# Patient Record
Sex: FEMALE | Race: WHITE | Employment: OTHER | URBAN - METROPOLITAN AREA
[De-identification: names, ages, dates, MRNs, and addresses within clinical notes are randomized per-mention and may not be internally consistent; named-entity substitution may affect disease eponyms.]

---

## 2017-06-10 PROBLEM — E11.3293 MILD NONPROLIFERATIVE DIABETIC RETINOPATHY OF BOTH EYES WITHOUT MACULAR EDEMA ASSOCIATED WITH TYPE 2 DIABETES MELLITUS (HCC): Status: ACTIVE | Noted: 2017-06-10

## 2017-07-20 ENCOUNTER — OFFICE VISIT (OUTPATIENT)
Dept: SURGERY | Facility: CLINIC | Age: 62
End: 2017-07-20

## 2017-07-20 VITALS
BODY MASS INDEX: 19.44 KG/M2 | WEIGHT: 121 LBS | HEART RATE: 78 BPM | HEIGHT: 66 IN | SYSTOLIC BLOOD PRESSURE: 134 MMHG | DIASTOLIC BLOOD PRESSURE: 58 MMHG

## 2017-07-20 DIAGNOSIS — D33.3 ACOUSTIC NEUROMA (HCC): Primary | ICD-10-CM

## 2017-07-20 DIAGNOSIS — R29.810 FACIAL DROOP: ICD-10-CM

## 2017-07-20 PROCEDURE — 82043 UR ALBUMIN QUANTITATIVE: CPT | Performed by: INTERNAL MEDICINE

## 2017-07-20 PROCEDURE — 82570 ASSAY OF URINE CREATININE: CPT | Performed by: INTERNAL MEDICINE

## 2017-07-20 PROCEDURE — 99213 OFFICE O/P EST LOW 20 MIN: CPT | Performed by: NEUROLOGICAL SURGERY

## 2017-07-20 PROCEDURE — 86803 HEPATITIS C AB TEST: CPT | Performed by: INTERNAL MEDICINE

## 2017-07-20 NOTE — PROGRESS NOTES
Neurosurgery Clinic Visit  2017    Harley Hess PCP:  Pippa Turner MD    [birth year] MRN [id number]     HISTORY OF PRESENT ILLNESS:  tom Deshawn is a(n) 57year old female who is here s/p left image guided retrosigmoid craniectomy for tumor

## 2017-07-20 NOTE — PATIENT INSTRUCTIONS
Refill policies:    • Allow 2-3 business days for refills; controlled substances may take longer.   • Contact your pharmacy at least 5 days prior to running out of medication and have them send an electronic request or submit request through the [facility] have a procedure or additional testing performed. [facility]) will contact your insurance carrier to obtain pre-certification or prior authorization.     Unfortunately, JOCELYN has seen an increase in denial of payment even though the p

## 2017-07-26 ENCOUNTER — OFFICE VISIT (OUTPATIENT)
Dept: SURGERY | Facility: CLINIC | Age: 62
End: 2017-07-26

## 2017-07-26 VITALS
HEIGHT: 66 IN | TEMPERATURE: 80 F | SYSTOLIC BLOOD PRESSURE: 175 MMHG | DIASTOLIC BLOOD PRESSURE: 76 MMHG | WEIGHT: 124.38 LBS | HEART RATE: 80 BPM | BODY MASS INDEX: 19.99 KG/M2

## 2017-07-26 DIAGNOSIS — S04.52XA INJURY OF LEFT FACIAL NERVE, INITIAL ENCOUNTER: Primary | ICD-10-CM

## 2017-07-26 DIAGNOSIS — G51.0 FACIAL PARALYSIS: ICD-10-CM

## 2017-07-26 PROCEDURE — 99243 OFF/OP CNSLTJ NEW/EST LOW 30: CPT | Performed by: SURGERY

## 2017-07-26 NOTE — CONSULTS
New Patient Consultation    Chief Complaint: L facial nerve paralysis  History of Present Illness:   Laz Carroll is a 57year old female referred by Dr. Naa Palacios for L faical nerve paralysis.   The patient underwent schwannoma resection in November 2015 impairment          Past Surgical History:  Past Surgical History:  No date: EXTERN DRAIN [address] [facility] PSEUDOCYST,OPEN  6-29-10: HYSTERECTOMY      Comment: exp lap KODAK BSO,  11/23/15: OTHER      Comment: LEFT IMAGE GUIDED CRANIOTOMY FOR RESECTION OF of poor/slow wound healing, weight loss/gain, fertility or hormone problems, cold intolerance, heat intolerance, excessive thirst, or thyroid disease. Allergic/Immunologic:  There is no history of hives, hay fever, angioedema or anaphylaxis.   HEENT:    T fainting/black outs, dizziness, memory problems, loss of sensation/numbness, problems walking, weakness, tingling or burning in hands/feet.    Psychiatric:  There is no history of abusive relationship, bipolar disorder, sleep disturbance,+ anxiety, depressi John Colón to specifically determine the viability of her remaining left facial muscles. If as expected there is limited viability of the motor endplates on the left facial musculature then any nerve transfer  procedure would not be successful.   Ther hematoma, seroma, foreign body reaction, allergic reaction, flap failure, wound dehiscences, delayed wound healing, need for further surgery. Patient understands and  questions were answered. Betina Henderson MD  7/26/2017  12:32 PM

## 2019-02-09 PROCEDURE — 82570 ASSAY OF URINE CREATININE: CPT | Performed by: INTERNAL MEDICINE

## 2019-02-09 PROCEDURE — 82043 UR ALBUMIN QUANTITATIVE: CPT | Performed by: INTERNAL MEDICINE

## 2019-07-24 ENCOUNTER — HOSPITAL ENCOUNTER (EMERGENCY)
Facility: HOSPITAL | Age: 64
Discharge: HOME | End: 2019-07-24
Attending: EMERGENCY MEDICINE
Payer: COMMERCIAL

## 2019-07-24 VITALS
DIASTOLIC BLOOD PRESSURE: 85 MMHG | TEMPERATURE: 98 F | HEIGHT: 66 IN | RESPIRATION RATE: 18 BRPM | HEART RATE: 69 BPM | WEIGHT: 120 LBS | SYSTOLIC BLOOD PRESSURE: 158 MMHG | BODY MASS INDEX: 19.29 KG/M2 | OXYGEN SATURATION: 99 %

## 2019-07-24 DIAGNOSIS — E16.2 HYPOGLYCEMIA: Primary | ICD-10-CM

## 2019-07-24 LAB
GLUCOSE BLD-MCNC: 118 MG/DL (ref 70–99)
GLUCOSE BLD-MCNC: 215 MG/DL (ref 70–99)

## 2019-07-24 PROCEDURE — 99283 EMERGENCY DEPT VISIT LOW MDM: CPT

## 2019-07-24 PROCEDURE — 82962 GLUCOSE BLOOD TEST: CPT

## 2019-07-24 NOTE — ED NOTES
Patient is resting comfortably on cart, awaiting food tray. Pt requesting side rail down, pt given orange juice per RN request for pt to drink.

## 2019-07-24 NOTE — ED NOTES
Pt states she did not eat today, which is normal for her. Pt states she takes a long lasting insulin at night and then prn when eating. Pt states she has had issues over the past couple of weeks since changing insulin.

## 2019-07-24 NOTE — ED PROVIDER NOTES
Patient Seen in: [facility] Emergency Department    History   Patient presents with:  Hypoglycemia (metabolic)    Stated Complaint: low glucose    HPI    Patient is a 69-year-old female, medical history as noted below, here via EMS after she was not Uterine fibroid     large uterine fibroid   • Visual impairment        Past Surgical History:   Procedure Laterality Date   • CRANIOTOMY FOR TUMOR Left 11/25/2015    Performed by Joseph Joseph MD at [facility] MAIN OR   • EXTERN DRAIN [address] [facility] PSEUDOCYST,OPEN cm (5' 6\")   Wt 54.4 kg   SpO2 99%   BMI 19.37 kg/m²         Physical Exam      Constitutional: No distress. Appears well-developed and well-nourished. Head: Normocephalic and atraumatic.    Nose: Nose normal.   Eyes: EOM are normal. Pupils are equal, ro

## 2019-07-24 NOTE — ED NOTES
Pt does not want any further intervention, but is in agreement to eat a food tray. Per livier Wills for general food tray.

## 2019-08-20 NOTE — PROGRESS NOTES
Order given today for EMG to assess for viability of facial muscles for pre-op planning for future facial reconstruction (has known left facial nerve paralysis) - asked specifically for Dr. Ernestine Saldivar for comparison to 2016 study as this is who did her previous
no

## 2021-04-15 ENCOUNTER — APPOINTMENT (OUTPATIENT)
Dept: CT IMAGING | Facility: HOSPITAL | Age: 66
DRG: 638 | End: 2021-04-15
Attending: EMERGENCY MEDICINE
Payer: MEDICARE

## 2021-04-15 ENCOUNTER — HOSPITAL ENCOUNTER (INPATIENT)
Facility: HOSPITAL | Age: 66
LOS: 2 days | Discharge: HOME | DRG: 638 | End: 2021-04-17
Attending: EMERGENCY MEDICINE | Admitting: INTERNAL MEDICINE
Payer: MEDICARE

## 2021-04-15 ENCOUNTER — APPOINTMENT (OUTPATIENT)
Dept: GENERAL RADIOLOGY | Facility: HOSPITAL | Age: 66
DRG: 638 | End: 2021-04-15
Attending: EMERGENCY MEDICINE
Payer: MEDICARE

## 2021-04-15 DIAGNOSIS — T07.XXXA MULTIPLE CONTUSIONS: ICD-10-CM

## 2021-04-15 DIAGNOSIS — E11.10 DIABETIC KETOACIDOSIS WITHOUT COMA ASSOCIATED WITH TYPE 2 DIABETES MELLITUS (HCC): Primary | ICD-10-CM

## 2021-04-15 DIAGNOSIS — S01.01XA SCALP LACERATION, INITIAL ENCOUNTER: ICD-10-CM

## 2021-04-15 PROCEDURE — 99291 CRITICAL CARE FIRST HOUR: CPT | Performed by: NURSE PRACTITIONER

## 2021-04-15 PROCEDURE — 72170 X-RAY EXAM OF PELVIS: CPT | Performed by: EMERGENCY MEDICINE

## 2021-04-15 PROCEDURE — 0HQ0XZZ REPAIR SCALP SKIN, EXTERNAL APPROACH: ICD-10-PCS | Performed by: EMERGENCY MEDICINE

## 2021-04-15 PROCEDURE — 70450 CT HEAD/BRAIN W/O DYE: CPT | Performed by: EMERGENCY MEDICINE

## 2021-04-15 PROCEDURE — 72125 CT NECK SPINE W/O DYE: CPT | Performed by: EMERGENCY MEDICINE

## 2021-04-15 RX ORDER — INSULIN GLARGINE 100 [IU]/ML
26 INJECTION, SOLUTION SUBCUTANEOUS NIGHTLY
Status: ON HOLD | COMMUNITY
End: 2021-04-17

## 2021-04-15 RX ORDER — DEXTROSE AND SODIUM CHLORIDE 5; .45 G/100ML; G/100ML
100 INJECTION, SOLUTION INTRAVENOUS CONTINUOUS PRN
Status: DISCONTINUED | OUTPATIENT
Start: 2021-04-15 | End: 2021-04-16

## 2021-04-15 RX ORDER — HEPARIN SODIUM 5000 [USP'U]/ML
5000 INJECTION, SOLUTION INTRAVENOUS; SUBCUTANEOUS EVERY 8 HOURS SCHEDULED
Status: DISCONTINUED | OUTPATIENT
Start: 2021-04-15 | End: 2021-04-17

## 2021-04-15 RX ORDER — METOCLOPRAMIDE HYDROCHLORIDE 5 MG/ML
5 INJECTION INTRAMUSCULAR; INTRAVENOUS EVERY 8 HOURS PRN
Status: DISCONTINUED | OUTPATIENT
Start: 2021-04-15 | End: 2021-04-17

## 2021-04-15 RX ORDER — SODIUM CHLORIDE 9 MG/ML
INJECTION, SOLUTION INTRAVENOUS CONTINUOUS
Status: DISCONTINUED | OUTPATIENT
Start: 2021-04-15 | End: 2021-04-16

## 2021-04-15 RX ORDER — ASPIRIN 81 MG/1
81 TABLET ORAL DAILY
COMMUNITY

## 2021-04-15 RX ORDER — ONDANSETRON 2 MG/ML
4 INJECTION INTRAMUSCULAR; INTRAVENOUS EVERY 6 HOURS PRN
Status: DISCONTINUED | OUTPATIENT
Start: 2021-04-15 | End: 2021-04-17

## 2021-04-15 RX ORDER — SODIUM CHLORIDE 9 MG/ML
INJECTION, SOLUTION INTRAVENOUS CONTINUOUS
Status: DISCONTINUED | OUTPATIENT
Start: 2021-04-15 | End: 2021-04-15

## 2021-04-15 RX ORDER — DEXTROSE MONOHYDRATE 25 G/50ML
50 INJECTION, SOLUTION INTRAVENOUS
Status: DISCONTINUED | OUTPATIENT
Start: 2021-04-15 | End: 2021-04-17

## 2021-04-15 RX ORDER — ONDANSETRON 2 MG/ML
4 INJECTION INTRAMUSCULAR; INTRAVENOUS EVERY 4 HOURS PRN
Status: DISCONTINUED | OUTPATIENT
Start: 2021-04-15 | End: 2021-04-15

## 2021-04-15 NOTE — ED INITIAL ASSESSMENT (HPI)
Pt fell at home down 12 stairs. Pt has lac to back of head and EMS placed pt in c collar. Pt c/o rt buttock pain. Pt slurring her words.

## 2021-04-16 ENCOUNTER — APPOINTMENT (OUTPATIENT)
Dept: GENERAL RADIOLOGY | Facility: HOSPITAL | Age: 66
DRG: 638 | End: 2021-04-16
Attending: INTERNAL MEDICINE
Payer: MEDICARE

## 2021-04-16 PROCEDURE — 72190 X-RAY EXAM OF PELVIS: CPT | Performed by: INTERNAL MEDICINE

## 2021-04-16 RX ORDER — ACETAMINOPHEN 325 MG/1
650 TABLET ORAL EVERY 6 HOURS PRN
Status: DISCONTINUED | OUTPATIENT
Start: 2021-04-16 | End: 2021-04-17

## 2021-04-16 RX ORDER — ASPIRIN 81 MG/1
81 TABLET ORAL DAILY
Status: DISCONTINUED | OUTPATIENT
Start: 2021-04-16 | End: 2021-04-17

## 2021-04-16 RX ORDER — ATORVASTATIN CALCIUM 10 MG/1
10 TABLET, FILM COATED ORAL NIGHTLY
Refills: 1 | Status: DISCONTINUED | OUTPATIENT
Start: 2021-04-16 | End: 2021-04-17

## 2021-04-16 RX ORDER — SODIUM CHLORIDE 9 MG/ML
INJECTION, SOLUTION INTRAVENOUS CONTINUOUS
Status: DISCONTINUED | OUTPATIENT
Start: 2021-04-16 | End: 2021-04-17

## 2021-04-16 RX ORDER — TETRAHYDROZOLINE HCL 0.05 %
1 DROPS OPHTHALMIC (EYE) 4 TIMES DAILY PRN
Status: DISCONTINUED | OUTPATIENT
Start: 2021-04-16 | End: 2021-04-17

## 2021-04-16 RX ORDER — ESCITALOPRAM OXALATE 20 MG/1
20 TABLET ORAL DAILY
Refills: 1 | Status: DISCONTINUED | OUTPATIENT
Start: 2021-04-16 | End: 2021-04-17

## 2021-04-16 RX ORDER — METOPROLOL SUCCINATE 50 MG/1
50 TABLET, EXTENDED RELEASE ORAL
Status: DISCONTINUED | OUTPATIENT
Start: 2021-04-16 | End: 2021-04-17

## 2021-04-16 RX ORDER — TRAMADOL HYDROCHLORIDE 50 MG/1
50 TABLET ORAL EVERY 12 HOURS PRN
Status: DISCONTINUED | OUTPATIENT
Start: 2021-04-16 | End: 2021-04-17

## 2021-04-16 NOTE — PROGRESS NOTES
04/16/21 1136   Clinical Encounter Type   Visited With Patient   Routine Visit   (Responded to AD consult)   Patient Spiritual Encounters   Spiritual Assessment Completed Yes   Per consult,  invited patient into an Advanced Directive conversatio

## 2021-04-16 NOTE — CONSULTS
Critical Care Consult     Assessment / Plan:  1. DKA  - insulin gtt d/c'd  - levemir and SSI  - IVFs  2. Fall - CT head and spine with no acute process. Possible bilateral inferior pubic ramus fractures  - repeat XR pending  3.  Presyncope - likely due to D mellitus ([facility] [address]    • Uterine fibroid     large uterine fibroid   • Visual impairment        Past Surgical History:   Procedure Laterality Date   • BRAIN SURGERY     • CATARACT     • EXTERN DRAIN PANC PSEUDOCYST,OPEN     • FRACTURE SURGERY      L shoulder soni directed, Disp: 45 mL, Rfl: 3, 4/14/2021 at 2100  Cholecalciferol (VITAMIN D) 1000 UNITS Oral Tab, Take 3,000 units by mouth once daily, Disp: 30 tablet, Rfl: 0, 4/14/2021 at 0900  Multiple Vitamin (ONE-DAILY MULTI VITAMINS) Oral Tab, Take 1 tablet by mout Used    Vaping Use      Vaping Use: Never used    Alcohol use: No      Alcohol/week: 0.0 standard drinks      Comment: quit 1991-- 25 years ago as of 2016    Drug use: No      Family History   Problem Relation Age of Onset   • Cancer Father    • Cancer Mot

## 2021-04-16 NOTE — PROGRESS NOTES
ICU  Critical Care APRN Progress Note    NAME: Sallie Alvarado - ROOM: [address] [place] [address] - MRN: [id number] - Age: 76year old - IBF:4/46/8410    History Of Present Illness:  Sallie Alvarado is a 76year old female with PMHx significant for acoustic neuroma s/p re Uterine fibroid     large uterine fibroid       Social Hx:  Social History    Tobacco Use      Smoking status: Current Every Day Smoker        Packs/day: 0.50        Years: 35.00        Pack years: 17.5        Types: Cigarettes      Smokeless tobacco: Marcial Hopkins MD on 4/15/2021 at 8:20 PM     Finalized by (CST): Raffy Weiss MD on 4/15/2021 at 8:22 PM       CT SPINE CERVICAL (CPT=72125)    Result Date: 4/15/2021  CONCLUSION:   No evidence of acute bony injury to the cervical spine.    Dictated by (CST): Neli Polanco To

## 2021-04-16 NOTE — H&P
[facility] Hospitalist History and Physical      CC: GUMARO    PCP: Dorothy Bonilla MD    History of Present Illness: Patient is a 76year old female with PMH sig for DM, acoustic neuroma sp resection with residual left sided facial droop here with fall yesterday down nightly., Disp: , Rfl:   Losartan Potassium-HCTZ 50-12.5 MG Oral Tab, Take 1 tablet by mouth daily. , Disp: 90 tablet, Rfl: 1  simvastatin 20 MG Oral Tab, Take 1 tablet (20 mg total) by mouth daily. , Disp: 90 tablet, Rfl: 1  Citalopram Hydrobromide 40 MG Or Coughing     Past Social Hx:  Denies heavy ETOH use  Denies illicts    Fam Hx  Family History   Problem Relation Age of Onset   • Cancer Father    • Cancer Mother        Review of Systems: *Negative except as otherwise noted in HPI    Objective:  B Assessment/Plan:   Principal Problem:    Diabetic ketoacidosis without coma associated with type 2 diabetes mellitus ([facility] [address]  Active Problems:    Scalp laceration, initial encounter    Multiple contusions    DAVID [facility]acute kidney [facility]) [facility]    Fall    Pos

## 2021-04-16 NOTE — DIETARY NOTE
[facility]    NUTRITION ASSESSMENT    Pt does not meet malnutrition criteria at this time. NUTRITION INTERVENTION:     1. Meal and Snacks - monitor patient po intake. Encourage adequate po of appropriate diet.    2. Medical Food Supplements -  Ratio kg (116 lb 9.6 oz)  09/09/19 : 54.2 kg (119 lb 6.4 oz)  07/24/19 : 54.4 kg (120 lb)  02/09/19 : 56.2 kg (123 lb 12.8 oz)  09/27/18 : 56.4 kg (124 lb 6.4 oz)       NUTRITION:  Diet: Orders Placed This Encounter      Carbohydrate controlled diet 1800 kcal/60

## 2021-04-16 NOTE — ED QUICK NOTES
Placed patient on bed pan x 2 to attempt to collect urine sample but patient was unsuccessful. Patient back from CT. 2 bags of normal saline finished infusing. Glucose 596. Insulin started at 9units/hr. 3rd bag of normal saline hung at this time.  Patient a

## 2021-04-16 NOTE — PROGRESS NOTES
Received patient this am alert and oriented  NSR on monitor  Lungs clear bilaterally  Lidocaine patch applied to right lower back per [facility]  Ice packs to neck for neck pain  Insulin gtt off at 1030 am per orders  Up to commode with 1 assist  Orders to transf

## 2021-04-16 NOTE — ED PROVIDER NOTES
Patient Seen in: [facility] Emergency Department      History   Patient presents with:  Trauma    Stated Complaint:     HPI/Subjective:   HPI    74-year-old female brought in by EMS after falling down 12 stairs.   Patient reports she felt a little di outbreak on R-anterior thigh   • Uterine fibroid     large uterine fibroid              Past Surgical History:   Procedure Laterality Date   • CRANIOTOMY FOR TUMOR Left 11/25/2015    Performed by Adryan Baxter MD at [facility] MAIN OR   • EXTERN [facility] Resp [facility]   Wt 49 kg   SpO2 100%   BMI 17.36 kg/m²         Physical Exam    General:  Vitals as listed. No acute distress   HEENT: Bleeding from the occipital scalp. There is a 3 cm vertical laceration to the posterior scalp. The galea is intact.   Sclerae order CBC WITH DIFFERENTIAL WITH PLATELET.   Procedure                               Abnormality         Status                     ---------                               -----------         ------                     CBC W/ DIFFERENTIAL[[id number]] is transmitted to the [facility] [address]) which includes the Dose Index Registry.   PATIENT STATED HISTORY: (As transcribed by Technologist)     FINDINGS:  VENTRICLES/SULCI:  Ventricles and sulci are n CONCLUSION:   No evidence of acute bony injury to the cervical spine.    Dictated by (CST): Kwesi Parry MD on 4/15/2021 at 8:32 PM     Finalized by (CST): Kwesi Parry MD on 4/15/2021 at 8:35 PM           Laceration repair:  Laceration was anesthet Laceration repaired as described above. 3 staples used for repair. Staples should be removed in 7 days.   Admission disposition: 4/15/2021  9:33 PM                                  Disposition and Plan     Clinical Impression:  Diabetic ketoacidosis witho

## 2021-04-16 NOTE — PLAN OF CARE
Received pt from ED alert and oriented x4. On insulin gtt. Accu-403. Assisted on bedpan to void. Urine sent. Admission data base completed. Significant other at bedside. On room air. VSS, Afeb. Wants to eat. Ice chips given.   C/o pain to R flank,

## 2021-04-16 NOTE — CM/SW NOTE
CM met with patient to discuss medications. Pt stating that her Lantus cost cate from $25 to $500. Pt stopped using Lantus and adjusted other insulin medication to compensate  without knowledge of her doctor.  Pt has appointment with Dr. Silvestre Mera on [address]

## 2021-04-17 VITALS
BODY MASS INDEX: 18.77 KG/M2 | HEART RATE: 71 BPM | DIASTOLIC BLOOD PRESSURE: 55 MMHG | HEIGHT: 65 IN | TEMPERATURE: 99 F | WEIGHT: 112.63 LBS | OXYGEN SATURATION: 93 % | RESPIRATION RATE: 21 BRPM | SYSTOLIC BLOOD PRESSURE: 145 MMHG

## 2021-04-17 RX ORDER — INSULIN GLARGINE 100 [IU]/ML
26 INJECTION, SOLUTION SUBCUTANEOUS EVERY MORNING
Qty: 1 PEN | Refills: 0 | Status: SHIPPED | OUTPATIENT
Start: 2021-04-17 | End: 2021-07-02

## 2021-04-17 RX ORDER — AMLODIPINE BESYLATE 5 MG/1
5 TABLET ORAL
Status: DISCONTINUED | OUTPATIENT
Start: 2021-04-17 | End: 2021-04-17

## 2021-04-17 RX ORDER — MAGNESIUM OXIDE 400 MG (241.3 MG MAGNESIUM) TABLET
800 TABLET ONCE
Status: COMPLETED | OUTPATIENT
Start: 2021-04-17 | End: 2021-04-17

## 2021-04-17 RX ORDER — POTASSIUM CHLORIDE 20 MEQ/1
40 TABLET, EXTENDED RELEASE ORAL ONCE
Status: COMPLETED | OUTPATIENT
Start: 2021-04-17 | End: 2021-04-17

## 2021-04-17 NOTE — DISCHARGE SUMMARY
General Medicine Discharge Summary     Patient ID:  Emeli Delgado  76year old  9/14/1955    Admit date: 4/15/2021    Discharge date and time:  4/17/21    Attending Physician: Doron Javier, No s/s of acute infection  - CT head OK, Cspine OK, possible mild pelvic fracture though repeat does not show this, she doesn't express any pain  - Not requiring any pain meds, ambulating independently     # DKA  - Corrected now, off insulin drip, restart daily. Losartan Potassium-HCTZ 50-12.5 MG Oral Tab  Take 1 tablet by mouth daily. simvastatin 20 MG Oral Tab  Take 1 tablet (20 mg total) by mouth daily. Citalopram Hydrobromide 40 MG Oral Tab  Take 1 tablet (40 mg total) by mouth daily.     Arthurine Daniele

## 2021-04-17 NOTE — PROGRESS NOTES
Critical Care Progress Note     Assessment / Plan:  1. DKA  - insulin gtt d/c'd  - levemir and SSI  - IVFs  2. Presyncope - likely due to dehydration  - monitor  3. DAVID - improved  - IVFs  4. FEN  - diabetic diet  5. PPx  - subcu heparin  6.  Dispo  - dc ho

## 2021-04-17 NOTE — PLAN OF CARE
Received pt alert and oriented but forgetful at times. On room air. Up to commode with stand by assist several times overnight. Is somewhat impulsive and unsteady at times. Voiding well. Accu 135 last night.   C/o pain to R flank and back of head but r

## 2021-04-19 ENCOUNTER — PATIENT OUTREACH (OUTPATIENT)
Dept: CASE MANAGEMENT | Age: 66
End: 2021-04-19

## 2021-04-19 NOTE — PROGRESS NOTES
1st attempt to contact patient to assist in sched hosp fup-called pt no VM or answer-will attempt later      Follow up for Diabetes (Post-Discharge Follow-Up Orders) Once, Status: Canceled     Priority: Routine       Question: Where to follow-up for Diabet

## 2021-04-20 NOTE — PROGRESS NOTES
2nd attempt to contact patient to assist in sched DM fup-called pt no answer and number goes to fast busy- Pt already has existing appt w/PCP who she sees for DM right now.     Follow up for Diabetes (Post-Discharge Follow-Up Orders) Once, Status: Canceled

## 2021-04-21 NOTE — PROGRESS NOTES
3rd attempt to contact patient to sched DM fup and no answer and phone goes to fast busy signal-closing encounter    Follow up for Diabetes (Post-Discharge Follow-Up Orders) Once, Status: Canceled     Priority: Routine       Question: Where to follow-up fo

## 2021-04-26 ENCOUNTER — PATIENT OUTREACH (OUTPATIENT)
Dept: CASE MANAGEMENT | Age: 66
End: 2021-04-26

## 2021-04-26 NOTE — PROGRESS NOTES
1st attempt to contact Pt    Unable to [address] to review DM Questions, not set up

## 2021-05-03 NOTE — PROGRESS NOTES
2nd attempt to contact Pt.     Unable to [facility] [phone] to review DM Questions, went to fast busy

## 2021-11-29 PROBLEM — E46 PROTEIN-CALORIE MALNUTRITION, UNSPECIFIED SEVERITY (HCC): Status: ACTIVE | Noted: 2021-11-29

## 2022-01-19 PROBLEM — C50.411 MALIGNANT NEOPLASM OF UPPER-OUTER QUADRANT OF RIGHT BREAST IN FEMALE, ESTROGEN RECEPTOR POSITIVE (HCC): Status: ACTIVE | Noted: 2022-01-19

## 2022-01-19 PROBLEM — Z17.0 MALIGNANT NEOPLASM OF UPPER-OUTER QUADRANT OF RIGHT BREAST IN FEMALE, ESTROGEN RECEPTOR POSITIVE (HCC): Status: ACTIVE | Noted: 2022-01-19

## 2022-01-19 PROBLEM — C50.411 MALIGNANT NEOPLASM OF UPPER-OUTER QUADRANT OF RIGHT BREAST IN FEMALE, ESTROGEN RECEPTOR POSITIVE: Status: ACTIVE | Noted: 2022-01-19

## 2022-01-19 PROBLEM — Z17.0 MALIGNANT NEOPLASM OF UPPER-OUTER QUADRANT OF RIGHT BREAST IN FEMALE, ESTROGEN RECEPTOR POSITIVE: Status: ACTIVE | Noted: 2022-01-19

## 2022-08-28 ENCOUNTER — HOSPITAL ENCOUNTER (INPATIENT)
Age: 67
LOS: 10 days | Discharge: HOME HEALTH | DRG: 871 | End: 2022-09-07
Attending: EMERGENCY MEDICINE | Admitting: EMERGENCY MEDICINE

## 2022-08-28 ENCOUNTER — APPOINTMENT (OUTPATIENT)
Dept: ULTRASOUND IMAGING | Age: 67
DRG: 871 | End: 2022-08-28
Attending: EMERGENCY MEDICINE

## 2022-08-28 ENCOUNTER — APPOINTMENT (OUTPATIENT)
Dept: GENERAL RADIOLOGY | Age: 67
DRG: 871 | End: 2022-08-28
Attending: EMERGENCY MEDICINE

## 2022-08-28 ENCOUNTER — APPOINTMENT (OUTPATIENT)
Dept: CT IMAGING | Age: 67
DRG: 871 | End: 2022-08-28
Attending: EMERGENCY MEDICINE

## 2022-08-28 DIAGNOSIS — C50.919 MALIGNANT NEOPLASM OF FEMALE BREAST, UNSPECIFIED ESTROGEN RECEPTOR STATUS, UNSPECIFIED LATERALITY, UNSPECIFIED SITE OF BREAST (CMD): ICD-10-CM

## 2022-08-28 DIAGNOSIS — R50.81 NEUTROPENIC FEVER (CMD): ICD-10-CM

## 2022-08-28 DIAGNOSIS — J18.9 HEALTHCARE-ASSOCIATED PNEUMONIA: ICD-10-CM

## 2022-08-28 DIAGNOSIS — J81.0 ACUTE PULMONARY EDEMA (CMD): ICD-10-CM

## 2022-08-28 DIAGNOSIS — A41.3: Primary | ICD-10-CM

## 2022-08-28 DIAGNOSIS — D70.9 NEUTROPENIC FEVER (CMD): ICD-10-CM

## 2022-08-28 DIAGNOSIS — D69.6 THROMBOCYTOPENIA (CMD): ICD-10-CM

## 2022-08-28 LAB
ABO + RH BLD: NORMAL
ALBUMIN SERPL-MCNC: 1.7 G/DL (ref 3.6–5.1)
ALBUMIN/GLOB SERPL: 0.5 {RATIO} (ref 1–2.4)
ALP SERPL-CCNC: 67 UNITS/L (ref 45–117)
ALT SERPL-CCNC: 17 UNITS/L
ANION GAP SERPL CALC-SCNC: 14 MMOL/L (ref 7–19)
APPEARANCE UR: ABNORMAL
AST SERPL-CCNC: 12 UNITS/L
ATRIAL RATE (BPM): 91
BACTERIA #/AREA URNS HPF: ABNORMAL /HPF
BILIRUB SERPL-MCNC: 0.5 MG/DL (ref 0.2–1)
BILIRUB UR QL STRIP: NEGATIVE
BLD GP AB SCN SERPL QL GEL: NEGATIVE
BLOOD EXPIRATION DATE: NORMAL
BLOOD EXPIRATION DATE: NORMAL
BUN SERPL-MCNC: 57 MG/DL (ref 6–20)
BUN/CREAT SERPL: 46 (ref 7–25)
CALCIUM SERPL-MCNC: 8 MG/DL (ref 8.4–10.2)
CHLORIDE SERPL-SCNC: 90 MMOL/L (ref 97–110)
CO2 SERPL-SCNC: 27 MMOL/L (ref 21–32)
COLOR UR: YELLOW
CREAT SERPL-MCNC: 1.23 MG/DL (ref 0.51–0.95)
CRP SERPL-MCNC: 33.7 MG/DL
D DIMER PPP FEU-MCNC: 2.46 MG/L (FEU)
DEPRECATED RDW RBC: 41.9 FL (ref 39–50)
DISPENSE STATUS: NORMAL
DISPENSE STATUS: NORMAL
ERYTHROCYTE [DISTWIDTH] IN BLOOD: 13.4 % (ref 11–15)
ERYTHROCYTE [SEDIMENTATION RATE] IN BLOOD BY WESTERGREN METHOD: 28 MM/HR (ref 0–20)
FASTING DURATION TIME PATIENT: ABNORMAL H
FLUAV RNA RESP QL NAA+PROBE: NOT DETECTED
FLUBV RNA RESP QL NAA+PROBE: NOT DETECTED
GFR SERPLBLD BASED ON 1.73 SQ M-ARVRAT: 48 ML/MIN
GLOBULIN SER-MCNC: 3.1 G/DL (ref 2–4)
GLUCOSE BLDC GLUCOMTR-MCNC: 101 MG/DL (ref 70–99)
GLUCOSE BLDC GLUCOMTR-MCNC: 140 MG/DL (ref 70–99)
GLUCOSE BLDC GLUCOMTR-MCNC: 150 MG/DL (ref 70–99)
GLUCOSE BLDC GLUCOMTR-MCNC: 154 MG/DL (ref 70–99)
GLUCOSE BLDC GLUCOMTR-MCNC: 192 MG/DL (ref 70–99)
GLUCOSE BLDC GLUCOMTR-MCNC: 34 MG/DL (ref 70–99)
GLUCOSE SERPL-MCNC: 49 MG/DL (ref 70–99)
GLUCOSE UR STRIP-MCNC: NEGATIVE MG/DL
HCT VFR BLD CALC: 22.2 % (ref 36–46.5)
HGB BLD-MCNC: 8 G/DL (ref 12–15.5)
HGB UR QL STRIP: ABNORMAL
HYALINE CASTS #/AREA URNS LPF: ABNORMAL /LPF
ISBT BLOOD TYPE: 6200
ISBT BLOOD TYPE: 6200
ISSUE DATE/TIME: NORMAL
ISSUE DATE/TIME: NORMAL
KETONES UR STRIP-MCNC: NEGATIVE MG/DL
LACTATE BLDV-SCNC: 7 MMOL/L (ref 0–2)
LACTATE BLDV-SCNC: 7.8 MMOL/L (ref 0–2)
LEUKOCYTE ESTERASE UR QL STRIP: NEGATIVE
MCH RBC QN AUTO: 32 PG (ref 26–34)
MCHC RBC AUTO-ENTMCNC: 36 G/DL (ref 32–36.5)
MCV RBC AUTO: 88.8 FL (ref 78–100)
NITRITE UR QL STRIP: NEGATIVE
NRBC BLD MANUAL-RTO: 0 /100 WBC
NT-PROBNP SERPL-MCNC: 6970 PG/ML
P AXIS (DEGREES): 74
PH UR STRIP: 5 [PH] (ref 5–7)
PLATELET # BLD AUTO: 4 K/MCL (ref 140–450)
POTASSIUM SERPL-SCNC: 2.9 MMOL/L (ref 3.4–5.1)
PR-INTERVAL (MSEC): 130
PROCALCITONIN SERPL IA-MCNC: 50.35 NG/ML
PRODUCT CODE: NORMAL
PRODUCT CODE: NORMAL
PRODUCT DESCRIPTION: NORMAL
PRODUCT DESCRIPTION: NORMAL
PRODUCT ID: NORMAL
PRODUCT ID: NORMAL
PROT SERPL-MCNC: 4.8 G/DL (ref 6.4–8.2)
PROT UR STRIP-MCNC: NEGATIVE MG/DL
QRS-INTERVAL (MSEC): 104
QT-INTERVAL (MSEC): 418
QTC: 514
R AXIS (DEGREES): 74
RBC # BLD: 2.5 MIL/MCL (ref 4–5.2)
RBC #/AREA URNS HPF: ABNORMAL /HPF
REPORT TEXT: NORMAL
RSV AG NPH QL IA.RAPID: NOT DETECTED
SARS-COV-2 RNA RESP QL NAA+PROBE: NOT DETECTED
SERVICE CMNT-IMP: NORMAL
SERVICE CMNT-IMP: NORMAL
SODIUM SERPL-SCNC: 128 MMOL/L (ref 135–145)
SP GR UR STRIP: 1.02 (ref 1–1.03)
SQUAMOUS #/AREA URNS HPF: ABNORMAL /HPF
T AXIS (DEGREES): 72
TYPE AND SCREEN EXPIRATION DATE: NORMAL
UNIT BLOOD TYPE: NORMAL
UNIT BLOOD TYPE: NORMAL
UNIT NUMBER: NORMAL
UNIT NUMBER: NORMAL
UROBILINOGEN UR STRIP-MCNC: 0.2 MG/DL
VENTRICULAR RATE EKG/MIN (BPM): 91
WBC # BLD: 0.4 K/MCL (ref 4.2–11)
WBC #/AREA URNS HPF: ABNORMAL /HPF

## 2022-08-28 PROCEDURE — 96375 TX/PRO/DX INJ NEW DRUG ADDON: CPT

## 2022-08-28 PROCEDURE — 86900 BLOOD TYPING SEROLOGIC ABO: CPT | Performed by: EMERGENCY MEDICINE

## 2022-08-28 PROCEDURE — 85027 COMPLETE CBC AUTOMATED: CPT | Performed by: EMERGENCY MEDICINE

## 2022-08-28 PROCEDURE — 84145 PROCALCITONIN (PCT): CPT | Performed by: EMERGENCY MEDICINE

## 2022-08-28 PROCEDURE — C9803 HOPD COVID-19 SPEC COLLECT: HCPCS

## 2022-08-28 PROCEDURE — 96365 THER/PROPH/DIAG IV INF INIT: CPT

## 2022-08-28 PROCEDURE — 93010 ELECTROCARDIOGRAM REPORT: CPT | Performed by: INTERNAL MEDICINE

## 2022-08-28 PROCEDURE — 0241U COVID/FLU/RSV PANEL: CPT | Performed by: EMERGENCY MEDICINE

## 2022-08-28 PROCEDURE — 10004651 HB RX, NO CHARGE ITEM: Performed by: EMERGENCY MEDICINE

## 2022-08-28 PROCEDURE — 10004180 HB COUNTER-TRANSPORT

## 2022-08-28 PROCEDURE — G1004 CDSM NDSC: HCPCS

## 2022-08-28 PROCEDURE — 10002800 HB RX 250 W HCPCS: Performed by: EMERGENCY MEDICINE

## 2022-08-28 PROCEDURE — 93970 EXTREMITY STUDY: CPT

## 2022-08-28 PROCEDURE — 36415 COLL VENOUS BLD VENIPUNCTURE: CPT

## 2022-08-28 PROCEDURE — 10002801 HB RX 250 W/O HCPCS: Performed by: EMERGENCY MEDICINE

## 2022-08-28 PROCEDURE — 83880 ASSAY OF NATRIURETIC PEPTIDE: CPT | Performed by: EMERGENCY MEDICINE

## 2022-08-28 PROCEDURE — 10002807 HB RX 258: Performed by: EMERGENCY MEDICINE

## 2022-08-28 PROCEDURE — 87077 CULTURE AEROBIC IDENTIFY: CPT | Performed by: EMERGENCY MEDICINE

## 2022-08-28 PROCEDURE — 86140 C-REACTIVE PROTEIN: CPT | Performed by: EMERGENCY MEDICINE

## 2022-08-28 PROCEDURE — 93005 ELECTROCARDIOGRAM TRACING: CPT | Performed by: EMERGENCY MEDICINE

## 2022-08-28 PROCEDURE — 87641 MR-STAPH DNA AMP PROBE: CPT | Performed by: EMERGENCY MEDICINE

## 2022-08-28 PROCEDURE — P9073 PLATELETS PHERESIS PATH REDU: HCPCS

## 2022-08-28 PROCEDURE — 81001 URINALYSIS AUTO W/SCOPE: CPT | Performed by: EMERGENCY MEDICINE

## 2022-08-28 PROCEDURE — 10002803 HB RX 637: Performed by: EMERGENCY MEDICINE

## 2022-08-28 PROCEDURE — 83605 ASSAY OF LACTIC ACID: CPT | Performed by: EMERGENCY MEDICINE

## 2022-08-28 PROCEDURE — 85652 RBC SED RATE AUTOMATED: CPT | Performed by: EMERGENCY MEDICINE

## 2022-08-28 PROCEDURE — 94660 CPAP INITIATION&MGMT: CPT

## 2022-08-28 PROCEDURE — 10004281 HB COUNTER-STAFF TIME PER 15 MIN

## 2022-08-28 PROCEDURE — 99285 EMERGENCY DEPT VISIT HI MDM: CPT

## 2022-08-28 PROCEDURE — 85379 FIBRIN DEGRADATION QUANT: CPT | Performed by: EMERGENCY MEDICINE

## 2022-08-28 PROCEDURE — 10002801 HB RX 250 W/O HCPCS

## 2022-08-28 PROCEDURE — 10002807 HB RX 258: Performed by: INTERNAL MEDICINE

## 2022-08-28 PROCEDURE — 71045 X-RAY EXAM CHEST 1 VIEW: CPT

## 2022-08-28 PROCEDURE — 10002805 HB CONTRAST AGENT: Performed by: EMERGENCY MEDICINE

## 2022-08-28 PROCEDURE — 96367 TX/PROPH/DG ADDL SEQ IV INF: CPT

## 2022-08-28 PROCEDURE — 71275 CT ANGIOGRAPHY CHEST: CPT

## 2022-08-28 PROCEDURE — 82962 GLUCOSE BLOOD TEST: CPT

## 2022-08-28 PROCEDURE — 80053 COMPREHEN METABOLIC PANEL: CPT | Performed by: EMERGENCY MEDICINE

## 2022-08-28 PROCEDURE — 10000008 HB ROOM CHARGE ICU OR CCU

## 2022-08-28 PROCEDURE — 87154 CUL TYP ID BLD PTHGN 6+ TRGT: CPT | Performed by: EMERGENCY MEDICINE

## 2022-08-28 PROCEDURE — 10002801 HB RX 250 W/O HCPCS: Performed by: INTERNAL MEDICINE

## 2022-08-28 RX ORDER — GUAIFENESIN 600 MG/1
600 TABLET, EXTENDED RELEASE ORAL 2 TIMES DAILY
COMMUNITY
End: 2023-05-05

## 2022-08-28 RX ORDER — ACETAMINOPHEN 325 MG/1
650 TABLET ORAL EVERY 4 HOURS PRN
Status: DISCONTINUED | OUTPATIENT
Start: 2022-08-28 | End: 2022-09-07 | Stop reason: HOSPADM

## 2022-08-28 RX ORDER — SODIUM CHLORIDE 9 MG/ML
INJECTION, SOLUTION INTRAVENOUS CONTINUOUS PRN
Status: DISCONTINUED | OUTPATIENT
Start: 2022-08-28 | End: 2022-08-29 | Stop reason: ALTCHOICE

## 2022-08-28 RX ORDER — LOSARTAN POTASSIUM AND HYDROCHLOROTHIAZIDE 12.5; 1 MG/1; MG/1
1 TABLET ORAL DAILY
Status: ON HOLD | COMMUNITY
End: 2022-09-07 | Stop reason: HOSPADM

## 2022-08-28 RX ORDER — CITALOPRAM 40 MG/1
40 TABLET ORAL DAILY
COMMUNITY

## 2022-08-28 RX ORDER — PROCHLORPERAZINE MALEATE 10 MG
10 TABLET ORAL EVERY 6 HOURS PRN
COMMUNITY
End: 2023-05-05

## 2022-08-28 RX ORDER — NICOTINE POLACRILEX 4 MG
30 LOZENGE BUCCAL PRN
Status: DISCONTINUED | OUTPATIENT
Start: 2022-08-28 | End: 2022-09-07 | Stop reason: HOSPADM

## 2022-08-28 RX ORDER — POLYETHYLENE GLYCOL 3350 17 G/17G
17 POWDER, FOR SOLUTION ORAL DAILY PRN
Status: DISCONTINUED | OUTPATIENT
Start: 2022-08-28 | End: 2022-09-07 | Stop reason: HOSPADM

## 2022-08-28 RX ORDER — POTASSIUM CHLORIDE 14.9 MG/ML
20 INJECTION INTRAVENOUS ONCE
Status: COMPLETED | OUTPATIENT
Start: 2022-08-28 | End: 2022-08-28

## 2022-08-28 RX ORDER — FAMOTIDINE 10 MG/ML
20 INJECTION, SOLUTION INTRAVENOUS DAILY
Status: DISCONTINUED | OUTPATIENT
Start: 2022-08-28 | End: 2022-08-29

## 2022-08-28 RX ORDER — ONDANSETRON HYDROCHLORIDE 8 MG/1
8 TABLET, FILM COATED ORAL EVERY 8 HOURS PRN
COMMUNITY
End: 2023-05-05

## 2022-08-28 RX ORDER — IPRATROPIUM BROMIDE AND ALBUTEROL SULFATE 2.5; .5 MG/3ML; MG/3ML
3 SOLUTION RESPIRATORY (INHALATION)
Status: DISCONTINUED | OUTPATIENT
Start: 2022-08-28 | End: 2022-09-07 | Stop reason: HOSPADM

## 2022-08-28 RX ORDER — ONDANSETRON HYDROCHLORIDE 8 MG/1
8 TABLET, FILM COATED ORAL EVERY 8 HOURS PRN
Status: DISCONTINUED | OUTPATIENT
Start: 2022-08-28 | End: 2022-09-07 | Stop reason: HOSPADM

## 2022-08-28 RX ORDER — METOPROLOL SUCCINATE 50 MG/1
50 TABLET, EXTENDED RELEASE ORAL DAILY
Status: ON HOLD | COMMUNITY
End: 2022-09-07 | Stop reason: HOSPADM

## 2022-08-28 RX ORDER — DEXTROSE MONOHYDRATE 25 G/50ML
INJECTION, SOLUTION INTRAVENOUS
Status: COMPLETED
Start: 2022-08-28 | End: 2022-08-28

## 2022-08-28 RX ORDER — NICOTINE POLACRILEX 4 MG
15 LOZENGE BUCCAL PRN
Status: DISCONTINUED | OUTPATIENT
Start: 2022-08-28 | End: 2022-09-07 | Stop reason: HOSPADM

## 2022-08-28 RX ORDER — AMOXICILLIN 250 MG
2 CAPSULE ORAL DAILY PRN
Status: DISCONTINUED | OUTPATIENT
Start: 2022-08-28 | End: 2022-09-07 | Stop reason: HOSPADM

## 2022-08-28 RX ORDER — CITALOPRAM 20 MG/1
20 TABLET ORAL DAILY
Status: DISCONTINUED | OUTPATIENT
Start: 2022-08-28 | End: 2022-09-07 | Stop reason: HOSPADM

## 2022-08-28 RX ORDER — AMLODIPINE BESYLATE 5 MG/1
5 TABLET ORAL 2 TIMES DAILY
Status: DISCONTINUED | OUTPATIENT
Start: 2022-08-28 | End: 2022-09-07 | Stop reason: HOSPADM

## 2022-08-28 RX ORDER — DEXAMETHASONE 4 MG/1
8 TABLET ORAL SEE ADMIN INSTRUCTIONS
COMMUNITY
End: 2023-05-05

## 2022-08-28 RX ORDER — BISACODYL 10 MG
10 SUPPOSITORY, RECTAL RECTAL DAILY PRN
Status: DISCONTINUED | OUTPATIENT
Start: 2022-08-28 | End: 2022-09-07 | Stop reason: HOSPADM

## 2022-08-28 RX ORDER — DIPHENHYDRAMINE HCL 25 MG
25 CAPSULE ORAL EVERY 6 HOURS PRN
COMMUNITY

## 2022-08-28 RX ORDER — AMLODIPINE BESYLATE 5 MG/1
5 TABLET ORAL 2 TIMES DAILY
COMMUNITY
End: 2023-05-05

## 2022-08-28 RX ORDER — SODIUM CHLORIDE 9 MG/ML
INJECTION, SOLUTION INTRAVENOUS CONTINUOUS PRN
Status: DISCONTINUED | OUTPATIENT
Start: 2022-08-28 | End: 2022-08-30 | Stop reason: SDUPTHER

## 2022-08-28 RX ORDER — DEXTROSE MONOHYDRATE 25 G/50ML
25 INJECTION, SOLUTION INTRAVENOUS PRN
Status: DISCONTINUED | OUTPATIENT
Start: 2022-08-28 | End: 2022-09-07 | Stop reason: HOSPADM

## 2022-08-28 RX ORDER — 0.9 % SODIUM CHLORIDE 0.9 %
2 VIAL (ML) INJECTION EVERY 12 HOURS SCHEDULED
Status: DISCONTINUED | OUTPATIENT
Start: 2022-08-28 | End: 2022-09-07 | Stop reason: HOSPADM

## 2022-08-28 RX ORDER — INSULIN GLARGINE 100 [IU]/ML
20 INJECTION, SOLUTION SUBCUTANEOUS NIGHTLY
Status: ON HOLD | COMMUNITY
End: 2023-05-09 | Stop reason: SDUPTHER

## 2022-08-28 RX ORDER — SIMVASTATIN 20 MG
20 TABLET ORAL NIGHTLY
Status: ON HOLD | COMMUNITY
End: 2023-05-09 | Stop reason: SDUPTHER

## 2022-08-28 RX ORDER — METOPROLOL SUCCINATE 100 MG/1
50 TABLET, EXTENDED RELEASE ORAL DAILY
Status: DISCONTINUED | OUTPATIENT
Start: 2022-08-28 | End: 2022-08-29

## 2022-08-28 RX ORDER — ATORVASTATIN CALCIUM 10 MG/1
10 TABLET, FILM COATED ORAL NIGHTLY
Status: DISCONTINUED | OUTPATIENT
Start: 2022-08-28 | End: 2022-09-07 | Stop reason: HOSPADM

## 2022-08-28 RX ORDER — DEXTROSE MONOHYDRATE 25 G/50ML
12.5 INJECTION, SOLUTION INTRAVENOUS PRN
Status: DISCONTINUED | OUTPATIENT
Start: 2022-08-28 | End: 2022-09-07 | Stop reason: HOSPADM

## 2022-08-28 RX ORDER — ACETAMINOPHEN 325 MG/1
650 TABLET ORAL EVERY 6 HOURS PRN
COMMUNITY

## 2022-08-28 RX ORDER — POTASSIUM CHLORIDE 14.9 MG/ML
20 INJECTION INTRAVENOUS ONCE
Status: COMPLETED | OUTPATIENT
Start: 2022-08-28 | End: 2022-08-29

## 2022-08-28 RX ORDER — DEXTROSE MONOHYDRATE 25 G/50ML
25 INJECTION, SOLUTION INTRAVENOUS ONCE
Status: COMPLETED | OUTPATIENT
Start: 2022-08-28 | End: 2022-08-28

## 2022-08-28 RX ADMIN — CEFEPIME 1000 MG: 1 INJECTION, POWDER, FOR SOLUTION INTRAMUSCULAR; INTRAVENOUS at 14:58

## 2022-08-28 RX ADMIN — IOHEXOL 75 ML: 350 INJECTION, SOLUTION INTRAVENOUS at 16:26

## 2022-08-28 RX ADMIN — AZITHROMYCIN 500 MG: 500 INJECTION, POWDER, LYOPHILIZED, FOR SOLUTION INTRAVENOUS at 15:38

## 2022-08-28 RX ADMIN — FAMOTIDINE 20 MG: 10 INJECTION INTRAVENOUS at 21:18

## 2022-08-28 RX ADMIN — SODIUM CHLORIDE, POTASSIUM CHLORIDE, SODIUM LACTATE AND CALCIUM CHLORIDE 1440 ML: 600; 310; 30; 20 INJECTION, SOLUTION INTRAVENOUS at 14:30

## 2022-08-28 RX ADMIN — POTASSIUM CHLORIDE 20 MEQ: 14.9 INJECTION, SOLUTION INTRAVENOUS at 16:15

## 2022-08-28 RX ADMIN — ATORVASTATIN CALCIUM 10 MG: 20 TABLET, FILM COATED ORAL at 21:17

## 2022-08-28 RX ADMIN — DOXYCYCLINE 100 MG: 100 INJECTION, POWDER, LYOPHILIZED, FOR SOLUTION INTRAVENOUS at 22:02

## 2022-08-28 RX ADMIN — SODIUM CHLORIDE, PRESERVATIVE FREE 2 ML: 5 INJECTION INTRAVENOUS at 21:30

## 2022-08-28 RX ADMIN — CEFEPIME HYDROCHLORIDE 2000 MG: 2 INJECTION, POWDER, FOR SOLUTION INTRAVENOUS at 21:33

## 2022-08-28 RX ADMIN — DEXTROSE MONOHYDRATE 25 G: 25 INJECTION, SOLUTION INTRAVENOUS at 14:15

## 2022-08-28 RX ADMIN — VANCOMYCIN HYDROCHLORIDE 1000 MG: 1 INJECTION, POWDER, LYOPHILIZED, FOR SOLUTION INTRAVENOUS at 16:37

## 2022-08-28 RX ADMIN — POTASSIUM CHLORIDE 20 MEQ: 14.9 INJECTION, SOLUTION INTRAVENOUS at 20:33

## 2022-08-28 RX ADMIN — CITALOPRAM HYDROBROMIDE 20 MG: 20 TABLET ORAL at 21:17

## 2022-08-28 ASSESSMENT — LIFESTYLE VARIABLES
LAST DRINK YOU HAD: DENIES INTAKE
TRANSFERRING: INDEPENDENT
ARE YOU BLIND OR DO YOU HAVE SERIOUS DIFFICULTY SEEING, EVEN WHEN WEARING GLASSES: YES
HOW MUCH WEIGHT HAVE YOU LOST: 2
HOW MANY STANDARD DRINKS CONTAINING ALCOHOL DO YOU HAVE ON A TYPICAL DAY: 0,1 OR 2
SHORT OF BREATH OR FATIGUE WITH ADLS: YES
SMOKING_YEARS: NO
ALCOHOL_USE_STATUS: NO OR LOW RISK WITH VALIDATED TOOL
ARE YOU DEAF OR DO YOU HAVE SERIOUS DIFFICULTY  HEARING: YES
ADL SCORE: 12
RECENTLY LOST WEIGHT WITHOUT TRYING: 0
TOILETING: INDEPENDENT
DRESSING: INDEPENDENT
ADL BEFORE ADMISSION: NEEDS/REQUIRES ASSISTANCE
ADL NEEDS ASSIST: NO
HISTORY OF PROBLEMS WHEN YOU STOP DRINKING ALCOHOL: NO
ENJOYING LIFE WITHOUT USING ALCOHOL OR DRUGS: INDEPENDENT
HAVE YOU BEEN EATING POORLY BECAUSE OF A DECREASED APPETITE: 1
RECENT DECLINE IN ADLS: YES, ACUTE ILLNESS WITHOUT THERAPY NEEDS
HOW OFTEN DO YOU HAVE A DRINK CONTAINING ALCOHOL: NEVER
AUDIT-C TOTAL SCORE: 0
IS PATIENT ABLE TO COMPLETE ASSESSMENT AT THIS TIME: YES
HOW OFTEN DO YOU HAVE 6 OR MORE DRINKS ON ONE OCCASION: NEVER
FEEDING: INDEPENDENT
CHRONIC/CANCER PAIN PRESENT: NO
CONTINENCE: INDEPENDENT
MALNUTRITION SCORING TOOL (MST) SCORE: 3

## 2022-08-28 ASSESSMENT — PAIN SCALES - GENERAL
PAINLEVEL_OUTOF10: 0
PAINLEVEL_OUTOF10: 5

## 2022-08-28 ASSESSMENT — ACTIVITIES OF DAILY LIVING (ADL): SENSORY_SUPPORT_DEVICES: DENTURES

## 2022-08-28 ASSESSMENT — COLUMBIA-SUICIDE SEVERITY RATING SCALE - C-SSRS
6. HAVE YOU EVER DONE ANYTHING, STARTED TO DO ANYTHING, OR PREPARED TO DO ANYTHING TO END YOUR LIFE?: NO
2. HAVE YOU ACTUALLY HAD ANY THOUGHTS OF KILLING YOURSELF?: NO
1. WITHIN THE PAST MONTH, HAVE YOU WISHED YOU WERE DEAD OR WISHED YOU COULD GO TO SLEEP AND NOT WAKE UP?: NO
IS THE PATIENT ABLE TO COMPLETE C-SSRS: YES

## 2022-08-28 ASSESSMENT — PATIENT HEALTH QUESTIONNAIRE - PHQ9
CLINICAL INTERPRETATION OF PHQ2 SCORE: NO FURTHER SCREENING NEEDED
IS PATIENT ABLE TO COMPLETE PHQ2 OR PHQ9: YES
1. LITTLE INTEREST OR PLEASURE IN DOING THINGS: NOT AT ALL
SUM OF ALL RESPONSES TO PHQ9 QUESTIONS 1 AND 2: 0
SUM OF ALL RESPONSES TO PHQ9 QUESTIONS 1 AND 2: 0
2. FEELING DOWN, DEPRESSED OR HOPELESS: NOT AT ALL

## 2022-08-28 ASSESSMENT — COGNITIVE AND FUNCTIONAL STATUS - GENERAL
BECAUSE OF A PHYSICAL, MENTAL, OR EMOTIONAL CONDITION, DO YOU HAVE DIFFICULTY DOING ERRANDS ALONE: YES
DO YOU HAVE DIFFICULTY DRESSING OR BATHING: NO
DO YOU HAVE SERIOUS DIFFICULTY WALKING OR CLIMBING STAIRS: NO
BECAUSE OF A PHYSICAL, MENTAL, OR EMOTIONAL CONDITION, DO YOU HAVE SERIOUS DIFFICULTY CONCENTRATING, REMEMBERING OR MAKING DECISIONS: NO

## 2022-08-29 ENCOUNTER — APPOINTMENT (OUTPATIENT)
Dept: CARDIOLOGY | Age: 67
DRG: 871 | End: 2022-08-29
Attending: EMERGENCY MEDICINE

## 2022-08-29 LAB
ACANTHOCYTES BLD QL SMEAR: ABNORMAL
ALBUMIN SERPL-MCNC: 1.5 G/DL (ref 3.6–5.1)
ALBUMIN/GLOB SERPL: 0.6 {RATIO} (ref 1–2.4)
ALP SERPL-CCNC: 57 UNITS/L (ref 45–117)
ALT SERPL-CCNC: 14 UNITS/L
ANION GAP SERPL CALC-SCNC: 11 MMOL/L (ref 7–19)
AORTIC VALVE AREA: NORMAL
ASCENDING AORTA (AAD): 3.1
AST SERPL-CCNC: 14 UNITS/L
ATRIAL RATE (BPM): 88
AV MEAN GRADIENT (AVMG): NORMAL
AV MEAN VELOCITY (AVMV): NORMAL
AV PEAK GRADIENT (AVPG): NORMAL
AV PEAK VELOCITY (AVPV): NORMAL
AV STENOSIS SEVERITY TEXT: NORMAL
BASOPHILS # BLD: 0 K/MCL (ref 0–0.3)
BASOPHILS NFR BLD: 0 %
BILIRUB SERPL-MCNC: 1 MG/DL (ref 0.2–1)
BLOOD EXPIRATION DATE: NORMAL
BLOOD EXPIRATION DATE: NORMAL
BUN SERPL-MCNC: 52 MG/DL (ref 6–20)
BUN/CREAT SERPL: 51 (ref 7–25)
CALCIUM SERPL-MCNC: 7.6 MG/DL (ref 8.4–10.2)
CHLORIDE SERPL-SCNC: 95 MMOL/L (ref 97–110)
CO2 SERPL-SCNC: 29 MMOL/L (ref 21–32)
CORTIS SERPL-MCNC: 55.7 MCG/DL (ref 3.4–22.5)
CREAT SERPL-MCNC: 1.01 MG/DL (ref 0.51–0.95)
CROSSMATCH RESULT: NORMAL
DEPRECATED RDW RBC: 41.2 FL (ref 39–50)
DEPRECATED RDW RBC: 42.2 FL (ref 39–50)
DISPENSE STATUS: NORMAL
DISPENSE STATUS: NORMAL
DOP CALC LVOT PEAK VEL (LVOTPV): 0.7
E WAVE DECELARATION TIME (MDT): 215
EOSINOPHIL # BLD: 0 K/MCL (ref 0–0.5)
EOSINOPHIL NFR BLD: 0 %
ERYTHROCYTE [DISTWIDTH] IN BLOOD: 13.4 % (ref 11–15)
ERYTHROCYTE [DISTWIDTH] IN BLOOD: 13.7 % (ref 11–15)
EST RIGHT VENT SYSTOLIC PRESSURE BY TRICUSPID REGURGITATION JET (RVSP): 27.4
FASTING DURATION TIME PATIENT: ABNORMAL H
GFR SERPLBLD BASED ON 1.73 SQ M-ARVRAT: 61 ML/MIN
GLOBULIN SER-MCNC: 2.7 G/DL (ref 2–4)
GLUCOSE BLDC GLUCOMTR-MCNC: 178 MG/DL (ref 70–99)
GLUCOSE BLDC GLUCOMTR-MCNC: 213 MG/DL (ref 70–99)
GLUCOSE BLDC GLUCOMTR-MCNC: 96 MG/DL (ref 70–99)
GLUCOSE SERPL-MCNC: 73 MG/DL (ref 70–99)
HBA1C MFR BLD: 9.5 % (ref 4.5–5.6)
HCT VFR BLD CALC: 17.5 % (ref 36–46.5)
HCT VFR BLD CALC: 22.8 % (ref 36–46.5)
HGB BLD-MCNC: 6.3 G/DL (ref 12–15.5)
HGB BLD-MCNC: 8.3 G/DL (ref 12–15.5)
INTERVENTRICULAR SEPTUM IN END DIASTOLE (IVSD): 0.92
ISBT BLOOD TYPE: 5100
ISBT BLOOD TYPE: 6200
ISSUE DATE/TIME: NORMAL
ISSUE DATE/TIME: NORMAL
LACTATE BLDV-SCNC: 2.4 MMOL/L (ref 0–2)
LACTATE BLDV-SCNC: 3.4 MMOL/L (ref 0–2)
LEFT INTERNAL DIMENSION IN SYSTOLE (LVSD): 2.8
LEFT VENTRICULAR INTERNAL DIMENSION IN DIASTOLE (LVDD): 4.45
LEFT VENTRICULAR POSTERIOR WALL IN END DIASTOLE (LVPW): 1
LV EF: NORMAL %
LVOT VTI (LVOTVTI): 17.4
LYMPHOCYTES # BLD: 0.1 K/MCL (ref 1–4)
LYMPHOCYTES NFR BLD: 4 %
MAGNESIUM SERPL-MCNC: 1.4 MG/DL (ref 1.7–2.4)
MCH RBC QN AUTO: 32 PG (ref 26–34)
MCH RBC QN AUTO: 32.3 PG (ref 26–34)
MCHC RBC AUTO-ENTMCNC: 36 G/DL (ref 32–36.5)
MCHC RBC AUTO-ENTMCNC: 36.4 G/DL (ref 32–36.5)
MCV RBC AUTO: 88 FL (ref 78–100)
MCV RBC AUTO: 89.7 FL (ref 78–100)
METAMYELOCYTES NFR BLD: 1 % (ref 0–2)
MONOCYTES # BLD: 0 K/MCL (ref 0.3–0.9)
MONOCYTES NFR BLD: 3 %
MRSA DNA SPEC QL NAA+PROBE: NOT DETECTED
MV E TISSUE VEL LAT (MELV): 12.1
MV E TISSUE VEL MED (MESV): 8.16
MV E WAVE VEL/E TISSUE VEL MED(MSR): 13.36
MV PEAK A VELOCITY (MVPAV): 0.91
MV PEAK E VELOCITY (MVPEV): 1.09
NEUTROPHILS # BLD: 1.5 K/MCL (ref 1.8–7.7)
NEUTS BAND NFR BLD: 7 % (ref 0–10)
NEUTS SEG NFR BLD: 85 %
NRBC BLD MANUAL-RTO: 1 /100 WBC
NRBC BLD MANUAL-RTO: 1 /100 WBC
PHOSPHATE SERPL-MCNC: 2 MG/DL (ref 2.4–4.7)
PLAT MORPH BLD: NORMAL
PLATELET # BLD AUTO: 17 K/MCL (ref 140–450)
PLATELET # BLD AUTO: 42 K/MCL (ref 140–450)
POTASSIUM SERPL-SCNC: 3.1 MMOL/L (ref 3.4–5.1)
POTASSIUM SERPL-SCNC: 3.7 MMOL/L (ref 3.4–5.1)
PROCALCITONIN SERPL IA-MCNC: 39.8 NG/ML
PRODUCT CODE: NORMAL
PRODUCT CODE: NORMAL
PRODUCT DESCRIPTION: NORMAL
PRODUCT DESCRIPTION: NORMAL
PRODUCT ID: NORMAL
PRODUCT ID: NORMAL
PROT SERPL-MCNC: 4.2 G/DL (ref 6.4–8.2)
QRS-INTERVAL (MSEC): 80
QT-INTERVAL (MSEC): 358
QTC: 486
R AXIS (DEGREES): 62
RBC # BLD: 1.95 MIL/MCL (ref 4–5.2)
RBC # BLD: 2.59 MIL/MCL (ref 4–5.2)
REPORT TEXT: NORMAL
SODIUM SERPL-SCNC: 132 MMOL/L (ref 135–145)
T AXIS (DEGREES): 71
TRICUSPID VALVE PEAK REGURGITATION VELOCITY (TRPV): 2.47
TSH SERPL-ACNC: 0.47 MCUNITS/ML (ref 0.35–5)
TV ESTIMATED RIGHT ARTERIAL PRESSURE (RAP): 3
UNIT BLOOD TYPE: NORMAL
UNIT BLOOD TYPE: NORMAL
UNIT NUMBER: NORMAL
UNIT NUMBER: NORMAL
VENTRICULAR RATE EKG/MIN (BPM): 111
WBC # BLD: 1.6 K/MCL (ref 4.2–11)
WBC # BLD: 3.2 K/MCL (ref 4.2–11)
WBC TOXIC VACUOLES BLD QL SMEAR: PRESENT

## 2022-08-29 PROCEDURE — 10004651 HB RX, NO CHARGE ITEM: Performed by: EMERGENCY MEDICINE

## 2022-08-29 PROCEDURE — 84443 ASSAY THYROID STIM HORMONE: CPT | Performed by: EMERGENCY MEDICINE

## 2022-08-29 PROCEDURE — 13003289 HB OXYGEN THERAPY DAILY

## 2022-08-29 PROCEDURE — 10002800 HB RX 250 W HCPCS: Performed by: INTERNAL MEDICINE

## 2022-08-29 PROCEDURE — 85027 COMPLETE CBC AUTOMATED: CPT | Performed by: EMERGENCY MEDICINE

## 2022-08-29 PROCEDURE — 93005 ELECTROCARDIOGRAM TRACING: CPT | Performed by: EMERGENCY MEDICINE

## 2022-08-29 PROCEDURE — 10002807 HB RX 258: Performed by: INTERNAL MEDICINE

## 2022-08-29 PROCEDURE — 93306 TTE W/DOPPLER COMPLETE: CPT

## 2022-08-29 PROCEDURE — 10002803 HB RX 637: Performed by: INTERNAL MEDICINE

## 2022-08-29 PROCEDURE — 10002803 HB RX 637: Performed by: HOSPITALIST

## 2022-08-29 PROCEDURE — 97535 SELF CARE MNGMENT TRAINING: CPT

## 2022-08-29 PROCEDURE — 97530 THERAPEUTIC ACTIVITIES: CPT

## 2022-08-29 PROCEDURE — 87633 RESP VIRUS 12-25 TARGETS: CPT | Performed by: INTERNAL MEDICINE

## 2022-08-29 PROCEDURE — P9073 PLATELETS PHERESIS PATH REDU: HCPCS

## 2022-08-29 PROCEDURE — 97161 PT EVAL LOW COMPLEX 20 MIN: CPT

## 2022-08-29 PROCEDURE — 10002803 HB RX 637: Performed by: EMERGENCY MEDICINE

## 2022-08-29 PROCEDURE — P9016 RBC LEUKOCYTES REDUCED: HCPCS

## 2022-08-29 PROCEDURE — 84100 ASSAY OF PHOSPHORUS: CPT | Performed by: EMERGENCY MEDICINE

## 2022-08-29 PROCEDURE — 10002801 HB RX 250 W/O HCPCS: Performed by: INTERNAL MEDICINE

## 2022-08-29 PROCEDURE — 97116 GAIT TRAINING THERAPY: CPT

## 2022-08-29 PROCEDURE — 10006031 HB ROOM CHARGE TELEMETRY

## 2022-08-29 PROCEDURE — 10002016 HB COUNTER INCENTIVE SPIROMETRY

## 2022-08-29 PROCEDURE — 84132 ASSAY OF SERUM POTASSIUM: CPT | Performed by: INTERNAL MEDICINE

## 2022-08-29 PROCEDURE — 83735 ASSAY OF MAGNESIUM: CPT | Performed by: EMERGENCY MEDICINE

## 2022-08-29 PROCEDURE — 83605 ASSAY OF LACTIC ACID: CPT | Performed by: NURSE PRACTITIONER

## 2022-08-29 PROCEDURE — 83036 HEMOGLOBIN GLYCOSYLATED A1C: CPT | Performed by: EMERGENCY MEDICINE

## 2022-08-29 PROCEDURE — 97166 OT EVAL MOD COMPLEX 45 MIN: CPT

## 2022-08-29 PROCEDURE — 10002800 HB RX 250 W HCPCS: Performed by: EMERGENCY MEDICINE

## 2022-08-29 PROCEDURE — 83605 ASSAY OF LACTIC ACID: CPT | Performed by: EMERGENCY MEDICINE

## 2022-08-29 PROCEDURE — 10002807 HB RX 258: Performed by: EMERGENCY MEDICINE

## 2022-08-29 PROCEDURE — 80053 COMPREHEN METABOLIC PANEL: CPT | Performed by: EMERGENCY MEDICINE

## 2022-08-29 PROCEDURE — 82533 TOTAL CORTISOL: CPT | Performed by: EMERGENCY MEDICINE

## 2022-08-29 PROCEDURE — 85027 COMPLETE CBC AUTOMATED: CPT | Performed by: NURSE PRACTITIONER

## 2022-08-29 PROCEDURE — 84145 PROCALCITONIN (PCT): CPT | Performed by: EMERGENCY MEDICINE

## 2022-08-29 RX ORDER — POTASSIUM CHLORIDE 14.9 MG/ML
20 INJECTION INTRAVENOUS ONCE
Status: DISCONTINUED | OUTPATIENT
Start: 2022-08-29 | End: 2022-08-29

## 2022-08-29 RX ORDER — POTASSIUM CHLORIDE 14.9 MG/ML
20 INJECTION INTRAVENOUS ONCE
Status: COMPLETED | OUTPATIENT
Start: 2022-08-29 | End: 2022-08-29

## 2022-08-29 RX ORDER — LANOLIN ALCOHOL/MO/W.PET/CERES
3 CREAM (GRAM) TOPICAL NIGHTLY
Status: COMPLETED | OUTPATIENT
Start: 2022-08-29 | End: 2022-08-29

## 2022-08-29 RX ORDER — SODIUM CHLORIDE 9 MG/ML
INJECTION, SOLUTION INTRAVENOUS CONTINUOUS PRN
Status: DISCONTINUED | OUTPATIENT
Start: 2022-08-29 | End: 2022-08-30 | Stop reason: SDUPTHER

## 2022-08-29 RX ORDER — POTASSIUM CHLORIDE 14.9 MG/ML
20 INJECTION INTRAVENOUS ONCE
Status: COMPLETED | OUTPATIENT
Start: 2022-08-29 | End: 2022-08-30

## 2022-08-29 RX ORDER — LANOLIN ALCOHOL/MO/W.PET/CERES
3 CREAM (GRAM) TOPICAL ONCE
Status: COMPLETED | OUTPATIENT
Start: 2022-08-29 | End: 2022-08-29

## 2022-08-29 RX ADMIN — MAGNESIUM SULFATE HEPTAHYDRATE 2 G: 40 INJECTION, SOLUTION INTRAVENOUS at 09:39

## 2022-08-29 RX ADMIN — CEFEPIME HYDROCHLORIDE 2000 MG: 2 INJECTION, POWDER, FOR SOLUTION INTRAVENOUS at 09:41

## 2022-08-29 RX ADMIN — DOXYCYCLINE 100 MG: 100 INJECTION, POWDER, LYOPHILIZED, FOR SOLUTION INTRAVENOUS at 11:54

## 2022-08-29 RX ADMIN — DOXYCYCLINE 100 MG: 100 INJECTION, POWDER, LYOPHILIZED, FOR SOLUTION INTRAVENOUS at 21:28

## 2022-08-29 RX ADMIN — Medication 3 MG: at 22:39

## 2022-08-29 RX ADMIN — AZITHROMYCIN DIHYDRATE 500 MG: 500 INJECTION, POWDER, LYOPHILIZED, FOR SOLUTION INTRAVENOUS at 15:28

## 2022-08-29 RX ADMIN — Medication 3 MG: at 02:09

## 2022-08-29 RX ADMIN — CITALOPRAM HYDROBROMIDE 20 MG: 20 TABLET ORAL at 09:40

## 2022-08-29 RX ADMIN — ATORVASTATIN CALCIUM 10 MG: 20 TABLET, FILM COATED ORAL at 21:12

## 2022-08-29 RX ADMIN — POTASSIUM CHLORIDE 20 MEQ: 14.9 INJECTION, SOLUTION INTRAVENOUS at 06:19

## 2022-08-29 RX ADMIN — POTASSIUM CHLORIDE 20 MEQ: 14.9 INJECTION, SOLUTION INTRAVENOUS at 21:23

## 2022-08-29 RX ADMIN — ACETAMINOPHEN 650 MG: 325 TABLET ORAL at 02:08

## 2022-08-29 RX ADMIN — METOPROLOL TARTRATE 25 MG: 25 TABLET, FILM COATED ORAL at 21:13

## 2022-08-29 RX ADMIN — SODIUM CHLORIDE, PRESERVATIVE FREE 2 ML: 5 INJECTION INTRAVENOUS at 21:32

## 2022-08-29 RX ADMIN — INSULIN LISPRO 1 UNITS: 100 INJECTION, SOLUTION INTRAVENOUS; SUBCUTANEOUS at 18:11

## 2022-08-29 RX ADMIN — MAGNESIUM SULFATE HEPTAHYDRATE 2 G: 40 INJECTION, SOLUTION INTRAVENOUS at 06:21

## 2022-08-29 RX ADMIN — ACETAMINOPHEN 650 MG: 325 TABLET ORAL at 21:13

## 2022-08-29 RX ADMIN — CEFEPIME HYDROCHLORIDE 2000 MG: 2 INJECTION, POWDER, FOR SOLUTION INTRAVENOUS at 21:20

## 2022-08-29 RX ADMIN — METOPROLOL TARTRATE 25 MG: 25 TABLET, FILM COATED ORAL at 13:35

## 2022-08-29 RX ADMIN — INSULIN LISPRO 2 UNITS: 100 INJECTION, SOLUTION INTRAVENOUS; SUBCUTANEOUS at 23:25

## 2022-08-29 RX ADMIN — POTASSIUM PHOSPHATE, MONOBASIC AND POTASSIUM PHOSPHATE, DIBASIC 45 MMOL: 224; 236 INJECTION, SOLUTION, CONCENTRATE INTRAVENOUS at 08:11

## 2022-08-29 RX ADMIN — SODIUM CHLORIDE, PRESERVATIVE FREE 2 ML: 5 INJECTION INTRAVENOUS at 09:40

## 2022-08-29 ASSESSMENT — COGNITIVE AND FUNCTIONAL STATUS - GENERAL
HELP NEEDED FOR TOILETING: A LITTLE
BASIC_MOBILITY_CONVERTED_SCORE: 39.67
BASIC_MOBILITY_RAW_SCORE: 17
HELP NEEDED DRESSING REGULAR UPPER BODY CLOTHING: A LITTLE
DAILY_ACTIVITY_CONVERTED_SCORE: 38.66
HELP NEEDED DRESSING REGULAR LOWER BODY CLOTHING: A LITTLE
HELP NEEDED FOR BATHING: A LOT
DAILY_ACTIVITY_RAW_SCORE: 18
HELP NEEDED FOR PERSONAL GROOMING: A LITTLE

## 2022-08-29 ASSESSMENT — PAIN SCALES - GENERAL
PAINLEVEL_OUTOF10: 2
PAINLEVEL_OUTOF10: 2
PAINLEVEL_OUTOF10: 0

## 2022-08-29 ASSESSMENT — ACTIVITIES OF DAILY LIVING (ADL)
PRIOR_ADL: INDEPENDENT
PRIOR_ADL_BATHING: INDEPENDENT
HOME_MANAGEMENT_TIME_ENTRY: 13
PRIOR_ADL_TOILETING: INDEPENDENT

## 2022-08-29 ASSESSMENT — ENCOUNTER SYMPTOMS: PAIN SEVERITY NOW: 0

## 2022-08-30 ENCOUNTER — CASE MANAGEMENT (OUTPATIENT)
Dept: CARE COORDINATION | Age: 67
End: 2022-08-30

## 2022-08-30 LAB
A BAUMANNII DNA BLD POS QL NAA+NON-PROBE: NOT DETECTED
ANION GAP SERPL CALC-SCNC: 12 MMOL/L (ref 7–19)
B FRAGILIS DNA BLD POS QL NAA+NON-PROBE: NOT DETECTED
BLOOD EXPIRATION DATE: NORMAL
BUN SERPL-MCNC: 50 MG/DL (ref 6–20)
BUN/CREAT SERPL: 61 (ref 7–25)
C ALBICANS DNA BLD POS QL NAA+NON-PROBE: NOT DETECTED
C AURIS DNA BLD POS QL NAA+NON-PROBE: NOT DETECTED
C GATTII+NEOFOR DNA BLD POS QL NAA+N-PRB: NOT DETECTED
C GLABRATA DNA BLD POS QL NAA+NON-PROBE: NOT DETECTED
C KRUSEI DNA BLD POS QL NAA+NON-PROBE: NOT DETECTED
C PARAP DNA BLD POS QL NAA+NON-PROBE: NOT DETECTED
C PNEUM DNA SPEC QL NAA+PROBE: NOT DETECTED
C TROPICLS DNA BLD POS QL NAA+NON-PROBE: NOT DETECTED
CALCIUM SERPL-MCNC: 8.3 MG/DL (ref 8.4–10.2)
CHLORIDE SERPL-SCNC: 97 MMOL/L (ref 97–110)
CO2 SERPL-SCNC: 26 MMOL/L (ref 21–32)
CREAT SERPL-MCNC: 0.82 MG/DL (ref 0.51–0.95)
DEPRECATED RDW RBC: 44.1 FL (ref 39–50)
DISPENSE STATUS: NORMAL
E CLOAC COMP DNA BLD POS NAA+NON-PROBE: NOT DETECTED
E COLI DNA BLD POS QL NAA+NON-PROBE: NOT DETECTED
E FAECALIS DNA BLD POS QL NAA+NON-PROBE: NOT DETECTED
E FAECIUM DNA BLD POS QL NAA+NON-PROBE: NOT DETECTED
ENTEROBACTERALES DNA BLD POS NAA+N-PRB: NOT DETECTED
ERYTHROCYTE [DISTWIDTH] IN BLOOD: 13.9 % (ref 11–15)
FASTING DURATION TIME PATIENT: ABNORMAL H
FLUAV H1 2009 PAND RNA SPEC QL NAA+PROBE: NOT DETECTED
FLUAV H1 RNA SPEC QL NAA+PROBE: NOT DETECTED
FLUAV H3 RNA SPEC QL NAA+PROBE: NOT DETECTED
FLUAV RNA SPEC QL NAA+PROBE: NORMAL
FLUBV RNA SPEC QL NAA+PROBE: NOT DETECTED
GFR SERPLBLD BASED ON 1.73 SQ M-ARVRAT: 78 ML/MIN
GLUCOSE BLDC GLUCOMTR-MCNC: 253 MG/DL (ref 70–99)
GLUCOSE BLDC GLUCOMTR-MCNC: 256 MG/DL (ref 70–99)
GLUCOSE BLDC GLUCOMTR-MCNC: 268 MG/DL (ref 70–99)
GLUCOSE BLDC GLUCOMTR-MCNC: 87 MG/DL (ref 70–99)
GLUCOSE SERPL-MCNC: 253 MG/DL (ref 70–99)
GP B STREP DNA CSF QL NAA+NON-PROBE: NOT DETECTED
HADV DNA SPEC QL NAA+PROBE: NOT DETECTED
HAEM INFLU DNA BLD POS QL NAA+NON-PROBE: DETECTED
HBOV DNA SPEC QL NAA+PROBE: NOT DETECTED
HCOV 229E RNA SPEC QL NAA+PROBE: NOT DETECTED
HCOV HKU1 RNA SPEC QL NAA+PROBE: NOT DETECTED
HCOV NL63 RNA SPEC QL NAA+PROBE: NOT DETECTED
HCOV OC43 RNA SPEC QL NAA+PROBE: NOT DETECTED
HCT VFR BLD CALC: 25.1 % (ref 36–46.5)
HGB BLD-MCNC: 8.8 G/DL (ref 12–15.5)
HMPV RNA SPEC QL NAA+PROBE: NOT DETECTED
HPIV1 RNA SPEC QL NAA+PROBE: NOT DETECTED
HPIV2 RNA SPEC QL NAA+PROBE: NOT DETECTED
HPIV3 RNA SPEC QL NAA+PROBE: NOT DETECTED
HPIV4 RNA SPEC QL NAA+PROBE: NOT DETECTED
ISBT BLOOD TYPE: 6200
ISSUE DATE/TIME: NORMAL
K OXYTOCA DNA BLD POS QL NAA+NON-PROBE: NOT DETECTED
KLEBSIELLA SP DNA BLD POS QL NAA+NON-PRB: NOT DETECTED
KLEBSIELLA SP DNA BLD POS QL NAA+NON-PRB: NOT DETECTED
L MONOCYTOG DNA BLD POS QL NAA+NON-PROBE: NOT DETECTED
L PNEUMO DNA SPEC QL NAA+PROBE: NOT DETECTED
M PNEUMO DNA SPEC QL NAA+PROBE: NOT DETECTED
MAGNESIUM SERPL-MCNC: 2.3 MG/DL (ref 1.7–2.4)
MCH RBC QN AUTO: 31.4 PG (ref 26–34)
MCHC RBC AUTO-ENTMCNC: 35.1 G/DL (ref 32–36.5)
MCV RBC AUTO: 89.6 FL (ref 78–100)
N MEN DNA BLD POS QL NAA+NON-PROBE: NOT DETECTED
NRBC BLD MANUAL-RTO: 0 /100 WBC
P AERUGINOSA DNA BLD POS NAA+NON-PROBE: NOT DETECTED
PHOSPHATE SERPL-MCNC: 3.2 MG/DL (ref 2.4–4.7)
PLATELET # BLD AUTO: 10 K/MCL (ref 140–450)
POTASSIUM SERPL-SCNC: 3.5 MMOL/L (ref 3.4–5.1)
PRODUCT CODE: NORMAL
PRODUCT DESCRIPTION: NORMAL
PRODUCT ID: NORMAL
PROTEUS SP DNA BLD POS QL NAA+NON-PROBE: NOT DETECTED
RBC # BLD: 2.8 MIL/MCL (ref 4–5.2)
RSV A RNA SPEC QL NAA+PROBE: NOT DETECTED
RSV B RNA SPEC QL NAA+PROBE: NOT DETECTED
RV+EV RNA SPEC QL NAA+PROBE: NOT DETECTED
S EPIDERMIDIS DNA BLD POS QL NAA+NON-PRB: NOT DETECTED
S LUGDUNENSIS DNA BLD POS QL NAA+NON-PRB: NOT DETECTED
S MALTOPHILIA DNA BLD POS QL NAA+NON-PRB: NOT DETECTED
S MARCESCENS DNA BLD POS NAA+NON-PROBE: NOT DETECTED
S PNEUM DNA BLD POS QL NAA+NON-PROBE: NOT DETECTED
S PYO DNA BLD POS QL NAA+NON-PROBE: NOT DETECTED
SALMONELLA DNA BLD POS QL NAA+NON-PROBE: NOT DETECTED
SERVICE CMNT-IMP: NORMAL
SODIUM SERPL-SCNC: 131 MMOL/L (ref 135–145)
STAPHYLOCOCCUS AUREUS: NOT DETECTED
STAPHYLOCOCCUS SPECIES: NOT DETECTED
STREPTOCOCCUS DNA BLD POS NAA+NON-PROBE: NOT DETECTED
UNIT BLOOD TYPE: NORMAL
UNIT NUMBER: NORMAL
WBC # BLD: 10.1 K/MCL (ref 4.2–11)

## 2022-08-30 PROCEDURE — 36415 COLL VENOUS BLD VENIPUNCTURE: CPT | Performed by: INTERNAL MEDICINE

## 2022-08-30 PROCEDURE — 10002801 HB RX 250 W/O HCPCS: Performed by: INTERNAL MEDICINE

## 2022-08-30 PROCEDURE — 10004180 HB COUNTER-TRANSPORT

## 2022-08-30 PROCEDURE — 83735 ASSAY OF MAGNESIUM: CPT | Performed by: INTERNAL MEDICINE

## 2022-08-30 PROCEDURE — 10002800 HB RX 250 W HCPCS: Performed by: INTERNAL MEDICINE

## 2022-08-30 PROCEDURE — 10002800 HB RX 250 W HCPCS: Performed by: EMERGENCY MEDICINE

## 2022-08-30 PROCEDURE — 10004651 HB RX, NO CHARGE ITEM: Performed by: EMERGENCY MEDICINE

## 2022-08-30 PROCEDURE — 10002803 HB RX 637: Performed by: EMERGENCY MEDICINE

## 2022-08-30 PROCEDURE — 80048 BASIC METABOLIC PNL TOTAL CA: CPT | Performed by: INTERNAL MEDICINE

## 2022-08-30 PROCEDURE — 93005 ELECTROCARDIOGRAM TRACING: CPT | Performed by: INTERNAL MEDICINE

## 2022-08-30 PROCEDURE — 10006031 HB ROOM CHARGE TELEMETRY

## 2022-08-30 PROCEDURE — 85027 COMPLETE CBC AUTOMATED: CPT | Performed by: INTERNAL MEDICINE

## 2022-08-30 PROCEDURE — 10002807 HB RX 258: Performed by: INTERNAL MEDICINE

## 2022-08-30 PROCEDURE — 87040 BLOOD CULTURE FOR BACTERIA: CPT | Performed by: INTERNAL MEDICINE

## 2022-08-30 PROCEDURE — 84100 ASSAY OF PHOSPHORUS: CPT | Performed by: INTERNAL MEDICINE

## 2022-08-30 PROCEDURE — 10002807 HB RX 258: Performed by: EMERGENCY MEDICINE

## 2022-08-30 PROCEDURE — P9073 PLATELETS PHERESIS PATH REDU: HCPCS

## 2022-08-30 PROCEDURE — 13003289 HB OXYGEN THERAPY DAILY

## 2022-08-30 RX ORDER — POTASSIUM CHLORIDE 14.9 MG/ML
20 INJECTION INTRAVENOUS ONCE
Status: COMPLETED | OUTPATIENT
Start: 2022-08-30 | End: 2022-08-30

## 2022-08-30 RX ORDER — INSULIN GLARGINE 100 [IU]/ML
15 INJECTION, SOLUTION SUBCUTANEOUS NIGHTLY
Status: DISCONTINUED | OUTPATIENT
Start: 2022-08-30 | End: 2022-08-31

## 2022-08-30 RX ORDER — SODIUM CHLORIDE 9 MG/ML
INJECTION, SOLUTION INTRAVENOUS CONTINUOUS PRN
Status: DISCONTINUED | OUTPATIENT
Start: 2022-08-30 | End: 2022-09-07 | Stop reason: HOSPADM

## 2022-08-30 RX ORDER — FUROSEMIDE 10 MG/ML
20 INJECTION INTRAMUSCULAR; INTRAVENOUS ONCE
Status: COMPLETED | OUTPATIENT
Start: 2022-08-30 | End: 2022-08-30

## 2022-08-30 RX ADMIN — METOPROLOL TARTRATE 25 MG: 25 TABLET, FILM COATED ORAL at 09:46

## 2022-08-30 RX ADMIN — ATORVASTATIN CALCIUM 10 MG: 20 TABLET, FILM COATED ORAL at 20:21

## 2022-08-30 RX ADMIN — POTASSIUM CHLORIDE 20 MEQ: 14.9 INJECTION, SOLUTION INTRAVENOUS at 09:50

## 2022-08-30 RX ADMIN — CITALOPRAM HYDROBROMIDE 20 MG: 20 TABLET ORAL at 09:46

## 2022-08-30 RX ADMIN — SODIUM CHLORIDE, PRESERVATIVE FREE 2 ML: 5 INJECTION INTRAVENOUS at 20:20

## 2022-08-30 RX ADMIN — DOXYCYCLINE 100 MG: 100 INJECTION, POWDER, LYOPHILIZED, FOR SOLUTION INTRAVENOUS at 13:12

## 2022-08-30 RX ADMIN — CEFEPIME HYDROCHLORIDE 2000 MG: 2 INJECTION, POWDER, FOR SOLUTION INTRAVENOUS at 20:20

## 2022-08-30 RX ADMIN — FUROSEMIDE 20 MG: 10 INJECTION, SOLUTION INTRAMUSCULAR; INTRAVENOUS at 13:15

## 2022-08-30 RX ADMIN — METOPROLOL TARTRATE 25 MG: 25 TABLET, FILM COATED ORAL at 20:21

## 2022-08-30 RX ADMIN — POTASSIUM CHLORIDE 20 MEQ: 14.9 INJECTION, SOLUTION INTRAVENOUS at 15:21

## 2022-08-30 RX ADMIN — POTASSIUM CHLORIDE 20 MEQ: 14.9 INJECTION, SOLUTION INTRAVENOUS at 04:48

## 2022-08-30 RX ADMIN — INSULIN LISPRO 3 UNITS: 100 INJECTION, SOLUTION INTRAVENOUS; SUBCUTANEOUS at 10:33

## 2022-08-30 RX ADMIN — INSULIN LISPRO 6 UNITS: 100 INJECTION, SOLUTION INTRAVENOUS; SUBCUTANEOUS at 18:00

## 2022-08-30 RX ADMIN — INSULIN LISPRO 6 UNITS: 100 INJECTION, SOLUTION INTRAVENOUS; SUBCUTANEOUS at 15:48

## 2022-08-30 RX ADMIN — CEFEPIME HYDROCHLORIDE 2000 MG: 2 INJECTION, POWDER, FOR SOLUTION INTRAVENOUS at 09:53

## 2022-08-30 RX ADMIN — INSULIN GLARGINE 15 UNITS: 100 INJECTION, SOLUTION SUBCUTANEOUS at 18:00

## 2022-08-30 ASSESSMENT — PAIN SCALES - GENERAL
PAINLEVEL_OUTOF10: 0
PAINLEVEL_OUTOF10: 2
PAINLEVEL_OUTOF10: 0

## 2022-08-31 LAB
ANION GAP SERPL CALC-SCNC: 10 MMOL/L (ref 7–19)
ATRIAL RATE (BPM): 74
BLOOD EXPIRATION DATE: NORMAL
BUN SERPL-MCNC: 46 MG/DL (ref 6–20)
BUN/CREAT SERPL: 58 (ref 7–25)
CALCIUM SERPL-MCNC: 7.9 MG/DL (ref 8.4–10.2)
CHLORIDE SERPL-SCNC: 100 MMOL/L (ref 97–110)
CO2 SERPL-SCNC: 26 MMOL/L (ref 21–32)
CREAT SERPL-MCNC: 0.8 MG/DL (ref 0.51–0.95)
DEPRECATED RDW RBC: 44.2 FL (ref 39–50)
DISPENSE STATUS: NORMAL
ERYTHROCYTE [DISTWIDTH] IN BLOOD: 14.1 % (ref 11–15)
FASTING DURATION TIME PATIENT: ABNORMAL H
GFR SERPLBLD BASED ON 1.73 SQ M-ARVRAT: 81 ML/MIN
GLUCOSE BLDC GLUCOMTR-MCNC: 101 MG/DL (ref 70–99)
GLUCOSE BLDC GLUCOMTR-MCNC: 104 MG/DL (ref 70–99)
GLUCOSE BLDC GLUCOMTR-MCNC: 135 MG/DL (ref 70–99)
GLUCOSE BLDC GLUCOMTR-MCNC: 137 MG/DL (ref 70–99)
GLUCOSE BLDC GLUCOMTR-MCNC: 175 MG/DL (ref 70–99)
GLUCOSE BLDC GLUCOMTR-MCNC: 48 MG/DL (ref 70–99)
GLUCOSE BLDC GLUCOMTR-MCNC: 80 MG/DL (ref 70–99)
GLUCOSE SERPL-MCNC: 204 MG/DL (ref 70–99)
HCT VFR BLD CALC: 23 % (ref 36–46.5)
HGB BLD-MCNC: 8.1 G/DL (ref 12–15.5)
ISBT BLOOD TYPE: 7300
ISSUE DATE/TIME: NORMAL
L PNEUMO1 AG UR QL IA.RAPID: NORMAL
MAGNESIUM SERPL-MCNC: 1.7 MG/DL (ref 1.7–2.4)
MCH RBC QN AUTO: 31.5 PG (ref 26–34)
MCHC RBC AUTO-ENTMCNC: 35.2 G/DL (ref 32–36.5)
MCV RBC AUTO: 89.5 FL (ref 78–100)
NRBC BLD MANUAL-RTO: 0 /100 WBC
P AXIS (DEGREES): 68
PLATELET # BLD AUTO: 5 K/MCL (ref 140–450)
POTASSIUM SERPL-SCNC: 3.2 MMOL/L (ref 3.4–5.1)
POTASSIUM SERPL-SCNC: 3.7 MMOL/L (ref 3.4–5.1)
PR-INTERVAL (MSEC): 148
PRODUCT CODE: NORMAL
PRODUCT DESCRIPTION: NORMAL
PRODUCT ID: NORMAL
QRS-INTERVAL (MSEC): 84
QT-INTERVAL (MSEC): 422
QTC: 468
R AXIS (DEGREES): 64
RBC # BLD: 2.57 MIL/MCL (ref 4–5.2)
REPORT TEXT: NORMAL
S PNEUM AG UR QL IA.RAPID: NORMAL
SODIUM SERPL-SCNC: 133 MMOL/L (ref 135–145)
T AXIS (DEGREES): 73
UNIT BLOOD TYPE: NORMAL
UNIT NUMBER: NORMAL
VENTRICULAR RATE EKG/MIN (BPM): 74
WBC # BLD: 15.2 K/MCL (ref 4.2–11)

## 2022-08-31 PROCEDURE — P9073 PLATELETS PHERESIS PATH REDU: HCPCS

## 2022-08-31 PROCEDURE — 10002800 HB RX 250 W HCPCS: Performed by: EMERGENCY MEDICINE

## 2022-08-31 PROCEDURE — 10002803 HB RX 637: Performed by: INTERNAL MEDICINE

## 2022-08-31 PROCEDURE — 84132 ASSAY OF SERUM POTASSIUM: CPT | Performed by: EMERGENCY MEDICINE

## 2022-08-31 PROCEDURE — 10002800 HB RX 250 W HCPCS: Performed by: HOSPITALIST

## 2022-08-31 PROCEDURE — 10002800 HB RX 250 W HCPCS: Performed by: INTERNAL MEDICINE

## 2022-08-31 PROCEDURE — 10002803 HB RX 637: Performed by: HOSPITALIST

## 2022-08-31 PROCEDURE — 10004651 HB RX, NO CHARGE ITEM: Performed by: EMERGENCY MEDICINE

## 2022-08-31 PROCEDURE — 83735 ASSAY OF MAGNESIUM: CPT | Performed by: INTERNAL MEDICINE

## 2022-08-31 PROCEDURE — 10002801 HB RX 250 W/O HCPCS: Performed by: EMERGENCY MEDICINE

## 2022-08-31 PROCEDURE — 10002803 HB RX 637: Performed by: EMERGENCY MEDICINE

## 2022-08-31 PROCEDURE — 87899 AGENT NOS ASSAY W/OPTIC: CPT | Performed by: EMERGENCY MEDICINE

## 2022-08-31 PROCEDURE — 10002807 HB RX 258: Performed by: EMERGENCY MEDICINE

## 2022-08-31 PROCEDURE — 10006031 HB ROOM CHARGE TELEMETRY

## 2022-08-31 PROCEDURE — 85027 COMPLETE CBC AUTOMATED: CPT | Performed by: INTERNAL MEDICINE

## 2022-08-31 PROCEDURE — 13003289 HB OXYGEN THERAPY DAILY

## 2022-08-31 PROCEDURE — 80048 BASIC METABOLIC PNL TOTAL CA: CPT | Performed by: INTERNAL MEDICINE

## 2022-08-31 RX ORDER — POTASSIUM CHLORIDE 20 MEQ/1
40 TABLET, EXTENDED RELEASE ORAL ONCE
Status: COMPLETED | OUTPATIENT
Start: 2022-08-31 | End: 2022-08-31

## 2022-08-31 RX ORDER — IMMUNE GLOBULIN INFUSION (HUMAN) 100 MG/ML
1000 INJECTION, SOLUTION INTRAVENOUS; SUBCUTANEOUS ONCE
Status: COMPLETED | OUTPATIENT
Start: 2022-08-31 | End: 2022-08-31

## 2022-08-31 RX ORDER — SODIUM CHLORIDE 9 MG/ML
INJECTION, SOLUTION INTRAVENOUS CONTINUOUS PRN
Status: DISCONTINUED | OUTPATIENT
Start: 2022-08-31 | End: 2022-09-07 | Stop reason: HOSPADM

## 2022-08-31 RX ORDER — LANOLIN ALCOHOL/MO/W.PET/CERES
400 CREAM (GRAM) TOPICAL ONCE
Status: COMPLETED | OUTPATIENT
Start: 2022-08-31 | End: 2022-08-31

## 2022-08-31 RX ORDER — PREDNISONE 50 MG/1
50 TABLET ORAL
Status: DISCONTINUED | OUTPATIENT
Start: 2022-08-31 | End: 2022-09-05

## 2022-08-31 RX ORDER — INSULIN GLARGINE 100 [IU]/ML
10 INJECTION, SOLUTION SUBCUTANEOUS NIGHTLY
Status: DISCONTINUED | OUTPATIENT
Start: 2022-08-31 | End: 2022-09-07 | Stop reason: HOSPADM

## 2022-08-31 RX ADMIN — INSULIN GLARGINE 10 UNITS: 100 INJECTION, SOLUTION SUBCUTANEOUS at 20:10

## 2022-08-31 RX ADMIN — DEXTROSE MONOHYDRATE 12.5 G: 25 INJECTION, SOLUTION INTRAVENOUS at 06:00

## 2022-08-31 RX ADMIN — METOPROLOL TARTRATE 25 MG: 25 TABLET, FILM COATED ORAL at 20:10

## 2022-08-31 RX ADMIN — ACETAMINOPHEN 650 MG: 325 TABLET ORAL at 03:22

## 2022-08-31 RX ADMIN — SODIUM CHLORIDE, PRESERVATIVE FREE 2 ML: 5 INJECTION INTRAVENOUS at 08:45

## 2022-08-31 RX ADMIN — ATORVASTATIN CALCIUM 10 MG: 20 TABLET, FILM COATED ORAL at 20:10

## 2022-08-31 RX ADMIN — CEFEPIME HYDROCHLORIDE 2000 MG: 2 INJECTION, POWDER, FOR SOLUTION INTRAVENOUS at 20:16

## 2022-08-31 RX ADMIN — Medication 400 MG: at 08:36

## 2022-08-31 RX ADMIN — AMLODIPINE BESYLATE 5 MG: 5 TABLET ORAL at 20:10

## 2022-08-31 RX ADMIN — POTASSIUM CHLORIDE 40 MEQ: 1500 TABLET, EXTENDED RELEASE ORAL at 08:34

## 2022-08-31 RX ADMIN — METOPROLOL TARTRATE 25 MG: 25 TABLET, FILM COATED ORAL at 08:36

## 2022-08-31 RX ADMIN — GUAIFENESIN 200 MG: 200 SOLUTION ORAL at 03:22

## 2022-08-31 RX ADMIN — CITALOPRAM HYDROBROMIDE 20 MG: 20 TABLET ORAL at 08:36

## 2022-08-31 RX ADMIN — PREDNISONE 50 MG: 5 TABLET ORAL at 12:34

## 2022-08-31 RX ADMIN — CEFEPIME HYDROCHLORIDE 2000 MG: 2 INJECTION, POWDER, FOR SOLUTION INTRAVENOUS at 08:44

## 2022-08-31 RX ADMIN — IMMUNE GLOBULIN INFUSION (HUMAN) 60 G: 100 INJECTION, SOLUTION INTRAVENOUS; SUBCUTANEOUS at 12:47

## 2022-08-31 RX ADMIN — Medication 400 MG: at 16:50

## 2022-08-31 ASSESSMENT — PAIN SCALES - GENERAL
PAINLEVEL_OUTOF10: 0
PAINLEVEL_OUTOF10: 1
PAINLEVEL_OUTOF10: 0
PAINLEVEL_OUTOF10: 0
PAINLEVEL_OUTOF10: 1

## 2022-09-01 LAB
ANION GAP SERPL CALC-SCNC: 9 MMOL/L (ref 7–19)
BUN SERPL-MCNC: 45 MG/DL (ref 6–20)
BUN/CREAT SERPL: 57 (ref 7–25)
CALCIUM SERPL-MCNC: 8.4 MG/DL (ref 8.4–10.2)
CHLORIDE SERPL-SCNC: 100 MMOL/L (ref 97–110)
CO2 SERPL-SCNC: 27 MMOL/L (ref 21–32)
CREAT SERPL-MCNC: 0.79 MG/DL (ref 0.51–0.95)
DEPRECATED RDW RBC: 43.3 FL (ref 39–50)
ERYTHROCYTE [DISTWIDTH] IN BLOOD: 14.4 % (ref 11–15)
FASTING DURATION TIME PATIENT: ABNORMAL H
GFR SERPLBLD BASED ON 1.73 SQ M-ARVRAT: 82 ML/MIN
GLUCOSE BLDC GLUCOMTR-MCNC: 131 MG/DL (ref 70–99)
GLUCOSE BLDC GLUCOMTR-MCNC: 211 MG/DL (ref 70–99)
GLUCOSE BLDC GLUCOMTR-MCNC: 94 MG/DL (ref 70–99)
GLUCOSE SERPL-MCNC: 130 MG/DL (ref 70–99)
HCT VFR BLD CALC: 25.7 % (ref 36–46.5)
HGB BLD-MCNC: 9.5 G/DL (ref 12–15.5)
MAGNESIUM SERPL-MCNC: 1.8 MG/DL (ref 1.7–2.4)
MCH RBC QN AUTO: 32.2 PG (ref 26–34)
MCHC RBC AUTO-ENTMCNC: 37 G/DL (ref 32–36.5)
MCV RBC AUTO: 87.1 FL (ref 78–100)
NRBC BLD MANUAL-RTO: 0 /100 WBC
PLATELET # BLD AUTO: 11 K/MCL (ref 140–450)
POTASSIUM SERPL-SCNC: 4.1 MMOL/L (ref 3.4–5.1)
RBC # BLD: 2.95 MIL/MCL (ref 4–5.2)
SODIUM SERPL-SCNC: 132 MMOL/L (ref 135–145)
WBC # BLD: 18.7 K/MCL (ref 4.2–11)

## 2022-09-01 PROCEDURE — 80048 BASIC METABOLIC PNL TOTAL CA: CPT | Performed by: INTERNAL MEDICINE

## 2022-09-01 PROCEDURE — 10002019 HB COUNTER RESP ASSESSMENT

## 2022-09-01 PROCEDURE — 10002803 HB RX 637: Performed by: INTERNAL MEDICINE

## 2022-09-01 PROCEDURE — 83735 ASSAY OF MAGNESIUM: CPT | Performed by: EMERGENCY MEDICINE

## 2022-09-01 PROCEDURE — 10004651 HB RX, NO CHARGE ITEM: Performed by: EMERGENCY MEDICINE

## 2022-09-01 PROCEDURE — 10002800 HB RX 250 W HCPCS: Performed by: INTERNAL MEDICINE

## 2022-09-01 PROCEDURE — 10006031 HB ROOM CHARGE TELEMETRY

## 2022-09-01 PROCEDURE — 10002803 HB RX 637: Performed by: EMERGENCY MEDICINE

## 2022-09-01 PROCEDURE — 10004180 HB COUNTER-TRANSPORT

## 2022-09-01 PROCEDURE — 10002800 HB RX 250 W HCPCS: Performed by: HOSPITALIST

## 2022-09-01 PROCEDURE — 10002800 HB RX 250 W HCPCS: Performed by: EMERGENCY MEDICINE

## 2022-09-01 PROCEDURE — 10002807 HB RX 258: Performed by: EMERGENCY MEDICINE

## 2022-09-01 PROCEDURE — 13003289 HB OXYGEN THERAPY DAILY

## 2022-09-01 PROCEDURE — 85027 COMPLETE CBC AUTOMATED: CPT | Performed by: INTERNAL MEDICINE

## 2022-09-01 RX ORDER — TRAMADOL HYDROCHLORIDE 50 MG/1
50 TABLET ORAL EVERY 6 HOURS PRN
Status: DISCONTINUED | OUTPATIENT
Start: 2022-09-01 | End: 2022-09-07 | Stop reason: HOSPADM

## 2022-09-01 RX ORDER — LOSARTAN POTASSIUM 50 MG/1
100 TABLET ORAL DAILY
Status: DISCONTINUED | OUTPATIENT
Start: 2022-09-01 | End: 2022-09-07 | Stop reason: HOSPADM

## 2022-09-01 RX ADMIN — PREDNISONE 50 MG: 5 TABLET ORAL at 07:51

## 2022-09-01 RX ADMIN — ACETAMINOPHEN 650 MG: 325 TABLET ORAL at 11:00

## 2022-09-01 RX ADMIN — CEFEPIME HYDROCHLORIDE 2000 MG: 2 INJECTION, POWDER, FOR SOLUTION INTRAVENOUS at 07:55

## 2022-09-01 RX ADMIN — SODIUM CHLORIDE, PRESERVATIVE FREE 2 ML: 5 INJECTION INTRAVENOUS at 07:52

## 2022-09-01 RX ADMIN — CEFEPIME HYDROCHLORIDE 2000 MG: 2 INJECTION, POWDER, FOR SOLUTION INTRAVENOUS at 20:04

## 2022-09-01 RX ADMIN — ATORVASTATIN CALCIUM 10 MG: 20 TABLET, FILM COATED ORAL at 20:00

## 2022-09-01 RX ADMIN — MORPHINE SULFATE 2 MG: 2 INJECTION, SOLUTION INTRAMUSCULAR; INTRAVENOUS at 22:41

## 2022-09-01 RX ADMIN — INSULIN GLARGINE 10 UNITS: 100 INJECTION, SOLUTION SUBCUTANEOUS at 20:00

## 2022-09-01 RX ADMIN — METOPROLOL TARTRATE 25 MG: 25 TABLET, FILM COATED ORAL at 07:51

## 2022-09-01 RX ADMIN — AMLODIPINE BESYLATE 5 MG: 5 TABLET ORAL at 20:00

## 2022-09-01 RX ADMIN — METOPROLOL TARTRATE 25 MG: 25 TABLET, FILM COATED ORAL at 20:00

## 2022-09-01 RX ADMIN — MORPHINE SULFATE 2 MG: 2 INJECTION, SOLUTION INTRAMUSCULAR; INTRAVENOUS at 12:29

## 2022-09-01 RX ADMIN — TRAMADOL HYDROCHLORIDE 50 MG: 50 TABLET, COATED ORAL at 11:45

## 2022-09-01 RX ADMIN — CITALOPRAM HYDROBROMIDE 20 MG: 20 TABLET ORAL at 07:51

## 2022-09-01 RX ADMIN — AMLODIPINE BESYLATE 5 MG: 5 TABLET ORAL at 07:51

## 2022-09-01 RX ADMIN — LOSARTAN POTASSIUM 100 MG: 50 TABLET, FILM COATED ORAL at 11:45

## 2022-09-01 ASSESSMENT — PULMONARY FUNCTION TESTS: FEV1/FVC: UNABLE TO OBTAIN, OR GREATER THAN 70%

## 2022-09-01 ASSESSMENT — PAIN SCALES - GENERAL
PAINLEVEL_OUTOF10: 0
PAINLEVEL_OUTOF10: 0

## 2022-09-01 ASSESSMENT — PAIN SCALES - BEHAVIORAL PAIN SCALE (BPS): BPS_SCORE: 3

## 2022-09-02 LAB
ANION GAP SERPL CALC-SCNC: 8 MMOL/L (ref 7–19)
BUN SERPL-MCNC: 47 MG/DL (ref 6–20)
BUN/CREAT SERPL: 57 (ref 7–25)
CALCIUM SERPL-MCNC: 8.3 MG/DL (ref 8.4–10.2)
CHLORIDE SERPL-SCNC: 102 MMOL/L (ref 97–110)
CO2 SERPL-SCNC: 28 MMOL/L (ref 21–32)
CREAT SERPL-MCNC: 0.82 MG/DL (ref 0.51–0.95)
DEPRECATED RDW RBC: 46.4 FL (ref 39–50)
ERYTHROCYTE [DISTWIDTH] IN BLOOD: 14.9 % (ref 11–15)
FASTING DURATION TIME PATIENT: ABNORMAL H
GFR SERPLBLD BASED ON 1.73 SQ M-ARVRAT: 78 ML/MIN
GLUCOSE BLDC GLUCOMTR-MCNC: 106 MG/DL (ref 70–99)
GLUCOSE BLDC GLUCOMTR-MCNC: 114 MG/DL (ref 70–99)
GLUCOSE BLDC GLUCOMTR-MCNC: 124 MG/DL (ref 70–99)
GLUCOSE BLDC GLUCOMTR-MCNC: 136 MG/DL (ref 70–99)
GLUCOSE SERPL-MCNC: 147 MG/DL (ref 70–99)
HCT VFR BLD CALC: 23.9 % (ref 36–46.5)
HGB BLD-MCNC: 8.8 G/DL (ref 12–15.5)
MAGNESIUM SERPL-MCNC: 2 MG/DL (ref 1.7–2.4)
MCH RBC QN AUTO: 32.5 PG (ref 26–34)
MCHC RBC AUTO-ENTMCNC: 36.8 G/DL (ref 32–36.5)
MCV RBC AUTO: 88.2 FL (ref 78–100)
NRBC BLD MANUAL-RTO: 0 /100 WBC
PLATELET # BLD AUTO: 13 K/MCL (ref 140–450)
POTASSIUM SERPL-SCNC: 4 MMOL/L (ref 3.4–5.1)
RBC # BLD: 2.71 MIL/MCL (ref 4–5.2)
SODIUM SERPL-SCNC: 134 MMOL/L (ref 135–145)
WBC # BLD: 18 K/MCL (ref 4.2–11)

## 2022-09-02 PROCEDURE — 13003289 HB OXYGEN THERAPY DAILY

## 2022-09-02 PROCEDURE — 10004651 HB RX, NO CHARGE ITEM: Performed by: EMERGENCY MEDICINE

## 2022-09-02 PROCEDURE — 10002803 HB RX 637: Performed by: INTERNAL MEDICINE

## 2022-09-02 PROCEDURE — 85027 COMPLETE CBC AUTOMATED: CPT | Performed by: INTERNAL MEDICINE

## 2022-09-02 PROCEDURE — 83735 ASSAY OF MAGNESIUM: CPT | Performed by: INTERNAL MEDICINE

## 2022-09-02 PROCEDURE — 10002803 HB RX 637: Performed by: EMERGENCY MEDICINE

## 2022-09-02 PROCEDURE — 10006031 HB ROOM CHARGE TELEMETRY

## 2022-09-02 PROCEDURE — 10002801 HB RX 250 W/O HCPCS: Performed by: INTERNAL MEDICINE

## 2022-09-02 PROCEDURE — 80048 BASIC METABOLIC PNL TOTAL CA: CPT | Performed by: INTERNAL MEDICINE

## 2022-09-02 PROCEDURE — 10002800 HB RX 250 W HCPCS: Performed by: INTERNAL MEDICINE

## 2022-09-02 RX ORDER — POTASSIUM CHLORIDE 14.9 MG/ML
20 INJECTION INTRAVENOUS ONCE
Status: DISCONTINUED | OUTPATIENT
Start: 2022-09-02 | End: 2022-09-02

## 2022-09-02 RX ORDER — CEFAZOLIN SODIUM/WATER 2 G/20 ML
2000 SYRINGE (ML) INTRAVENOUS DAILY
Status: COMPLETED | OUTPATIENT
Start: 2022-09-02 | End: 2022-09-03

## 2022-09-02 RX ORDER — POTASSIUM CHLORIDE 29.8 MG/ML
40 INJECTION INTRAVENOUS ONCE
Status: COMPLETED | OUTPATIENT
Start: 2022-09-02 | End: 2022-09-02

## 2022-09-02 RX ADMIN — SODIUM CHLORIDE, PRESERVATIVE FREE 2 ML: 5 INJECTION INTRAVENOUS at 09:51

## 2022-09-02 RX ADMIN — CITALOPRAM HYDROBROMIDE 20 MG: 20 TABLET ORAL at 09:21

## 2022-09-02 RX ADMIN — POTASSIUM CHLORIDE 40 MEQ: 400 INJECTION, SOLUTION INTRAVENOUS at 09:23

## 2022-09-02 RX ADMIN — METOPROLOL TARTRATE 25 MG: 25 TABLET, FILM COATED ORAL at 09:21

## 2022-09-02 RX ADMIN — AMLODIPINE BESYLATE 5 MG: 5 TABLET ORAL at 20:13

## 2022-09-02 RX ADMIN — AMLODIPINE BESYLATE 5 MG: 5 TABLET ORAL at 09:21

## 2022-09-02 RX ADMIN — CEFTRIAXONE SODIUM 2000 MG: 10 INJECTION, POWDER, FOR SOLUTION INTRAVENOUS at 09:22

## 2022-09-02 RX ADMIN — PREDNISONE 50 MG: 5 TABLET ORAL at 09:21

## 2022-09-02 RX ADMIN — MORPHINE SULFATE 2 MG: 2 INJECTION, SOLUTION INTRAMUSCULAR; INTRAVENOUS at 02:43

## 2022-09-02 RX ADMIN — TRAMADOL HYDROCHLORIDE 50 MG: 50 TABLET, COATED ORAL at 20:13

## 2022-09-02 RX ADMIN — ATORVASTATIN CALCIUM 10 MG: 20 TABLET, FILM COATED ORAL at 20:13

## 2022-09-02 RX ADMIN — LOSARTAN POTASSIUM 100 MG: 50 TABLET, FILM COATED ORAL at 09:21

## 2022-09-02 RX ADMIN — METOPROLOL TARTRATE 25 MG: 25 TABLET, FILM COATED ORAL at 20:13

## 2022-09-02 RX ADMIN — SODIUM CHLORIDE, PRESERVATIVE FREE 2 ML: 5 INJECTION INTRAVENOUS at 20:14

## 2022-09-02 RX ADMIN — MORPHINE SULFATE 2 MG: 2 INJECTION, SOLUTION INTRAMUSCULAR; INTRAVENOUS at 23:47

## 2022-09-02 ASSESSMENT — PAIN SCALES - GENERAL
PAINLEVEL_OUTOF10: 4
PAINLEVEL_OUTOF10: 0
PAINLEVEL_OUTOF10: 7
PAINLEVEL_OUTOF10: 4
PAINLEVEL_OUTOF10: 0
PAINLEVEL_OUTOF10: 5

## 2022-09-03 LAB
ACANTHOCYTES BLD QL SMEAR: ABNORMAL
ALBUMIN SERPL-MCNC: 1.8 G/DL (ref 3.6–5.1)
ALBUMIN/GLOB SERPL: 0.5 {RATIO} (ref 1–2.4)
ALP SERPL-CCNC: 170 UNITS/L (ref 45–117)
ALT SERPL-CCNC: 25 UNITS/L
ANION GAP SERPL CALC-SCNC: 10 MMOL/L (ref 7–19)
AST SERPL-CCNC: 23 UNITS/L
BASOPHILS # BLD: 0 K/MCL (ref 0–0.3)
BASOPHILS NFR BLD: 0 %
BILIRUB SERPL-MCNC: 0.6 MG/DL (ref 0.2–1)
BUN SERPL-MCNC: 43 MG/DL (ref 6–20)
BUN/CREAT SERPL: 54 (ref 7–25)
CALCIUM SERPL-MCNC: 8.5 MG/DL (ref 8.4–10.2)
CHLORIDE SERPL-SCNC: 102 MMOL/L (ref 97–110)
CO2 SERPL-SCNC: 27 MMOL/L (ref 21–32)
CREAT SERPL-MCNC: 0.79 MG/DL (ref 0.51–0.95)
DEPRECATED RDW RBC: 48.2 FL (ref 39–50)
EOSINOPHIL # BLD: 0 K/MCL (ref 0–0.5)
EOSINOPHIL NFR BLD: 0 %
ERYTHROCYTE [DISTWIDTH] IN BLOOD: 15.4 % (ref 11–15)
FASTING DURATION TIME PATIENT: ABNORMAL H
GFR SERPLBLD BASED ON 1.73 SQ M-ARVRAT: 82 ML/MIN
GLOBULIN SER-MCNC: 3.5 G/DL (ref 2–4)
GLUCOSE BLDC GLUCOMTR-MCNC: 153 MG/DL (ref 70–99)
GLUCOSE BLDC GLUCOMTR-MCNC: 211 MG/DL (ref 70–99)
GLUCOSE BLDC GLUCOMTR-MCNC: 223 MG/DL (ref 70–99)
GLUCOSE BLDC GLUCOMTR-MCNC: 307 MG/DL (ref 70–99)
GLUCOSE SERPL-MCNC: 197 MG/DL (ref 70–99)
HCT VFR BLD CALC: 25.4 % (ref 36–46.5)
HGB BLD-MCNC: 9 G/DL (ref 12–15.5)
LYMPHOCYTES # BLD: 0.4 K/MCL (ref 1–4)
LYMPHOCYTES NFR BLD: 2 %
MCH RBC QN AUTO: 31.6 PG (ref 26–34)
MCHC RBC AUTO-ENTMCNC: 35.4 G/DL (ref 32–36.5)
MCV RBC AUTO: 89.1 FL (ref 78–100)
MONOCYTES # BLD: 0.6 K/MCL (ref 0.3–0.9)
MONOCYTES NFR BLD: 3 %
NEUTROPHILS # BLD: 18.4 K/MCL (ref 1.8–7.7)
NEUTS SEG NFR BLD: 95 %
NRBC BLD MANUAL-RTO: 0 /100 WBC
PLAT MORPH BLD: NORMAL
PLATELET # BLD AUTO: 26 K/MCL (ref 140–450)
POTASSIUM SERPL-SCNC: 5 MMOL/L (ref 3.4–5.1)
PROT SERPL-MCNC: 5.3 G/DL (ref 6.4–8.2)
RBC # BLD: 2.85 MIL/MCL (ref 4–5.2)
SODIUM SERPL-SCNC: 134 MMOL/L (ref 135–145)
WBC # BLD: 19.4 K/MCL (ref 4.2–11)
WBC TOXIC VACUOLES BLD QL SMEAR: PRESENT

## 2022-09-03 PROCEDURE — 10006031 HB ROOM CHARGE TELEMETRY

## 2022-09-03 PROCEDURE — 10002803 HB RX 637: Performed by: INTERNAL MEDICINE

## 2022-09-03 PROCEDURE — 85027 COMPLETE CBC AUTOMATED: CPT | Performed by: HOSPITALIST

## 2022-09-03 PROCEDURE — 10002803 HB RX 637: Performed by: EMERGENCY MEDICINE

## 2022-09-03 PROCEDURE — 10002800 HB RX 250 W HCPCS: Performed by: HOSPITALIST

## 2022-09-03 PROCEDURE — 10002800 HB RX 250 W HCPCS: Performed by: INTERNAL MEDICINE

## 2022-09-03 PROCEDURE — 80053 COMPREHEN METABOLIC PANEL: CPT | Performed by: HOSPITALIST

## 2022-09-03 PROCEDURE — 10004651 HB RX, NO CHARGE ITEM: Performed by: EMERGENCY MEDICINE

## 2022-09-03 PROCEDURE — 13003289 HB OXYGEN THERAPY DAILY

## 2022-09-03 RX ADMIN — INSULIN GLARGINE 10 UNITS: 100 INJECTION, SOLUTION SUBCUTANEOUS at 21:38

## 2022-09-03 RX ADMIN — INSULIN LISPRO 4 UNITS: 100 INJECTION, SOLUTION INTRAVENOUS; SUBCUTANEOUS at 14:50

## 2022-09-03 RX ADMIN — METOPROLOL TARTRATE 25 MG: 25 TABLET, FILM COATED ORAL at 08:55

## 2022-09-03 RX ADMIN — TRAMADOL HYDROCHLORIDE 50 MG: 50 TABLET, COATED ORAL at 09:09

## 2022-09-03 RX ADMIN — SODIUM CHLORIDE, PRESERVATIVE FREE 2 ML: 5 INJECTION INTRAVENOUS at 09:11

## 2022-09-03 RX ADMIN — INSULIN LISPRO 12 UNITS: 100 INJECTION, SOLUTION INTRAVENOUS; SUBCUTANEOUS at 17:19

## 2022-09-03 RX ADMIN — ATORVASTATIN CALCIUM 10 MG: 20 TABLET, FILM COATED ORAL at 20:12

## 2022-09-03 RX ADMIN — PREDNISONE 50 MG: 5 TABLET ORAL at 08:55

## 2022-09-03 RX ADMIN — AMLODIPINE BESYLATE 5 MG: 5 TABLET ORAL at 20:12

## 2022-09-03 RX ADMIN — INSULIN LISPRO 4 UNITS: 100 INJECTION, SOLUTION INTRAVENOUS; SUBCUTANEOUS at 09:10

## 2022-09-03 RX ADMIN — INSULIN LISPRO 3 UNITS: 100 INJECTION, SOLUTION INTRAVENOUS; SUBCUTANEOUS at 20:11

## 2022-09-03 RX ADMIN — CITALOPRAM HYDROBROMIDE 20 MG: 20 TABLET ORAL at 08:55

## 2022-09-03 RX ADMIN — SODIUM CHLORIDE, PRESERVATIVE FREE 2 ML: 5 INJECTION INTRAVENOUS at 20:13

## 2022-09-03 RX ADMIN — CEFTRIAXONE SODIUM 2000 MG: 10 INJECTION, POWDER, FOR SOLUTION INTRAVENOUS at 08:56

## 2022-09-03 RX ADMIN — METOPROLOL TARTRATE 25 MG: 25 TABLET, FILM COATED ORAL at 20:12

## 2022-09-03 RX ADMIN — AMLODIPINE BESYLATE 5 MG: 5 TABLET ORAL at 08:55

## 2022-09-03 RX ADMIN — LOSARTAN POTASSIUM 100 MG: 50 TABLET, FILM COATED ORAL at 08:55

## 2022-09-03 ASSESSMENT — PAIN SCALES - GENERAL
PAINLEVEL_OUTOF10: 0

## 2022-09-03 ASSESSMENT — PAIN SCALES - PAIN ASSESSMENT IN ADVANCED DEMENTIA (PAINAD)
CONSOLABILITY: NO NEED TO CONSOLE
FACIALEXPRESSION: SMILING OR INEXPRESSIVE
BODYLANGUAGE: RELAXED
BREATHING: NORMAL
TOTALSCORE: 0

## 2022-09-04 LAB
ANION GAP SERPL CALC-SCNC: 7 MMOL/L (ref 7–19)
BACTERIA BLD CULT: NORMAL
BACTERIA BLD CULT: NORMAL
BUN SERPL-MCNC: 40 MG/DL (ref 6–20)
BUN/CREAT SERPL: 56 (ref 7–25)
CALCIUM SERPL-MCNC: 8.3 MG/DL (ref 8.4–10.2)
CHLORIDE SERPL-SCNC: 100 MMOL/L (ref 97–110)
CO2 SERPL-SCNC: 31 MMOL/L (ref 21–32)
CREAT SERPL-MCNC: 0.71 MG/DL (ref 0.51–0.95)
DEPRECATED RDW RBC: 48.8 FL (ref 39–50)
ERYTHROCYTE [DISTWIDTH] IN BLOOD: 15.3 % (ref 11–15)
FASTING DURATION TIME PATIENT: ABNORMAL H
GFR SERPLBLD BASED ON 1.73 SQ M-ARVRAT: >90 ML/MIN
GLUCOSE BLDC GLUCOMTR-MCNC: 150 MG/DL (ref 70–99)
GLUCOSE BLDC GLUCOMTR-MCNC: 156 MG/DL (ref 70–99)
GLUCOSE BLDC GLUCOMTR-MCNC: 188 MG/DL (ref 70–99)
GLUCOSE BLDC GLUCOMTR-MCNC: 194 MG/DL (ref 70–99)
GLUCOSE SERPL-MCNC: 175 MG/DL (ref 70–99)
HCT VFR BLD CALC: 25.2 % (ref 36–46.5)
HGB BLD-MCNC: 8.6 G/DL (ref 12–15.5)
MCH RBC QN AUTO: 31.4 PG (ref 26–34)
MCHC RBC AUTO-ENTMCNC: 34.1 G/DL (ref 32–36.5)
MCV RBC AUTO: 92 FL (ref 78–100)
NRBC BLD MANUAL-RTO: 1 /100 WBC
PLATELET # BLD AUTO: 64 K/MCL (ref 140–450)
POTASSIUM SERPL-SCNC: 4.5 MMOL/L (ref 3.4–5.1)
RBC # BLD: 2.74 MIL/MCL (ref 4–5.2)
SODIUM SERPL-SCNC: 133 MMOL/L (ref 135–145)
WBC # BLD: 25.8 K/MCL (ref 4.2–11)

## 2022-09-04 PROCEDURE — 10002803 HB RX 637: Performed by: INTERNAL MEDICINE

## 2022-09-04 PROCEDURE — 10002800 HB RX 250 W HCPCS: Performed by: HOSPITALIST

## 2022-09-04 PROCEDURE — 10002803 HB RX 637: Performed by: EMERGENCY MEDICINE

## 2022-09-04 PROCEDURE — 10002800 HB RX 250 W HCPCS: Performed by: INTERNAL MEDICINE

## 2022-09-04 PROCEDURE — 10002803 HB RX 637: Performed by: HOSPITALIST

## 2022-09-04 PROCEDURE — 10004180 HB COUNTER-TRANSPORT

## 2022-09-04 PROCEDURE — 10004651 HB RX, NO CHARGE ITEM: Performed by: EMERGENCY MEDICINE

## 2022-09-04 PROCEDURE — 80048 BASIC METABOLIC PNL TOTAL CA: CPT | Performed by: HOSPITALIST

## 2022-09-04 PROCEDURE — 13003289 HB OXYGEN THERAPY DAILY

## 2022-09-04 PROCEDURE — 10006031 HB ROOM CHARGE TELEMETRY

## 2022-09-04 PROCEDURE — 85027 COMPLETE CBC AUTOMATED: CPT | Performed by: HOSPITALIST

## 2022-09-04 RX ORDER — CEFAZOLIN SODIUM/WATER 2 G/20 ML
2000 SYRINGE (ML) INTRAVENOUS DAILY
Status: DISCONTINUED | OUTPATIENT
Start: 2022-09-05 | End: 2022-09-04

## 2022-09-04 RX ORDER — CEFAZOLIN SODIUM/WATER 2 G/20 ML
2000 SYRINGE (ML) INTRAVENOUS AT BEDTIME
Status: DISCONTINUED | OUTPATIENT
Start: 2022-09-04 | End: 2022-09-06

## 2022-09-04 RX ADMIN — PREDNISONE 50 MG: 5 TABLET ORAL at 08:23

## 2022-09-04 RX ADMIN — MORPHINE SULFATE 2 MG: 2 INJECTION, SOLUTION INTRAMUSCULAR; INTRAVENOUS at 18:18

## 2022-09-04 RX ADMIN — INSULIN LISPRO 3 UNITS: 100 INJECTION, SOLUTION INTRAVENOUS; SUBCUTANEOUS at 08:28

## 2022-09-04 RX ADMIN — INSULIN LISPRO 3 UNITS: 100 INJECTION, SOLUTION INTRAVENOUS; SUBCUTANEOUS at 20:55

## 2022-09-04 RX ADMIN — CEFTRIAXONE SODIUM 2000 MG: 10 INJECTION, POWDER, FOR SOLUTION INTRAVENOUS at 22:28

## 2022-09-04 RX ADMIN — AMLODIPINE BESYLATE 5 MG: 5 TABLET ORAL at 08:23

## 2022-09-04 RX ADMIN — INSULIN LISPRO 3 UNITS: 100 INJECTION, SOLUTION INTRAVENOUS; SUBCUTANEOUS at 17:57

## 2022-09-04 RX ADMIN — GUAIFENESIN 200 MG: 200 SOLUTION ORAL at 18:02

## 2022-09-04 RX ADMIN — LOSARTAN POTASSIUM 100 MG: 50 TABLET, FILM COATED ORAL at 08:23

## 2022-09-04 RX ADMIN — CITALOPRAM HYDROBROMIDE 20 MG: 20 TABLET ORAL at 08:23

## 2022-09-04 RX ADMIN — INSULIN GLARGINE 10 UNITS: 100 INJECTION, SOLUTION SUBCUTANEOUS at 20:56

## 2022-09-04 RX ADMIN — MORPHINE SULFATE 2 MG: 2 INJECTION, SOLUTION INTRAMUSCULAR; INTRAVENOUS at 14:54

## 2022-09-04 RX ADMIN — METOPROLOL TARTRATE 25 MG: 25 TABLET, FILM COATED ORAL at 08:23

## 2022-09-04 RX ADMIN — ATORVASTATIN CALCIUM 10 MG: 20 TABLET, FILM COATED ORAL at 20:55

## 2022-09-04 RX ADMIN — MORPHINE SULFATE 2 MG: 2 INJECTION, SOLUTION INTRAMUSCULAR; INTRAVENOUS at 22:38

## 2022-09-04 RX ADMIN — METOPROLOL TARTRATE 25 MG: 25 TABLET, FILM COATED ORAL at 20:55

## 2022-09-04 RX ADMIN — AMLODIPINE BESYLATE 5 MG: 5 TABLET ORAL at 20:55

## 2022-09-04 RX ADMIN — SODIUM CHLORIDE, PRESERVATIVE FREE 2 ML: 5 INJECTION INTRAVENOUS at 08:24

## 2022-09-04 RX ADMIN — SODIUM CHLORIDE, PRESERVATIVE FREE 2 ML: 5 INJECTION INTRAVENOUS at 21:11

## 2022-09-04 RX ADMIN — INSULIN LISPRO 3 UNITS: 100 INJECTION, SOLUTION INTRAVENOUS; SUBCUTANEOUS at 12:27

## 2022-09-04 ASSESSMENT — PAIN SCALES - PAIN ASSESSMENT IN ADVANCED DEMENTIA (PAINAD)
BODYLANGUAGE: RELAXED
BREATHING: NORMAL
FACIALEXPRESSION: SMILING OR INEXPRESSIVE
CONSOLABILITY: NO NEED TO CONSOLE
TOTALSCORE: 0

## 2022-09-04 ASSESSMENT — PAIN SCALES - GENERAL
PAINLEVEL_OUTOF10: 5
PAINLEVEL_OUTOF10: 7
PAINLEVEL_OUTOF10: 0
PAINLEVEL_OUTOF10: 0
PAINLEVEL_OUTOF10: 7
PAINLEVEL_OUTOF10: 0

## 2022-09-04 ASSESSMENT — PAIN SCALES - WONG BAKER
WONGBAKER_NUMERICALRESPONSE: 5
WONGBAKER_NUMERICALRESPONSE: 7

## 2022-09-05 LAB
ANION GAP SERPL CALC-SCNC: 6 MMOL/L (ref 7–19)
BUN SERPL-MCNC: 37 MG/DL (ref 6–20)
BUN/CREAT SERPL: 59 (ref 7–25)
CALCIUM SERPL-MCNC: 8.5 MG/DL (ref 8.4–10.2)
CHLORIDE SERPL-SCNC: 98 MMOL/L (ref 97–110)
CO2 SERPL-SCNC: 34 MMOL/L (ref 21–32)
CREAT SERPL-MCNC: 0.63 MG/DL (ref 0.51–0.95)
DEPRECATED RDW RBC: 50.1 FL (ref 39–50)
ERYTHROCYTE [DISTWIDTH] IN BLOOD: 15.9 % (ref 11–15)
FASTING DURATION TIME PATIENT: ABNORMAL H
GFR SERPLBLD BASED ON 1.73 SQ M-ARVRAT: >90 ML/MIN
GLUCOSE BLDC GLUCOMTR-MCNC: 216 MG/DL (ref 70–99)
GLUCOSE BLDC GLUCOMTR-MCNC: 223 MG/DL (ref 70–99)
GLUCOSE BLDC GLUCOMTR-MCNC: 248 MG/DL (ref 70–99)
GLUCOSE BLDC GLUCOMTR-MCNC: 251 MG/DL (ref 70–99)
GLUCOSE BLDC GLUCOMTR-MCNC: 314 MG/DL (ref 70–99)
GLUCOSE SERPL-MCNC: 242 MG/DL (ref 70–99)
HCT VFR BLD CALC: 26.3 % (ref 36–46.5)
HGB BLD-MCNC: 9.2 G/DL (ref 12–15.5)
MCH RBC QN AUTO: 32.2 PG (ref 26–34)
MCHC RBC AUTO-ENTMCNC: 35 G/DL (ref 32–36.5)
MCV RBC AUTO: 92 FL (ref 78–100)
NRBC BLD MANUAL-RTO: 1 /100 WBC
PLATELET # BLD AUTO: 111 K/MCL (ref 140–450)
POTASSIUM SERPL-SCNC: 4.8 MMOL/L (ref 3.4–5.1)
RBC # BLD: 2.86 MIL/MCL (ref 4–5.2)
SODIUM SERPL-SCNC: 133 MMOL/L (ref 135–145)
WBC # BLD: 20.2 K/MCL (ref 4.2–11)

## 2022-09-05 PROCEDURE — 10006031 HB ROOM CHARGE TELEMETRY

## 2022-09-05 PROCEDURE — 10002800 HB RX 250 W HCPCS: Performed by: INTERNAL MEDICINE

## 2022-09-05 PROCEDURE — 10002803 HB RX 637: Performed by: EMERGENCY MEDICINE

## 2022-09-05 PROCEDURE — 97530 THERAPEUTIC ACTIVITIES: CPT

## 2022-09-05 PROCEDURE — 80048 BASIC METABOLIC PNL TOTAL CA: CPT | Performed by: HOSPITALIST

## 2022-09-05 PROCEDURE — 10004651 HB RX, NO CHARGE ITEM: Performed by: EMERGENCY MEDICINE

## 2022-09-05 PROCEDURE — 10002803 HB RX 637: Performed by: INTERNAL MEDICINE

## 2022-09-05 PROCEDURE — 10002800 HB RX 250 W HCPCS: Performed by: HOSPITALIST

## 2022-09-05 PROCEDURE — 13003289 HB OXYGEN THERAPY DAILY

## 2022-09-05 PROCEDURE — 85027 COMPLETE CBC AUTOMATED: CPT | Performed by: HOSPITALIST

## 2022-09-05 RX ORDER — PREDNISONE 10 MG/1
TABLET ORAL
Qty: 45 TABLET | Refills: 0 | Status: SHIPPED | OUTPATIENT
Start: 2022-09-05 | End: 2023-05-05

## 2022-09-05 RX ORDER — CEFTRIAXONE 1 G/1
2000 INJECTION, POWDER, FOR SOLUTION INTRAMUSCULAR; INTRAVENOUS DAILY
Qty: 10 EACH | Refills: 0 | Status: SHIPPED
Start: 2022-09-05 | End: 2022-09-06 | Stop reason: HOSPADM

## 2022-09-05 RX ORDER — TRAMADOL HYDROCHLORIDE 50 MG/1
50 TABLET ORAL EVERY 6 HOURS PRN
Qty: 20 TABLET | Refills: 0 | Status: SHIPPED | OUTPATIENT
Start: 2022-09-05 | End: 2022-09-07 | Stop reason: SDUPTHER

## 2022-09-05 RX ADMIN — CEFTRIAXONE SODIUM 2000 MG: 10 INJECTION, POWDER, FOR SOLUTION INTRAVENOUS at 22:05

## 2022-09-05 RX ADMIN — AMLODIPINE BESYLATE 5 MG: 5 TABLET ORAL at 22:03

## 2022-09-05 RX ADMIN — CITALOPRAM HYDROBROMIDE 20 MG: 20 TABLET ORAL at 08:45

## 2022-09-05 RX ADMIN — METOPROLOL TARTRATE 25 MG: 25 TABLET, FILM COATED ORAL at 08:45

## 2022-09-05 RX ADMIN — MORPHINE SULFATE 2 MG: 2 INJECTION, SOLUTION INTRAMUSCULAR; INTRAVENOUS at 19:30

## 2022-09-05 RX ADMIN — INSULIN LISPRO 6 UNITS: 100 INJECTION, SOLUTION INTRAVENOUS; SUBCUTANEOUS at 12:51

## 2022-09-05 RX ADMIN — ATORVASTATIN CALCIUM 10 MG: 20 TABLET, FILM COATED ORAL at 22:02

## 2022-09-05 RX ADMIN — AMLODIPINE BESYLATE 5 MG: 5 TABLET ORAL at 08:45

## 2022-09-05 RX ADMIN — MORPHINE SULFATE 2 MG: 2 INJECTION, SOLUTION INTRAMUSCULAR; INTRAVENOUS at 15:34

## 2022-09-05 RX ADMIN — INSULIN GLARGINE 10 UNITS: 100 INJECTION, SOLUTION SUBCUTANEOUS at 22:05

## 2022-09-05 RX ADMIN — METOPROLOL TARTRATE 25 MG: 25 TABLET, FILM COATED ORAL at 22:02

## 2022-09-05 RX ADMIN — INSULIN LISPRO 6 UNITS: 100 INJECTION, SOLUTION INTRAVENOUS; SUBCUTANEOUS at 22:05

## 2022-09-05 RX ADMIN — SODIUM CHLORIDE, PRESERVATIVE FREE 2 ML: 5 INJECTION INTRAVENOUS at 22:03

## 2022-09-05 RX ADMIN — TRAMADOL HYDROCHLORIDE 50 MG: 50 TABLET, COATED ORAL at 22:02

## 2022-09-05 RX ADMIN — INSULIN LISPRO 12 UNITS: 100 INJECTION, SOLUTION INTRAVENOUS; SUBCUTANEOUS at 18:20

## 2022-09-05 RX ADMIN — SODIUM CHLORIDE, PRESERVATIVE FREE 2 ML: 5 INJECTION INTRAVENOUS at 23:37

## 2022-09-05 RX ADMIN — LOSARTAN POTASSIUM 100 MG: 50 TABLET, FILM COATED ORAL at 08:45

## 2022-09-05 RX ADMIN — PREDNISONE 50 MG: 5 TABLET ORAL at 08:45

## 2022-09-05 RX ADMIN — INSULIN LISPRO 6 UNITS: 100 INJECTION, SOLUTION INTRAVENOUS; SUBCUTANEOUS at 09:43

## 2022-09-05 RX ADMIN — MORPHINE SULFATE 2 MG: 2 INJECTION, SOLUTION INTRAMUSCULAR; INTRAVENOUS at 23:32

## 2022-09-05 RX ADMIN — SODIUM CHLORIDE, PRESERVATIVE FREE 2 ML: 5 INJECTION INTRAVENOUS at 08:56

## 2022-09-05 ASSESSMENT — PAIN SCALES - GENERAL
PAINLEVEL_OUTOF10: 7
PAINLEVEL_OUTOF10: 0
PAINLEVEL_OUTOF10: 7
PAINLEVEL_OUTOF10: 7
PAINLEVEL_OUTOF10: 4
PAINLEVEL_OUTOF10: 6
PAINLEVEL_OUTOF10: 8

## 2022-09-05 ASSESSMENT — ENCOUNTER SYMPTOMS: PAIN SEVERITY NOW: 0

## 2022-09-05 ASSESSMENT — COGNITIVE AND FUNCTIONAL STATUS - GENERAL
BASIC_MOBILITY_RAW_SCORE: 18
BASIC_MOBILITY_CONVERTED_SCORE: 41.05

## 2022-09-06 LAB
ANION GAP SERPL CALC-SCNC: 4 MMOL/L (ref 7–19)
BUN SERPL-MCNC: 36 MG/DL (ref 6–20)
BUN/CREAT SERPL: 60 (ref 7–25)
CALCIUM SERPL-MCNC: 8 MG/DL (ref 8.4–10.2)
CHLORIDE SERPL-SCNC: 98 MMOL/L (ref 97–110)
CO2 SERPL-SCNC: 34 MMOL/L (ref 21–32)
CREAT SERPL-MCNC: 0.6 MG/DL (ref 0.51–0.95)
DEPRECATED RDW RBC: 50.5 FL (ref 39–50)
ERYTHROCYTE [DISTWIDTH] IN BLOOD: 16.1 % (ref 11–15)
FASTING DURATION TIME PATIENT: ABNORMAL H
GFR SERPLBLD BASED ON 1.73 SQ M-ARVRAT: >90 ML/MIN
GLUCOSE BLDC GLUCOMTR-MCNC: 144 MG/DL (ref 70–99)
GLUCOSE BLDC GLUCOMTR-MCNC: 151 MG/DL (ref 70–99)
GLUCOSE BLDC GLUCOMTR-MCNC: 199 MG/DL (ref 70–99)
GLUCOSE BLDC GLUCOMTR-MCNC: 243 MG/DL (ref 70–99)
GLUCOSE SERPL-MCNC: 169 MG/DL (ref 70–99)
HCT VFR BLD CALC: 25.7 % (ref 36–46.5)
HGB BLD-MCNC: 8.8 G/DL (ref 12–15.5)
MCH RBC QN AUTO: 31.8 PG (ref 26–34)
MCHC RBC AUTO-ENTMCNC: 34.2 G/DL (ref 32–36.5)
MCV RBC AUTO: 92.8 FL (ref 78–100)
NRBC BLD MANUAL-RTO: 0 /100 WBC
PLATELET # BLD AUTO: 158 K/MCL (ref 140–450)
POTASSIUM SERPL-SCNC: 4.4 MMOL/L (ref 3.4–5.1)
RBC # BLD: 2.77 MIL/MCL (ref 4–5.2)
SODIUM SERPL-SCNC: 132 MMOL/L (ref 135–145)
WBC # BLD: 22.3 K/MCL (ref 4.2–11)

## 2022-09-06 PROCEDURE — 10006031 HB ROOM CHARGE TELEMETRY

## 2022-09-06 PROCEDURE — 10002807 HB RX 258: Performed by: INTERNAL MEDICINE

## 2022-09-06 PROCEDURE — 10002803 HB RX 637: Performed by: EMERGENCY MEDICINE

## 2022-09-06 PROCEDURE — 10002800 HB RX 250 W HCPCS: Performed by: INTERNAL MEDICINE

## 2022-09-06 PROCEDURE — 10002803 HB RX 637: Performed by: INTERNAL MEDICINE

## 2022-09-06 PROCEDURE — 10004651 HB RX, NO CHARGE ITEM: Performed by: EMERGENCY MEDICINE

## 2022-09-06 PROCEDURE — 13003289 HB OXYGEN THERAPY DAILY

## 2022-09-06 PROCEDURE — 10002800 HB RX 250 W HCPCS: Performed by: HOSPITALIST

## 2022-09-06 PROCEDURE — 80048 BASIC METABOLIC PNL TOTAL CA: CPT | Performed by: HOSPITALIST

## 2022-09-06 PROCEDURE — 85027 COMPLETE CBC AUTOMATED: CPT | Performed by: HOSPITALIST

## 2022-09-06 RX ORDER — PREDNISONE 20 MG/1
20 TABLET ORAL
Status: DISCONTINUED | OUTPATIENT
Start: 2022-09-08 | End: 2022-09-07 | Stop reason: HOSPADM

## 2022-09-06 RX ORDER — ENOXAPARIN SODIUM 100 MG/ML
30 INJECTION SUBCUTANEOUS DAILY
Status: DISCONTINUED | OUTPATIENT
Start: 2022-09-06 | End: 2022-09-07 | Stop reason: HOSPADM

## 2022-09-06 RX ORDER — PREDNISONE 10 MG/1
10 TABLET ORAL
Status: DISCONTINUED | OUTPATIENT
Start: 2022-09-14 | End: 2022-09-07 | Stop reason: HOSPADM

## 2022-09-06 RX ORDER — PREDNISONE 5 MG/1
5 TABLET ORAL
Status: DISCONTINUED | OUTPATIENT
Start: 2022-09-17 | End: 2022-09-07 | Stop reason: HOSPADM

## 2022-09-06 RX ORDER — ERTAPENEM 1 G/1
1 INJECTION, POWDER, LYOPHILIZED, FOR SOLUTION INTRAMUSCULAR; INTRAVENOUS DAILY
Qty: 7 G | Refills: 0 | Status: SHIPPED
Start: 2022-09-06 | End: 2022-09-13

## 2022-09-06 RX ORDER — HEPARIN 100 UNIT/ML
5 SYRINGE INTRAVENOUS ONCE
Status: DISCONTINUED | OUTPATIENT
Start: 2022-09-06 | End: 2022-09-07 | Stop reason: HOSPADM

## 2022-09-06 RX ADMIN — MEROPENEM 500 MG: 500 INJECTION INTRAVENOUS at 16:46

## 2022-09-06 RX ADMIN — SODIUM CHLORIDE, PRESERVATIVE FREE 2 ML: 5 INJECTION INTRAVENOUS at 09:00

## 2022-09-06 RX ADMIN — SODIUM CHLORIDE, PRESERVATIVE FREE 2 ML: 5 INJECTION INTRAVENOUS at 20:53

## 2022-09-06 RX ADMIN — CITALOPRAM HYDROBROMIDE 20 MG: 20 TABLET ORAL at 08:51

## 2022-09-06 RX ADMIN — MEROPENEM 500 MG: 500 INJECTION INTRAVENOUS at 22:17

## 2022-09-06 RX ADMIN — MORPHINE SULFATE 2 MG: 2 INJECTION, SOLUTION INTRAMUSCULAR; INTRAVENOUS at 20:53

## 2022-09-06 RX ADMIN — METOPROLOL TARTRATE 25 MG: 25 TABLET, FILM COATED ORAL at 20:54

## 2022-09-06 RX ADMIN — AMLODIPINE BESYLATE 5 MG: 5 TABLET ORAL at 08:51

## 2022-09-06 RX ADMIN — LOSARTAN POTASSIUM 100 MG: 50 TABLET, FILM COATED ORAL at 08:51

## 2022-09-06 RX ADMIN — METOPROLOL TARTRATE 25 MG: 25 TABLET, FILM COATED ORAL at 08:51

## 2022-09-06 RX ADMIN — INSULIN LISPRO 3 UNITS: 100 INJECTION, SOLUTION INTRAVENOUS; SUBCUTANEOUS at 08:56

## 2022-09-06 RX ADMIN — INSULIN LISPRO 3 UNITS: 100 INJECTION, SOLUTION INTRAVENOUS; SUBCUTANEOUS at 20:54

## 2022-09-06 RX ADMIN — INSULIN LISPRO 6 UNITS: 100 INJECTION, SOLUTION INTRAVENOUS; SUBCUTANEOUS at 18:03

## 2022-09-06 RX ADMIN — ENOXAPARIN SODIUM 30 MG: 30 INJECTION SUBCUTANEOUS at 11:36

## 2022-09-06 RX ADMIN — AMLODIPINE BESYLATE 5 MG: 5 TABLET ORAL at 20:54

## 2022-09-06 RX ADMIN — ATORVASTATIN CALCIUM 10 MG: 20 TABLET, FILM COATED ORAL at 20:54

## 2022-09-06 RX ADMIN — MORPHINE SULFATE 2 MG: 2 INJECTION, SOLUTION INTRAMUSCULAR; INTRAVENOUS at 05:13

## 2022-09-06 RX ADMIN — INSULIN GLARGINE 10 UNITS: 100 INJECTION, SOLUTION SUBCUTANEOUS at 20:54

## 2022-09-06 RX ADMIN — PREDNISONE 30 MG: 20 TABLET ORAL at 08:51

## 2022-09-06 ASSESSMENT — PAIN SCALES - GENERAL
PAINLEVEL_OUTOF10: 6
PAINLEVEL_OUTOF10: 6
PAINLEVEL_OUTOF10: 3
PAINLEVEL_OUTOF10: 2

## 2022-09-06 ASSESSMENT — PAIN SCALES - PAIN ASSESSMENT IN ADVANCED DEMENTIA (PAINAD)
FACIALEXPRESSION: SMILING OR INEXPRESSIVE
TOTALSCORE: 0
BREATHING: NORMAL
CONSOLABILITY: NO NEED TO CONSOLE
BODYLANGUAGE: RELAXED

## 2022-09-06 ASSESSMENT — PAIN SCALES - BEHAVIORAL PAIN SCALE (BPS): BPS_SCORE: 3

## 2022-09-07 VITALS
WEIGHT: 106.92 LBS | RESPIRATION RATE: 16 BRPM | HEIGHT: 66 IN | DIASTOLIC BLOOD PRESSURE: 62 MMHG | BODY MASS INDEX: 17.18 KG/M2 | SYSTOLIC BLOOD PRESSURE: 116 MMHG | HEART RATE: 83 BPM | TEMPERATURE: 96.8 F | OXYGEN SATURATION: 94 %

## 2022-09-07 LAB
GLUCOSE BLDC GLUCOMTR-MCNC: 143 MG/DL (ref 70–99)
GLUCOSE BLDC GLUCOMTR-MCNC: 246 MG/DL (ref 70–99)
GLUCOSE BLDC GLUCOMTR-MCNC: 362 MG/DL (ref 70–99)

## 2022-09-07 PROCEDURE — 10002800 HB RX 250 W HCPCS: Performed by: INTERNAL MEDICINE

## 2022-09-07 PROCEDURE — 10002803 HB RX 637: Performed by: EMERGENCY MEDICINE

## 2022-09-07 PROCEDURE — 10002800 HB RX 250 W HCPCS: Performed by: HOSPITALIST

## 2022-09-07 PROCEDURE — 10002803 HB RX 637: Performed by: INTERNAL MEDICINE

## 2022-09-07 PROCEDURE — 97535 SELF CARE MNGMENT TRAINING: CPT

## 2022-09-07 PROCEDURE — 97530 THERAPEUTIC ACTIVITIES: CPT

## 2022-09-07 PROCEDURE — 13003289 HB OXYGEN THERAPY DAILY

## 2022-09-07 PROCEDURE — 10002807 HB RX 258: Performed by: INTERNAL MEDICINE

## 2022-09-07 PROCEDURE — 10004651 HB RX, NO CHARGE ITEM: Performed by: EMERGENCY MEDICINE

## 2022-09-07 PROCEDURE — 10004180 HB COUNTER-TRANSPORT

## 2022-09-07 RX ORDER — TRAMADOL HYDROCHLORIDE 50 MG/1
50 TABLET ORAL EVERY 6 HOURS PRN
Qty: 15 TABLET | Refills: 0 | Status: SHIPPED | OUTPATIENT
Start: 2022-09-07

## 2022-09-07 RX ORDER — LOSARTAN POTASSIUM 100 MG/1
100 TABLET ORAL DAILY
Qty: 30 TABLET | Refills: 0 | Status: SHIPPED | OUTPATIENT
Start: 2022-09-08 | End: 2023-05-05

## 2022-09-07 RX ORDER — HYDRALAZINE HYDROCHLORIDE 20 MG/ML
10 INJECTION INTRAMUSCULAR; INTRAVENOUS EVERY 6 HOURS PRN
Status: DISCONTINUED | OUTPATIENT
Start: 2022-09-07 | End: 2022-09-07 | Stop reason: HOSPADM

## 2022-09-07 RX ADMIN — INSULIN LISPRO 15 UNITS: 100 INJECTION, SOLUTION INTRAVENOUS; SUBCUTANEOUS at 17:47

## 2022-09-07 RX ADMIN — CITALOPRAM HYDROBROMIDE 20 MG: 20 TABLET ORAL at 08:00

## 2022-09-07 RX ADMIN — ERTAPENEM SODIUM 1 G: 1 INJECTION, POWDER, LYOPHILIZED, FOR SOLUTION INTRAMUSCULAR; INTRAVENOUS at 17:09

## 2022-09-07 RX ADMIN — TRAMADOL HYDROCHLORIDE 50 MG: 50 TABLET, COATED ORAL at 07:52

## 2022-09-07 RX ADMIN — SODIUM CHLORIDE, PRESERVATIVE FREE 2 ML: 5 INJECTION INTRAVENOUS at 08:00

## 2022-09-07 RX ADMIN — AMLODIPINE BESYLATE 5 MG: 5 TABLET ORAL at 08:00

## 2022-09-07 RX ADMIN — ENOXAPARIN SODIUM 30 MG: 30 INJECTION SUBCUTANEOUS at 08:00

## 2022-09-07 RX ADMIN — LOSARTAN POTASSIUM 100 MG: 50 TABLET, FILM COATED ORAL at 08:00

## 2022-09-07 RX ADMIN — METOPROLOL TARTRATE 25 MG: 25 TABLET, FILM COATED ORAL at 08:00

## 2022-09-07 RX ADMIN — MORPHINE SULFATE 2 MG: 2 INJECTION, SOLUTION INTRAMUSCULAR; INTRAVENOUS at 10:05

## 2022-09-07 RX ADMIN — MEROPENEM 500 MG: 500 INJECTION INTRAVENOUS at 06:25

## 2022-09-07 RX ADMIN — MEROPENEM 500 MG: 500 INJECTION INTRAVENOUS at 14:07

## 2022-09-07 RX ADMIN — INSULIN LISPRO 6 UNITS: 100 INJECTION, SOLUTION INTRAVENOUS; SUBCUTANEOUS at 12:42

## 2022-09-07 RX ADMIN — HYDRALAZINE HYDROCHLORIDE 10 MG: 20 INJECTION INTRAMUSCULAR; INTRAVENOUS at 06:19

## 2022-09-07 RX ADMIN — PREDNISONE 30 MG: 20 TABLET ORAL at 07:45

## 2022-09-07 ASSESSMENT — PAIN SCALES - GENERAL
PAINLEVEL_OUTOF10: 7
PAINLEVEL_OUTOF10: 5
PAINLEVEL_OUTOF10: 2
PAINLEVEL_OUTOF10: 3

## 2022-09-07 ASSESSMENT — COGNITIVE AND FUNCTIONAL STATUS - GENERAL
HELP NEEDED FOR BATHING: A LOT
HELP NEEDED DRESSING REGULAR LOWER BODY CLOTHING: A LITTLE
HELP NEEDED FOR TOILETING: A LITTLE
DAILY_ACTIVITY_RAW_SCORE: 18
HELP NEEDED DRESSING REGULAR UPPER BODY CLOTHING: A LITTLE
DAILY_ACTIVITY_CONVERTED_SCORE: 38.66
HELP NEEDED FOR PERSONAL GROOMING: A LITTLE

## 2022-09-07 ASSESSMENT — PAIN SCALES - PAIN ASSESSMENT IN ADVANCED DEMENTIA (PAINAD)
BREATHING: NORMAL
FACIALEXPRESSION: SMILING OR INEXPRESSIVE
BODYLANGUAGE: RELAXED
TOTALSCORE: 0
CONSOLABILITY: NO NEED TO CONSOLE

## 2022-09-07 ASSESSMENT — ENCOUNTER SYMPTOMS: PAIN SEVERITY NOW: 6

## 2022-09-07 ASSESSMENT — ACTIVITIES OF DAILY LIVING (ADL): HOME_MANAGEMENT_TIME_ENTRY: 10

## 2022-09-08 ENCOUNTER — CASE MANAGEMENT (OUTPATIENT)
Dept: CARE COORDINATION | Age: 67
End: 2022-09-08

## 2022-09-08 LAB
BACTERIA BLD CULT: ABNORMAL
GRAM STN SPEC: ABNORMAL

## 2022-09-11 NOTE — ED INITIAL ASSESSMENT (HPI)
Pt presents to the ED via EMS with complaints of low glucose. Medics were called by EMS after co-worker witnessed pt with confusion. Medics state glucose was 33 upon their arrival, but pt was alert enough to have juice and oral glucose, glucose up to 58.  P
Labs and imaging reviewed

## 2022-09-15 ENCOUNTER — CASE MANAGEMENT (OUTPATIENT)
Dept: CARE COORDINATION | Age: 67
End: 2022-09-15

## 2022-09-21 ENCOUNTER — CASE MANAGEMENT (OUTPATIENT)
Dept: CARE COORDINATION | Age: 67
End: 2022-09-21

## 2022-09-29 ENCOUNTER — CASE MANAGEMENT (OUTPATIENT)
Dept: CARE COORDINATION | Age: 67
End: 2022-09-29

## 2022-10-05 ENCOUNTER — HOSPITAL ENCOUNTER (INPATIENT)
Facility: HOSPITAL | Age: 67
LOS: 2 days | Discharge: HOME HEALTH | End: 2022-10-08
Attending: EMERGENCY MEDICINE | Admitting: INTERNAL MEDICINE
Payer: MEDICARE

## 2022-10-05 DIAGNOSIS — E86.0 DEHYDRATION: Primary | ICD-10-CM

## 2022-10-05 DIAGNOSIS — R73.9 HYPERGLYCEMIA: ICD-10-CM

## 2022-10-05 DIAGNOSIS — D72.829 LEUKOCYTOSIS, UNSPECIFIED TYPE: ICD-10-CM

## 2022-10-05 DIAGNOSIS — E11.65 UNCONTROLLED TYPE 2 DIABETES MELLITUS WITH HYPERGLYCEMIA (HCC): ICD-10-CM

## 2022-10-05 DIAGNOSIS — D64.9 ANEMIA, UNSPECIFIED TYPE: ICD-10-CM

## 2022-10-05 DIAGNOSIS — E87.6 HYPOKALEMIA: ICD-10-CM

## 2022-10-05 DIAGNOSIS — N17.9 AKI (ACUTE KIDNEY INJURY) (HCC): ICD-10-CM

## 2022-10-05 DIAGNOSIS — N39.0 ACUTE UTI: ICD-10-CM

## 2022-10-05 DIAGNOSIS — E87.1 HYPONATREMIA: ICD-10-CM

## 2022-10-05 LAB
ALBUMIN SERPL-MCNC: 2.8 G/DL (ref 3.4–5)
ALBUMIN/GLOB SERPL: 0.8 {RATIO} (ref 1–2)
ALP LIVER SERPL-CCNC: 142 U/L
ALT SERPL-CCNC: 30 U/L
ANION GAP SERPL CALC-SCNC: 11 MMOL/L (ref 0–18)
AST SERPL-CCNC: 29 U/L (ref 15–37)
BASOPHILS # BLD AUTO: 0.07 X10(3) UL (ref 0–0.2)
BASOPHILS NFR BLD AUTO: 0.5 %
BILIRUB SERPL-MCNC: 0.3 MG/DL (ref 0.1–2)
BUN BLD-MCNC: 23 MG/DL (ref 7–18)
CALCIUM BLD-MCNC: 8.9 MG/DL (ref 8.5–10.1)
CHLORIDE SERPL-SCNC: 92 MMOL/L (ref 98–112)
CO2 SERPL-SCNC: 27 MMOL/L (ref 21–32)
CREAT BLD-MCNC: 1.1 MG/DL
EOSINOPHIL # BLD AUTO: 0.09 X10(3) UL (ref 0–0.7)
EOSINOPHIL NFR BLD AUTO: 0.7 %
ERYTHROCYTE [DISTWIDTH] IN BLOOD BY AUTOMATED COUNT: 17.7 %
GFR SERPLBLD BASED ON 1.73 SQ M-ARVRAT: 55 ML/MIN/1.73M2 (ref 60–?)
GLOBULIN PLAS-MCNC: 3.7 G/DL (ref 2.8–4.4)
GLUCOSE BLD-MCNC: 212 MG/DL (ref 70–99)
GLUCOSE BLD-MCNC: 237 MG/DL (ref 70–99)
HCT VFR BLD AUTO: 31.7 %
HGB BLD-MCNC: 10.6 G/DL
IMM GRANULOCYTES # BLD AUTO: 0.23 X10(3) UL (ref 0–1)
IMM GRANULOCYTES NFR BLD: 1.7 %
LYMPHOCYTES # BLD AUTO: 3 X10(3) UL (ref 1–4)
LYMPHOCYTES NFR BLD AUTO: 21.9 %
MCH RBC QN AUTO: 35 PG (ref 26–34)
MCHC RBC AUTO-ENTMCNC: 33.4 G/DL (ref 31–37)
MCV RBC AUTO: 104.6 FL
MONOCYTES # BLD AUTO: 1.15 X10(3) UL (ref 0.1–1)
MONOCYTES NFR BLD AUTO: 8.4 %
NEUTROPHILS # BLD AUTO: 9.14 X10 (3) UL (ref 1.5–7.7)
NEUTROPHILS # BLD AUTO: 9.14 X10(3) UL (ref 1.5–7.7)
NEUTROPHILS NFR BLD AUTO: 66.8 %
OSMOLALITY SERPL CALC.SUM OF ELEC: 280 MOSM/KG (ref 275–295)
PLATELET # BLD AUTO: 181 10(3)UL (ref 150–450)
POTASSIUM SERPL-SCNC: 2.8 MMOL/L (ref 3.5–5.1)
PROT SERPL-MCNC: 6.5 G/DL (ref 6.4–8.2)
RBC # BLD AUTO: 3.03 X10(6)UL
SODIUM SERPL-SCNC: 130 MMOL/L (ref 136–145)
WBC # BLD AUTO: 13.7 X10(3) UL (ref 4–11)

## 2022-10-05 PROCEDURE — 99285 EMERGENCY DEPT VISIT HI MDM: CPT

## 2022-10-05 PROCEDURE — 87040 BLOOD CULTURE FOR BACTERIA: CPT | Performed by: EMERGENCY MEDICINE

## 2022-10-05 PROCEDURE — 80053 COMPREHEN METABOLIC PANEL: CPT | Performed by: EMERGENCY MEDICINE

## 2022-10-05 PROCEDURE — 96361 HYDRATE IV INFUSION ADD-ON: CPT

## 2022-10-05 PROCEDURE — 96368 THER/DIAG CONCURRENT INF: CPT

## 2022-10-05 PROCEDURE — 96365 THER/PROPH/DIAG IV INF INIT: CPT

## 2022-10-05 PROCEDURE — 36415 COLL VENOUS BLD VENIPUNCTURE: CPT

## 2022-10-05 PROCEDURE — 96366 THER/PROPH/DIAG IV INF ADDON: CPT

## 2022-10-05 PROCEDURE — 83036 HEMOGLOBIN GLYCOSYLATED A1C: CPT | Performed by: HOSPITALIST

## 2022-10-05 PROCEDURE — 85025 COMPLETE CBC W/AUTO DIFF WBC: CPT | Performed by: EMERGENCY MEDICINE

## 2022-10-05 PROCEDURE — 82962 GLUCOSE BLOOD TEST: CPT

## 2022-10-05 RX ORDER — POTASSIUM CHLORIDE 20 MEQ/1
40 TABLET, EXTENDED RELEASE ORAL ONCE
Status: COMPLETED | OUTPATIENT
Start: 2022-10-05 | End: 2022-10-05

## 2022-10-05 RX ORDER — POTASSIUM CHLORIDE 14.9 MG/ML
20 INJECTION INTRAVENOUS ONCE
Status: COMPLETED | OUTPATIENT
Start: 2022-10-05 | End: 2022-10-06

## 2022-10-06 PROBLEM — N17.9 AKI (ACUTE KIDNEY INJURY): Status: ACTIVE | Noted: 2022-10-06

## 2022-10-06 PROBLEM — D64.9 ANEMIA, UNSPECIFIED TYPE: Status: ACTIVE | Noted: 2022-10-06

## 2022-10-06 PROBLEM — R73.9 HYPERGLYCEMIA: Status: ACTIVE | Noted: 2022-10-06

## 2022-10-06 PROBLEM — D72.829 LEUKOCYTOSIS, UNSPECIFIED TYPE: Status: ACTIVE | Noted: 2022-10-06

## 2022-10-06 PROBLEM — E87.1 HYPONATREMIA: Status: ACTIVE | Noted: 2022-10-06

## 2022-10-06 PROBLEM — N17.9 AKI (ACUTE KIDNEY INJURY) (HCC): Status: ACTIVE | Noted: 2022-10-06

## 2022-10-06 PROBLEM — E86.0 DEHYDRATION: Status: ACTIVE | Noted: 2022-10-06

## 2022-10-06 PROBLEM — N39.0 ACUTE UTI: Status: ACTIVE | Noted: 2022-10-06

## 2022-10-06 PROBLEM — E87.6 HYPOKALEMIA: Status: ACTIVE | Noted: 2022-10-06

## 2022-10-06 LAB
ANION GAP SERPL CALC-SCNC: 6 MMOL/L (ref 0–18)
ANION GAP SERPL CALC-SCNC: 7 MMOL/L (ref 0–18)
BASOPHILS # BLD AUTO: 0.07 X10(3) UL (ref 0–0.2)
BASOPHILS NFR BLD AUTO: 0.5 %
BILIRUB UR QL STRIP.AUTO: NEGATIVE
BUN BLD-MCNC: 16 MG/DL (ref 7–18)
BUN BLD-MCNC: 17 MG/DL (ref 7–18)
CALCIUM BLD-MCNC: 8 MG/DL (ref 8.5–10.1)
CALCIUM BLD-MCNC: 8.6 MG/DL (ref 8.5–10.1)
CHLORIDE SERPL-SCNC: 101 MMOL/L (ref 98–112)
CHLORIDE SERPL-SCNC: 99 MMOL/L (ref 98–112)
CO2 SERPL-SCNC: 25 MMOL/L (ref 21–32)
CO2 SERPL-SCNC: 26 MMOL/L (ref 21–32)
COLOR UR AUTO: YELLOW
CREAT BLD-MCNC: 0.79 MG/DL
CREAT BLD-MCNC: 0.82 MG/DL
EOSINOPHIL # BLD AUTO: 0.09 X10(3) UL (ref 0–0.7)
EOSINOPHIL NFR BLD AUTO: 0.6 %
ERYTHROCYTE [DISTWIDTH] IN BLOOD BY AUTOMATED COUNT: 17.9 %
EST. AVERAGE GLUCOSE BLD GHB EST-MCNC: 148 MG/DL (ref 68–126)
GFR SERPLBLD BASED ON 1.73 SQ M-ARVRAT: 78 ML/MIN/1.73M2 (ref 60–?)
GFR SERPLBLD BASED ON 1.73 SQ M-ARVRAT: 81 ML/MIN/1.73M2 (ref 60–?)
GLUCOSE BLD-MCNC: 176 MG/DL (ref 70–99)
GLUCOSE BLD-MCNC: 218 MG/DL (ref 70–99)
GLUCOSE BLD-MCNC: 242 MG/DL (ref 70–99)
GLUCOSE BLD-MCNC: 257 MG/DL (ref 70–99)
GLUCOSE BLD-MCNC: 448 MG/DL (ref 70–99)
GLUCOSE BLD-MCNC: 504 MG/DL (ref 70–99)
GLUCOSE BLD-MCNC: 534 MG/DL (ref 70–99)
GLUCOSE UR STRIP.AUTO-MCNC: 50 MG/DL
HBA1C MFR BLD: 6.8 % (ref ?–5.7)
HCT VFR BLD AUTO: 30.2 %
HGB BLD-MCNC: 9.9 G/DL
IMM GRANULOCYTES # BLD AUTO: 0.17 X10(3) UL (ref 0–1)
IMM GRANULOCYTES NFR BLD: 1.2 %
KETONES UR STRIP.AUTO-MCNC: NEGATIVE MG/DL
LACTATE SERPL-SCNC: 2.3 MMOL/L (ref 0.4–2)
LACTATE SERPL-SCNC: 2.7 MMOL/L (ref 0.4–2)
LACTATE SERPL-SCNC: 3.3 MMOL/L (ref 0.4–2)
LYMPHOCYTES # BLD AUTO: 2.49 X10(3) UL (ref 1–4)
LYMPHOCYTES NFR BLD AUTO: 17.9 %
MCH RBC QN AUTO: 34.7 PG (ref 26–34)
MCHC RBC AUTO-ENTMCNC: 32.8 G/DL (ref 31–37)
MCV RBC AUTO: 106 FL
MONOCYTES # BLD AUTO: 1.33 X10(3) UL (ref 0.1–1)
MONOCYTES NFR BLD AUTO: 9.6 %
NEUTROPHILS # BLD AUTO: 9.73 X10 (3) UL (ref 1.5–7.7)
NEUTROPHILS # BLD AUTO: 9.73 X10(3) UL (ref 1.5–7.7)
NEUTROPHILS NFR BLD AUTO: 70.2 %
NITRITE UR QL STRIP.AUTO: POSITIVE
OSMOLALITY SERPL CALC.SUM OF ELEC: 284 MOSM/KG (ref 275–295)
OSMOLALITY SERPL CALC.SUM OF ELEC: 298 MOSM/KG (ref 275–295)
PH UR STRIP.AUTO: 6 [PH] (ref 5–8)
PLATELET # BLD AUTO: 185 10(3)UL (ref 150–450)
POTASSIUM SERPL-SCNC: 4 MMOL/L (ref 3.5–5.1)
POTASSIUM SERPL-SCNC: 4.6 MMOL/L (ref 3.5–5.1)
PROT UR STRIP.AUTO-MCNC: NEGATIVE MG/DL
RBC # BLD AUTO: 2.85 X10(6)UL
SARS-COV-2 RNA RESP QL NAA+PROBE: NOT DETECTED
SODIUM SERPL-SCNC: 131 MMOL/L (ref 136–145)
SODIUM SERPL-SCNC: 133 MMOL/L (ref 136–145)
SP GR UR STRIP.AUTO: 1 (ref 1–1.03)
UROBILINOGEN UR STRIP.AUTO-MCNC: <2 MG/DL
WBC # BLD AUTO: 13.9 X10(3) UL (ref 4–11)
WBC #/AREA URNS AUTO: >50 /HPF

## 2022-10-06 PROCEDURE — 97530 THERAPEUTIC ACTIVITIES: CPT

## 2022-10-06 PROCEDURE — 80048 BASIC METABOLIC PNL TOTAL CA: CPT | Performed by: HOSPITALIST

## 2022-10-06 PROCEDURE — 80048 BASIC METABOLIC PNL TOTAL CA: CPT | Performed by: INTERNAL MEDICINE

## 2022-10-06 PROCEDURE — 97162 PT EVAL MOD COMPLEX 30 MIN: CPT

## 2022-10-06 PROCEDURE — 82962 GLUCOSE BLOOD TEST: CPT

## 2022-10-06 PROCEDURE — 97116 GAIT TRAINING THERAPY: CPT

## 2022-10-06 PROCEDURE — 87088 URINE BACTERIA CULTURE: CPT | Performed by: EMERGENCY MEDICINE

## 2022-10-06 PROCEDURE — 87086 URINE CULTURE/COLONY COUNT: CPT | Performed by: EMERGENCY MEDICINE

## 2022-10-06 PROCEDURE — 81001 URINALYSIS AUTO W/SCOPE: CPT | Performed by: EMERGENCY MEDICINE

## 2022-10-06 PROCEDURE — 87186 SC STD MICRODIL/AGAR DIL: CPT | Performed by: EMERGENCY MEDICINE

## 2022-10-06 PROCEDURE — 83605 ASSAY OF LACTIC ACID: CPT | Performed by: EMERGENCY MEDICINE

## 2022-10-06 PROCEDURE — 85025 COMPLETE CBC W/AUTO DIFF WBC: CPT | Performed by: HOSPITALIST

## 2022-10-06 RX ORDER — SODIUM CHLORIDE 9 MG/ML
INJECTION, SOLUTION INTRAVENOUS CONTINUOUS
Status: DISCONTINUED | OUTPATIENT
Start: 2022-10-06 | End: 2022-10-08

## 2022-10-06 RX ORDER — METOPROLOL SUCCINATE 50 MG/1
50 TABLET, EXTENDED RELEASE ORAL
Status: DISCONTINUED | OUTPATIENT
Start: 2022-10-07 | End: 2022-10-08

## 2022-10-06 RX ORDER — NICOTINE POLACRILEX 4 MG
15 LOZENGE BUCCAL
Status: DISCONTINUED | OUTPATIENT
Start: 2022-10-06 | End: 2022-10-08

## 2022-10-06 RX ORDER — DEXTROSE MONOHYDRATE 25 G/50ML
50 INJECTION, SOLUTION INTRAVENOUS
Status: DISCONTINUED | OUTPATIENT
Start: 2022-10-06 | End: 2022-10-08

## 2022-10-06 RX ORDER — ASPIRIN 81 MG/1
81 TABLET ORAL DAILY
Status: DISCONTINUED | OUTPATIENT
Start: 2022-10-06 | End: 2022-10-08

## 2022-10-06 RX ORDER — ENOXAPARIN SODIUM 100 MG/ML
40 INJECTION SUBCUTANEOUS DAILY
Status: DISCONTINUED | OUTPATIENT
Start: 2022-10-06 | End: 2022-10-08

## 2022-10-06 RX ORDER — INSULIN ASPART 100 [IU]/ML
15 INJECTION, SOLUTION INTRAVENOUS; SUBCUTANEOUS ONCE
Status: COMPLETED | OUTPATIENT
Start: 2022-10-06 | End: 2022-10-06

## 2022-10-06 RX ORDER — MULTIPLE VITAMINS W/ MINERALS TAB 9MG-400MCG
1 TAB ORAL DAILY
Status: DISCONTINUED | OUTPATIENT
Start: 2022-10-06 | End: 2022-10-08

## 2022-10-06 RX ORDER — ONDANSETRON 2 MG/ML
4 INJECTION INTRAMUSCULAR; INTRAVENOUS EVERY 6 HOURS PRN
Status: DISCONTINUED | OUTPATIENT
Start: 2022-10-06 | End: 2022-10-08

## 2022-10-06 RX ORDER — LORAZEPAM 1 MG/1
0.5 TABLET ORAL ONCE
Status: COMPLETED | OUTPATIENT
Start: 2022-10-06 | End: 2022-10-06

## 2022-10-06 RX ORDER — NICOTINE POLACRILEX 4 MG
30 LOZENGE BUCCAL
Status: DISCONTINUED | OUTPATIENT
Start: 2022-10-06 | End: 2022-10-08

## 2022-10-06 RX ORDER — ATORVASTATIN CALCIUM 10 MG/1
10 TABLET, FILM COATED ORAL NIGHTLY
Status: DISCONTINUED | OUTPATIENT
Start: 2022-10-06 | End: 2022-10-08

## 2022-10-06 RX ORDER — INSULIN ASPART 100 [IU]/ML
10 INJECTION, SOLUTION INTRAVENOUS; SUBCUTANEOUS ONCE
Status: DISCONTINUED | OUTPATIENT
Start: 2022-10-06 | End: 2022-10-06

## 2022-10-06 RX ORDER — ESCITALOPRAM OXALATE 10 MG/1
20 TABLET ORAL DAILY
Status: DISCONTINUED | OUTPATIENT
Start: 2022-10-07 | End: 2022-10-08

## 2022-10-06 RX ORDER — ACETAMINOPHEN 325 MG/1
650 TABLET ORAL EVERY 6 HOURS PRN
Status: DISCONTINUED | OUTPATIENT
Start: 2022-10-06 | End: 2022-10-08

## 2022-10-06 NOTE — PLAN OF CARE
Pt received A&Ox4, left facial droop - hx brain tumor. Tmax 99.5F. VSS. Denies pain. Lactic 2.7 this AM, increase from previous collection. Dr. Sai Guajardo notified. No new orders. Seen by PT, orders placed for OT eval. IVF infusing @ 100ml/hr. IV rocephin per MAR.  this afternoon. MD notified. 1x dose 15 units given per orders. Insulin adjusted per MD. STAT BMP collected. Repeat , 5 units given per orders. Pt BG now down to 176. Fall precautions in place. Call light in reach.

## 2022-10-06 NOTE — PLAN OF CARE
Problem: GENITOURINARY  Goal: Maintain optimal urinary function  Description: Interventions:  - Assess ability to void  - Assess for bladder distention  - Monitor creatinine and BUN  - Monitor intake and output  - Insert urinary catheter as ordered  - Obtain appropriate imaging as ordered  Outcome: Progressing

## 2022-10-06 NOTE — CM/SW NOTE
Department  notified of request for [facility], aidin referrals started. Assigned CM/SW to follow up with pt/family on further discharge planning.      Kelsy Bejarano  [facility]

## 2022-10-07 ENCOUNTER — CASE MANAGEMENT (OUTPATIENT)
Dept: CARE COORDINATION | Age: 67
End: 2022-10-07

## 2022-10-07 LAB
ANION GAP SERPL CALC-SCNC: 1 MMOL/L (ref 0–18)
BASOPHILS # BLD AUTO: 0.06 X10(3) UL (ref 0–0.2)
BASOPHILS NFR BLD AUTO: 0.6 %
BUN BLD-MCNC: 14 MG/DL (ref 7–18)
CALCIUM BLD-MCNC: 8.2 MG/DL (ref 8.5–10.1)
CHLORIDE SERPL-SCNC: 110 MMOL/L (ref 98–112)
CO2 SERPL-SCNC: 24 MMOL/L (ref 21–32)
CREAT BLD-MCNC: 0.63 MG/DL
EOSINOPHIL # BLD AUTO: 0.09 X10(3) UL (ref 0–0.7)
EOSINOPHIL NFR BLD AUTO: 0.9 %
ERYTHROCYTE [DISTWIDTH] IN BLOOD BY AUTOMATED COUNT: 18 %
GFR SERPLBLD BASED ON 1.73 SQ M-ARVRAT: 97 ML/MIN/1.73M2 (ref 60–?)
GLUCOSE BLD-MCNC: 107 MG/DL (ref 70–99)
GLUCOSE BLD-MCNC: 116 MG/DL (ref 70–99)
GLUCOSE BLD-MCNC: 311 MG/DL (ref 70–99)
GLUCOSE BLD-MCNC: 329 MG/DL (ref 70–99)
GLUCOSE BLD-MCNC: 355 MG/DL (ref 70–99)
GLUCOSE BLD-MCNC: 46 MG/DL (ref 70–99)
GLUCOSE BLD-MCNC: 52 MG/DL (ref 70–99)
GLUCOSE BLD-MCNC: 91 MG/DL (ref 70–99)
HCT VFR BLD AUTO: 29.4 %
HGB BLD-MCNC: 9.9 G/DL
IMM GRANULOCYTES # BLD AUTO: 0.11 X10(3) UL (ref 0–1)
IMM GRANULOCYTES NFR BLD: 1.1 %
LACTATE SERPL-SCNC: 1.6 MMOL/L (ref 0.4–2)
LYMPHOCYTES # BLD AUTO: 3.38 X10(3) UL (ref 1–4)
LYMPHOCYTES NFR BLD AUTO: 33.7 %
MCH RBC QN AUTO: 36.3 PG (ref 26–34)
MCHC RBC AUTO-ENTMCNC: 33.7 G/DL (ref 31–37)
MCV RBC AUTO: 107.7 FL
MONOCYTES # BLD AUTO: 0.98 X10(3) UL (ref 0.1–1)
MONOCYTES NFR BLD AUTO: 9.8 %
NEUTROPHILS # BLD AUTO: 5.41 X10 (3) UL (ref 1.5–7.7)
NEUTROPHILS # BLD AUTO: 5.41 X10(3) UL (ref 1.5–7.7)
NEUTROPHILS NFR BLD AUTO: 53.9 %
OSMOLALITY SERPL CALC.SUM OF ELEC: 278 MOSM/KG (ref 275–295)
PLATELET # BLD AUTO: 193 10(3)UL (ref 150–450)
POTASSIUM SERPL-SCNC: 4 MMOL/L (ref 3.5–5.1)
RBC # BLD AUTO: 2.73 X10(6)UL
SODIUM SERPL-SCNC: 135 MMOL/L (ref 136–145)
WBC # BLD AUTO: 10 X10(3) UL (ref 4–11)

## 2022-10-07 PROCEDURE — 80048 BASIC METABOLIC PNL TOTAL CA: CPT | Performed by: HOSPITALIST

## 2022-10-07 PROCEDURE — 83605 ASSAY OF LACTIC ACID: CPT | Performed by: INTERNAL MEDICINE

## 2022-10-07 PROCEDURE — 97530 THERAPEUTIC ACTIVITIES: CPT

## 2022-10-07 PROCEDURE — 85025 COMPLETE CBC W/AUTO DIFF WBC: CPT | Performed by: HOSPITALIST

## 2022-10-07 PROCEDURE — 97165 OT EVAL LOW COMPLEX 30 MIN: CPT

## 2022-10-07 PROCEDURE — 82962 GLUCOSE BLOOD TEST: CPT

## 2022-10-07 NOTE — PLAN OF CARE
Patient is alert and oriented, denies pain or SOB. Ambulates to the bathroom without assistance, steady gait. Patient feels better, IVF infusing at 100 ml/hr, no acute events overnight. Bed alarm is on, call light within reach, will continue to monitor.      Problem: GENITOURINARY  Goal: Maintain optimal urinary function  Description: Interventions:  - Assess ability to void  - Assess for bladder distention  - Monitor creatinine and BUN  - Monitor intake and output  - Insert urinary catheter as ordered  - Obtain appropriate imaging as ordered  Outcome: Progressing

## 2022-10-07 NOTE — HOME CARE LIAISON
Received referral via [facility] for Home Health services. Spoke w/ patient and provided with list of [facility]  providers from [facility]. Patient's choice is to use previous home health- [facility]. . Notified Bobo Garcia

## 2022-10-07 NOTE — CM/SW NOTE
VIVIAN sent updated referral for [facility] services to Mandie Chand via [phone]. VIVIAN will continue to follow for plan of care changes and remain available for any additional DC needs or concerns.      Jailyn Hart MSW, LSW  Discharge Planner   [phone]

## 2022-10-07 NOTE — PLAN OF CARE
Pt received A&Ox4. Afebrile. VSS. Denies pain. BG 46 this AM, MD aware. Repeat , 329, 311. D/w Dr. Sai Guajardo, insulin given per orders. Up in halls today w/ OT. IVF infusing @ 100ml/hr. IV rocephin per MAR. Repeat lactic down to 1.6. Fall precautions in place. Call light in reach.

## 2022-10-08 VITALS
HEIGHT: 66 IN | RESPIRATION RATE: 14 BRPM | DIASTOLIC BLOOD PRESSURE: 73 MMHG | TEMPERATURE: 99 F | SYSTOLIC BLOOD PRESSURE: 156 MMHG | BODY MASS INDEX: 16.45 KG/M2 | WEIGHT: 102.38 LBS | OXYGEN SATURATION: 100 % | HEART RATE: 88 BPM

## 2022-10-08 LAB
ANION GAP SERPL CALC-SCNC: 4 MMOL/L (ref 0–18)
BASOPHILS # BLD AUTO: 0.06 X10(3) UL (ref 0–0.2)
BASOPHILS NFR BLD AUTO: 0.7 %
BUN BLD-MCNC: 11 MG/DL (ref 7–18)
CALCIUM BLD-MCNC: 8.1 MG/DL (ref 8.5–10.1)
CHLORIDE SERPL-SCNC: 106 MMOL/L (ref 98–112)
CO2 SERPL-SCNC: 28 MMOL/L (ref 21–32)
CREAT BLD-MCNC: 0.59 MG/DL
EOSINOPHIL # BLD AUTO: 0.05 X10(3) UL (ref 0–0.7)
EOSINOPHIL NFR BLD AUTO: 0.6 %
ERYTHROCYTE [DISTWIDTH] IN BLOOD BY AUTOMATED COUNT: 18 %
GFR SERPLBLD BASED ON 1.73 SQ M-ARVRAT: 98 ML/MIN/1.73M2 (ref 60–?)
GLUCOSE BLD-MCNC: 230 MG/DL (ref 70–99)
GLUCOSE BLD-MCNC: 231 MG/DL (ref 70–99)
GLUCOSE BLD-MCNC: 322 MG/DL (ref 70–99)
GLUCOSE BLD-MCNC: 345 MG/DL (ref 70–99)
GLUCOSE BLD-MCNC: 356 MG/DL (ref 70–99)
GLUCOSE BLD-MCNC: 41 MG/DL (ref 70–99)
GLUCOSE BLD-MCNC: 46 MG/DL (ref 70–99)
GLUCOSE BLD-MCNC: 471 MG/DL (ref 70–99)
GLUCOSE BLD-MCNC: 65 MG/DL (ref 70–99)
GLUCOSE BLD-MCNC: 95 MG/DL (ref 70–99)
HCT VFR BLD AUTO: 30.8 %
HGB BLD-MCNC: 10 G/DL
IMM GRANULOCYTES # BLD AUTO: 0.15 X10(3) UL (ref 0–1)
IMM GRANULOCYTES NFR BLD: 1.7 %
LYMPHOCYTES # BLD AUTO: 1.8 X10(3) UL (ref 1–4)
LYMPHOCYTES NFR BLD AUTO: 20.9 %
MCH RBC QN AUTO: 35.6 PG (ref 26–34)
MCHC RBC AUTO-ENTMCNC: 32.5 G/DL (ref 31–37)
MCV RBC AUTO: 109.6 FL
MONOCYTES # BLD AUTO: 1.11 X10(3) UL (ref 0.1–1)
MONOCYTES NFR BLD AUTO: 12.9 %
NEUTROPHILS # BLD AUTO: 5.45 X10 (3) UL (ref 1.5–7.7)
NEUTROPHILS # BLD AUTO: 5.45 X10(3) UL (ref 1.5–7.7)
NEUTROPHILS NFR BLD AUTO: 63.2 %
OSMOLALITY SERPL CALC.SUM OF ELEC: 282 MOSM/KG (ref 275–295)
PLATELET # BLD AUTO: 196 10(3)UL (ref 150–450)
POTASSIUM SERPL-SCNC: 3.5 MMOL/L (ref 3.5–5.1)
RBC # BLD AUTO: 2.81 X10(6)UL
SODIUM SERPL-SCNC: 138 MMOL/L (ref 136–145)
WBC # BLD AUTO: 8.6 X10(3) UL (ref 4–11)

## 2022-10-08 PROCEDURE — 82962 GLUCOSE BLOOD TEST: CPT

## 2022-10-08 PROCEDURE — 97110 THERAPEUTIC EXERCISES: CPT

## 2022-10-08 PROCEDURE — 97116 GAIT TRAINING THERAPY: CPT

## 2022-10-08 PROCEDURE — 80048 BASIC METABOLIC PNL TOTAL CA: CPT | Performed by: HOSPITALIST

## 2022-10-08 PROCEDURE — 85025 COMPLETE CBC W/AUTO DIFF WBC: CPT | Performed by: HOSPITALIST

## 2022-10-08 RX ORDER — INSULIN GLARGINE 100 [IU]/ML
15 INJECTION, SOLUTION SUBCUTANEOUS EVERY MORNING
Qty: 4.5 ML | Refills: 0 | Status: SHIPPED | OUTPATIENT
Start: 2022-10-08 | End: 2022-11-07

## 2022-10-08 RX ORDER — CIPROFLOXACIN 500 MG/1
500 TABLET, FILM COATED ORAL 2 TIMES DAILY
Qty: 4 TABLET | Refills: 0 | Status: SHIPPED | OUTPATIENT
Start: 2022-10-08 | End: 2022-10-10

## 2022-10-08 RX ORDER — POTASSIUM CHLORIDE 20 MEQ/1
40 TABLET, EXTENDED RELEASE ORAL EVERY 4 HOURS
Status: COMPLETED | OUTPATIENT
Start: 2022-10-08 | End: 2022-10-08

## 2022-10-08 NOTE — PROGRESS NOTES
Patient denies urinary discomfort. Blood sugar 300's this morning, Dr. Carlos Tadeo notified, blood sugar monitored. Blood sugar 471 this afternoon, patient denies dizziness, Dr. Carlos Tadeo notfiied, Novolog 10 units given, repeat blood cugar 487, Dr. Carlos Tadeo notified, 5 units of Novolog given. . Latest blood sugar 231, Dr. Carlos Tadeo notified.

## 2022-10-08 NOTE — PLAN OF CARE
Pt A/O x4. VSS. Pt blood glucose elevated today, sliding scale insulin admin per MAR. Previous discharge orders in place, however, Pt blood glucose has only elevated despite insulin coverage. Dr. Scot Velazquez notified, awaiting response. All other medications given per MAR. Pt ambulated around unit, tolerated well. Fall precautions in place. Call light within reach; will continue to monitor.      Problem: GENITOURINARY  Goal: Maintain optimal urinary function  Description: Interventions:  - Assess ability to void  - Assess for bladder distention  - Monitor creatinine and BUN  - Monitor intake and output  - Insert urinary catheter as ordered  - Obtain appropriate imaging as ordered  10/8/2022 1324 by Venancio Dee RN  Outcome: Adequate for Discharge  10/8/2022 1208 by Venancio Dee RN  Outcome: Adequate for Discharge

## 2022-10-08 NOTE — CM/SW NOTE
10/08/22 1000   Discharge disposition   Expected discharge disposition Home-Health   Post Acute Care Provider   ([facility] [phone])       [facility] notified of anticipated dc date.     Kanu Xie LCSW  /Discharge Planner

## 2022-10-08 NOTE — PLAN OF CARE
Patient is alert and oriented x4. High blood sugar at , asymptomatic. MD made. Patient denies any  urinary discomfort, no N/V and deneis any pain. Left arm scabs from fall, right arm prec from right breast mastectomy. VSS and afebrile, needs addressed. Call light within reach. Fall prec maintained. 0515 Low blood sugar at 41, asymptomatic, alert and oriented. Denies any discomfort, no shaking. Given glucose oral liquid. BG increased to 61 after 15 mins, continue to treat per protocol and now at 95. Will continue to monitor. MD notified. Per patient she usually take Levemir daily mornings at home, not at [facility] if order can be changed when discharged. 3590 Levemir order changed, see MAR.     Problem: GENITOURINARY  Goal: Maintain optimal urinary function  Description: Interventions:  - Assess ability to void  - Assess for bladder distention  - Monitor creatinine and BUN  - Monitor intake and output  - Insert urinary catheter as ordered  - Obtain appropriate imaging as ordered  Outcome: Progressing

## 2022-10-10 ENCOUNTER — CASE MANAGEMENT (OUTPATIENT)
Dept: CARE COORDINATION | Age: 67
End: 2022-10-10

## 2022-10-10 ASSESSMENT — ACTIVITIES OF DAILY LIVING (ADL)
DME_IN_USE: WALKER
DME_IN_USE: YES

## 2022-10-10 ASSESSMENT — SLEEP AND FATIGUE QUESTIONNAIRES: SLEEP DESCRIPTORS: REPORTS NO PROBLEM

## 2022-10-16 LAB
B-LACTAMASE ORGANISM ISLT: POSITIVE
BACTERIA BLD CULT: ABNORMAL
GRAM STN SPEC: ABNORMAL

## 2022-11-07 ENCOUNTER — CASE MANAGEMENT (OUTPATIENT)
Dept: CARE COORDINATION | Age: 67
End: 2022-11-07

## 2022-11-15 ENCOUNTER — CASE MANAGEMENT (OUTPATIENT)
Dept: CARE COORDINATION | Age: 67
End: 2022-11-15

## 2022-12-06 ENCOUNTER — CASE MANAGEMENT (OUTPATIENT)
Dept: CARE COORDINATION | Age: 67
End: 2022-12-06

## 2022-12-06 ASSESSMENT — SLEEP AND FATIGUE QUESTIONNAIRES: SLEEP DESCRIPTORS: REPORTS NO PROBLEM

## 2023-05-05 ENCOUNTER — PREP FOR CASE (OUTPATIENT)
Dept: CARDIOLOGY | Age: 68
End: 2023-05-05

## 2023-05-05 DIAGNOSIS — I50.23 ACUTE ON CHRONIC SYSTOLIC HEART FAILURE (CMD): Primary | ICD-10-CM

## 2023-05-05 RX ORDER — LIDOCAINE AND PRILOCAINE 25; 25 MG/G; MG/G
CREAM TOPICAL PRN
COMMUNITY

## 2023-05-05 RX ORDER — METOPROLOL SUCCINATE 50 MG/1
50 TABLET, EXTENDED RELEASE ORAL DAILY
Status: ON HOLD | COMMUNITY
End: 2023-05-08 | Stop reason: HOSPADM

## 2023-05-05 RX ORDER — LOSARTAN POTASSIUM AND HYDROCHLOROTHIAZIDE 12.5; 5 MG/1; MG/1
1 TABLET ORAL DAILY
Status: ON HOLD | COMMUNITY
End: 2023-05-08 | Stop reason: HOSPADM

## 2023-05-05 RX ORDER — SPIRONOLACTONE 25 MG/1
25 TABLET ORAL DAILY
COMMUNITY

## 2023-05-08 ENCOUNTER — HOSPITAL ENCOUNTER (INPATIENT)
Age: 68
LOS: 1 days | Discharge: HOME | DRG: 286 | End: 2023-05-09
Attending: INTERNAL MEDICINE | Admitting: INTERNAL MEDICINE

## 2023-05-08 DIAGNOSIS — I50.9 ACUTE DECOMPENSATED HEART FAILURE (CMD): Primary | ICD-10-CM

## 2023-05-08 DIAGNOSIS — I50.23 ACUTE ON CHRONIC SYSTOLIC HEART FAILURE (CMD): ICD-10-CM

## 2023-05-08 LAB
ANION GAP SERPL CALC-SCNC: 10 MMOL/L (ref 7–19)
ATRIAL RATE (BPM): 82
BUN SERPL-MCNC: 26 MG/DL (ref 6–20)
BUN/CREAT SERPL: 27 (ref 7–25)
CALCIUM SERPL-MCNC: 8.8 MG/DL (ref 8.4–10.2)
CHLORIDE SERPL-SCNC: 108 MMOL/L (ref 97–110)
CO2 SERPL-SCNC: 25 MMOL/L (ref 21–32)
CREAT SERPL-MCNC: 0.97 MG/DL (ref 0.51–0.95)
DEPRECATED RDW RBC: 50.4 FL (ref 39–50)
ERYTHROCYTE [DISTWIDTH] IN BLOOD: 13.1 % (ref 11–15)
FASTING DURATION TIME PATIENT: ABNORMAL H
GFR SERPLBLD BASED ON 1.73 SQ M-ARVRAT: 64 ML/MIN
GLUCOSE BLDC GLUCOMTR-MCNC: 228 MG/DL (ref 70–99)
GLUCOSE BLDC GLUCOMTR-MCNC: 362 MG/DL (ref 70–99)
GLUCOSE SERPL-MCNC: 113 MG/DL (ref 70–99)
HCT VFR BLD CALC: 36 % (ref 36–46.5)
HCT VFR BLD CALC: 39.7 % (ref 36–46.5)
HGB BLD-MCNC: 13.2 G/DL (ref 12–15.5)
MAGNESIUM SERPL-MCNC: 2.1 MG/DL (ref 1.7–2.4)
MCH RBC QN AUTO: 34.9 PG (ref 26–34)
MCHC RBC AUTO-ENTMCNC: 33.2 G/DL (ref 32–36.5)
MCV RBC AUTO: 105 FL (ref 78–100)
NRBC BLD MANUAL-RTO: 0 /100 WBC
NT-PROBNP SERPL-MCNC: ABNORMAL PG/ML
P AXIS (DEGREES): 69
PLATELET # BLD AUTO: 162 K/MCL (ref 140–450)
POTASSIUM SERPL-SCNC: 4.3 MMOL/L (ref 3.4–5.1)
PR-INTERVAL (MSEC): 158
QRS-INTERVAL (MSEC): 78
QT-INTERVAL (MSEC): 392
QTC: 458
R AXIS (DEGREES): 33
RBC # BLD: 3.78 MIL/MCL (ref 4–5.2)
REPORT TEXT: NORMAL
SODIUM SERPL-SCNC: 139 MMOL/L (ref 135–145)
T AXIS (DEGREES): 152
TROPONIN I SERPL DL<=0.01 NG/ML-MCNC: 140 NG/L
VENTRICULAR RATE EKG/MIN (BPM): 82
WBC # BLD: 6.5 K/MCL (ref 4.2–11)

## 2023-05-08 PROCEDURE — 13000001 HB PHASE II RECOVERY EA 30 MINUTES: Performed by: INTERNAL MEDICINE

## 2023-05-08 PROCEDURE — 10002805 HB CONTRAST AGENT: Performed by: INTERNAL MEDICINE

## 2023-05-08 PROCEDURE — 10002803 HB RX 637: Performed by: PHYSICIAN ASSISTANT

## 2023-05-08 PROCEDURE — 96372 THER/PROPH/DIAG INJ SC/IM: CPT | Performed by: HOSPITALIST

## 2023-05-08 PROCEDURE — B2111ZZ FLUOROSCOPY OF MULTIPLE CORONARY ARTERIES USING LOW OSMOLAR CONTRAST: ICD-10-PCS | Performed by: INTERNAL MEDICINE

## 2023-05-08 PROCEDURE — 85027 COMPLETE CBC AUTOMATED: CPT | Performed by: INTERNAL MEDICINE

## 2023-05-08 PROCEDURE — 10002800 HB RX 250 W HCPCS: Performed by: INTERNAL MEDICINE

## 2023-05-08 PROCEDURE — C1887 CATHETER, GUIDING: HCPCS | Performed by: INTERNAL MEDICINE

## 2023-05-08 PROCEDURE — 99153 MOD SED SAME PHYS/QHP EA: CPT | Performed by: INTERNAL MEDICINE

## 2023-05-08 PROCEDURE — 99223 1ST HOSP IP/OBS HIGH 75: CPT | Performed by: INTERNAL MEDICINE

## 2023-05-08 PROCEDURE — 10002803 HB RX 637: Performed by: HOSPITALIST

## 2023-05-08 PROCEDURE — B2151ZZ FLUOROSCOPY OF LEFT HEART USING LOW OSMOLAR CONTRAST: ICD-10-PCS | Performed by: INTERNAL MEDICINE

## 2023-05-08 PROCEDURE — 83735 ASSAY OF MAGNESIUM: CPT | Performed by: INTERNAL MEDICINE

## 2023-05-08 PROCEDURE — 10002800 HB RX 250 W HCPCS: Performed by: PHYSICIAN ASSISTANT

## 2023-05-08 PROCEDURE — 83880 ASSAY OF NATRIURETIC PEPTIDE: CPT | Performed by: INTERNAL MEDICINE

## 2023-05-08 PROCEDURE — 36415 COLL VENOUS BLD VENIPUNCTURE: CPT | Performed by: INTERNAL MEDICINE

## 2023-05-08 PROCEDURE — 10006023 HB SUPPLY 272: Performed by: INTERNAL MEDICINE

## 2023-05-08 PROCEDURE — 80048 BASIC METABOLIC PNL TOTAL CA: CPT | Performed by: INTERNAL MEDICINE

## 2023-05-08 PROCEDURE — 10004651 HB RX, NO CHARGE ITEM: Performed by: INTERNAL MEDICINE

## 2023-05-08 PROCEDURE — 93005 ELECTROCARDIOGRAM TRACING: CPT | Performed by: INTERNAL MEDICINE

## 2023-05-08 PROCEDURE — 4A023N8 MEASUREMENT OF CARDIAC SAMPLING AND PRESSURE, BILATERAL, PERCUTANEOUS APPROACH: ICD-10-PCS | Performed by: INTERNAL MEDICINE

## 2023-05-08 PROCEDURE — 10002801 HB RX 250 W/O HCPCS: Performed by: INTERNAL MEDICINE

## 2023-05-08 PROCEDURE — C1769 GUIDE WIRE: HCPCS | Performed by: INTERNAL MEDICINE

## 2023-05-08 PROCEDURE — 10006031 HB ROOM CHARGE TELEMETRY

## 2023-05-08 PROCEDURE — 99152 MOD SED SAME PHYS/QHP 5/>YRS: CPT | Performed by: INTERNAL MEDICINE

## 2023-05-08 PROCEDURE — C1894 INTRO/SHEATH, NON-LASER: HCPCS | Performed by: INTERNAL MEDICINE

## 2023-05-08 PROCEDURE — 84484 ASSAY OF TROPONIN QUANT: CPT | Performed by: INTERNAL MEDICINE

## 2023-05-08 PROCEDURE — 93460 R&L HRT ART/VENTRICLE ANGIO: CPT | Performed by: INTERNAL MEDICINE

## 2023-05-08 PROCEDURE — 10002800 HB RX 250 W HCPCS: Performed by: HOSPITALIST

## 2023-05-08 RX ORDER — TRAMADOL HYDROCHLORIDE 50 MG/1
50 TABLET ORAL EVERY 6 HOURS PRN
Status: DISCONTINUED | OUTPATIENT
Start: 2023-05-08 | End: 2023-05-09 | Stop reason: HOSPADM

## 2023-05-08 RX ORDER — 0.9 % SODIUM CHLORIDE 0.9 %
2 VIAL (ML) INJECTION EVERY 12 HOURS SCHEDULED
Status: DISCONTINUED | OUTPATIENT
Start: 2023-05-08 | End: 2023-05-09 | Stop reason: HOSPADM

## 2023-05-08 RX ORDER — MIDAZOLAM HYDROCHLORIDE 1 MG/ML
1 INJECTION, SOLUTION INTRAMUSCULAR; INTRAVENOUS PRN
Status: ACTIVE | OUTPATIENT
Start: 2023-05-08 | End: 2023-05-09

## 2023-05-08 RX ORDER — INSULIN GLARGINE 100 [IU]/ML
20 INJECTION, SOLUTION SUBCUTANEOUS NIGHTLY
Status: DISCONTINUED | OUTPATIENT
Start: 2023-05-08 | End: 2023-05-09 | Stop reason: HOSPADM

## 2023-05-08 RX ORDER — CARVEDILOL 12.5 MG/1
12.5 TABLET ORAL 2 TIMES DAILY WITH MEALS
Qty: 180 TABLET | Refills: 3 | Status: SHIPPED | OUTPATIENT
Start: 2023-05-08 | End: 2023-05-09 | Stop reason: HOSPADM

## 2023-05-08 RX ORDER — SACUBITRIL AND VALSARTAN 24; 26 MG/1; MG/1
1 TABLET, FILM COATED ORAL 2 TIMES DAILY
Qty: 180 TABLET | Refills: 3 | Status: SHIPPED | OUTPATIENT
Start: 2023-05-08

## 2023-05-08 RX ORDER — CLONIDINE HYDROCHLORIDE 0.1 MG/1
0.1 TABLET ORAL EVERY 4 HOURS PRN
Status: ACTIVE | OUTPATIENT
Start: 2023-05-08 | End: 2023-05-09

## 2023-05-08 RX ORDER — SPIRONOLACTONE 25 MG/1
25 TABLET ORAL DAILY
Status: DISCONTINUED | OUTPATIENT
Start: 2023-05-08 | End: 2023-05-09 | Stop reason: HOSPADM

## 2023-05-08 RX ORDER — NICOTINE POLACRILEX 4 MG
30 LOZENGE BUCCAL PRN
Status: DISCONTINUED | OUTPATIENT
Start: 2023-05-08 | End: 2023-05-09 | Stop reason: HOSPADM

## 2023-05-08 RX ORDER — NITROGLYCERIN 0.4 MG/1
0.4 TABLET SUBLINGUAL EVERY 5 MIN PRN
Status: ACTIVE | OUTPATIENT
Start: 2023-05-08 | End: 2023-05-09

## 2023-05-08 RX ORDER — HEPARIN SODIUM 1000 [USP'U]/ML
INJECTION, SOLUTION INTRAVENOUS; SUBCUTANEOUS PRN
Status: DISCONTINUED | OUTPATIENT
Start: 2023-05-08 | End: 2023-05-08 | Stop reason: HOSPADM

## 2023-05-08 RX ORDER — ACETAMINOPHEN 325 MG/1
650 TABLET ORAL EVERY 4 HOURS PRN
Status: DISCONTINUED | OUTPATIENT
Start: 2023-05-08 | End: 2023-05-09 | Stop reason: HOSPADM

## 2023-05-08 RX ORDER — 0.9 % SODIUM CHLORIDE 0.9 %
2 VIAL (ML) INJECTION EVERY 12 HOURS SCHEDULED
Status: DISCONTINUED | OUTPATIENT
Start: 2023-05-08 | End: 2023-05-08 | Stop reason: HOSPADM

## 2023-05-08 RX ORDER — HYDRALAZINE HYDROCHLORIDE 20 MG/ML
10 INJECTION INTRAMUSCULAR; INTRAVENOUS EVERY 4 HOURS PRN
Status: DISCONTINUED | OUTPATIENT
Start: 2023-05-08 | End: 2023-05-08 | Stop reason: HOSPADM

## 2023-05-08 RX ORDER — NICOTINE POLACRILEX 4 MG
15 LOZENGE BUCCAL PRN
Status: DISCONTINUED | OUTPATIENT
Start: 2023-05-08 | End: 2023-05-09 | Stop reason: HOSPADM

## 2023-05-08 RX ORDER — SODIUM CHLORIDE 9 MG/ML
INJECTION, SOLUTION INTRAVENOUS CONTINUOUS
Status: ACTIVE | OUTPATIENT
Start: 2023-05-08 | End: 2023-05-08

## 2023-05-08 RX ORDER — ATORVASTATIN CALCIUM 10 MG/1
10 TABLET, FILM COATED ORAL NIGHTLY
Status: DISCONTINUED | OUTPATIENT
Start: 2023-05-08 | End: 2023-05-09 | Stop reason: HOSPADM

## 2023-05-08 RX ORDER — DEXTROSE MONOHYDRATE 25 G/50ML
25 INJECTION, SOLUTION INTRAVENOUS PRN
Status: DISCONTINUED | OUTPATIENT
Start: 2023-05-08 | End: 2023-05-09 | Stop reason: HOSPADM

## 2023-05-08 RX ORDER — CLONIDINE HYDROCHLORIDE 0.1 MG/1
0.1 TABLET ORAL EVERY 4 HOURS PRN
Status: DISCONTINUED | OUTPATIENT
Start: 2023-05-08 | End: 2023-05-08 | Stop reason: HOSPADM

## 2023-05-08 RX ORDER — HYDRALAZINE HYDROCHLORIDE 20 MG/ML
10 INJECTION INTRAMUSCULAR; INTRAVENOUS EVERY 4 HOURS PRN
Status: ACTIVE | OUTPATIENT
Start: 2023-05-08 | End: 2023-05-09

## 2023-05-08 RX ORDER — DEXTROSE MONOHYDRATE 25 G/50ML
12.5 INJECTION, SOLUTION INTRAVENOUS PRN
Status: DISCONTINUED | OUTPATIENT
Start: 2023-05-08 | End: 2023-05-09 | Stop reason: HOSPADM

## 2023-05-08 RX ORDER — ASPIRIN 325 MG
325 TABLET ORAL ONCE
Status: DISCONTINUED | OUTPATIENT
Start: 2023-05-08 | End: 2023-05-08

## 2023-05-08 RX ORDER — LIDOCAINE HYDROCHLORIDE 20 MG/ML
INJECTION, SOLUTION EPIDURAL; INFILTRATION; INTRACAUDAL; PERINEURAL PRN
Status: DISCONTINUED | OUTPATIENT
Start: 2023-05-08 | End: 2023-05-08 | Stop reason: HOSPADM

## 2023-05-08 RX ORDER — CARVEDILOL 6.25 MG/1
6.25 TABLET ORAL EVERY 12 HOURS SCHEDULED
Status: DISCONTINUED | OUTPATIENT
Start: 2023-05-08 | End: 2023-05-09

## 2023-05-08 RX ORDER — ACETAMINOPHEN 650 MG/1
650 SUPPOSITORY RECTAL EVERY 4 HOURS PRN
Status: DISCONTINUED | OUTPATIENT
Start: 2023-05-08 | End: 2023-05-09 | Stop reason: HOSPADM

## 2023-05-08 RX ORDER — TORSEMIDE 10 MG/1
10 TABLET ORAL DAILY
Qty: 90 TABLET | Refills: 3 | Status: SHIPPED | OUTPATIENT
Start: 2023-05-08

## 2023-05-08 RX ORDER — MIDAZOLAM HYDROCHLORIDE 1 MG/ML
INJECTION, SOLUTION INTRAMUSCULAR; INTRAVENOUS PRN
Status: DISCONTINUED | OUTPATIENT
Start: 2023-05-08 | End: 2023-05-08 | Stop reason: HOSPADM

## 2023-05-08 RX ORDER — FUROSEMIDE 10 MG/ML
40 INJECTION INTRAMUSCULAR; INTRAVENOUS ONCE
Status: COMPLETED | OUTPATIENT
Start: 2023-05-08 | End: 2023-05-08

## 2023-05-08 RX ORDER — INSULIN GLARGINE 100 [IU]/ML
16 INJECTION, SOLUTION SUBCUTANEOUS NIGHTLY
Status: DISCONTINUED | OUTPATIENT
Start: 2023-05-08 | End: 2023-05-08

## 2023-05-08 RX ORDER — CITALOPRAM HYDROBROMIDE 10 MG/1
40 TABLET ORAL DAILY
Status: DISCONTINUED | OUTPATIENT
Start: 2023-05-08 | End: 2023-05-09 | Stop reason: HOSPADM

## 2023-05-08 RX ORDER — LIDOCAINE HYDROCHLORIDE 10 MG/ML
1 INJECTION, SOLUTION INFILTRATION; PERINEURAL PRN
Status: ACTIVE | OUTPATIENT
Start: 2023-05-08 | End: 2023-05-09

## 2023-05-08 RX ADMIN — CITALOPRAM HYDROBROMIDE 40 MG: 10 TABLET ORAL at 17:21

## 2023-05-08 RX ADMIN — SACUBITRIL AND VALSARTAN 1 TABLET: 24; 26 TABLET, FILM COATED ORAL at 21:02

## 2023-05-08 RX ADMIN — INSULIN LISPRO 15 UNITS: 100 INJECTION, SOLUTION INTRAVENOUS; SUBCUTANEOUS at 18:10

## 2023-05-08 RX ADMIN — INSULIN GLARGINE 20 UNITS: 100 INJECTION, SOLUTION SUBCUTANEOUS at 22:09

## 2023-05-08 RX ADMIN — CARVEDILOL 6.25 MG: 6.25 TABLET, FILM COATED ORAL at 21:02

## 2023-05-08 RX ADMIN — ATORVASTATIN CALCIUM 10 MG: 10 TABLET, FILM COATED ORAL at 21:02

## 2023-05-08 RX ADMIN — FUROSEMIDE 40 MG: 10 INJECTION, SOLUTION INTRAMUSCULAR; INTRAVENOUS at 11:41

## 2023-05-08 RX ADMIN — SPIRONOLACTONE 25 MG: 25 TABLET ORAL at 17:21

## 2023-05-08 RX ADMIN — SODIUM CHLORIDE 2 ML: 9 INJECTION, SOLUTION INTRAMUSCULAR; INTRAVENOUS; SUBCUTANEOUS at 06:43

## 2023-05-08 RX ADMIN — SODIUM CHLORIDE, PRESERVATIVE FREE 2 ML: 5 INJECTION INTRAVENOUS at 21:04

## 2023-05-08 SDOH — ECONOMIC STABILITY: FOOD INSECURITY: HOW OFTEN IN THE PAST 12 MONTHS WERE YOU WORRIED OR STRESSED ABOUT HAVING ENOUGH MONEY TO BUY NUTRITIOUS MEALS?: RARELY

## 2023-05-08 SDOH — HEALTH STABILITY: GENERAL
BECAUSE OF A PHYSICAL, MENTAL, OR EMOTIONAL CONDITION, DO YOU HAVE SERIOUS DIFFICULTY CONCENTRATING, REMEMBERING OR MAKING DECISIONS?: NO

## 2023-05-08 SDOH — HEALTH STABILITY: GENERAL: BECAUSE OF A PHYSICAL, MENTAL, OR EMOTIONAL CONDITION, DO YOU HAVE DIFFICULTY DOING ERRANDS ALONE?: NO

## 2023-05-08 SDOH — SOCIAL STABILITY: SOCIAL NETWORK
HOW OFTEN DO YOU SEE OR TALK TO PEOPLE THAT YOU CARE ABOUT AND FEEL CLOSE TO? (FOR EXAMPLE: TALKING TO FRIENDS ON THE PHONE, VISITING FRIENDS OR FAMILY, GOING TO CHURCH OR CLUB MEETINGS): 5 OR MORE TIMES A WEEK

## 2023-05-08 SDOH — ECONOMIC STABILITY: TRANSPORTATION INSECURITY
IN THE PAST 12 MONTHS, HAS LACK OF TRANSPORTATION KEPT YOU FROM MEETINGS, WORK, OR FROM GETTING THINGS NEEDED FOR DAILY LIVING?: NO

## 2023-05-08 SDOH — SOCIAL STABILITY: SOCIAL NETWORK: SUPPORT SYSTEMS: SPOUSE;SIGNIFICANT OTHER

## 2023-05-08 SDOH — HEALTH STABILITY: PHYSICAL HEALTH: DO YOU HAVE DIFFICULTY DRESSING OR BATHING?: NO

## 2023-05-08 SDOH — ECONOMIC STABILITY: GENERAL

## 2023-05-08 SDOH — ECONOMIC STABILITY: HOUSING INSECURITY: ARE YOU WORRIED ABOUT LOSING YOUR HOUSING?: NO

## 2023-05-08 SDOH — ECONOMIC STABILITY: TRANSPORTATION INSECURITY
IN THE PAST 12 MONTHS, HAS THE LACK OF TRANSPORTATION KEPT YOU FROM MEDICAL APPOINTMENTS OR FROM GETTING MEDICATIONS?: NO

## 2023-05-08 SDOH — ECONOMIC STABILITY: HOUSING INSECURITY: WHAT IS YOUR LIVING SITUATION TODAY?: SPOUSE;SIGNIFICANT OTHER

## 2023-05-08 SDOH — HEALTH STABILITY: PHYSICAL HEALTH: DO YOU HAVE SERIOUS DIFFICULTY WALKING OR CLIMBING STAIRS?: NO

## 2023-05-08 SDOH — ECONOMIC STABILITY: HOUSING INSECURITY: WHAT IS YOUR LIVING SITUATION TODAY?: HOUSE

## 2023-05-08 ASSESSMENT — ACTIVITIES OF DAILY LIVING (ADL)
RECENT_DECLINE_ADL: NO
ADL_SHORT_OF_BREATH: NO
ADL_SCORE: 12
ADL_BEFORE_ADMISSION: INDEPENDENT

## 2023-05-08 ASSESSMENT — PAIN SCALES - GENERAL
PAINLEVEL_OUTOF10: 0

## 2023-05-08 ASSESSMENT — LIFESTYLE VARIABLES
HOW OFTEN DO YOU HAVE 6 OR MORE DRINKS ON ONE OCCASION: NEVER
ALCOHOL_USE_STATUS: NO OR LOW RISK WITH VALIDATED TOOL
HOW MANY STANDARD DRINKS CONTAINING ALCOHOL DO YOU HAVE ON A TYPICAL DAY: 0,1 OR 2
AUDIT-C TOTAL SCORE: 0
HOW OFTEN DO YOU HAVE A DRINK CONTAINING ALCOHOL: NEVER

## 2023-05-08 ASSESSMENT — PATIENT HEALTH QUESTIONNAIRE - PHQ9
CLINICAL INTERPRETATION OF PHQ2 SCORE: NO FURTHER SCREENING NEEDED
SUM OF ALL RESPONSES TO PHQ9 QUESTIONS 1 AND 2: 0
IS PATIENT ABLE TO COMPLETE PHQ2 OR PHQ9: YES
2. FEELING DOWN, DEPRESSED OR HOPELESS: NOT AT ALL
1. LITTLE INTEREST OR PLEASURE IN DOING THINGS: NOT AT ALL
SUM OF ALL RESPONSES TO PHQ9 QUESTIONS 1 AND 2: 0

## 2023-05-09 VITALS
WEIGHT: 100.31 LBS | DIASTOLIC BLOOD PRESSURE: 66 MMHG | RESPIRATION RATE: 16 BRPM | SYSTOLIC BLOOD PRESSURE: 101 MMHG | BODY MASS INDEX: 16.12 KG/M2 | HEIGHT: 66 IN | OXYGEN SATURATION: 96 % | TEMPERATURE: 97.9 F | HEART RATE: 75 BPM

## 2023-05-09 LAB
ANION GAP SERPL CALC-SCNC: 12 MMOL/L (ref 7–19)
BUN SERPL-MCNC: 25 MG/DL (ref 6–20)
BUN/CREAT SERPL: 32 (ref 7–25)
CALCIUM SERPL-MCNC: 8.4 MG/DL (ref 8.4–10.2)
CHLORIDE SERPL-SCNC: 109 MMOL/L (ref 97–110)
CO2 SERPL-SCNC: 20 MMOL/L (ref 21–32)
CREAT SERPL-MCNC: 0.78 MG/DL (ref 0.51–0.95)
DEPRECATED RDW RBC: 47.8 FL (ref 39–50)
ERYTHROCYTE [DISTWIDTH] IN BLOOD: 12.9 % (ref 11–15)
FASTING DURATION TIME PATIENT: ABNORMAL H
GFR SERPLBLD BASED ON 1.73 SQ M-ARVRAT: 83 ML/MIN
GLUCOSE BLDC GLUCOMTR-MCNC: 109 MG/DL (ref 70–99)
GLUCOSE BLDC GLUCOMTR-MCNC: 37 MG/DL (ref 70–99)
GLUCOSE BLDC GLUCOMTR-MCNC: 40 MG/DL (ref 70–99)
GLUCOSE SERPL-MCNC: 64 MG/DL (ref 70–99)
HCT VFR BLD CALC: 40.3 % (ref 36–46.5)
HGB BLD-MCNC: 14.1 G/DL (ref 12–15.5)
MCH RBC QN AUTO: 35.6 PG (ref 26–34)
MCHC RBC AUTO-ENTMCNC: 35 G/DL (ref 32–36.5)
MCV RBC AUTO: 101.8 FL (ref 78–100)
NRBC BLD MANUAL-RTO: 0 /100 WBC
PLATELET # BLD AUTO: 177 K/MCL (ref 140–450)
POTASSIUM SERPL-SCNC: 3.8 MMOL/L (ref 3.4–5.1)
RBC # BLD: 3.96 MIL/MCL (ref 4–5.2)
SODIUM SERPL-SCNC: 137 MMOL/L (ref 135–145)
TROPONIN I SERPL DL<=0.01 NG/ML-MCNC: 209 NG/L
WBC # BLD: 6.5 K/MCL (ref 4.2–11)

## 2023-05-09 PROCEDURE — 10002803 HB RX 637: Performed by: NURSE PRACTITIONER

## 2023-05-09 PROCEDURE — 10002803 HB RX 637: Performed by: PHYSICIAN ASSISTANT

## 2023-05-09 PROCEDURE — 84484 ASSAY OF TROPONIN QUANT: CPT | Performed by: INTERNAL MEDICINE

## 2023-05-09 PROCEDURE — 36415 COLL VENOUS BLD VENIPUNCTURE: CPT | Performed by: HOSPITALIST

## 2023-05-09 PROCEDURE — 10004651 HB RX, NO CHARGE ITEM: Performed by: PHYSICIAN ASSISTANT

## 2023-05-09 PROCEDURE — 99233 SBSQ HOSP IP/OBS HIGH 50: CPT | Performed by: INTERNAL MEDICINE

## 2023-05-09 PROCEDURE — 80048 BASIC METABOLIC PNL TOTAL CA: CPT | Performed by: HOSPITALIST

## 2023-05-09 PROCEDURE — 85027 COMPLETE CBC AUTOMATED: CPT | Performed by: HOSPITALIST

## 2023-05-09 PROCEDURE — 10002803 HB RX 637: Performed by: HOSPITALIST

## 2023-05-09 PROCEDURE — 10004651 HB RX, NO CHARGE ITEM: Performed by: INTERNAL MEDICINE

## 2023-05-09 RX ORDER — METOPROLOL SUCCINATE 25 MG/1
12.5 TABLET, EXTENDED RELEASE ORAL DAILY
Qty: 45 TABLET | Refills: 3 | Status: SHIPPED | OUTPATIENT
Start: 2023-05-09

## 2023-05-09 RX ORDER — INSULIN GLARGINE 100 [IU]/ML
16 INJECTION, SOLUTION SUBCUTANEOUS NIGHTLY
Qty: 15 ML | Refills: 12 | Status: SHIPPED | COMMUNITY
Start: 2023-05-09

## 2023-05-09 RX ORDER — METOPROLOL SUCCINATE 25 MG/1
12.5 TABLET, EXTENDED RELEASE ORAL DAILY
Status: DISCONTINUED | OUTPATIENT
Start: 2023-05-09 | End: 2023-05-09 | Stop reason: HOSPADM

## 2023-05-09 RX ORDER — SIMVASTATIN 20 MG
20 TABLET ORAL NIGHTLY
Qty: 90 TABLET | Refills: 3 | Status: SHIPPED | OUTPATIENT
Start: 2023-05-09

## 2023-05-09 RX ADMIN — SPIRONOLACTONE 25 MG: 25 TABLET ORAL at 08:39

## 2023-05-09 RX ADMIN — SODIUM CHLORIDE, PRESERVATIVE FREE 2 ML: 5 INJECTION INTRAVENOUS at 09:00

## 2023-05-09 RX ADMIN — METOPROLOL SUCCINATE 12.5 MG: 25 TABLET, EXTENDED RELEASE ORAL at 12:10

## 2023-05-09 RX ADMIN — CARVEDILOL 6.25 MG: 6.25 TABLET, FILM COATED ORAL at 08:39

## 2023-05-09 RX ADMIN — SACUBITRIL AND VALSARTAN 1 TABLET: 24; 26 TABLET, FILM COATED ORAL at 08:39

## 2023-05-09 RX ADMIN — ASPIRIN 81 MG: 81 TABLET, COATED ORAL at 08:39

## 2023-05-09 RX ADMIN — CITALOPRAM HYDROBROMIDE 40 MG: 10 TABLET ORAL at 08:40

## 2023-05-09 ASSESSMENT — PAIN SCALES - GENERAL
PAINLEVEL_OUTOF10: 0
PAINLEVEL_OUTOF10: 0

## 2023-07-24 ENCOUNTER — HOSPITAL ENCOUNTER (INPATIENT)
Facility: HOSPITAL | Age: 68
LOS: 19 days | Discharge: HOME | End: 2023-08-12
Attending: EMERGENCY MEDICINE | Admitting: INTERNAL MEDICINE
Payer: MEDICARE

## 2023-07-24 ENCOUNTER — APPOINTMENT (OUTPATIENT)
Dept: GENERAL RADIOLOGY | Facility: HOSPITAL | Age: 68
End: 2023-07-24
Attending: EMERGENCY MEDICINE
Payer: MEDICARE

## 2023-07-24 DIAGNOSIS — E10.10 TYPE 1 DIABETES MELLITUS WITH KETOACIDOSIS WITHOUT COMA (HCC): Primary | ICD-10-CM

## 2023-07-24 DIAGNOSIS — I10 ESSENTIAL HYPERTENSION: ICD-10-CM

## 2023-07-24 PROBLEM — R79.89 AZOTEMIA: Status: ACTIVE | Noted: 2023-07-24

## 2023-07-24 PROBLEM — D64.9 ANEMIA: Status: ACTIVE | Noted: 2023-07-24

## 2023-07-24 PROBLEM — N17.9 ACUTE RENAL FAILURE (ARF) (HCC): Status: ACTIVE | Noted: 2023-07-24

## 2023-07-24 PROBLEM — N17.9 ACUTE KIDNEY INJURY: Status: ACTIVE | Noted: 2023-07-24

## 2023-07-24 PROBLEM — N17.9 ACUTE KIDNEY INJURY (HCC): Status: ACTIVE | Noted: 2023-07-24

## 2023-07-24 PROBLEM — E87.5 HYPERKALEMIA: Status: ACTIVE | Noted: 2023-07-24

## 2023-07-24 PROBLEM — N17.9 ACUTE RENAL FAILURE (ARF): Status: ACTIVE | Noted: 2023-07-24

## 2023-07-24 PROBLEM — E87.20 METABOLIC ACIDOSIS: Status: ACTIVE | Noted: 2023-07-24

## 2023-07-24 LAB
ALBUMIN SERPL-MCNC: 3.7 G/DL (ref 3.4–5)
ALBUMIN/GLOB SERPL: 1.2 {RATIO} (ref 1–2)
ALP LIVER SERPL-CCNC: 85 U/L
ALT SERPL-CCNC: 37 U/L
ANION GAP SERPL CALC-SCNC: 12 MMOL/L (ref 0–18)
ANION GAP SERPL CALC-SCNC: 16 MMOL/L (ref 0–18)
ANION GAP SERPL CALC-SCNC: 23 MMOL/L (ref 0–18)
ANION GAP SERPL CALC-SCNC: 29 MMOL/L (ref 0–18)
ANION GAP SERPL CALC-SCNC: 35 MMOL/L (ref 0–18)
APTT PPP: 32.9 SECONDS (ref 23.3–35.6)
AST SERPL-CCNC: 33 U/L (ref 15–37)
ATRIAL RATE: 102 BPM
BASE EXCESS BLDV CALC-SCNC: -29.4 MMOL/L
BASOPHILS # BLD AUTO: 0.04 X10(3) UL (ref 0–0.2)
BASOPHILS # BLD: 0 X10(3) UL (ref 0–0.2)
BASOPHILS NFR BLD AUTO: 0.4 %
BASOPHILS NFR BLD: 0 %
BILIRUB SERPL-MCNC: 0.4 MG/DL (ref 0.1–2)
BILIRUB UR QL STRIP.AUTO: NEGATIVE
BUN BLD-MCNC: 60 MG/DL (ref 7–18)
BUN BLD-MCNC: 61 MG/DL (ref 7–18)
BUN BLD-MCNC: 63 MG/DL (ref 7–18)
CALCIUM BLD-MCNC: 10 MG/DL (ref 8.5–10.1)
CALCIUM BLD-MCNC: 10.4 MG/DL (ref 8.5–10.1)
CALCIUM BLD-MCNC: 8.7 MG/DL (ref 8.5–10.1)
CALCIUM BLD-MCNC: 9.4 MG/DL (ref 8.5–10.1)
CALCIUM BLD-MCNC: 9.5 MG/DL (ref 8.5–10.1)
CHLORIDE SERPL-SCNC: 102 MMOL/L (ref 98–112)
CHLORIDE SERPL-SCNC: 106 MMOL/L (ref 98–112)
CHLORIDE SERPL-SCNC: 106 MMOL/L (ref 98–112)
CHLORIDE SERPL-SCNC: 88 MMOL/L (ref 98–112)
CHLORIDE SERPL-SCNC: 97 MMOL/L (ref 98–112)
CHOLEST SERPL-MCNC: 226 MG/DL (ref ?–200)
CO2 SERPL-SCNC: 11 MMOL/L (ref 21–32)
CO2 SERPL-SCNC: 15 MMOL/L (ref 21–32)
CO2 SERPL-SCNC: 19 MMOL/L (ref 21–32)
CO2 SERPL-SCNC: 4 MMOL/L (ref 21–32)
CO2 SERPL-SCNC: 8 MMOL/L (ref 21–32)
COLOR UR AUTO: YELLOW
CREAT BLD-MCNC: 2.54 MG/DL
CREAT BLD-MCNC: 2.57 MG/DL
CREAT BLD-MCNC: 2.61 MG/DL
CREAT BLD-MCNC: 2.7 MG/DL
CREAT BLD-MCNC: 2.8 MG/DL
EGFRCR SERPLBLD CKD-EPI 2021: 18 ML/MIN/1.73M2 (ref 60–?)
EGFRCR SERPLBLD CKD-EPI 2021: 19 ML/MIN/1.73M2 (ref 60–?)
EGFRCR SERPLBLD CKD-EPI 2021: 19 ML/MIN/1.73M2 (ref 60–?)
EGFRCR SERPLBLD CKD-EPI 2021: 20 ML/MIN/1.73M2 (ref 60–?)
EGFRCR SERPLBLD CKD-EPI 2021: 20 ML/MIN/1.73M2 (ref 60–?)
EOSINOPHIL # BLD AUTO: 0.23 X10(3) UL (ref 0–0.7)
EOSINOPHIL # BLD: 0 X10(3) UL (ref 0–0.7)
EOSINOPHIL NFR BLD AUTO: 2.1 %
EOSINOPHIL NFR BLD: 0 %
ERYTHROCYTE [DISTWIDTH] IN BLOOD BY AUTOMATED COUNT: 13.1 %
ERYTHROCYTE [DISTWIDTH] IN BLOOD BY AUTOMATED COUNT: 14 %
EST. AVERAGE GLUCOSE BLD GHB EST-MCNC: 263 MG/DL (ref 68–126)
GLOBULIN PLAS-MCNC: 3 G/DL (ref 2.8–4.4)
GLUCOSE BLD-MCNC: 101 MG/DL (ref 70–99)
GLUCOSE BLD-MCNC: 115 MG/DL (ref 70–99)
GLUCOSE BLD-MCNC: 129 MG/DL (ref 70–99)
GLUCOSE BLD-MCNC: 157 MG/DL (ref 70–99)
GLUCOSE BLD-MCNC: 186 MG/DL (ref 70–99)
GLUCOSE BLD-MCNC: 250 MG/DL (ref 70–99)
GLUCOSE BLD-MCNC: 250 MG/DL (ref 70–99)
GLUCOSE BLD-MCNC: 270 MG/DL (ref 70–99)
GLUCOSE BLD-MCNC: 301 MG/DL (ref 70–99)
GLUCOSE BLD-MCNC: 356 MG/DL (ref 70–99)
GLUCOSE BLD-MCNC: 426 MG/DL (ref 70–99)
GLUCOSE BLD-MCNC: 475 MG/DL (ref 70–99)
GLUCOSE BLD-MCNC: 524 MG/DL (ref 70–99)
GLUCOSE BLD-MCNC: 552 MG/DL (ref 70–99)
GLUCOSE BLD-MCNC: 768 MG/DL (ref 70–99)
GLUCOSE BLD-MCNC: 84 MG/DL (ref 70–99)
GLUCOSE UR STRIP.AUTO-MCNC: >=500 MG/DL
HBA1C MFR BLD: 10.8 % (ref ?–5.7)
HCO3 BLDV-SCNC: 1.5 MEQ/L (ref 22–26)
HCT VFR BLD AUTO: 33.3 %
HCT VFR BLD AUTO: 37.8 %
HDLC SERPL-MCNC: 37 MG/DL (ref 40–59)
HGB BLD-MCNC: 11.5 G/DL
HGB BLD-MCNC: 11.7 G/DL
IMM GRANULOCYTES # BLD AUTO: 0.32 X10(3) UL (ref 0–1)
IMM GRANULOCYTES NFR BLD: 3 %
INR BLD: 1.17 (ref 0.85–1.16)
KETONES UR STRIP.AUTO-MCNC: 20 MG/DL
LACTATE BLD-SCNC: 10.9 MMOL/L (ref 0.5–2)
LACTATE SERPL-SCNC: 10.5 MMOL/L (ref 0.4–2)
LACTATE SERPL-SCNC: 7.5 MMOL/L (ref 0.4–2)
LACTATE SERPL-SCNC: 8.4 MMOL/L (ref 0.4–2)
LACTATE SERPL-SCNC: 8.5 MMOL/L (ref 0.4–2)
LDLC SERPL CALC-MCNC: 85 MG/DL (ref ?–100)
LEUKOCYTE ESTERASE UR QL STRIP.AUTO: NEGATIVE
LIPASE SERPL-CCNC: 61 U/L (ref 13–75)
LYMPHOCYTES # BLD AUTO: 0.48 X10(3) UL (ref 1–4)
LYMPHOCYTES NFR BLD AUTO: 4.5 %
LYMPHOCYTES NFR BLD: 1.6 X10(3) UL (ref 1–4)
LYMPHOCYTES NFR BLD: 9 %
MAGNESIUM SERPL-MCNC: 3.5 MG/DL (ref 1.6–2.6)
MCH RBC QN AUTO: 33.7 PG (ref 26–34)
MCH RBC QN AUTO: 34.1 PG (ref 26–34)
MCHC RBC AUTO-ENTMCNC: 31 G/DL (ref 31–37)
MCHC RBC AUTO-ENTMCNC: 34.5 G/DL (ref 31–37)
MCV RBC AUTO: 110.2 FL
MCV RBC AUTO: 97.7 FL
METAMYELOCYTES # BLD: 0.36 X10(3) UL
METAMYELOCYTES NFR BLD: 2 %
MONOCYTES # BLD AUTO: 0.63 X10(3) UL (ref 0.1–1)
MONOCYTES # BLD: 1.42 X10(3) UL (ref 0.1–1)
MONOCYTES NFR BLD AUTO: 5.8 %
MONOCYTES NFR BLD: 8 %
MORPHOLOGY: NORMAL
NEUTROPHILS # BLD AUTO: 12.65 X10 (3) UL (ref 1.5–7.7)
NEUTROPHILS # BLD AUTO: 9.07 X10 (3) UL (ref 1.5–7.7)
NEUTROPHILS # BLD AUTO: 9.07 X10(3) UL (ref 1.5–7.7)
NEUTROPHILS NFR BLD AUTO: 84.2 %
NEUTROPHILS NFR BLD: 71 %
NEUTS BAND NFR BLD: 10 %
NEUTS HYPERSEG # BLD: 14.42 X10(3) UL (ref 1.5–7.7)
NITRITE UR QL STRIP.AUTO: NEGATIVE
NONHDLC SERPL-MCNC: 189 MG/DL (ref ?–130)
NT-PROBNP SERPL-MCNC: 3924 PG/ML (ref ?–125)
OSMOLALITY SERPL CALC.SUM OF ELEC: 301 MOSM/KG (ref 275–295)
OSMOLALITY SERPL CALC.SUM OF ELEC: 303 MOSM/KG (ref 275–295)
OSMOLALITY SERPL CALC.SUM OF ELEC: 308 MOSM/KG (ref 275–295)
OSMOLALITY SERPL CALC.SUM OF ELEC: 318 MOSM/KG (ref 275–295)
OSMOLALITY SERPL CALC.SUM OF ELEC: 320 MOSM/KG (ref 275–295)
OXYHGB MFR BLDV: 80.4 % (ref 72–78)
P AXIS: 84 DEGREES
P-R INTERVAL: 226 MS
PCO2 BLDV: 24 MM HG (ref 38–50)
PH BLDV: 6.83 [PH] (ref 7.33–7.43)
PH UR STRIP.AUTO: 5 [PH] (ref 5–8)
PHOSPHATE SERPL-MCNC: 10.5 MG/DL (ref 2.5–4.9)
PLATELET # BLD AUTO: 149 10(3)UL (ref 150–450)
PLATELET # BLD AUTO: 153 10(3)UL (ref 150–450)
PLATELET MORPHOLOGY: NORMAL
PO2 BLDV: 62 MM HG (ref 30–50)
POTASSIUM BLD-SCNC: 6.7 MMOL/L (ref 3.6–5.1)
POTASSIUM SERPL-SCNC: 3.4 MMOL/L (ref 3.5–5.1)
POTASSIUM SERPL-SCNC: 3.7 MMOL/L (ref 3.5–5.1)
POTASSIUM SERPL-SCNC: 4 MMOL/L (ref 3.5–5.1)
POTASSIUM SERPL-SCNC: 5.3 MMOL/L (ref 3.5–5.1)
POTASSIUM SERPL-SCNC: 6.5 MMOL/L (ref 3.5–5.1)
PROCALCITONIN SERPL-MCNC: 0.23 NG/ML (ref ?–0.16)
PROT SERPL-MCNC: 6.7 G/DL (ref 6.4–8.2)
PROT UR STRIP.AUTO-MCNC: 30 MG/DL
PROTHROMBIN TIME: 14.9 SECONDS (ref 11.6–14.8)
Q-T INTERVAL: 348 MS
QRS DURATION: 104 MS
QTC CALCULATION (BEZET): 453 MS
R AXIS: 29 DEGREES
RBC # BLD AUTO: 3.41 X10(6)UL
RBC # BLD AUTO: 3.43 X10(6)UL
SARS-COV-2 RNA RESP QL NAA+PROBE: NOT DETECTED
SODIUM BLD-SCNC: 125 MMOL/L (ref 135–145)
SODIUM SERPL-SCNC: 127 MMOL/L (ref 136–145)
SODIUM SERPL-SCNC: 134 MMOL/L (ref 136–145)
SODIUM SERPL-SCNC: 136 MMOL/L (ref 136–145)
SODIUM SERPL-SCNC: 137 MMOL/L (ref 136–145)
SODIUM SERPL-SCNC: 137 MMOL/L (ref 136–145)
SP GR UR STRIP.AUTO: 1.02 (ref 1–1.03)
T AXIS: 87 DEGREES
TOTAL CELLS COUNTED BLD: 100
TRIGL SERPL-MCNC: 640 MG/DL (ref 30–149)
TROPONIN I HIGH SENSITIVITY: 220 NG/L
TROPONIN I HIGH SENSITIVITY: 93 NG/L
UROBILINOGEN UR STRIP.AUTO-MCNC: <2 MG/DL
VENTRICULAR RATE: 102 BPM
VLDLC SERPL CALC-MCNC: 106 MG/DL (ref 0–30)
WBC # BLD AUTO: 10.8 X10(3) UL (ref 4–11)
WBC # BLD AUTO: 17.8 X10(3) UL (ref 4–11)

## 2023-07-24 PROCEDURE — 3E033XZ INTRODUCTION OF VASOPRESSOR INTO PERIPHERAL VEIN, PERCUTANEOUS APPROACH: ICD-10-PCS | Performed by: NURSE PRACTITIONER

## 2023-07-24 PROCEDURE — 99223 1ST HOSP IP/OBS HIGH 75: CPT | Performed by: INTERNAL MEDICINE

## 2023-07-24 PROCEDURE — 71045 X-RAY EXAM CHEST 1 VIEW: CPT | Performed by: EMERGENCY MEDICINE

## 2023-07-24 RX ORDER — DEXTROSE MONOHYDRATE 25 G/50ML
50 INJECTION, SOLUTION INTRAVENOUS
Status: DISCONTINUED | OUTPATIENT
Start: 2023-07-24 | End: 2023-08-12

## 2023-07-24 RX ORDER — SODIUM CHLORIDE 9 MG/ML
1000 INJECTION, SOLUTION INTRAVENOUS ONCE
Status: COMPLETED | OUTPATIENT
Start: 2023-07-24 | End: 2023-07-24

## 2023-07-24 RX ORDER — SODIUM CHLORIDE, SODIUM LACTATE, POTASSIUM CHLORIDE, CALCIUM CHLORIDE 600; 310; 30; 20 MG/100ML; MG/100ML; MG/100ML; MG/100ML
INJECTION, SOLUTION INTRAVENOUS CONTINUOUS
Status: DISCONTINUED | OUTPATIENT
Start: 2023-07-24 | End: 2023-07-25

## 2023-07-24 RX ORDER — NICOTINE POLACRILEX 4 MG
15 LOZENGE BUCCAL
Status: DISCONTINUED | OUTPATIENT
Start: 2023-07-24 | End: 2023-08-12

## 2023-07-24 RX ORDER — DEXTROSE MONOHYDRATE 25 G/50ML
50 INJECTION, SOLUTION INTRAVENOUS ONCE
Status: DISCONTINUED | OUTPATIENT
Start: 2023-07-24 | End: 2023-07-24

## 2023-07-24 RX ORDER — CALCIUM GLUCONATE 10 MG/ML
1 INJECTION, SOLUTION INTRAVENOUS ONCE
Status: COMPLETED | OUTPATIENT
Start: 2023-07-24 | End: 2023-07-24

## 2023-07-24 RX ORDER — SODIUM CHLORIDE 9 MG/ML
INJECTION, SOLUTION INTRAVENOUS CONTINUOUS
Status: ACTIVE | OUTPATIENT
Start: 2023-07-24 | End: 2023-07-24

## 2023-07-24 RX ORDER — NICOTINE POLACRILEX 4 MG
30 LOZENGE BUCCAL
Status: DISCONTINUED | OUTPATIENT
Start: 2023-07-24 | End: 2023-08-12

## 2023-07-24 RX ORDER — SODIUM POLYSTYRENE SULFONATE 4.1 MEQ/G
30 POWDER, FOR SUSPENSION ORAL; RECTAL ONCE
Status: DISCONTINUED | OUTPATIENT
Start: 2023-07-24 | End: 2023-07-24

## 2023-07-24 RX ORDER — DEXTROSE AND SODIUM CHLORIDE 5; .45 G/100ML; G/100ML
100 INJECTION, SOLUTION INTRAVENOUS CONTINUOUS PRN
Status: DISCONTINUED | OUTPATIENT
Start: 2023-07-24 | End: 2023-07-25

## 2023-07-24 RX ORDER — ONDANSETRON 2 MG/ML
4 INJECTION INTRAMUSCULAR; INTRAVENOUS EVERY 4 HOURS PRN
Status: ACTIVE | OUTPATIENT
Start: 2023-07-24 | End: 2023-07-24

## 2023-07-24 RX ORDER — HEPARIN SODIUM 5000 [USP'U]/ML
5000 INJECTION, SOLUTION INTRAVENOUS; SUBCUTANEOUS EVERY 8 HOURS SCHEDULED
Status: DISCONTINUED | OUTPATIENT
Start: 2023-07-24 | End: 2023-08-12

## 2023-07-24 NOTE — ED INITIAL ASSESSMENT (HPI)
Pt arrived via EMS from home, called in by son for SOB. Dexi reading for EMS reading high. Pt A&Ox1.

## 2023-07-24 NOTE — ED QUICK NOTES
Pt has 1 skin tear to right arm, 3 skin tears to left arm, and 1 skin tear to left leg. All skin tears were cleansed and band aids applied to sites.

## 2023-07-24 NOTE — PLAN OF CARE
Received pt from ED. Pt lethargic and oriented x1. Pt came up on 3L NC now on room air. Pt remains on insulin gtt. Algorithm A column 10. Fluids started due to blood sugar of 250.multiple Bms. Urinary incontinence x2. Pt complaining about NPO status. Pt educated on NPO status and insulin gtt. Will continue to monitor. See MAR/flowsheets for additional information. Blood sugars read HI and did not transfer over to the results page. Insulin gtt titrated accordingly.

## 2023-07-24 NOTE — ED QUICK NOTES
Dr. Lizet Tay gave verbal order to give 500mL sodium chloride 0.9% bolus and to hold additional fluids at this time.

## 2023-07-24 NOTE — PROGRESS NOTES
Pharmacy Dosing Service  Initial Pharmacokinetic Consult for Vancomycin Dosing          Yonatan Zapata is a 75year old female who is being treated for sepsis. The initial treatment and monitoring approach will be non-AUC strategy. Pharmacy has been asked to dose vancomycin & Zosyn by Dr. Amalia Trinidad. Other current anti-infective medication(s):  Zosyn (7/24-    Est CrCl: ~14 mL/min (DAVID - baseline SCr ~0.6-0.8)    Renal dosing considerations: DAVID/ARF    Pharmacokinetic target: Trough/random 10-15 mg/L          A/P:  1. Patient received a loading dose of vancomycin 1000mg IVPB (~25mg/kg) x 1 dose followed by a maintenance dose of 750mg (~15mg/kg) q48hr based upon actual BW and estimated renal function. 2.  Plan for vancomycin trough to be obtained before the 3rd dose. 3.  Will monitor SCr daily while on vancomycin to assess renal function. 4.  Changed Zosyn to 3.375g q12hr due to DAVID. Pharmacy will continue to follow her and adjust dosing as appropriate.         Pat Shelton PharmD, BCPS  7/24/2023  10:13 AM  [address] [place] [address] Extension: [phone]

## 2023-07-25 ENCOUNTER — APPOINTMENT (OUTPATIENT)
Dept: CV DIAGNOSTICS | Facility: HOSPITAL | Age: 68
End: 2023-07-25
Attending: INTERNAL MEDICINE
Payer: MEDICARE

## 2023-07-25 LAB
ANION GAP SERPL CALC-SCNC: 10 MMOL/L (ref 0–18)
ANION GAP SERPL CALC-SCNC: 11 MMOL/L (ref 0–18)
BASOPHILS # BLD AUTO: 0.04 X10(3) UL (ref 0–0.2)
BASOPHILS NFR BLD AUTO: 0.3 %
BUN BLD-MCNC: 63 MG/DL (ref 7–18)
BUN BLD-MCNC: 69 MG/DL (ref 7–18)
CALCIUM BLD-MCNC: 7.8 MG/DL (ref 8.5–10.1)
CALCIUM BLD-MCNC: 8.6 MG/DL (ref 8.5–10.1)
CHLORIDE SERPL-SCNC: 105 MMOL/L (ref 98–112)
CHLORIDE SERPL-SCNC: 108 MMOL/L (ref 98–112)
CO2 SERPL-SCNC: 14 MMOL/L (ref 21–32)
CO2 SERPL-SCNC: 20 MMOL/L (ref 21–32)
CREAT BLD-MCNC: 2.7 MG/DL
CREAT BLD-MCNC: 2.76 MG/DL
EGFRCR SERPLBLD CKD-EPI 2021: 18 ML/MIN/1.73M2 (ref 60–?)
EGFRCR SERPLBLD CKD-EPI 2021: 19 ML/MIN/1.73M2 (ref 60–?)
EOSINOPHIL # BLD AUTO: 0.01 X10(3) UL (ref 0–0.7)
EOSINOPHIL NFR BLD AUTO: 0.1 %
ERYTHROCYTE [DISTWIDTH] IN BLOOD BY AUTOMATED COUNT: 13 %
GLUCOSE BLD-MCNC: 114 MG/DL (ref 70–99)
GLUCOSE BLD-MCNC: 127 MG/DL (ref 70–99)
GLUCOSE BLD-MCNC: 128 MG/DL (ref 70–99)
GLUCOSE BLD-MCNC: 152 MG/DL (ref 70–99)
GLUCOSE BLD-MCNC: 192 MG/DL (ref 70–99)
GLUCOSE BLD-MCNC: 205 MG/DL (ref 70–99)
GLUCOSE BLD-MCNC: 215 MG/DL (ref 70–99)
GLUCOSE BLD-MCNC: 233 MG/DL (ref 70–99)
GLUCOSE BLD-MCNC: 73 MG/DL (ref 70–99)
GLUCOSE BLD-MCNC: 75 MG/DL (ref 70–99)
GLUCOSE BLD-MCNC: 90 MG/DL (ref 70–99)
GLUCOSE BLD-MCNC: 90 MG/DL (ref 70–99)
GLUCOSE BLD-MCNC: >600 MG/DL (ref 70–99)
HCT VFR BLD AUTO: 34.5 %
HGB BLD-MCNC: 12.4 G/DL
IMM GRANULOCYTES # BLD AUTO: 0.09 X10(3) UL (ref 0–1)
IMM GRANULOCYTES NFR BLD: 0.8 %
LACTATE SERPL-SCNC: 2.9 MMOL/L (ref 0.4–2)
LACTATE SERPL-SCNC: 6.1 MMOL/L (ref 0.4–2)
LYMPHOCYTES # BLD AUTO: 0.55 X10(3) UL (ref 1–4)
LYMPHOCYTES NFR BLD AUTO: 4.6 %
MAGNESIUM SERPL-MCNC: 2.5 MG/DL (ref 1.6–2.6)
MCH RBC QN AUTO: 33.8 PG (ref 26–34)
MCHC RBC AUTO-ENTMCNC: 35.9 G/DL (ref 31–37)
MCV RBC AUTO: 94 FL
MONOCYTES # BLD AUTO: 1.18 X10(3) UL (ref 0.1–1)
MONOCYTES NFR BLD AUTO: 9.8 %
MRSA DNA SPEC QL NAA+PROBE: POSITIVE
NEUTROPHILS # BLD AUTO: 10.13 X10 (3) UL (ref 1.5–7.7)
NEUTROPHILS # BLD AUTO: 10.13 X10(3) UL (ref 1.5–7.7)
NEUTROPHILS NFR BLD AUTO: 84.4 %
OSMOLALITY SERPL CALC.SUM OF ELEC: 301 MOSM/KG (ref 275–295)
OSMOLALITY SERPL CALC.SUM OF ELEC: 303 MOSM/KG (ref 275–295)
PHOSPHATE SERPL-MCNC: 2.5 MG/DL (ref 2.5–4.9)
PHOSPHATE SERPL-MCNC: 2.8 MG/DL (ref 2.5–4.9)
PLATELET # BLD AUTO: 144 10(3)UL (ref 150–450)
POTASSIUM SERPL-SCNC: 3.7 MMOL/L (ref 3.5–5.1)
POTASSIUM SERPL-SCNC: 4.2 MMOL/L (ref 3.5–5.1)
RBC # BLD AUTO: 3.67 X10(6)UL
SODIUM SERPL-SCNC: 133 MMOL/L (ref 136–145)
SODIUM SERPL-SCNC: 135 MMOL/L (ref 136–145)
WBC # BLD AUTO: 12 X10(3) UL (ref 4–11)

## 2023-07-25 PROCEDURE — 93306 TTE W/DOPPLER COMPLETE: CPT | Performed by: INTERNAL MEDICINE

## 2023-07-25 PROCEDURE — 99291 CRITICAL CARE FIRST HOUR: CPT | Performed by: INTERNAL MEDICINE

## 2023-07-25 PROCEDURE — 99223 1ST HOSP IP/OBS HIGH 75: CPT | Performed by: STUDENT IN AN ORGANIZED HEALTH CARE EDUCATION/TRAINING PROGRAM

## 2023-07-25 RX ORDER — SODIUM CHLORIDE 9 MG/ML
INJECTION, SOLUTION INTRAVENOUS CONTINUOUS
Status: DISCONTINUED | OUTPATIENT
Start: 2023-07-25 | End: 2023-07-27

## 2023-07-25 NOTE — PROGRESS NOTES
S: Pt had to be started on norepinephrine overnight for hypotension. She is confused this morning, says her tongue hurts. Meds:   insulin detemir  4 Units Subcutaneous [email or web address]    insulin aspart  1-68 Units Subcutaneous TID CC    insulin aspart  1-5 Units Subcutaneous TID AC and HS    [START ON 7/26/2023] vancomycin  15 mg/kg Intravenous Q48H    piperacillin-tazobactam  3.375 g Intravenous Q12H    heparin  5,000 Units Subcutaneous Q8H [facility]       Prn Meds:  glucose **OR** glucose **OR** glucose-vitamin C **OR** dextrose **OR** glucose **OR** glucose **OR** glucose-vitamin C, heparin    Infusions:   norepinephrine 2 mcg/min (07/25/23 1215)    sodium chloride 100 mL/hr at 07/25/23 1205       OBJECTIVE:   07/25/23  1130 07/25/23  1145 07/25/23  1200 07/25/23  1215   BP: 108/49 96/44 97/42 (!) 80/40   Pulse: 83 82 83 85   Resp: 18 19 21 20   Temp:       TempSrc:       SpO2: 99% 97% 90% 98%   Weight:       Height:         O2: room air     Gen - Awake, agitated at times, no visible distress  Lungs - CTAB  CV - regular rate & rhythm. Normal S1, S2. No murmurs, rubs, or gallops appreciated. Abdomen - soft, nontender to palpation   Extremities - No cyanosis, clubbing, edema appreciated.     Labs:  Recent Labs   Lab 07/24/23  0653 07/24/23  1554 07/25/23  0344   WBC 17.8* 10.8 12.0*   HGB 11.7* 11.5* 12.4   .0 149.0* 144.0*     Recent Labs   Lab 07/24/23  0653 07/24/23  0734 07/24/23  2052 07/24/23  2324 07/25/23  0344   *   < > 137 137 135*   K 6.5*   < > 3.7 3.4* 3.7   CL 88*   < > 106 106 105   CO2 4.0*   < > 15.0* 19.0* 20.0*   BUN 60*   < > 61* 63* 63*   CREATSERUM 2.61*   < > 2.80* 2.70* 2.70*   *   < > 129* 84 152*   ANIONGAP 35*   < > 16 12 10   ALB 3.7  --   --   --   --    CA 10.4*   < > 9.4 8.7 8.6   MG 3.5*  --   --   --   --    PHOS 10.5*  --   --   --   --    ALKPHO 85  --   --   --   --    AST 33  --   --   --   --    ALT 37  --   --   --   --    BILT 0.4  --   --   --   -- TP 6.7  --   --   --   --    LIP 61  --   --   --   --    LACTI  --    < > 7.5* 6.1* 2.9*    < > = values in this interval not displayed. Recent Labs   Lab 07/24/23  0653   INR 1.17*   PTT 32.9     Recent Labs   Lab 07/24/23  0653 07/24/23  1241   PBNP 3,924*  --    TROPHS 93* 220*     Recent Labs   Lab 07/24/23  0653   PCT 0.23*       Recent Labs   Lab 07/24/23  0653   COVID19 Not Detected       Imaging reviewed    ASSESSMENT AND PLAN      Diabetic ketoacidosis - improved  -IVF  -monitor accuchecks  -insulin drip transitioned to subcutaneous insulin  -endocrine is following  Shock - possible sepsis vs hypovolemia, now on levophed. 1/2 blood cultures positive for GPC but could be contaminant. -IVF  -wean norepinephrine as able, goal MAP>65  -continue empiric vanc, zosyn  -follow up cultures; will repeat blood cultures today  Renal - DAVID, hyperkalemia, hyperphosphatemia, likely due to DKA and hypovolemia  -IVF  -monitor labs, urine output  -may need renal consult if worse  Lactic acidosis - could be from DKA and hypovolemia, sepsis also possible  -IVF, supportive care of DKA  -trend lactate levels  Encephalopathy - suspect due to renal insufficiency, sleep deprivation, infection, etc  -supportive care  HFrEF - EF 25-30 (Echo 4/2023). Nonobstructive CAD on previous C  -cautious IVF  -resume home meds when bp improved  -cardiology consulted  Breast cancer  -outpatient follow up with Dr. Hardy Erickson  Nutrition   -diabetic diet  Proph   -subcutaneous heparin  Dispo   -full code  -ICU monitoring , pt is critically ill    Discussed with patient and life partner, all questions answered. 35 min critical care time        Tremaine Wong M.D.   Pulmonary/Critical Care

## 2023-07-25 NOTE — PLAN OF CARE
Assumed care of patient following shift report. Patient remains alert and oriented to self and place only. Extremely restless. Requesting water frequently. Insulin gtt remains on, blood sugars being checked q1. BMP Q4. Patient able to turn self. Bed alarm in place to prevent injury. Will continue to monitor. Blood pressure dropped into the 60s around 2300. 500cc bolus given with little improvement to blood pressure, levophed started and titrated per orders.

## 2023-07-25 NOTE — PROGRESS NOTES
Critical Care note:    Persistent hypotension despite trying additional small fluid bolus. Levophed gtt ordered to start. Lactic acid remains elevated and trending. Is on empiric abx. Maintenance IV fluids continuing and patient appears to be tolerating thus far. Merline Negro, APRN  Critical Care NP  [facility] [phone]: [phone]  Pgr: [phone]    Gap closed and acidosis improved. Glucose <200.   Insulin gtt transition to odd orders placed per order set

## 2023-07-25 NOTE — PLAN OF CARE
Received pt at change of shift. Pt alert and oriented x2 on room air. Pt restless/confused/agitated throughout the day. Pt following commands. Pupils equal and reactive. Pt remains off of insulin gtt. Levophed off after 500cc bolus. VSS. Afebrile. Multiple BM's. 2 urinary incontinence episodes. Fair appetite. Will continue to monitor. See MAR/flowsheets for additional information.

## 2023-07-25 NOTE — PHYSICAL THERAPY NOTE
PHYSICAL THERAPY EVALUATION - INPATIENT     Room Number: 474/474-A  Evaluation Date: 7/25/2023  Type of Evaluation: Initial  Physician Order: PT Eval and Treat    Presenting Problem: DKA  Co-Morbidities : breast ca, DM, CHF, HTN, PVD  Reason for Therapy: Mobility Dysfunction and Discharge Planning    History related to current admission: Patient is a 75year old female admitted on 7/24/2023 Presents with N/V and AMS dx DKA requiring pressor support    5/8-5/9/23 Good Gianfranco: acute on chronic systolic HF      ASSESSMENT   In this PT evaluation, the patient presents with the following impairments 1) Impaired safety awareness, 2) Impaired motor planning, 3) Decreased activity tolerance and endurance with pt reporting inc fatigue - able to perform transfers with mod A and RW however recommend 2 assist for impaired safety awareness and impulsivity, able to tolerate static standing for ~3 minutes. These impairments and comorbidities manifest themselves as functional limitations in independent bed mobility, transfers, and gait. The patient is below baseline and would benefit from skilled inpatient PT to address the above deficits to assist patient in returning to prior to level of function. Functional outcome measures completed include [facility]. The -PAC '6-Clicks' Inpatient Basic Mobility Short Form was completed and this patient is demonstrating a Approx Degree of Impairment: 68.66%  degree of impairment in mobility. Research supports that patients with this level of impairment may benefit from [facility]. DISCHARGE RECOMMENDATIONS  PT Discharge Recommendations: Sub-acute rehabilitation    PLAN  PT Treatment Plan: Bed mobility; Body mechanics; Coordination; Endurance; Energy conservation;Patient education; Family education;Gait training;Neuromuscular re-educate;Range of motion;Strengthening;Stoop training;Stair training;Transfer training;Balance training  Rehab Potential : Good  Frequency (Obs): 3-5x/week  Number of Visits to Meet Established Goals: 5      CURRENT GOALS    Goal #1 Patient is able to demonstrate supine - sit EOB @ level: supervision     Goal #2 Patient is able to demonstrate transfers EOB to/from Chair/Wheelchair at assistance level: supervision     Goal #3 Patient is able to ambulate 50 feet with assist device: walker - rolling at assistance level: minimum assistance     Goal #4    Goal #5    Goal #6    Goal Comments: Goals established on 2023    HOME SITUATION  Type of Home: House   Home Layout: Two level  Stairs to Enter : 2     Stairs to [employer]: 16       Lives With: Spouse; Son  Drives: Yes  Patient Owned Equipment: Rolling walker       Prior Level of [place]: pt reports mod I for ambulation with RW, mild confusion noted throughout the session and unable to provide more details regarding PLOF    SUBJECTIVE  \" I'm so tired\"       OBJECTIVE  Precautions: Bed/chair alarm  Fall Risk: High fall risk    WEIGHT BEARING RESTRICTION  Weight Bearing Restriction: None                PAIN ASSESSMENT  Ratin          COGNITION  Overall Cognitive Status:  Impaired    RANGE OF MOTION AND STRENGTH ASSESSMENT  Upper extremity ROM and strength are within functional limits     Lower extremity ROM is within functional limits     Lower extremity strength is within functional limits       BALANCE  Static Sitting: Fair -  Dynamic Sitting: Poor +  Static Standing: Poor  Dynamic Standing: Poor    ADDITIONAL TESTS                                    ACTIVITY TOLERANCE                         O2 WALK       NEUROLOGICAL FINDINGS                        AM-PAC '6-Clicks' INPATIENT SHORT FORM - BASIC MOBILITY  How much difficulty does the patient currently have. ..   Patient Difficulty: Turning over in bed (including adjusting bedclothes, sheets and blankets)?: A Little   Patient Difficulty: Sitting down on and standing up from a chair with arms (e.g., wheelchair, bedside commode, etc.): A Lot   Patient Difficulty: Moving from lying on back to sitting on the side of the bed?: A Lot   How much help from another person does the patient currently need. .. Help from Another: Moving to and from a bed to a chair (including a wheelchair)?: A Lot   Help from Another: Need to walk in hospital room?: A Lot   Help from Another: Climbing 3-5 steps with a railing?: Total       AM-PAC Score:  Raw Score: 12   Approx Degree of Impairment: 68.66%   Standardized Score (AM-PAC Scale): 35.33   CMS Modifier (G-Code): CL    FUNCTIONAL ABILITY STATUS  Gait Assessment   Functional Mobility/Gait Assessment  Gait Assistance: Moderate assistance  Distance (ft): 2,2  Assistive Device: Rolling walker  Pattern: Shuffle    Skilled Therapy Provided     Bed Mobility:  Rolling: mod A   Supine to sit: mod A x2  Sit to supine: max A x2     Transfer Mobility:  Sit to stand: mod A *vc for UE placement    Stand to sit: mod A   Gait = mod A x1-2 with RW    Therapist's Comments: pt reporting inc fatigue and requiring max encouragement for mobility in order to assist in pericare as pt soiled at session initiation - able to perform B SLR without difficulty to assist therapist in donning socks. Mod A for bed mobility due to impaired motor planning. STS with initial retropulsion able to correct with tactile and verbal cues from therapist. Lateral stepping and transfer to bedside commode with mod A however required additional person for transfer from bedside commode to bed due to pt prematurely attempting to sit on bed and requiring inc assist to complete transfer. Additional assist required for transfer from sit to supine required due to position when returned from transfer. Exercise/Education Provided:  Bed mobility  Body mechanics  Functional activity tolerated  Posture  Transfer training    Patient End of Session: In bed; With [address] staff;Needs met;Call light within reach;RN aware of session/findings; All patient questions and concerns addressed; Alarm set; Discussed recommendations with /      Patient Evaluation Complexity Level:  History Low - no personal factors and/or co-morbidities   Examination of body systems Low - addressing 1-2 elements   Clinical Presentation Low - Stable   Clinical Decision Making Low - Stable       PT Session Time: 30 minutes  Gait Training:  minutes  Therapeutic Activity: 15 minutes  Neuromuscular Re-education:  minutes  Therapeutic Exercise:  minutes

## 2023-07-26 ENCOUNTER — APPOINTMENT (OUTPATIENT)
Dept: GENERAL RADIOLOGY | Facility: HOSPITAL | Age: 68
End: 2023-07-26
Attending: NURSE PRACTITIONER
Payer: MEDICARE

## 2023-07-26 LAB
ANION GAP SERPL CALC-SCNC: 9 MMOL/L (ref 0–18)
BASOPHILS # BLD AUTO: 0.06 X10(3) UL (ref 0–0.2)
BASOPHILS NFR BLD AUTO: 0.4 %
BUN BLD-MCNC: 72 MG/DL (ref 7–18)
C DIFF TOX B STL QL: NEGATIVE
CALCIUM BLD-MCNC: 7.5 MG/DL (ref 8.5–10.1)
CHLORIDE SERPL-SCNC: 112 MMOL/L (ref 98–112)
CO2 SERPL-SCNC: 17 MMOL/L (ref 21–32)
CREAT BLD-MCNC: 2.64 MG/DL
EGFRCR SERPLBLD CKD-EPI 2021: 19 ML/MIN/1.73M2 (ref 60–?)
EOSINOPHIL # BLD AUTO: 0.01 X10(3) UL (ref 0–0.7)
EOSINOPHIL NFR BLD AUTO: 0.1 %
ERYTHROCYTE [DISTWIDTH] IN BLOOD BY AUTOMATED COUNT: 14 %
GLUCOSE BLD-MCNC: 119 MG/DL (ref 70–99)
GLUCOSE BLD-MCNC: 158 MG/DL (ref 70–99)
GLUCOSE BLD-MCNC: 200 MG/DL (ref 70–99)
GLUCOSE BLD-MCNC: 74 MG/DL (ref 70–99)
GLUCOSE BLD-MCNC: 76 MG/DL (ref 70–99)
HCT VFR BLD AUTO: 29.3 %
HGB BLD-MCNC: 11 G/DL
IMM GRANULOCYTES # BLD AUTO: 0.14 X10(3) UL (ref 0–1)
IMM GRANULOCYTES NFR BLD: 1 %
LYMPHOCYTES # BLD AUTO: 0.69 X10(3) UL (ref 1–4)
LYMPHOCYTES NFR BLD AUTO: 5.1 %
MCH RBC QN AUTO: 34.3 PG (ref 26–34)
MCHC RBC AUTO-ENTMCNC: 37.5 G/DL (ref 31–37)
MCV RBC AUTO: 91.3 FL
MONOCYTES # BLD AUTO: 0.55 X10(3) UL (ref 0.1–1)
MONOCYTES NFR BLD AUTO: 4.1 %
NEUTROPHILS # BLD AUTO: 11.97 X10 (3) UL (ref 1.5–7.7)
NEUTROPHILS # BLD AUTO: 11.97 X10(3) UL (ref 1.5–7.7)
NEUTROPHILS NFR BLD AUTO: 89.3 %
OSMOLALITY SERPL CALC.SUM OF ELEC: 306 MOSM/KG (ref 275–295)
PLATELET # BLD AUTO: 119 10(3)UL (ref 150–450)
POTASSIUM SERPL-SCNC: 3.4 MMOL/L (ref 3.5–5.1)
RBC # BLD AUTO: 3.21 X10(6)UL
SODIUM SERPL-SCNC: 138 MMOL/L (ref 136–145)
WBC # BLD AUTO: 13.4 X10(3) UL (ref 4–11)

## 2023-07-26 PROCEDURE — 74018 RADEX ABDOMEN 1 VIEW: CPT | Performed by: NURSE PRACTITIONER

## 2023-07-26 PROCEDURE — 99233 SBSQ HOSP IP/OBS HIGH 50: CPT | Performed by: INTERNAL MEDICINE

## 2023-07-26 PROCEDURE — 99232 SBSQ HOSP IP/OBS MODERATE 35: CPT | Performed by: STUDENT IN AN ORGANIZED HEALTH CARE EDUCATION/TRAINING PROGRAM

## 2023-07-26 RX ORDER — FAMOTIDINE 20 MG/1
20 TABLET, FILM COATED ORAL DAILY
Status: DISCONTINUED | OUTPATIENT
Start: 2023-07-26 | End: 2023-08-12

## 2023-07-26 RX ORDER — ONDANSETRON 2 MG/ML
INJECTION INTRAMUSCULAR; INTRAVENOUS
Status: COMPLETED
Start: 2023-07-26 | End: 2023-07-26

## 2023-07-26 RX ORDER — ONDANSETRON 2 MG/ML
4 INJECTION INTRAMUSCULAR; INTRAVENOUS EVERY 6 HOURS PRN
Status: COMPLETED | OUTPATIENT
Start: 2023-07-26 | End: 2023-07-27

## 2023-07-26 RX ORDER — POTASSIUM CHLORIDE 20 MEQ/1
40 TABLET, EXTENDED RELEASE ORAL ONCE
Status: COMPLETED | OUTPATIENT
Start: 2023-07-26 | End: 2023-07-26

## 2023-07-26 RX ORDER — CALCIUM CARBONATE 500 MG/1
500 TABLET, CHEWABLE ORAL 2 TIMES DAILY PRN
Status: DISCONTINUED | OUTPATIENT
Start: 2023-07-26 | End: 2023-08-12

## 2023-07-26 NOTE — PLAN OF CARE
Received pt at change of shift dosing on and off in bed. Pt oriented to self only. Moves all extremities. Wants to be left alone to sleep. On room air. Lungs diminished. VSS. Afeb. Was incontinent of loose stool. Pure wick in place. IVF's infusing at 100cc/hr. Has remained off levo. Sleeping at present.

## 2023-07-27 ENCOUNTER — APPOINTMENT (OUTPATIENT)
Dept: ULTRASOUND IMAGING | Facility: HOSPITAL | Age: 68
End: 2023-07-27
Attending: INTERNAL MEDICINE
Payer: MEDICARE

## 2023-07-27 LAB
ALBUMIN SERPL-MCNC: 2.2 G/DL (ref 3.4–5)
ANION GAP SERPL CALC-SCNC: 12 MMOL/L (ref 0–18)
ARTERIAL PATENCY WRIST A: POSITIVE
BASE EXCESS BLDA CALC-SCNC: -12.7 MMOL/L (ref ?–2)
BASOPHILS # BLD AUTO: 0.04 X10(3) UL (ref 0–0.2)
BASOPHILS NFR BLD AUTO: 0.3 %
BILIRUB UR QL STRIP.AUTO: NEGATIVE
BODY TEMPERATURE: 98.6 F
BUN BLD-MCNC: 72 MG/DL (ref 7–18)
CA-I BLD-SCNC: 1.2 MMOL/L (ref 0.95–1.32)
CALCIUM BLD-MCNC: 7.7 MG/DL (ref 8.5–10.1)
CHLORIDE SERPL-SCNC: 107 MMOL/L (ref 98–112)
CO2 SERPL-SCNC: 15 MMOL/L (ref 21–32)
COHGB MFR BLD: 0.7 % SAT (ref 0–3)
COLOR UR AUTO: YELLOW
CREAT BLD-MCNC: 2.99 MG/DL
CREAT UR-SCNC: 54.8 MG/DL
EGFRCR SERPLBLD CKD-EPI 2021: 16 ML/MIN/1.73M2 (ref 60–?)
EOSINOPHIL # BLD AUTO: 0.01 X10(3) UL (ref 0–0.7)
EOSINOPHIL NFR BLD AUTO: 0.1 %
ERYTHROCYTE [DISTWIDTH] IN BLOOD BY AUTOMATED COUNT: 14.6 %
GLUCOSE BLD-MCNC: 134 MG/DL (ref 70–99)
GLUCOSE BLD-MCNC: 149 MG/DL (ref 70–99)
GLUCOSE BLD-MCNC: 175 MG/DL (ref 70–99)
GLUCOSE BLD-MCNC: 341 MG/DL (ref 70–99)
GLUCOSE UR STRIP.AUTO-MCNC: >=500 MG/DL
HCO3 BLDA-SCNC: 15 MEQ/L (ref 21–27)
HCT VFR BLD AUTO: 31.6 %
HGB BLD-MCNC: 11.1 G/DL
HGB BLD-MCNC: 11.2 G/DL
IMM GRANULOCYTES # BLD AUTO: 0.07 X10(3) UL (ref 0–1)
IMM GRANULOCYTES NFR BLD: 0.6 %
LACTATE BLD-SCNC: 1 MMOL/L (ref 0.5–2)
LEUKOCYTE ESTERASE UR QL STRIP.AUTO: NEGATIVE
LYMPHOCYTES # BLD AUTO: 0.68 X10(3) UL (ref 1–4)
LYMPHOCYTES NFR BLD AUTO: 5.4 %
MCH RBC QN AUTO: 33.8 PG (ref 26–34)
MCHC RBC AUTO-ENTMCNC: 35.1 G/DL (ref 31–37)
MCV RBC AUTO: 96.3 FL
METHGB MFR BLD: 0.1 % SAT (ref 0.4–1.5)
MONOCYTES # BLD AUTO: 0.47 X10(3) UL (ref 0.1–1)
MONOCYTES NFR BLD AUTO: 3.7 %
NEUTROPHILS # BLD AUTO: 11.29 X10 (3) UL (ref 1.5–7.7)
NEUTROPHILS # BLD AUTO: 11.29 X10(3) UL (ref 1.5–7.7)
NEUTROPHILS NFR BLD AUTO: 89.9 %
NITRITE UR QL STRIP.AUTO: NEGATIVE
OSMOLALITY SERPL CALC.SUM OF ELEC: 302 MOSM/KG (ref 275–295)
OXYHGB MFR BLDA: 95.8 % (ref 92–100)
PCO2 BLDA: 19 MM HG (ref 35–45)
PH BLDA: 7.36 [PH] (ref 7.35–7.45)
PH UR STRIP.AUTO: 5 [PH] (ref 5–8)
PHOSPHATE SERPL-MCNC: 3.7 MG/DL (ref 2.5–4.9)
PLATELET # BLD AUTO: 104 10(3)UL (ref 150–450)
PO2 BLDA: 82 MM HG (ref 80–100)
POTASSIUM BLD-SCNC: 4.1 MMOL/L (ref 3.6–5.1)
POTASSIUM SERPL-SCNC: 4.2 MMOL/L (ref 3.5–5.1)
PROT UR STRIP.AUTO-MCNC: NEGATIVE MG/DL
PROT UR-MCNC: 66.9 MG/DL
PROT/CREAT UR-RTO: 1.22
RBC # BLD AUTO: 3.28 X10(6)UL
SODIUM BLD-SCNC: 127 MMOL/L (ref 135–145)
SODIUM SERPL-SCNC: 134 MMOL/L (ref 136–145)
SODIUM SERPL-SCNC: 25 MMOL/L
SP GR UR STRIP.AUTO: 1.01 (ref 1–1.03)
UROBILINOGEN UR STRIP.AUTO-MCNC: <2 MG/DL
VANCOMYCIN SERPL-MCNC: 22.4 UG/ML (ref ?–40)
WBC # BLD AUTO: 12.6 X10(3) UL (ref 4–11)
YEAST UR QL: PRESENT /HPF

## 2023-07-27 PROCEDURE — 99233 SBSQ HOSP IP/OBS HIGH 50: CPT | Performed by: INTERNAL MEDICINE

## 2023-07-27 PROCEDURE — 99232 SBSQ HOSP IP/OBS MODERATE 35: CPT | Performed by: STUDENT IN AN ORGANIZED HEALTH CARE EDUCATION/TRAINING PROGRAM

## 2023-07-27 RX ORDER — ONDANSETRON 2 MG/ML
4 INJECTION INTRAMUSCULAR; INTRAVENOUS EVERY 6 HOURS PRN
Status: DISCONTINUED | OUTPATIENT
Start: 2023-07-27 | End: 2023-08-12

## 2023-07-27 RX ORDER — SODIUM BICARBONATE 325 MG/1
1300 TABLET ORAL 2 TIMES DAILY
Status: DISCONTINUED | OUTPATIENT
Start: 2023-07-27 | End: 2023-08-07

## 2023-07-27 RX ORDER — SODIUM CHLORIDE, SODIUM LACTATE, POTASSIUM CHLORIDE, CALCIUM CHLORIDE 600; 310; 30; 20 MG/100ML; MG/100ML; MG/100ML; MG/100ML
INJECTION, SOLUTION INTRAVENOUS CONTINUOUS
Status: DISCONTINUED | OUTPATIENT
Start: 2023-07-27 | End: 2023-07-27

## 2023-07-27 NOTE — PLAN OF CARE
Received pt at 1000. Alert and oriented, follows commands. Room air. Denies pain/SOB. Vitals stable. NSR on tele. L port accessed and infusing. See flowsheets for additional assessments. Plan to manage blood sugar, continue IV fluids, IV abx. Plan of care updated with pt, questions answered, verbalized understanding. Safety precautions in place. Instructed to call staff for help. Will continue to monitor.     Problem: Diabetes/Glucose Control  Goal: Glucose maintained within prescribed range  Description: INTERVENTIONS:  - Monitor Blood Glucose as ordered  - Assess for signs and symptoms of hyperglycemia and hypoglycemia  - Administer ordered medications to maintain glucose within target range  - Assess barriers to adequate nutritional intake and initiate nutrition consult as needed  - Instruct patient on self management of diabetes  Outcome: Progressing     Problem: Delirium  Goal: Minimize duration of delirium  Description: Interventions:  - Encourage use of hearing aids, eye glasses  - Promote highest level of mobility daily  - Provide frequent reorientation  - Promote wakefulness i.e. lights on, blinds open  - Promote sleep, encourage patient's normal rest cycle i.e. lights off, TV off, minimize noise and interruptions  - Encourage family to assist in orientation and promotion of home routines  Outcome: Progressing     Problem: GASTROINTESTINAL - ADULT  Goal: Maintains or returns to baseline bowel function  Description: INTERVENTIONS:  - Assess bowel function  - Maintain adequate hydration with IV or PO as ordered and tolerated  - Evaluate effectiveness of GI medications  - Encourage mobilization and activity  - Obtain nutritional consult as needed  - Establish a toileting routine/schedule  - Consider collaborating with pharmacy to review patient's medication profile  Outcome: Progressing     Problem: METABOLIC/FLUID AND ELECTROLYTES - ADULT  Goal: Glucose maintained within prescribed range  Description: INTERVENTIONS:  - Monitor Blood Glucose as ordered  - Assess for signs and symptoms of hyperglycemia and hypoglycemia  - Administer ordered medications to maintain glucose within target range  - Assess barriers to adequate nutritional intake and initiate nutrition consult as needed  - Instruct patient on self management of diabetes  Outcome: Progressing  Goal: Electrolytes maintained within normal limits  Description: INTERVENTIONS:  - Monitor labs and rhythm and assess patient for signs and symptoms of electrolyte imbalances  - Administer electrolyte replacement as ordered  - Monitor response to electrolyte replacements, including rhythm and repeat lab results as appropriate  - Fluid restriction as ordered  - Instruct patient on fluid and nutrition restrictions as appropriate  Outcome: Progressing  Goal: Hemodynamic stability and optimal renal function maintained  Description: INTERVENTIONS:  - Monitor labs and assess for signs and symptoms of volume excess or deficit  - Monitor intake, output and patient weight  - Monitor urine specific gravity, serum osmolarity and serum sodium as indicated or ordered  - Monitor response to interventions for patient's volume status, including labs, urine output, blood pressure (other measures as available)  - Encourage oral intake as appropriate  - Instruct patient on fluid and nutrition restrictions as appropriate  Outcome: Progressing     Problem: Patient/Family Goals  Goal: Patient/Family Long Term Goal  Description: Patient's Long Term Goal: go home    Interventions:  - manage blood sugar  - See additional Care Plan goals for specific interventions  Outcome: Progressing  Goal: Patient/Family Short Term Goal  Description: Patient's Short Term Goal: feel better    Interventions:   - manage blood sugar  - See additional Care Plan goals for specific interventions  Outcome: Progressing

## 2023-07-27 NOTE — PLAN OF CARE
Received at shift change. A&Ox3, disoriented to time, follows commands, pleasant. Vitals stable. Incontinent of bowel/bladder, purewick intact. Chest port remains accessed. Transfer orders remain in place. Refer to MAR/flowsheets for correlation. Report given to floor nurse @[id number].

## 2023-07-27 NOTE — PLAN OF CARE
Updated marc RN 2T, night  Incontinent of stool and urine x1 this am. Breakfast ordered and sent with patient to new room. Room air, afebrile, SR on tele. L PAC flushed + blood return, infusing. Piv iv fluids changed per order. RT attempted abg unsuccessful, we re attempt on floor.

## 2023-07-27 NOTE — PHYSICAL THERAPY NOTE
PHYSICAL THERAPY TREATMENT NOTE - INPATIENT    Room Number: 7186/8094-I     Session: 1     Number of Visits to Meet Established Goals: 5    Presenting Problem: DKA  Co-Morbidities : breast ca, DM, CHF, HTN, PVD  ASSESSMENT     Pt demonstrated improved activity tolerance and endurance - able to ambulate with CGA and RW ~75ft. HR elevated into 140s with exertion. Discussed EC and pacing techniques. Strongly recommended transfer to bedside chair with education on benefits however pt declined - RN aware. Due to improvements demonstrated as noted above DC rec changed to HHPT/OT as appropriate with goals updated. Secure messaged SW and discussed with RN. The patient is below baseline and would benefit from skilled inpatient PT to address the above deficits to assist patient in returning to prior to level of function. DISCHARGE RECOMMENDATIONS  PT Discharge Recommendations: Home with home health PT/OT     PLAN  PT Treatment Plan: Bed mobility; Body mechanics; Coordination; Endurance; Energy conservation;Patient education; Family education;Gait training;Neuromuscular re-educate;Range of motion;Strengthening;Stoop training;Stair training;Transfer training;Balance training  Rehab Potential : Good  Frequency (Obs): 3-5x/week    CURRENT GOALS       Goal #1 Patient is able to demonstrate supine - sit EOB @ level: supervision   MET 7/27   Goal #2 Patient is able to demonstrate transfers EOB to/from Chair/Wheelchair at assistance level: supervision   MET 7/27   Goal #3 Patient is able to ambulate 50 feet with assist device: walker - rolling at assistance level: minimum assistance   MET 7/27 progress to 150 with SBA   Goal #4  Patient will navigate stairs with rail and SBA   Goal #5     Goal #6     Goal Comments: Goals established on 7/25/2023 7/27/2023 all goals ongoing      SUBJECTIVE  \"Yeah I can walk\"    \" I don't need any help getting to the chair\"     OBJECTIVE  Precautions: Bed/chair alarm    WEIGHT BEARING RESTRICTION  Weight Bearing Restriction: None                PAIN ASSESSMENT   Ratin          BALANCE                                                                                                                       Static Sitting: Good  Dynamic Sitting: Good           Static Standing: Fair -  Dynamic Standing: Fair -    ACTIVITY TOLERANCE                         O2 WALK         AM-PAC '6-Clicks' INPATIENT SHORT FORM - BASIC MOBILITY  How much difficulty does the patient currently have. .. Patient Difficulty: Turning over in bed (including adjusting bedclothes, sheets and blankets)?: A Little   Patient Difficulty: Sitting down on and standing up from a chair with arms (e.g., wheelchair, bedside commode, etc.): A Little   Patient Difficulty: Moving from lying on back to sitting on the side of the bed?: A Little   How much help from another person does the patient currently need. .. Help from Another: Moving to and from a bed to a chair (including a wheelchair)?: A Little   Help from Another: Need to walk in hospital room?: A Little   Help from Another: Climbing 3-5 steps with a railing?: A Little       AM-PAC Score:  Raw Score: 18   Approx Degree of Impairment: 46.58%   Standardized Score (AM-PAC Scale): 43.63   CMS Modifier (G-Code): CK    FUNCTIONAL ABILITY STATUS  Gait Assessment   Functional Mobility/Gait Assessment  Gait Assistance: Contact guard assist  Distance (ft): 75  Assistive Device: Rolling walker  Pattern: Shuffle (B knee extension)    Skilled Therapy Provided    Bed Mobility:  Rolling: indep  Supine<>Sit: mod I      Transfer Mobility:  Sit<>Stand: CGA *increased time, denied dizziness throughout session   Stand<>Sit: CGA   Gait: CGA with RW    Therapist's Comments: educated on gait training with RW with vc for stepping pattern and encouragement for slower lindsey for safety, additionally discussed EC and pacing techniques as pts HR elevated in 140s with activity. Patient End of Session:  In bed;Needs met;Call light within reach;RN aware of session/findings; All patient questions and concerns addressed; Alarm set    PT Session Time: 25 minutes  Gait Training: 10 minutes  Therapeutic Activity: 15 minutes  Therapeutic Exercise:  minutes   Neuromuscular Re-education:  minutes

## 2023-07-27 NOTE — PROGRESS NOTES
Took over pt care this am. Pt remains off pressors. Ok to tx to floor. Pt has a poor intake. Not hungry. Afternoon c/o GERD like symptoms. Tums and Pepcid given with some relief but reflux returns. Pt burping. Almost having emisis. Zofran given. KUB ordered. Pt refusing straight cath at present d/t GERD. Needs urine sample.

## 2023-07-28 ENCOUNTER — APPOINTMENT (OUTPATIENT)
Dept: ULTRASOUND IMAGING | Facility: HOSPITAL | Age: 68
End: 2023-07-28
Attending: INTERNAL MEDICINE
Payer: MEDICARE

## 2023-07-28 LAB
ALBUMIN SERPL-MCNC: 2.2 G/DL (ref 3.4–5)
ANION GAP SERPL CALC-SCNC: 11 MMOL/L (ref 0–18)
BUN BLD-MCNC: 67 MG/DL (ref 7–18)
CALCIUM BLD-MCNC: 8 MG/DL (ref 8.5–10.1)
CHLORIDE SERPL-SCNC: 108 MMOL/L (ref 98–112)
CO2 SERPL-SCNC: 15 MMOL/L (ref 21–32)
CREAT BLD-MCNC: 3.37 MG/DL
EGFRCR SERPLBLD CKD-EPI 2021: 14 ML/MIN/1.73M2 (ref 60–?)
GLUCOSE BLD-MCNC: 153 MG/DL (ref 70–99)
GLUCOSE BLD-MCNC: 160 MG/DL (ref 70–99)
GLUCOSE BLD-MCNC: 173 MG/DL (ref 70–99)
GLUCOSE BLD-MCNC: 253 MG/DL (ref 70–99)
GLUCOSE BLD-MCNC: 310 MG/DL (ref 70–99)
OSMOLALITY SERPL CALC.SUM OF ELEC: 301 MOSM/KG (ref 275–295)
PHOSPHATE SERPL-MCNC: 3.2 MG/DL (ref 2.5–4.9)
POTASSIUM SERPL-SCNC: 3.7 MMOL/L (ref 3.5–5.1)
SODIUM SERPL-SCNC: 134 MMOL/L (ref 136–145)

## 2023-07-28 PROCEDURE — 76770 US EXAM ABDO BACK WALL COMP: CPT | Performed by: INTERNAL MEDICINE

## 2023-07-28 RX ORDER — METOPROLOL SUCCINATE 25 MG/1
25 TABLET, EXTENDED RELEASE ORAL
Status: DISCONTINUED | OUTPATIENT
Start: 2023-07-28 | End: 2023-08-12

## 2023-07-28 RX ORDER — ATORVASTATIN CALCIUM 40 MG/1
40 TABLET, FILM COATED ORAL NIGHTLY
Status: DISCONTINUED | OUTPATIENT
Start: 2023-07-28 | End: 2023-08-12

## 2023-07-28 NOTE — PLAN OF CARE
Resumed care at 0730. Aox4, vitals stable. Loose Bms, incontinent. OOB in chair today. Denies pain. Fingerstick before meals, coverage given as ordered. Tolerating PO diet.

## 2023-07-28 NOTE — PHYSICAL THERAPY NOTE
PHYSICAL THERAPY TREATMENT NOTE - INPATIENT    Room Number: 6645/2473-H     Session: 2     Number of Visits to Meet Established Goals: 5    Presenting Problem: DKA  Co-Morbidities : breast ca, DM, CHF, HTN, PVD  ASSESSMENT     Pt need frequent redirection to attend to her balance and to use appropriate hand placement during sit to stand. Pt is at [address] during sit to stand and able to ambulate with cga 80'. Pt had one minor lob during gait due to fast lindsey despite cues to dec lindsey to improve gait sequence. Pt will benefit from ongoing skilled PT. Pt was educated to ambulate with nursing staff to prevent deconditioning from immobility. Recommend HHPT/OT. DISCHARGE RECOMMENDATIONS  PT Discharge Recommendations: Home with home health PT/OT     PLAN  PT Treatment Plan: Bed mobility; Body mechanics; Coordination; Endurance; Energy conservation;Patient education; Family education;Gait training;Neuromuscular re-educate;Range of motion;Strengthening;Stoop training;Stair training;Transfer training;Balance training  Rehab Potential : Good  Frequency (Obs): 3-5x/week    CURRENT GOALS       Goal #1 Patient is able to demonstrate supine - sit EOB @ level: supervision   MET    Goal #2 Patient is able to demonstrate transfers EOB to/from Chair/Wheelchair at assistance level: supervision   MET    Goal #3 Patient is able to ambulate 50 feet with assist device: walker - rolling at assistance level: minimum assistance   MET  progress to 150 with SBA   Goal #4  Patient will navigate stairs with rail and SBA   Goal #5     Goal #6     Goal Comments: Goals established on 2023 all goals ongoing      SUBJECTIVE  \"I can walk but I want icecream and I will do anything. \"         OBJECTIVE  Precautions: Bed/chair alarm    WEIGHT BEARING RESTRICTION  Weight Bearing Restriction: None                PAIN ASSESSMENT   Ratin          BALANCE Static Sitting: Good  Dynamic Sitting: Good           Static Standing: Fair -  Dynamic Standing: Poor +    ACTIVITY TOLERANCE                         O2 WALK         AM-PAC '6-Clicks' INPATIENT SHORT FORM - BASIC MOBILITY  How much difficulty does the patient currently have. .. Patient Difficulty: Turning over in bed (including adjusting bedclothes, sheets and blankets)?: None   Patient Difficulty: Sitting down on and standing up from a chair with arms (e.g., wheelchair, bedside commode, etc.): A Little   Patient Difficulty: Moving from lying on back to sitting on the side of the bed?: A Little   How much help from another person does the patient currently need. .. Help from Another: Moving to and from a bed to a chair (including a wheelchair)?: A Little   Help from Another: Need to walk in hospital room?: A Little   Help from Another: Climbing 3-5 steps with a railing?: A Lot       AM-PAC Score:  Raw Score: 18   Approx Degree of Impairment: 46.58%   Standardized Score (AM-PAC Scale): 43.63   CMS Modifier (G-Code): CK    FUNCTIONAL ABILITY STATUS  Gait Assessment   Functional Mobility/Gait Assessment  Gait Assistance: Contact guard assist  Distance (ft): 80  Assistive Device: Rolling walker  Pattern: Shuffle    Skilled Therapy Provided    Bed Mobility:  Rolling: indep  Supine<>Sit: mod I      Transfer Mobility:  Sit<>Stand: CGA. Cues for hand placement on the bed. Pt tends to reach for the walker. Stand<>Sit: CGA   Gait: CGA with RW. Pt shows dec ROM of ankles, dec DF/PF, lindsey varied from fast to slow. Pt maintained standing 5 min during wiping secondary to BM. Pt refused to perform exercises. Longer time due to pt needing frequent cueing to redirect to focus on the session. Therapist's Comments:   Patient End of Session: In bed;Up in chair;Needs met;Call light within reach;RN aware of session/findings; All patient questions and concerns addressed; Alarm set    PT Session Time: 25 minutes  Gait Training: 10 minutes  Therapeutic Activity: 15 minutes  Therapeutic Exercise:  minutes   Neuromuscular Re-education:  minutes

## 2023-07-28 NOTE — CM/SW NOTE
07/28/23 0900   CM/SW Referral Data   Referral Source Social Work (self-referral)   Reason for Referral Discharge planning   Informant EMR;Spouse/Significant Other   Patient [address]   Number of Levels in Home 2   Patient lives with Spouse/Significant other;Daughter   Discharge Needs   Anticipated D/C needs Home health care       HOME SITUATION  Type of Home: House   Home Layout: Two level  Stairs to Enter : 2  Stairs to [employer]: 16     Lives With: Spouse; Son  Drives: Yes  Patient Owned Equipment: Rolling walker     Prior Level of [place]: pt reports mod I for ambulation with RW, mild confusion noted throughout the session and unable to provide more details regarding PLOF  (Per PT evaluation)      Patient is a 74 y/o female who admitted with type 1 diabetes mellitus with ketoacidosis without coma. PT updated recommendation from [facility] on 7/25 to [facility] on 7/27. Spoke with patient's  (Bud) regarding discharge planning. They live together in a two story house in [facility]. He shared that their daughter has been staying with them. Bud and daughter help patient as needed. Briefly discussed caregiver resources however he politely declined at this time. Discussed PT rec of HH, he was agreeable. He shared that patient went to [facility] last year and had [facility]. He does not wish to use [facility] again. Discussed [facility], he is interested in them. Sent referral in aidin. [facility] accepted and will follow up with patient regarding choice. SW will remain available.     Barrett Minor, LEONOR  Discharge Planner  [phone]

## 2023-07-28 NOTE — PLAN OF CARE
Assumed care around 1930. Ax3-4, can be confused and agitated at times. On RA w/ sats >90. Monitor shows NSR. Incontinent of bowel and bladder. No reports of pain. Up 1-2x walker/gait belt. Plan for US kidneys, cont IV abx. Safety precautions in place, call light within reach, bed alarm on.      Problem: Diabetes/Glucose Control  Goal: Glucose maintained within prescribed range  Description: INTERVENTIONS:  - Monitor Blood Glucose as ordered  - Assess for signs and symptoms of hyperglycemia and hypoglycemia  - Administer ordered medications to maintain glucose within target range  - Assess barriers to adequate nutritional intake and initiate nutrition consult as needed  - Instruct patient on self management of diabetes  Outcome: Progressing     Problem: Delirium  Goal: Minimize duration of delirium  Description: Interventions:  - Encourage use of hearing aids, eye glasses  - Promote highest level of mobility daily  - Provide frequent reorientation  - Promote wakefulness i.e. lights on, blinds open  - Promote sleep, encourage patient's normal rest cycle i.e. lights off, TV off, minimize noise and interruptions  - Encourage family to assist in orientation and promotion of home routines  Outcome: Progressing     Problem: GASTROINTESTINAL - ADULT  Goal: Maintains or returns to baseline bowel function  Description: INTERVENTIONS:  - Assess bowel function  - Maintain adequate hydration with IV or PO as ordered and tolerated  - Evaluate effectiveness of GI medications  - Encourage mobilization and activity  - Obtain nutritional consult as needed  - Establish a toileting routine/schedule  - Consider collaborating with pharmacy to review patient's medication profile  Outcome: Progressing     Problem: METABOLIC/FLUID AND ELECTROLYTES - ADULT  Goal: Glucose maintained within prescribed range  Description: INTERVENTIONS:  - Monitor Blood Glucose as ordered  - Assess for signs and symptoms of hyperglycemia and hypoglycemia  - Administer ordered medications to maintain glucose within target range  - Assess barriers to adequate nutritional intake and initiate nutrition consult as needed  - Instruct patient on self management of diabetes  Outcome: Progressing  Goal: Electrolytes maintained within normal limits  Description: INTERVENTIONS:  - Monitor labs and rhythm and assess patient for signs and symptoms of electrolyte imbalances  - Administer electrolyte replacement as ordered  - Monitor response to electrolyte replacements, including rhythm and repeat lab results as appropriate  - Fluid restriction as ordered  - Instruct patient on fluid and nutrition restrictions as appropriate  Outcome: Progressing  Goal: Hemodynamic stability and optimal renal function maintained  Description: INTERVENTIONS:  - Monitor labs and assess for signs and symptoms of volume excess or deficit  - Monitor intake, output and patient weight  - Monitor urine specific gravity, serum osmolarity and serum sodium as indicated or ordered  - Monitor response to interventions for patient's volume status, including labs, urine output, blood pressure (other measures as available)  - Encourage oral intake as appropriate  - Instruct patient on fluid and nutrition restrictions as appropriate  Outcome: Progressing     Problem: Patient/Family Goals  Goal: Patient/Family Long Term Goal  Description: Patient's Long Term Goal: go home    Interventions:  - manage blood sugar  - See additional Care Plan goals for specific interventions  Outcome: Progressing  Goal: Patient/Family Short Term Goal  Description: Patient's Short Term Goal: feel better    Interventions:   - manage blood sugar  - See additional Care Plan goals for specific interventions  Outcome: Progressing

## 2023-07-28 NOTE — HOME CARE LIAISON
Received referral via [facility] for Home Health services. Spoke w/ patient at the bedside and provided with list of [place] [address] providers [address], choice is [address] . Agency reserved in [address] [place], [place] [address] and contact information placed on AVS. Financial interest disclosure provided.  Notified Paty Santana

## 2023-07-29 LAB
ALBUMIN SERPL-MCNC: 2.2 G/DL (ref 3.4–5)
ANION GAP SERPL CALC-SCNC: 6 MMOL/L (ref 0–18)
BUN BLD-MCNC: 62 MG/DL (ref 7–18)
CALCIUM BLD-MCNC: 8.1 MG/DL (ref 8.5–10.1)
CHLORIDE SERPL-SCNC: 108 MMOL/L (ref 98–112)
CO2 SERPL-SCNC: 20 MMOL/L (ref 21–32)
CREAT BLD-MCNC: 3.5 MG/DL
EGFRCR SERPLBLD CKD-EPI 2021: 14 ML/MIN/1.73M2 (ref 60–?)
GLUCOSE BLD-MCNC: 235 MG/DL (ref 70–99)
GLUCOSE BLD-MCNC: 281 MG/DL (ref 70–99)
GLUCOSE BLD-MCNC: 322 MG/DL (ref 70–99)
GLUCOSE BLD-MCNC: 81 MG/DL (ref 70–99)
GLUCOSE BLD-MCNC: 84 MG/DL (ref 70–99)
OSMOLALITY SERPL CALC.SUM OF ELEC: 295 MOSM/KG (ref 275–295)
PHOSPHATE SERPL-MCNC: 3.1 MG/DL (ref 2.5–4.9)
POTASSIUM SERPL-SCNC: 3.7 MMOL/L (ref 3.5–5.1)
SODIUM SERPL-SCNC: 134 MMOL/L (ref 136–145)

## 2023-07-29 NOTE — PLAN OF CARE
Problem: Diabetes/Glucose Control  Type II Diabetes  Patient reports good appetite, denies nausea.    Novolog sliding scale, levemir for glucose control  Goal: Glucose maintained within prescribed range  300 tonight., denies thirst or feeling hungry  Description: INTERVENTIONS:  - Monitor Blood Glucose as ordered  - Assess for signs and symptoms of hyperglycemia and hypoglycemia  - Administer ordered medications to maintain glucose within target range  - Assess barriers to adequate nutritional intake and initiate nutrition consult as needed  - Instruct patient on self management of diabetes  Outcome: Progressing     Problem: Delirium  Goal: Minimize duration of delirium  Description: Interventions:  - Encourage use of hearing aids, eye glasses  - Promote highest level of mobility daily  - Provide frequent reorientation  - Promote wakefulness i.e. lights on, blinds open  - Promote sleep, encourage patient's normal rest cycle i.e. lights off, TV off, minimize noise and interruptions  - Encourage family to assist in orientation and promotion of home routines  Outcome: Progressing     Problem: GASTROINTESTINAL - ADULT  Denies abdominal pain or other discomfort  Goal: Maintains or returns to baseline bowel function  Description: INTERVENTIONS:  - Assess bowel function  - Maintain adequate hydration with IV or PO as ordered and tolerated  - Evaluate effectiveness of GI medications  - Encourage mobilization and activity  - Obtain nutritional consult as needed  - Establish a toileting routine/schedule  - Consider collaborating with pharmacy to review patient's medication profile  Outcome: Progressing     Problem: METABOLIC/FLUID AND ELECTROLYTES - ADULT  Goal: Glucose maintained within prescribed range  POC  glucose monitoring before meals and at bedtime,  BMP daily  Description: INTERVENTIONS:  - Monitor Blood Glucose as ordered  - Assess for signs and symptoms of hyperglycemia and hypoglycemia  - Administer ordered medications to maintain glucose within target range  - Assess barriers to adequate nutritional intake and initiate nutrition consult as needed  - Instruct patient on self management of diabetes  Outcome: Progressing  Goal: Electrolytes maintained within normal limits  Description: INTERVENTIONS:  - Monitor labs and rhythm and assess patient for signs and symptoms of electrolyte imbalances  - Administer electrolyte replacement as ordered  - Monitor response to electrolyte replacements, including rhythm and repeat lab results as appropriate  - Fluid restriction as ordered  - Instruct patient on fluid and nutrition restrictions as appropriate  Outcome: Progressing  Goal: Hemodynamic stability and optimal renal function maintained  Description: INTERVENTIONS:  - Monitor labs and assess for signs and symptoms of volume excess or deficit  - Monitor intake, output and patient weight  - Monitor urine specific gravity, serum osmolarity and serum sodium as indicated or ordered  - Monitor response to interventions for patient's volume status, including labs, urine output, blood pressure (other measures as available)  - Encourage oral intake as appropriate  - Instruct patient on fluid and nutrition restrictions as appropriate  Outcome: Progressing     Problem: Patient/Family Goals  Discharge planning in progress  Goal: Patient/Family Long Term Goal  Description: Patient's Long Term Goal: go home    Interventions:  - manage blood sugar  - See additional Care Plan goals for specific interventions  Outcome: Progressing  Goal: Patient/Family Short Term Goal  Description: Patient's Short Term Goal: feel better    Interventions:   - manage blood sugar  - See additional Care Plan goals for specific interventions  Outcome: Progressing

## 2023-07-29 NOTE — PLAN OF CARE
RN SHIFT NOTE    Assumed care of pt at 0700. No c/o pain at this time. Patient is A&O x 4. NSR on tele, S1 and S2 present and denies cardiac symptoms. No edema present in extremities. Lung sounds clear. Abdomen soft and rounded. BUE generalized bruising and redness. Tolerating medications and care needs have been met at this time. POC: DW, Strict I's and O's, Monitor Cr, IV abx.        Problem: Diabetes/Glucose Control  Goal: Glucose maintained within prescribed range  Description: INTERVENTIONS:  - Monitor Blood Glucose as ordered  - Assess for signs and symptoms of hyperglycemia and hypoglycemia  - Administer ordered medications to maintain glucose within target range  - Assess barriers to adequate nutritional intake and initiate nutrition consult as needed  - Instruct patient on self management of diabetes  Outcome: Progressing     Problem: Delirium  Goal: Minimize duration of delirium  Description: Interventions:  - Encourage use of hearing aids, eye glasses  - Promote highest level of mobility daily  - Provide frequent reorientation  - Promote wakefulness i.e. lights on, blinds open  - Promote sleep, encourage patient's normal rest cycle i.e. lights off, TV off, minimize noise and interruptions  - Encourage family to assist in orientation and promotion of home routines  Outcome: Progressing     Problem: GASTROINTESTINAL - ADULT  Goal: Maintains or returns to baseline bowel function  Description: INTERVENTIONS:  - Assess bowel function  - Maintain adequate hydration with IV or PO as ordered and tolerated  - Evaluate effectiveness of GI medications  - Encourage mobilization and activity  - Obtain nutritional consult as needed  - Establish a toileting routine/schedule  - Consider collaborating with pharmacy to review patient's medication profile  Outcome: Progressing     Problem: METABOLIC/FLUID AND ELECTROLYTES - ADULT  Goal: Glucose maintained within prescribed range  Description: INTERVENTIONS:  - Monitor Blood Glucose as ordered  - Assess for signs and symptoms of hyperglycemia and hypoglycemia  - Administer ordered medications to maintain glucose within target range  - Assess barriers to adequate nutritional intake and initiate nutrition consult as needed  - Instruct patient on self management of diabetes  Outcome: Progressing  Goal: Electrolytes maintained within normal limits  Description: INTERVENTIONS:  - Monitor labs and rhythm and assess patient for signs and symptoms of electrolyte imbalances  - Administer electrolyte replacement as ordered  - Monitor response to electrolyte replacements, including rhythm and repeat lab results as appropriate  - Fluid restriction as ordered  - Instruct patient on fluid and nutrition restrictions as appropriate  Outcome: Progressing  Goal: Hemodynamic stability and optimal renal function maintained  Description: INTERVENTIONS:  - Monitor labs and assess for signs and symptoms of volume excess or deficit  - Monitor intake, output and patient weight  - Monitor urine specific gravity, serum osmolarity and serum sodium as indicated or ordered  - Monitor response to interventions for patient's volume status, including labs, urine output, blood pressure (other measures as available)  - Encourage oral intake as appropriate  - Instruct patient on fluid and nutrition restrictions as appropriate  Outcome: Progressing     Problem: Patient/Family Goals  Goal: Patient/Family Long Term Goal  Description: Patient's Long Term Goal: go home    Interventions:  - manage blood sugar  - See additional Care Plan goals for specific interventions  Outcome: Progressing  Goal: Patient/Family Short Term Goal  Description: Patient's Short Term Goal: feel better    Interventions:   - manage blood sugar  - See additional Care Plan goals for specific interventions  Outcome: Progressing

## 2023-07-30 LAB
ALBUMIN SERPL-MCNC: 2.2 G/DL (ref 3.4–5)
ANION GAP SERPL CALC-SCNC: 9 MMOL/L (ref 0–18)
BASOPHILS # BLD AUTO: 0.03 X10(3) UL (ref 0–0.2)
BASOPHILS NFR BLD AUTO: 0.5 %
BUN BLD-MCNC: 55 MG/DL (ref 7–18)
CALCIUM BLD-MCNC: 7.9 MG/DL (ref 8.5–10.1)
CHLORIDE SERPL-SCNC: 107 MMOL/L (ref 98–112)
CO2 SERPL-SCNC: 16 MMOL/L (ref 21–32)
CREAT BLD-MCNC: 3.78 MG/DL
EGFRCR SERPLBLD CKD-EPI 2021: 12 ML/MIN/1.73M2 (ref 60–?)
EOSINOPHIL # BLD AUTO: 0.06 X10(3) UL (ref 0–0.7)
EOSINOPHIL NFR BLD AUTO: 0.9 %
ERYTHROCYTE [DISTWIDTH] IN BLOOD BY AUTOMATED COUNT: 15 %
GLUCOSE BLD-MCNC: 124 MG/DL (ref 70–99)
GLUCOSE BLD-MCNC: 138 MG/DL (ref 70–99)
GLUCOSE BLD-MCNC: 204 MG/DL (ref 70–99)
GLUCOSE BLD-MCNC: 280 MG/DL (ref 70–99)
GLUCOSE BLD-MCNC: 332 MG/DL (ref 70–99)
HCT VFR BLD AUTO: 29.6 %
HGB BLD-MCNC: 10.3 G/DL
IMM GRANULOCYTES # BLD AUTO: 0.15 X10(3) UL (ref 0–1)
IMM GRANULOCYTES NFR BLD: 2.3 %
LYMPHOCYTES # BLD AUTO: 1.46 X10(3) UL (ref 1–4)
LYMPHOCYTES NFR BLD AUTO: 22.8 %
MCH RBC QN AUTO: 33.8 PG (ref 26–34)
MCHC RBC AUTO-ENTMCNC: 34.8 G/DL (ref 31–37)
MCV RBC AUTO: 97 FL
MONOCYTES # BLD AUTO: 1 X10(3) UL (ref 0.1–1)
MONOCYTES NFR BLD AUTO: 15.6 %
NEUTROPHILS # BLD AUTO: 3.7 X10 (3) UL (ref 1.5–7.7)
NEUTROPHILS # BLD AUTO: 3.7 X10(3) UL (ref 1.5–7.7)
NEUTROPHILS NFR BLD AUTO: 57.9 %
OSMOLALITY SERPL CALC.SUM OF ELEC: 291 MOSM/KG (ref 275–295)
PHOSPHATE SERPL-MCNC: 3.8 MG/DL (ref 2.5–4.9)
PLATELET # BLD AUTO: 113 10(3)UL (ref 150–450)
POTASSIUM SERPL-SCNC: 4.3 MMOL/L (ref 3.5–5.1)
RBC # BLD AUTO: 3.05 X10(6)UL
SODIUM SERPL-SCNC: 132 MMOL/L (ref 136–145)
WBC # BLD AUTO: 6.4 X10(3) UL (ref 4–11)

## 2023-07-30 RX ORDER — ACETAMINOPHEN 325 MG/1
650 TABLET ORAL EVERY 6 HOURS PRN
Status: DISCONTINUED | OUTPATIENT
Start: 2023-07-30 | End: 2023-08-12

## 2023-07-30 NOTE — PLAN OF CARE
Assumed care of patient at 2230. Alert and oriented x4, No C/O chest pain or SOB, SPO2 on RA 98%, Heart rate SR 70s-80s. Breath sounds clear and diminished. Bed is locked and in low position. Personal belonging within reach. Updated on plan of care and verbalized understanding. Will Continue to monitor.       Problem: Diabetes/Glucose Control  Goal: Glucose maintained within prescribed range  Description: INTERVENTIONS:  - Monitor Blood Glucose as ordered  - Assess for signs and symptoms of hyperglycemia and hypoglycemia  - Administer ordered medications to maintain glucose within target range  - Assess barriers to adequate nutritional intake and initiate nutrition consult as needed  - Instruct patient on self management of diabetes  Outcome: Progressing     Problem: Delirium  Goal: Minimize duration of delirium  Description: Interventions:  - Encourage use of hearing aids, eye glasses  - Promote highest level of mobility daily  - Provide frequent reorientation  - Promote wakefulness i.e. lights on, blinds open  - Promote sleep, encourage patient's normal rest cycle i.e. lights off, TV off, minimize noise and interruptions  - Encourage family to assist in orientation and promotion of home routines  Outcome: Progressing     Problem: GASTROINTESTINAL - ADULT  Goal: Maintains or returns to baseline bowel function  Description: INTERVENTIONS:  - Assess bowel function  - Maintain adequate hydration with IV or PO as ordered and tolerated  - Evaluate effectiveness of GI medications  - Encourage mobilization and activity  - Obtain nutritional consult as needed  - Establish a toileting routine/schedule  - Consider collaborating with pharmacy to review patient's medication profile  Outcome: Progressing     Problem: METABOLIC/FLUID AND ELECTROLYTES - ADULT  Goal: Glucose maintained within prescribed range  Description: INTERVENTIONS:  - Monitor Blood Glucose as ordered  - Assess for signs and symptoms of hyperglycemia and hypoglycemia  - Administer ordered medications to maintain glucose within target range  - Assess barriers to adequate nutritional intake and initiate nutrition consult as needed  - Instruct patient on self management of diabetes  Outcome: Progressing  Goal: Electrolytes maintained within normal limits  Description: INTERVENTIONS:  - Monitor labs and rhythm and assess patient for signs and symptoms of electrolyte imbalances  - Administer electrolyte replacement as ordered  - Monitor response to electrolyte replacements, including rhythm and repeat lab results as appropriate  - Fluid restriction as ordered  - Instruct patient on fluid and nutrition restrictions as appropriate  Outcome: Progressing  Goal: Hemodynamic stability and optimal renal function maintained  Description: INTERVENTIONS:  - Monitor labs and assess for signs and symptoms of volume excess or deficit  - Monitor intake, output and patient weight  - Monitor urine specific gravity, serum osmolarity and serum sodium as indicated or ordered  - Monitor response to interventions for patient's volume status, including labs, urine output, blood pressure (other measures as available)  - Encourage oral intake as appropriate  - Instruct patient on fluid and nutrition restrictions as appropriate  Outcome: Progressing     Problem: Patient/Family Goals  Goal: Patient/Family Long Term Goal  Description: Patient's Long Term Goal: go home    Interventions:  - manage blood sugar  - See additional Care Plan goals for specific interventions  Outcome: Progressing  Goal: Patient/Family Short Term Goal  Description: Patient's Short Term Goal: feel better    Interventions:   - manage blood sugar  - See additional Care Plan goals for specific interventions  Outcome: Progressing

## 2023-07-30 NOTE — PLAN OF CARE
Problem: Diabetes/Glucose Control    Type II Diabetes  Patient reports good appetite, denies nausea.    Novolog sliding scale, levemir for glucose control  Goal: Glucose maintained within prescribed range  250-300, denies thirst or feeling hungry  Description: INTERVENTIONS:  - Monitor Blood Glucose as ordered  - Assess for signs and symptoms of hyperglycemia and hypoglycemia  - Administer ordered medications to maintain glucose within target range  - Assess barriers to adequate nutritional intake and initiate nutrition consult as needed  - Instruct patient on self management of diabetes  Outcome: Progressing     Problem: Delirium  Goal: Minimize duration of delirium  Description: Interventions:  - Encourage use of hearing aids, eye glasses  - Promote highest level of mobility daily  - Provide frequent reorientation  - Promote wakefulness i.e. lights on, blinds open  - Promote sleep, encourage patient's normal rest cycle i.e. lights off, TV off, minimize noise and interruptions  - Encourage family to assist in orientation and promotion of home routines  Outcome: Progressing     Problem: GASTROINTESTINAL - ADULT  Denies abdominal pain or other discomfort  Goal: Maintains or returns to baseline bowel function  Bowel movement today  Description: INTERVENTIONS:  - Assess bowel function  - Maintain adequate hydration with IV or PO as ordered and tolerated  - Evaluate effectiveness of GI medications  - Encourage mobilization and activity  - Obtain nutritional consult as needed  - Establish a toileting routine/schedule  - Consider collaborating with pharmacy to review patient's medication profile  Outcome: Progressing     Problem: METABOLIC/FLUID AND ELECTROLYTES - ADULT  Goal: Glucose maintained within prescribed range  POC   POC  glucose monitoring before meals and at bedtime,  BMP daily  Description: INTERVENTIONS:  - Monitor Blood Glucose as ordered  - Assess for signs and symptoms of hyperglycemia and hypoglycemia  - Administer ordered medications to maintain glucose within target range  - Assess barriers to adequate nutritional intake and initiate nutrition consult as needed  - Instruct patient on self management of diabetes  Outcome: Progressing  Goal: Electrolytes maintained within normal limits  Description: INTERVENTIONS:  - Monitor labs and rhythm and assess patient for signs and symptoms of electrolyte imbalances  - Administer electrolyte replacement as ordered  - Monitor response to electrolyte replacements, including rhythm and repeat lab results as appropriate  - Fluid restriction as ordered  - Instruct patient on fluid and nutrition restrictions as appropriate  Outcome: Progressing  Goal: Hemodynamic stability and optimal renal function maintained  Continue to monitor creatinine/ kidney function, intake and output  IV antibiotics/ Isolation precautions for MRSA  Description: INTERVENTIONS:  - Monitor labs and assess for signs and symptoms of volume excess or deficit  - Monitor intake, output and patient weight  - Monitor urine specific gravity, serum osmolarity and serum sodium as indicated or ordered  - Monitor response to interventions for patient's volume status, including labs, urine output, blood pressure (other measures as available)  - Encourage oral intake as appropriate  - Instruct patient on fluid and nutrition restrictions as appropriate  Outcome: Progressing     Problem: Patient/Family Goals  Discharge planning in progress  Goal: Patient/Family Long Term Goal  Description: Patient's Long Term Goal: go home    Interventions:  - manage blood sugar  - See additional Care Plan goals for specific interventions  Outcome: Progressing  Goal: Patient/Family Short Term Goal  Description: Patient's Short Term Goal: feel better    Interventions:   - manage blood sugar  - See additional Care Plan goals for specific interventions  Outcome: Progressing

## 2023-07-30 NOTE — PLAN OF CARE
Patient Aox4. Irritable. SR on tele monitor. Room air with diminished lungs. Incontinent of bowels and bladder. Purewick recommended to patient but patient refused. Up to chair 1 assist with walker. IV in left forearm not flushing. Left port cath with blood return. Call  light within reach. Bed in lowest position with alarm activated.             Problem: Diabetes/Glucose Control  Goal: Glucose maintained within prescribed range  Description: INTERVENTIONS:  - Monitor Blood Glucose as ordered  - Assess for signs and symptoms of hyperglycemia and hypoglycemia  - Administer ordered medications to maintain glucose within target range  - Assess barriers to adequate nutritional intake and initiate nutrition consult as needed  - Instruct patient on self management of diabetes  Outcome: Progressing     Problem: Delirium  Goal: Minimize duration of delirium  Description: Interventions:  - Encourage use of hearing aids, eye glasses  - Promote highest level of mobility daily  - Provide frequent reorientation  - Promote wakefulness i.e. lights on, blinds open  - Promote sleep, encourage patient's normal rest cycle i.e. lights off, TV off, minimize noise and interruptions  - Encourage family to assist in orientation and promotion of home routines  Outcome: Progressing     Problem: GASTROINTESTINAL - ADULT  Goal: Maintains or returns to baseline bowel function  Description: INTERVENTIONS:  - Assess bowel function  - Maintain adequate hydration with IV or PO as ordered and tolerated  - Evaluate effectiveness of GI medications  - Encourage mobilization and activity  - Obtain nutritional consult as needed  - Establish a toileting routine/schedule  - Consider collaborating with pharmacy to review patient's medication profile  Outcome: Progressing     Problem: METABOLIC/FLUID AND ELECTROLYTES - ADULT  Goal: Glucose maintained within prescribed range  Description: INTERVENTIONS:  - Monitor Blood Glucose as ordered  - Assess for signs and symptoms of hyperglycemia and hypoglycemia  - Administer ordered medications to maintain glucose within target range  - Assess barriers to adequate nutritional intake and initiate nutrition consult as needed  - Instruct patient on self management of diabetes  Outcome: Progressing  Goal: Electrolytes maintained within normal limits  Description: INTERVENTIONS:  - Monitor labs and rhythm and assess patient for signs and symptoms of electrolyte imbalances  - Administer electrolyte replacement as ordered  - Monitor response to electrolyte replacements, including rhythm and repeat lab results as appropriate  - Fluid restriction as ordered  - Instruct patient on fluid and nutrition restrictions as appropriate  Outcome: Progressing  Goal: Hemodynamic stability and optimal renal function maintained  Description: INTERVENTIONS:  - Monitor labs and assess for signs and symptoms of volume excess or deficit  - Monitor intake, output and patient weight  - Monitor urine specific gravity, serum osmolarity and serum sodium as indicated or ordered  - Monitor response to interventions for patient's volume status, including labs, urine output, blood pressure (other measures as available)  - Encourage oral intake as appropriate  - Instruct patient on fluid and nutrition restrictions as appropriate  Outcome: Progressing     Problem: Patient/Family Goals  Goal: Patient/Family Long Term Goal  Description: Patient's Long Term Goal: go home    Interventions:  - manage blood sugar  - See additional Care Plan goals for specific interventions  Outcome: Progressing  Goal: Patient/Family Short Term Goal  Description: Patient's Short Term Goal: feel better    Interventions:   - manage blood sugar  - See additional Care Plan goals for specific interventions  Outcome: Progressing

## 2023-07-31 ENCOUNTER — APPOINTMENT (OUTPATIENT)
Dept: ULTRASOUND IMAGING | Facility: HOSPITAL | Age: 68
End: 2023-07-31
Attending: INTERNAL MEDICINE
Payer: MEDICARE

## 2023-07-31 ENCOUNTER — APPOINTMENT (OUTPATIENT)
Dept: CT IMAGING | Facility: HOSPITAL | Age: 68
End: 2023-07-31
Attending: INTERNAL MEDICINE
Payer: MEDICARE

## 2023-07-31 LAB
ALBUMIN SERPL-MCNC: 2.2 G/DL (ref 3.4–5)
ANION GAP SERPL CALC-SCNC: 9 MMOL/L (ref 0–18)
BUN BLD-MCNC: 58 MG/DL (ref 7–18)
CALCIUM BLD-MCNC: 7.9 MG/DL (ref 8.5–10.1)
CHLORIDE SERPL-SCNC: 104 MMOL/L (ref 98–112)
CO2 SERPL-SCNC: 19 MMOL/L (ref 21–32)
CREAT BLD-MCNC: 3.82 MG/DL
EGFRCR SERPLBLD CKD-EPI 2021: 12 ML/MIN/1.73M2 (ref 60–?)
GAD-65: <5 U/ML
GLUCOSE BLD-MCNC: 110 MG/DL (ref 70–99)
GLUCOSE BLD-MCNC: 110 MG/DL (ref 70–99)
GLUCOSE BLD-MCNC: 198 MG/DL (ref 70–99)
GLUCOSE BLD-MCNC: 198 MG/DL (ref 70–99)
GLUCOSE BLD-MCNC: 231 MG/DL (ref 70–99)
INR BLD: 1 (ref 0.85–1.16)
OSMOLALITY SERPL CALC.SUM OF ELEC: 291 MOSM/KG (ref 275–295)
PHOSPHATE SERPL-MCNC: 3.8 MG/DL (ref 2.5–4.9)
POTASSIUM SERPL-SCNC: 4.1 MMOL/L (ref 3.5–5.1)
PROTHROMBIN TIME: 13.2 SECONDS (ref 11.6–14.8)
SODIUM SERPL-SCNC: 132 MMOL/L (ref 136–145)

## 2023-07-31 PROCEDURE — 74176 CT ABD & PELVIS W/O CONTRAST: CPT | Performed by: INTERNAL MEDICINE

## 2023-07-31 PROCEDURE — 99233 SBSQ HOSP IP/OBS HIGH 50: CPT | Performed by: STUDENT IN AN ORGANIZED HEALTH CARE EDUCATION/TRAINING PROGRAM

## 2023-07-31 PROCEDURE — 76942 ECHO GUIDE FOR BIOPSY: CPT | Performed by: INTERNAL MEDICINE

## 2023-07-31 PROCEDURE — 50200 RENAL BIOPSY PERQ: CPT | Performed by: INTERNAL MEDICINE

## 2023-07-31 PROCEDURE — 99152 MOD SED SAME PHYS/QHP 5/>YRS: CPT | Performed by: INTERNAL MEDICINE

## 2023-07-31 PROCEDURE — 0TB13ZX EXCISION OF LEFT KIDNEY, PERCUTANEOUS APPROACH, DIAGNOSTIC: ICD-10-PCS | Performed by: RADIOLOGY

## 2023-07-31 RX ORDER — DOBUTAMINE HYDROCHLORIDE 200 MG/100ML
2.5 INJECTION INTRAVENOUS CONTINUOUS
Status: DISCONTINUED | OUTPATIENT
Start: 2023-07-31 | End: 2023-08-12

## 2023-07-31 RX ORDER — MIDAZOLAM HYDROCHLORIDE 1 MG/ML
INJECTION INTRAMUSCULAR; INTRAVENOUS
Status: COMPLETED
Start: 2023-07-31 | End: 2023-07-31

## 2023-07-31 RX ORDER — MIDAZOLAM HYDROCHLORIDE 1 MG/ML
1 INJECTION INTRAMUSCULAR; INTRAVENOUS EVERY 5 MIN PRN
Status: DISCONTINUED | OUTPATIENT
Start: 2023-07-31 | End: 2023-08-01 | Stop reason: HOSPADM

## 2023-07-31 RX ORDER — FLUMAZENIL 0.1 MG/ML
0.2 INJECTION INTRAVENOUS AS NEEDED
Status: DISCONTINUED | OUTPATIENT
Start: 2023-07-31 | End: 2023-08-01 | Stop reason: HOSPADM

## 2023-07-31 RX ORDER — NALOXONE HYDROCHLORIDE 0.4 MG/ML
INJECTION, SOLUTION INTRAMUSCULAR; INTRAVENOUS; SUBCUTANEOUS
Status: DISCONTINUED
Start: 2023-07-31 | End: 2023-07-31 | Stop reason: WASHOUT

## 2023-07-31 RX ORDER — NALOXONE HYDROCHLORIDE 0.4 MG/ML
80 INJECTION, SOLUTION INTRAMUSCULAR; INTRAVENOUS; SUBCUTANEOUS AS NEEDED
Status: DISCONTINUED | OUTPATIENT
Start: 2023-07-31 | End: 2023-08-01 | Stop reason: HOSPADM

## 2023-07-31 RX ORDER — SODIUM CHLORIDE 9 MG/ML
INJECTION, SOLUTION INTRAVENOUS CONTINUOUS
Status: DISCONTINUED | OUTPATIENT
Start: 2023-07-31 | End: 2023-08-12

## 2023-07-31 RX ORDER — DEXTROSE MONOHYDRATE 50 MG/ML
INJECTION, SOLUTION INTRAVENOUS CONTINUOUS
Status: DISCONTINUED | OUTPATIENT
Start: 2023-07-31 | End: 2023-07-31

## 2023-07-31 RX ORDER — DEXTROSE AND SODIUM CHLORIDE 5; .9 G/100ML; G/100ML
INJECTION, SOLUTION INTRAVENOUS CONTINUOUS
Status: DISCONTINUED | OUTPATIENT
Start: 2023-07-31 | End: 2023-08-05

## 2023-07-31 RX ORDER — FLUMAZENIL 0.1 MG/ML
INJECTION INTRAVENOUS
Status: DISCONTINUED
Start: 2023-07-31 | End: 2023-07-31 | Stop reason: WASHOUT

## 2023-07-31 NOTE — PLAN OF CARE
Assumed care of patient at 1. Alert and oriented x4, No C/O chest pain or SOB, SPO2 on RA 96%, Heart rate SR 80s. Breath sounds clear and diminished. Bed is locked and in low position. Personal belonging within reach. Updated on plan of care and verbalized understanding. Will Continue to monitor.       Problem: Diabetes/Glucose Control  Goal: Glucose maintained within prescribed range  Description: INTERVENTIONS:  - Monitor Blood Glucose as ordered  - Assess for signs and symptoms of hyperglycemia and hypoglycemia  - Administer ordered medications to maintain glucose within target range  - Assess barriers to adequate nutritional intake and initiate nutrition consult as needed  - Instruct patient on self management of diabetes  Outcome: Progressing     Problem: Delirium  Goal: Minimize duration of delirium  Description: Interventions:  - Encourage use of hearing aids, eye glasses  - Promote highest level of mobility daily  - Provide frequent reorientation  - Promote wakefulness i.e. lights on, blinds open  - Promote sleep, encourage patient's normal rest cycle i.e. lights off, TV off, minimize noise and interruptions  - Encourage family to assist in orientation and promotion of home routines  Outcome: Progressing     Problem: GASTROINTESTINAL - ADULT  Goal: Maintains or returns to baseline bowel function  Description: INTERVENTIONS:  - Assess bowel function  - Maintain adequate hydration with IV or PO as ordered and tolerated  - Evaluate effectiveness of GI medications  - Encourage mobilization and activity  - Obtain nutritional consult as needed  - Establish a toileting routine/schedule  - Consider collaborating with pharmacy to review patient's medication profile  Outcome: Progressing     Problem: METABOLIC/FLUID AND ELECTROLYTES - ADULT  Goal: Glucose maintained within prescribed range  Description: INTERVENTIONS:  - Monitor Blood Glucose as ordered  - Assess for signs and symptoms of hyperglycemia and hypoglycemia  - Administer ordered medications to maintain glucose within target range  - Assess barriers to adequate nutritional intake and initiate nutrition consult as needed  - Instruct patient on self management of diabetes  Outcome: Progressing  Goal: Electrolytes maintained within normal limits  Description: INTERVENTIONS:  - Monitor labs and rhythm and assess patient for signs and symptoms of electrolyte imbalances  - Administer electrolyte replacement as ordered  - Monitor response to electrolyte replacements, including rhythm and repeat lab results as appropriate  - Fluid restriction as ordered  - Instruct patient on fluid and nutrition restrictions as appropriate  Outcome: Progressing  Goal: Hemodynamic stability and optimal renal function maintained  Description: INTERVENTIONS:  - Monitor labs and assess for signs and symptoms of volume excess or deficit  - Monitor intake, output and patient weight  - Monitor urine specific gravity, serum osmolarity and serum sodium as indicated or ordered  - Monitor response to interventions for patient's volume status, including labs, urine output, blood pressure (other measures as available)  - Encourage oral intake as appropriate  - Instruct patient on fluid and nutrition restrictions as appropriate  Outcome: Progressing     Problem: Patient/Family Goals  Goal: Patient/Family Long Term Goal  Description: Patient's Long Term Goal: go home    Interventions:  - manage blood sugar  - See additional Care Plan goals for specific interventions  Outcome: Progressing  Goal: Patient/Family Short Term Goal  Description: Patient's Short Term Goal: feel better    Interventions:   - manage blood sugar  - See additional Care Plan goals for specific interventions  Outcome: Progressing     Problem: Patient/Family Goals  Goal: Patient/Family Short Term Goal  Description: Patient's Short Term Goal: feel better    Interventions:   - manage blood sugar  - See additional Care Plan goals for specific interventions  Outcome: Progressing

## 2023-07-31 NOTE — IMAGING NOTE
Received order for renal biopsy and reviewed with Dr Shereen Valerio at [phone]. After reviewing available US imaging, Dr Shereen Valerio recommends additional CT imaging of non-contrast abd/pelvis prior to scheduling for biopsy. I spoke with Bruce Oates and she will contact PCP on this. She states pt has had no ASA since admission on 7/24. After additional imaging is reviewed, renal biopsy will be addressed.

## 2023-07-31 NOTE — PLAN OF CARE
Patient alert and oriented x 4, irritated and agitated at times. Up with x1 assist using walker. On RA. NSR on tele. Continent of bowel and bladder. Educated patient on output measurement, patient declining purewick. No complaints of pain, shortness of breath, or chest pain/discomfort. NPO for procedure. POC discussed with patient. Fall precautions in place. Call light within reach.      Problem: Diabetes/Glucose Control  Goal: Glucose maintained within prescribed range  Description: INTERVENTIONS:  - Monitor Blood Glucose as ordered  - Assess for signs and symptoms of hyperglycemia and hypoglycemia  - Administer ordered medications to maintain glucose within target range  - Assess barriers to adequate nutritional intake and initiate nutrition consult as needed  - Instruct patient on self management of diabetes  Outcome: Progressing     Problem: Delirium  Goal: Minimize duration of delirium  Description: Interventions:  - Encourage use of hearing aids, eye glasses  - Promote highest level of mobility daily  - Provide frequent reorientation  - Promote wakefulness i.e. lights on, blinds open  - Promote sleep, encourage patient's normal rest cycle i.e. lights off, TV off, minimize noise and interruptions  - Encourage family to assist in orientation and promotion of home routines  Outcome: Progressing     Problem: GASTROINTESTINAL - ADULT  Goal: Maintains or returns to baseline bowel function  Description: INTERVENTIONS:  - Assess bowel function  - Maintain adequate hydration with IV or PO as ordered and tolerated  - Evaluate effectiveness of GI medications  - Encourage mobilization and activity  - Obtain nutritional consult as needed  - Establish a toileting routine/schedule  - Consider collaborating with pharmacy to review patient's medication profile  Outcome: Progressing     Problem: METABOLIC/FLUID AND ELECTROLYTES - ADULT  Goal: Glucose maintained within prescribed range  Description: INTERVENTIONS:  - Monitor Blood Glucose as ordered  - Assess for signs and symptoms of hyperglycemia and hypoglycemia  - Administer ordered medications to maintain glucose within target range  - Assess barriers to adequate nutritional intake and initiate nutrition consult as needed  - Instruct patient on self management of diabetes  Outcome: Progressing  Goal: Electrolytes maintained within normal limits  Description: INTERVENTIONS:  - Monitor labs and rhythm and assess patient for signs and symptoms of electrolyte imbalances  - Administer electrolyte replacement as ordered  - Monitor response to electrolyte replacements, including rhythm and repeat lab results as appropriate  - Fluid restriction as ordered  - Instruct patient on fluid and nutrition restrictions as appropriate  Outcome: Progressing  Goal: Hemodynamic stability and optimal renal function maintained  Description: INTERVENTIONS:  - Monitor labs and assess for signs and symptoms of volume excess or deficit  - Monitor intake, output and patient weight  - Monitor urine specific gravity, serum osmolarity and serum sodium as indicated or ordered  - Monitor response to interventions for patient's volume status, including labs, urine output, blood pressure (other measures as available)  - Encourage oral intake as appropriate  - Instruct patient on fluid and nutrition restrictions as appropriate  Outcome: Progressing     Problem: Patient/Family Goals  Goal: Patient/Family Long Term Goal  Description: Patient's Long Term Goal: go home    Interventions:  - manage blood sugar  - See additional Care Plan goals for specific interventions  Outcome: Progressing  Goal: Patient/Family Short Term Goal  Description: Patient's Short Term Goal: feel better    Interventions:   - manage blood sugar  - See additional Care Plan goals for specific interventions  Outcome: Progressing

## 2023-07-31 NOTE — PROCEDURES
[facility]  Procedure Note    Kelsea Pinto Patient Status:  Inpatient    1955 MRN [id number]   [facility] 2NE-A Attending Taylor Osborn MD   [facility] Day # 7 PCP Sahil Dominguez MD     Procedure: US kidney core biopsy L    Pre-Procedure Diagnosis:  Renal insufficiency    Post-Procedure Diagnosis: Same    Anesthesia:  Local and Sedation    Findings:  2 x 18g core of L kidney    Specimens: As above    Blood Loss:  Minimal    Tourniquet Time: None  Complications:  None  Drains:  None    Secondary Diagnosis:  None    Silvia Schulte MD  2023

## 2023-07-31 NOTE — IMAGING NOTE
Pt here for US Renal Biopsy. Pre procedure vitals checked. IV access to left forearm saline lock. 0.9 NS IV initiated to maintain patency. Informed consent obtained by Dr Kushal Bustillos. Positioned pt on stretcher. Obtained biopsy. Specimen sent to Pathology. Presently denies pain. Tolerated procedure well. Vital signs stable on room air. [facility] Transported pt to  [phone] accompanied by Mook MANRIQUE.

## 2023-08-01 ENCOUNTER — APPOINTMENT (OUTPATIENT)
Dept: INTERVENTIONAL RADIOLOGY/VASCULAR | Facility: HOSPITAL | Age: 68
End: 2023-08-01
Attending: INTERNAL MEDICINE
Payer: MEDICARE

## 2023-08-01 LAB
ALBUMIN SERPL-MCNC: 2 G/DL (ref 3.4–5)
ANION GAP SERPL CALC-SCNC: 7 MMOL/L (ref 0–18)
BASOPHILS # BLD AUTO: 0.03 X10(3) UL (ref 0–0.2)
BASOPHILS NFR BLD AUTO: 0.3 %
BUN BLD-MCNC: 55 MG/DL (ref 7–18)
C3 SERPL-MCNC: 70.8 MG/DL (ref 90–180)
C4 SERPL-MCNC: 22.7 MG/DL (ref 10–40)
CALCIUM BLD-MCNC: 7.8 MG/DL (ref 8.5–10.1)
CHLORIDE SERPL-SCNC: 103 MMOL/L (ref 98–112)
CO2 SERPL-SCNC: 20 MMOL/L (ref 21–32)
CREAT BLD-MCNC: 4.27 MG/DL
EGFRCR SERPLBLD CKD-EPI 2021: 11 ML/MIN/1.73M2 (ref 60–?)
EOSINOPHIL # BLD AUTO: 0.06 X10(3) UL (ref 0–0.7)
EOSINOPHIL NFR BLD AUTO: 0.5 %
ERYTHROCYTE [DISTWIDTH] IN BLOOD BY AUTOMATED COUNT: 15.1 %
GLUCOSE BLD-MCNC: 129 MG/DL (ref 70–99)
GLUCOSE BLD-MCNC: 167 MG/DL (ref 70–99)
GLUCOSE BLD-MCNC: 188 MG/DL (ref 70–99)
GLUCOSE BLD-MCNC: 201 MG/DL (ref 70–99)
GLUCOSE BLD-MCNC: 214 MG/DL (ref 70–99)
HBV CORE AB SERPL QL IA: NONREACTIVE
HBV SURFACE AB SER QL: NONREACTIVE
HBV SURFACE AB SERPL IA-ACNC: <3.1 MIU/ML
HBV SURFACE AG SER-ACNC: <0.1 [IU]/L
HBV SURFACE AG SERPL QL IA: NONREACTIVE
HCT VFR BLD AUTO: 25.2 %
HCV AB SERPL QL IA: NONREACTIVE
HGB BLD-MCNC: 8.8 G/DL
IA-2 AUTOABS: <7.5 U/ML
IMM GRANULOCYTES # BLD AUTO: 0.21 X10(3) UL (ref 0–1)
IMM GRANULOCYTES NFR BLD: 1.9 %
LYMPHOCYTES # BLD AUTO: 1.25 X10(3) UL (ref 1–4)
LYMPHOCYTES NFR BLD AUTO: 11.1 %
MAGNESIUM SERPL-MCNC: 1.9 MG/DL (ref 1.6–2.6)
MCH RBC QN AUTO: 33.6 PG (ref 26–34)
MCHC RBC AUTO-ENTMCNC: 34.9 G/DL (ref 31–37)
MCV RBC AUTO: 96.2 FL
MONOCYTES # BLD AUTO: 1.2 X10(3) UL (ref 0.1–1)
MONOCYTES NFR BLD AUTO: 10.6 %
NEUTROPHILS # BLD AUTO: 8.55 X10 (3) UL (ref 1.5–7.7)
NEUTROPHILS # BLD AUTO: 8.55 X10(3) UL (ref 1.5–7.7)
NEUTROPHILS NFR BLD AUTO: 75.6 %
OSMOLALITY SERPL CALC.SUM OF ELEC: 290 MOSM/KG (ref 275–295)
PHOSPHATE SERPL-MCNC: 4.4 MG/DL (ref 2.5–4.9)
PLATELET # BLD AUTO: 161 10(3)UL (ref 150–450)
POTASSIUM SERPL-SCNC: 4.4 MMOL/L (ref 3.5–5.1)
RBC # BLD AUTO: 2.62 X10(6)UL
SODIUM SERPL-SCNC: 130 MMOL/L (ref 136–145)
T PALLIDUM AB SER QL IA: NONREACTIVE
WBC # BLD AUTO: 11.3 X10(3) UL (ref 4–11)

## 2023-08-01 PROCEDURE — 99232 SBSQ HOSP IP/OBS MODERATE 35: CPT | Performed by: STUDENT IN AN ORGANIZED HEALTH CARE EDUCATION/TRAINING PROGRAM

## 2023-08-01 PROCEDURE — 4A023N6 MEASUREMENT OF CARDIAC SAMPLING AND PRESSURE, RIGHT HEART, PERCUTANEOUS APPROACH: ICD-10-PCS | Performed by: INTERNAL MEDICINE

## 2023-08-01 RX ORDER — SODIUM CHLORIDE 9 MG/ML
INJECTION, SOLUTION INTRAVENOUS CONTINUOUS
Status: DISCONTINUED | OUTPATIENT
Start: 2023-08-01 | End: 2023-08-04

## 2023-08-01 RX ORDER — HEPARIN SODIUM 5000 [USP'U]/ML
INJECTION, SOLUTION INTRAVENOUS; SUBCUTANEOUS
Status: COMPLETED
Start: 2023-08-01 | End: 2023-08-01

## 2023-08-01 RX ORDER — LIDOCAINE HYDROCHLORIDE 10 MG/ML
INJECTION, SOLUTION EPIDURAL; INFILTRATION; INTRACAUDAL; PERINEURAL
Status: COMPLETED
Start: 2023-08-01 | End: 2023-08-01

## 2023-08-01 RX ORDER — ALBUMIN (HUMAN) 12.5 G/50ML
25 SOLUTION INTRAVENOUS ONCE
Status: COMPLETED | OUTPATIENT
Start: 2023-08-01 | End: 2023-08-01

## 2023-08-01 RX ORDER — MIDAZOLAM HYDROCHLORIDE 1 MG/ML
INJECTION INTRAMUSCULAR; INTRAVENOUS
Status: COMPLETED
Start: 2023-08-01 | End: 2023-08-01

## 2023-08-01 RX ORDER — ALBUMIN, HUMAN INJ 5% 5 %
25 SOLUTION INTRAVENOUS ONCE
Status: COMPLETED | OUTPATIENT
Start: 2023-08-01 | End: 2023-08-01

## 2023-08-01 NOTE — PROCEDURES
[facility] [address] [place] [address] Cardiology  Cardiac Catheterization Report    PREOPERATIVE DIAGNOSIS: severe cardiomyopathy, acute on chronic kidney injury    POSTOPERATIVE DIAGNOSIS: same as above    PROCEDURE PERFORMED: Right heart catheterization (CPT code 44036),     CONSENT: The risks, benefits, and alternatives of right heart catheterization were discussed. The risks included, but were not limited to: bleeding, pulmonary embolus, pulmonary infarction, cardiac tamponade, and death. Benefits of the procedure included: diagnosis of heart disease and symptomatic improvement. Alternatives to the procedure included: not performing cardiac catheterization, treatment with medications only, and observation. DESCRIPTION OF PROCEDURE: The patient was brought to the cardiac catheterization laboratory. Bilateral groins were prepped and draped with sterile technique. 10 mL of 1% lidocaine were injected into the right groin for local anesthesia. Once local anesthesia was achieved, sedation was administered. The IV was maintained by the RN and moderate conscious sedation with a total of Versed 2mg and Fentanyl 50mcg was given. The patient was assessed and monitoring of oxygen, heart rate and blood pressure by nurse and myself during the exam  (time of 1st dose administered when I was present) to 1333 (procedure end time). A micropuncture needle was used to cannulate the right internal jugular vein under ultrasound guidance. Its position was confirmed by fluoroscopy. A 7F sheath was placed in the right internal jugular vein and right heart catheterization was performed per usual routine using a 7F pulmonary capillary, balloon tipped wedge catheter. The sheath was sutured and the catheter was left in place for hemodynamic monitoring in the CVICU. The procedure was tolerated well. No complications were noted.     FINDINGS:    HEMODYNAMICS:  RA pressure: 2/0/0 mm Hg  RV pressure: 27/1 mm Hg  PA pressure: 6/4/3 mm Hg  PA wedge pressure: 25/6/13 mm Hg  Cardiac output: 6.0 L/min  Cardiac index: 3.9 L/min/m2  Pulmonary vascular resistance: 2.6 Woods units    OXIMETRY:  PA saturation: 68.0%, Hg 7.7 g/dL  Aortic saturation: 99%    CONCLUSIONS:  - low right heart filling pressure  - normal pulmonary artery pressure  - low normal pulmonary capillary wedge pressure  - normal cardiac output/index  - no intracardiac shunt by saturations    Discussed results with Dr. Verónica Solis. Will leave in Whiteberg catheter to assist with inotrope titration and, in this case, volume repletion, especially in the setting of severe cardiomyopathy.         Marcelle Miguel MD  8/1/2023  1:37 PM

## 2023-08-01 NOTE — PLAN OF CARE
Alert and oriented x4 on tele monitor hr 90's sinus rhythm. Left lower back small dressing c/d/I. With left facial droop. Dobutamine gtt @ 2.5 mcg/kg/hr in progress patent and intact. Denies any pain. Updated w/ poc and verbalized understanding. All needs attended and will continue to monitor. Call light within reach.   Problem: Diabetes/Glucose Control  Goal: Glucose maintained within prescribed range  Description: INTERVENTIONS:  - Monitor Blood Glucose as ordered  - Assess for signs and symptoms of hyperglycemia and hypoglycemia  - Administer ordered medications to maintain glucose within target range  - Assess barriers to adequate nutritional intake and initiate nutrition consult as needed  - Instruct patient on self management of diabetes  Outcome: Progressing     Problem: Delirium  Goal: Minimize duration of delirium  Description: Interventions:  - Encourage use of hearing aids, eye glasses  - Promote highest level of mobility daily  - Provide frequent reorientation  - Promote wakefulness i.e. lights on, blinds open  - Promote sleep, encourage patient's normal rest cycle i.e. lights off, TV off, minimize noise and interruptions  - Encourage family to assist in orientation and promotion of home routines  Outcome: Progressing     Problem: GASTROINTESTINAL - ADULT  Goal: Maintains or returns to baseline bowel function  Description: INTERVENTIONS:  - Assess bowel function  - Maintain adequate hydration with IV or PO as ordered and tolerated  - Evaluate effectiveness of GI medications  - Encourage mobilization and activity  - Obtain nutritional consult as needed  - Establish a toileting routine/schedule  - Consider collaborating with pharmacy to review patient's medication profile  Outcome: Progressing     Problem: METABOLIC/FLUID AND ELECTROLYTES - ADULT  Goal: Glucose maintained within prescribed range  Description: INTERVENTIONS:  - Monitor Blood Glucose as ordered  - Assess for signs and symptoms of hyperglycemia and hypoglycemia  - Administer ordered medications to maintain glucose within target range  - Assess barriers to adequate nutritional intake and initiate nutrition consult as needed  - Instruct patient on self management of diabetes  Outcome: Progressing  Goal: Electrolytes maintained within normal limits  Description: INTERVENTIONS:  - Monitor labs and rhythm and assess patient for signs and symptoms of electrolyte imbalances  - Administer electrolyte replacement as ordered  - Monitor response to electrolyte replacements, including rhythm and repeat lab results as appropriate  - Fluid restriction as ordered  - Instruct patient on fluid and nutrition restrictions as appropriate  Outcome: Progressing  Goal: Hemodynamic stability and optimal renal function maintained  Description: INTERVENTIONS:  - Monitor labs and assess for signs and symptoms of volume excess or deficit  - Monitor intake, output and patient weight  - Monitor urine specific gravity, serum osmolarity and serum sodium as indicated or ordered  - Monitor response to interventions for patient's volume status, including labs, urine output, blood pressure (other measures as available)  - Encourage oral intake as appropriate  - Instruct patient on fluid and nutrition restrictions as appropriate  Outcome: Progressing     Problem: Patient/Family Goals  Goal: Patient/Family Long Term Goal  Description: Patient's Long Term Goal: go home    Interventions:  - manage blood sugar  - See additional Care Plan goals for specific interventions  Outcome: Progressing  Goal: Patient/Family Short Term Goal  Description: Patient's Short Term Goal: feel better    Interventions:   - manage blood sugar  - See additional Care Plan goals for specific interventions  Outcome: Progressing

## 2023-08-01 NOTE — PHYSICAL THERAPY NOTE
PHYSICAL THERAPY TREATMENT NOTE - INPATIENT    Room Number: 5386/8088-S     Session: 3    Number of Visits to Meet Established Goals: 5    Presenting Problem: DKA  Co-Morbidities : breast ca, DM, CHF, HTN, PVD  ASSESSMENT     Pt appeared to be frustrated of being restricted to liquid diet due to cardiac cath procedure this PM. Redirection needed. Patient shows improved  balance and activity tolerance. Able to amb with ' with cga to sup assist. Pt needs cues for hand placement and proper technique during STS mobility. Pt will benefit from ongoing skilled PT. Pt was educated to ambulate with nursing staff to prevent deconditioning from immobility. Recommend HHPT/OT. DISCHARGE RECOMMENDATIONS  PT Discharge Recommendations: Home with home health PT/OT     PLAN  PT Treatment Plan: Bed mobility; Body mechanics; Coordination; Endurance; Energy conservation;Patient education; Family education;Gait training;Neuromuscular re-educate;Range of motion;Strengthening;Stoop training;Stair training;Transfer training;Balance training  Rehab Potential : Good  Frequency (Obs): 3-5x/week    CURRENT GOALS       Goal #1 Patient is able to demonstrate supine - sit EOB @ level: supervision   MET    Goal #2 Patient is able to demonstrate transfers EOB to/from Chair/Wheelchair at assistance level: supervision   MET    Goal #3 Patient is able to ambulate 50 feet with assist device: walker - rolling at assistance level: minimum assistance   MET  progress to 150 with SBA   Goal #4  Patient will navigate stairs with rail and SBA   Goal #5     Goal #6     Goal Comments: Goals established on 2023 all goals ongoing      SUBJECTIVE  \"I am 100 lbs and they are giving me liquid. \"      OBJECTIVE  Precautions: Bed/chair alarm    WEIGHT BEARING RESTRICTION  Weight Bearing Restriction: None                PAIN ASSESSMENT   Ratin          BALANCE Static Sitting: Good  Dynamic Sitting: Good           Static Standing: Fair -  Dynamic Standing: Poor +    ACTIVITY TOLERANCE                         O2 WALK         AM-PAC '6-Clicks' INPATIENT SHORT FORM - BASIC MOBILITY  How much difficulty does the patient currently have. .. Patient Difficulty: Turning over in bed (including adjusting bedclothes, sheets and blankets)?: None   Patient Difficulty: Sitting down on and standing up from a chair with arms (e.g., wheelchair, bedside commode, etc.): None   Patient Difficulty: Moving from lying on back to sitting on the side of the bed?: None   How much help from another person does the patient currently need. .. Help from Another: Moving to and from a bed to a chair (including a wheelchair)?: A Little   Help from Another: Need to walk in hospital room?: A Little   Help from Another: Climbing 3-5 steps with a railing?: A Lot       AM-PAC Score:  Raw Score: 20   Approx Degree of Impairment: 35.83%   Standardized Score (AM-PAC Scale): 47.67   CMS Modifier (G-Code): CJ    FUNCTIONAL ABILITY STATUS  Gait Assessment   Functional Mobility/Gait Assessment  Gait Assistance: Contact guard assist  Distance (ft): 100  Assistive Device: Rolling walker  Pattern:  (sophie flat feet, no DF/PF)    Skilled Therapy Provided    Bed Mobility:  Rolling: indep both ways three times. Supine<>Sit: mod I      Transfer Mobility:  Sit<>Stand: sup. Cues for hand placement on the bed. Pt tends to reach for the walker. Stand<>Sit: mod ind   Gait: CGA/sup with RW. Pt shows dec ROM of ankles,  DF/PF absent, maintained proper lindsey with cues. Patient shows wide DEBORA. Sup 50%,CGA 50%. Longer time due to pt needing frequent cueing to redirect to focus on the session. Pt upset but able to calm down after a few min. Therapist's Comments: RN was consulted due to blood discharge from the iv site.    Patient performed ankle pumps, LAQ, knee flexion, hip abd x 10. Patient End of Session: Up in chair;Needs met;Call light within reach;RN aware of session/findings; All patient questions and concerns addressed; Alarm set    PT Session Time: 33 minutes  Gait Training: 10 minutes  Therapeutic Activity: 15 minutes  Therapeutic Exercise: 8 minutes   Neuromuscular Re-education:  minutes

## 2023-08-01 NOTE — PLAN OF CARE
Received post right heart cath, awake and alert, on dobutamine drip. Hemodynamically stable. Denies pain. Requesting to have food, eating well. Plan of care updated with patient, questions answered, verbalized understanding.

## 2023-08-01 NOTE — CDS QUERY
Alejandro Elise  Dear Dr. Robe Wylie:  Clinical information (provided below) includes a diagnosis of Shock by the pulmonary critical care consultant. For accurate ICD-10-CM code assignment:   Please clarify the type of Shock  ( x ) Hypovolemic Shock Confirmed  (  x ) Septic Shock Confirmed   (  ) Shock ruled out, hypotension only  (  ) Other    DOCUMENTATION FROM THE MEDICAL RECORD    Possible Risk Factors: DKA, Possible Sepsis, possible UTI     Clinical Indicators:     ___7/24: Vital Signs/Labs: T 96.2, , RR 29, WBC 17.8 Cr 2.61, T Abhinav 0.4 , Sats 99% RA. + Hypotension requiring vasopressors. Lactic Acid Trend: 10.5->8.5->8.4->7.5->6.1->2.9  ___7/25 Critical Care Consultant Note: Shock - possible sepsis vs hypovolemia, now on levophed. 1/2 blood cultures  positive for GPC but could be contaminant. -IVF -wean norepinephrine as able, goal MAP>65 -continue empiric vanc, Zosyn.   ___7/26 Nephrology Consult: DAVID: likely ATN in the setting of recent shock, DKA -- check urine electrolytes, repeat UA  -- check renal US -- check vancomycin trough level -- IVF per below -- avoid NSAIDs, IV contrast if possible   ___8/1 Cardiology PN: DKA DM type 2 - with lactic acidosis initially DAVID - 2/2 ATN from hypotension - creatinine continues to climb. - nephrology on consultation. Mild troponin elevation - suspect supply demand mismatch in setting of hypotension, lactic acidosis, DKA. No sign of ACS. NICM    Treatment: IVF Normal Saline Bolus 1000mL followed by 500mL x 4, ICU admission, Levophed infusion  to keep Map >65.  IV Zosyn          If you have any questions, please contact Clinical : Jonas Mcelroy RN [phone] Thank You  THIS FORM IS A PERMANENT PART OF THE MEDICAL RECORD

## 2023-08-01 NOTE — PLAN OF CARE
Assumed care of pt at 0730  A&Ox4, chronic L facial droop, up with standby assist and a walker, no complaints of pain  R/A, NSR, no chest pain, no shortness of breath  Dobutmaine gtt running per orders  Skin is clean, dry, and intact, no wounds present  Incontinent of bowel and bladder, last BM 8/1  Fall precautions in place, bed and chair alarm on, call light within reach, pt updated on plan of care, will continue to monitor                    Problem: Diabetes/Glucose Control  Goal: Glucose maintained within prescribed range  Description: INTERVENTIONS:  - Monitor Blood Glucose as ordered  - Assess for signs and symptoms of hyperglycemia and hypoglycemia  - Administer ordered medications to maintain glucose within target range  - Assess barriers to adequate nutritional intake and initiate nutrition consult as needed  - Instruct patient on self management of diabetes  Outcome: Progressing     Problem: Delirium  Goal: Minimize duration of delirium  Description: Interventions:  - Encourage use of hearing aids, eye glasses  - Promote highest level of mobility daily  - Provide frequent reorientation  - Promote wakefulness i.e. lights on, blinds open  - Promote sleep, encourage patient's normal rest cycle i.e. lights off, TV off, minimize noise and interruptions  - Encourage family to assist in orientation and promotion of home routines  Outcome: Progressing     Problem: GASTROINTESTINAL - ADULT  Goal: Maintains or returns to baseline bowel function  Description: INTERVENTIONS:  - Assess bowel function  - Maintain adequate hydration with IV or PO as ordered and tolerated  - Evaluate effectiveness of GI medications  - Encourage mobilization and activity  - Obtain nutritional consult as needed  - Establish a toileting routine/schedule  - Consider collaborating with pharmacy to review patient's medication profile  Outcome: Progressing     Problem: METABOLIC/FLUID AND ELECTROLYTES - ADULT  Goal: Glucose maintained within prescribed range  Description: INTERVENTIONS:  - Monitor Blood Glucose as ordered  - Assess for signs and symptoms of hyperglycemia and hypoglycemia  - Administer ordered medications to maintain glucose within target range  - Assess barriers to adequate nutritional intake and initiate nutrition consult as needed  - Instruct patient on self management of diabetes  Outcome: Progressing  Goal: Electrolytes maintained within normal limits  Description: INTERVENTIONS:  - Monitor labs and rhythm and assess patient for signs and symptoms of electrolyte imbalances  - Administer electrolyte replacement as ordered  - Monitor response to electrolyte replacements, including rhythm and repeat lab results as appropriate  - Fluid restriction as ordered  - Instruct patient on fluid and nutrition restrictions as appropriate  Outcome: Progressing  Goal: Hemodynamic stability and optimal renal function maintained  Description: INTERVENTIONS:  - Monitor labs and assess for signs and symptoms of volume excess or deficit  - Monitor intake, output and patient weight  - Monitor urine specific gravity, serum osmolarity and serum sodium as indicated or ordered  - Monitor response to interventions for patient's volume status, including labs, urine output, blood pressure (other measures as available)  - Encourage oral intake as appropriate  - Instruct patient on fluid and nutrition restrictions as appropriate  Outcome: Progressing     Problem: Patient/Family Goals  Goal: Patient/Family Long Term Goal  Description: Patient's Long Term Goal: go home    Interventions:  - manage blood sugar  - See additional Care Plan goals for specific interventions  Outcome: Progressing  Goal: Patient/Family Short Term Goal  Description: Patient's Short Term Goal: feel better    Interventions:   - manage blood sugar  - See additional Care Plan goals for specific interventions  Outcome: Progressing

## 2023-08-02 LAB
ALBUMIN SERPL-MCNC: 2.4 G/DL (ref 3.4–5)
ANION GAP SERPL CALC-SCNC: 9 MMOL/L (ref 0–18)
ANTI-MPO ANTIBODIES: <0.2 UNITS
ANTI-PR3 ANTIBODIES: <0.2 UNITS
BUN BLD-MCNC: 47 MG/DL (ref 7–18)
CALCIUM BLD-MCNC: 7.7 MG/DL (ref 8.5–10.1)
CHLORIDE SERPL-SCNC: 105 MMOL/L (ref 98–112)
CO2 SERPL-SCNC: 21 MMOL/L (ref 21–32)
CREAT BLD-MCNC: 3.89 MG/DL
DSDNA IGG SERPL IA-ACNC: <0.6 IU/ML
EGFRCR SERPLBLD CKD-EPI 2021: 12 ML/MIN/1.73M2 (ref 60–?)
ENA AB SER QL IA: 0.1 UG/L
ENA AB SER QL IA: NEGATIVE
GLUCOSE BLD-MCNC: 144 MG/DL (ref 70–99)
GLUCOSE BLD-MCNC: 154 MG/DL (ref 70–99)
GLUCOSE BLD-MCNC: 168 MG/DL (ref 70–99)
GLUCOSE BLD-MCNC: 182 MG/DL (ref 70–99)
GLUCOSE BLD-MCNC: 206 MG/DL (ref 70–99)
MAGNESIUM SERPL-MCNC: 1.8 MG/DL (ref 1.6–2.6)
OSMOLALITY SERPL CALC.SUM OF ELEC: 295 MOSM/KG (ref 275–295)
PHOSPHATE SERPL-MCNC: 4 MG/DL (ref 2.5–4.9)
POTASSIUM SERPL-SCNC: 4.4 MMOL/L (ref 3.5–5.1)
SODIUM SERPL-SCNC: 135 MMOL/L (ref 136–145)

## 2023-08-02 PROCEDURE — 99232 SBSQ HOSP IP/OBS MODERATE 35: CPT | Performed by: STUDENT IN AN ORGANIZED HEALTH CARE EDUCATION/TRAINING PROGRAM

## 2023-08-02 NOTE — PLAN OF CARE
Assumed pt care this evening. Hemodynamic monitoring via swan-anum catheter. CO/CI 4/3>. PAPs 20-30s/10s. Sinus rhythm on tele. No ectopy. Dobutamine & . 9NS gtts infusing. Unable to measure UOP d/t incontinence & refusal of external catheter, however, noted to have 3 episodes of urine incontinence. Pt updated on POC.     Problem: Diabetes/Glucose Control  Goal: Glucose maintained within prescribed range  Description: INTERVENTIONS:  - Monitor Blood Glucose as ordered  - Assess for signs and symptoms of hyperglycemia and hypoglycemia  - Administer ordered medications to maintain glucose within target range  - Assess barriers to adequate nutritional intake and initiate nutrition consult as needed  - Instruct patient on self management of diabetes  Outcome: Progressing     Problem: Delirium  Goal: Minimize duration of delirium  Description: Interventions:  - Encourage use of hearing aids, eye glasses  - Promote highest level of mobility daily  - Provide frequent reorientation  - Promote wakefulness i.e. lights on, blinds open  - Promote sleep, encourage patient's normal rest cycle i.e. lights off, TV off, minimize noise and interruptions  - Encourage family to assist in orientation and promotion of home routines  Outcome: Progressing     Problem: GASTROINTESTINAL - ADULT  Goal: Maintains or returns to baseline bowel function  Description: INTERVENTIONS:  - Assess bowel function  - Maintain adequate hydration with IV or PO as ordered and tolerated  - Evaluate effectiveness of GI medications  - Encourage mobilization and activity  - Obtain nutritional consult as needed  - Establish a toileting routine/schedule  - Consider collaborating with pharmacy to review patient's medication profile  Outcome: Progressing     Problem: METABOLIC/FLUID AND ELECTROLYTES - ADULT  Goal: Glucose maintained within prescribed range  Description: INTERVENTIONS:  - Monitor Blood Glucose as ordered  - Assess for signs and symptoms of hyperglycemia and hypoglycemia  - Administer ordered medications to maintain glucose within target range  - Assess barriers to adequate nutritional intake and initiate nutrition consult as needed  - Instruct patient on self management of diabetes  Outcome: Progressing  Goal: Electrolytes maintained within normal limits  Description: INTERVENTIONS:  - Monitor labs and rhythm and assess patient for signs and symptoms of electrolyte imbalances  - Administer electrolyte replacement as ordered  - Monitor response to electrolyte replacements, including rhythm and repeat lab results as appropriate  - Fluid restriction as ordered  - Instruct patient on fluid and nutrition restrictions as appropriate  Outcome: Progressing  Goal: Hemodynamic stability and optimal renal function maintained  Description: INTERVENTIONS:  - Monitor labs and assess for signs and symptoms of volume excess or deficit  - Monitor intake, output and patient weight  - Monitor urine specific gravity, serum osmolarity and serum sodium as indicated or ordered  - Monitor response to interventions for patient's volume status, including labs, urine output, blood pressure (other measures as available)  - Encourage oral intake as appropriate  - Instruct patient on fluid and nutrition restrictions as appropriate  Outcome: Progressing     Problem: Patient/Family Goals  Goal: Patient/Family Long Term Goal  Description: Patient's Long Term Goal: go home    Interventions:  - manage blood sugar  - See additional Care Plan goals for specific interventions  Outcome: Progressing  Goal: Patient/Family Short Term Goal  Description: Patient's Short Term Goal: feel better    Interventions:   - manage blood sugar  - See additional Care Plan goals for specific interventions  Outcome: Progressing

## 2023-08-02 NOTE — PROGRESS NOTES
08/02/23 1523   Clinical Encounter Type   Visited With Patient   Routine Visit Introduction   Continue Visiting No   Taxonomy   Intended Effects Establish rapport and connectedness   Methods Encourage sharing of feelings; Offer support   Interventions Acknowledge current situation; Acknowledge response to difficult experience; Active listening; Ask guided questions; Ask guided questions about dedrick;Discuss concerns; Discuss frustrations with someone   Trigger for Consult   Trigger for Spiritual Care Consult No     Discussion:     initiated introductory visit (based on Pt's length of stay, <7 days).  met with Pt Sarah Prescott). During conversation, Lupe Purcell reflected on her hospitalization and medical journey, recognizing her strength and sharing about her frustrations. She expressed her hope as she reported that today was a much better day.  provided active listening, validation, and emotional support. Lupe Purcell expressed her appreciation and requested  visit again. Lupe Purcell is also aware that she can request a  visit via her RN. Spiritual Care support can be requested via an Epic consult or ext. [phone].        Viktor Gandhi M.Div, Chaplain Resident  Ext. [phone]

## 2023-08-02 NOTE — PLAN OF CARE
Up to chair x1 today. Stated that [facility] don't need me up for anything else today\". Incontinent of b/b, prefers to use the diaper in bed. [place] in place for hemodynamics. Dobs infusing at 2.5mcg per order. Denies pain/discomfort. Feels good today. SR/ST on monitors. SBP stable.

## 2023-08-03 LAB
ALBUMIN SERPL-MCNC: 2.2 G/DL (ref 3.4–5)
ANION GAP SERPL CALC-SCNC: 9 MMOL/L (ref 0–18)
BUN BLD-MCNC: 45 MG/DL (ref 7–18)
CALCIUM BLD-MCNC: 7.7 MG/DL (ref 8.5–10.1)
CHLORIDE SERPL-SCNC: 106 MMOL/L (ref 98–112)
CO2 SERPL-SCNC: 18 MMOL/L (ref 21–32)
CREAT BLD-MCNC: 3.58 MG/DL
EGFRCR SERPLBLD CKD-EPI 2021: 13 ML/MIN/1.73M2 (ref 60–?)
GLUCOSE BLD-MCNC: 132 MG/DL (ref 70–99)
GLUCOSE BLD-MCNC: 146 MG/DL (ref 70–99)
GLUCOSE BLD-MCNC: 183 MG/DL (ref 70–99)
GLUCOSE BLD-MCNC: 268 MG/DL (ref 70–99)
GLUCOSE BLD-MCNC: 283 MG/DL (ref 70–99)
MAGNESIUM SERPL-MCNC: 1.6 MG/DL (ref 1.6–2.6)
OSMOLALITY SERPL CALC.SUM OF ELEC: 290 MOSM/KG (ref 275–295)
PHOSPHATE SERPL-MCNC: 3.6 MG/DL (ref 2.5–4.9)
POTASSIUM SERPL-SCNC: 4.3 MMOL/L (ref 3.5–5.1)
SODIUM SERPL-SCNC: 133 MMOL/L (ref 136–145)

## 2023-08-03 RX ORDER — MULTIPLE VITAMINS W/ MINERALS TAB 9MG-400MCG
1 TAB ORAL DAILY
Status: DISCONTINUED | OUTPATIENT
Start: 2023-08-03 | End: 2023-08-12

## 2023-08-03 RX ORDER — MAGNESIUM OXIDE 400 MG/1
400 TABLET ORAL ONCE
Status: COMPLETED | OUTPATIENT
Start: 2023-08-03 | End: 2023-08-03

## 2023-08-03 NOTE — PLAN OF CARE
Assumed care at [address]. AOx4. Continue on Dobs. Rush in place. Refused to get up to use bathroom and insisted to use diaper. Assisted pt to sit in the chair for briefly 45 mins. Pt then requested to get back in bed. Denies pain or discomfort. Call light within reach. Continue to monitor.

## 2023-08-03 NOTE — PHYSICAL THERAPY NOTE
Attempted to see Pt this PM - RN aware of attempt. Pt feels that \"tomorrow is best for our date\". Will f/u later today if time permits, after all other patients are attempted per tentative schedule.

## 2023-08-03 NOTE — PLAN OF CARE
Assumed pt care at 0730. Pt a/o x4 and resting in bed. VSS. ST, HR 100s. Dobutamine gtt turned off this am per MD. Continue manual CO via swan q6. Pt encouraged to get out of bed. Updated with poc. Possible discontinue swan tomorrow. Will continue to monitor.

## 2023-08-04 LAB
ALBUMIN SERPL-MCNC: 2.3 G/DL (ref 3.4–5)
ANION GAP SERPL CALC-SCNC: 7 MMOL/L (ref 0–18)
BASOPHILS # BLD AUTO: 0.04 X10(3) UL (ref 0–0.2)
BASOPHILS NFR BLD AUTO: 0.3 %
BUN BLD-MCNC: 42 MG/DL (ref 7–18)
CALCIUM BLD-MCNC: 7.9 MG/DL (ref 8.5–10.1)
CHLORIDE SERPL-SCNC: 107 MMOL/L (ref 98–112)
CO2 SERPL-SCNC: 20 MMOL/L (ref 21–32)
CREAT BLD-MCNC: 3.09 MG/DL
EGFRCR SERPLBLD CKD-EPI 2021: 16 ML/MIN/1.73M2 (ref 60–?)
EOSINOPHIL # BLD AUTO: 0.1 X10(3) UL (ref 0–0.7)
EOSINOPHIL NFR BLD AUTO: 0.6 %
ERYTHROCYTE [DISTWIDTH] IN BLOOD BY AUTOMATED COUNT: 15.1 %
GLUCOSE BLD-MCNC: 134 MG/DL (ref 70–99)
GLUCOSE BLD-MCNC: 143 MG/DL (ref 70–99)
GLUCOSE BLD-MCNC: 200 MG/DL (ref 70–99)
GLUCOSE BLD-MCNC: 230 MG/DL (ref 70–99)
GLUCOSE BLD-MCNC: 269 MG/DL (ref 70–99)
GLUCOSE BLD-MCNC: 329 MG/DL (ref 70–99)
GLUCOSE BLD-MCNC: 329 MG/DL (ref 70–99)
GLUCOSE BLD-MCNC: 37 MG/DL (ref 70–99)
GLUCOSE BLD-MCNC: 49 MG/DL (ref 70–99)
GLUCOSE BLD-MCNC: 70 MG/DL (ref 70–99)
HCT VFR BLD AUTO: 25 %
HGB BLD-MCNC: 8.8 G/DL
IMM GRANULOCYTES # BLD AUTO: 0.18 X10(3) UL (ref 0–1)
IMM GRANULOCYTES NFR BLD: 1.1 %
LYMPHOCYTES # BLD AUTO: 3.27 X10(3) UL (ref 1–4)
LYMPHOCYTES NFR BLD AUTO: 20.6 %
MAGNESIUM SERPL-MCNC: 1.5 MG/DL (ref 1.6–2.6)
MCH RBC QN AUTO: 34.2 PG (ref 26–34)
MCHC RBC AUTO-ENTMCNC: 35.2 G/DL (ref 31–37)
MCV RBC AUTO: 97.3 FL
MONOCYTES # BLD AUTO: 0.99 X10(3) UL (ref 0.1–1)
MONOCYTES NFR BLD AUTO: 6.2 %
NEUTROPHILS # BLD AUTO: 11.28 X10 (3) UL (ref 1.5–7.7)
NEUTROPHILS # BLD AUTO: 11.28 X10(3) UL (ref 1.5–7.7)
NEUTROPHILS NFR BLD AUTO: 71.2 %
OSMOLALITY SERPL CALC.SUM OF ELEC: 285 MOSM/KG (ref 275–295)
PHOSPHATE SERPL-MCNC: 3.2 MG/DL (ref 2.5–4.9)
PLATELET # BLD AUTO: 159 10(3)UL (ref 150–450)
POTASSIUM SERPL-SCNC: 4.2 MMOL/L (ref 3.5–5.1)
RBC # BLD AUTO: 2.57 X10(6)UL
SODIUM SERPL-SCNC: 134 MMOL/L (ref 136–145)
WBC # BLD AUTO: 15.9 X10(3) UL (ref 4–11)
ZNT8 ABS: <15 U/ML

## 2023-08-04 NOTE — PLAN OF CARE
Order rec'd to remove [place]. Beta-blocker resumed. Pt may transfer to CTU per cards. Order rec'd to discontinue IVF. Pt may transfer to CTU per renal service. Hospitalist aware of low blood sugar this morning. No further hypoglycemia evens. Eating/drinking normally. Rec'd room assignment for 5840. Telephone report called to RN assuming care of pt. Awaiting transport.

## 2023-08-04 NOTE — PLAN OF CARE
Received pt alert and oriented x3, DANIELS, baseline left facial droop, states neuropathy on all extremities. On room air, lungs diminished, SR, bp stable, right [place] intact at 50cm. Hemodynamics done, bp stable, tolerating po, pt wearing brief, at times doesn't want to use purewick, she is aware that Md has order for strick I/O's currently wearing. Using bedpan, brown soft bm, abd soft, bs present, estefany area red. Multiple bruises with fragile skin. Prn Tylenol given for left shoulder pain. HS care provided for. All questions answered. At times pt forgetful    Pt wanted to sit up in chair, pt was 1 standby assist with walker. Tolerated 2 hours. Pt given bath and weight obtained via bed scale 59.8kg, when returning to bed, pt re weighed to 62.4kgs via standing scale. Chemistry called am labs glucose 37, accucheck done 49, confirmed, dex4 given, recheck was 70, pt refused more oral meds, IV D50 given, recheck 143. Notified PCT to do recheck this morning, breakfast order placed.

## 2023-08-04 NOTE — PHYSICAL THERAPY NOTE
Attempted to see Pt this AM - BURTON Travis aware of attempt. Pt refused this AM.  Pt agreeable to get up to chair for lunch. Writer re-attempted later, Pt was already up to chair/ambulated with RN. Will continue to follow.

## 2023-08-05 LAB
ALBUMIN SERPL-MCNC: 2 G/DL (ref 3.4–5)
ANION GAP SERPL CALC-SCNC: 4 MMOL/L (ref 0–18)
BASOPHILS # BLD AUTO: 0.05 X10(3) UL (ref 0–0.2)
BASOPHILS NFR BLD AUTO: 0.4 %
BUN BLD-MCNC: 44 MG/DL (ref 7–18)
CALCIUM BLD-MCNC: 7.7 MG/DL (ref 8.5–10.1)
CHLORIDE SERPL-SCNC: 107 MMOL/L (ref 98–112)
CO2 SERPL-SCNC: 22 MMOL/L (ref 21–32)
CREAT BLD-MCNC: 2.85 MG/DL
EGFRCR SERPLBLD CKD-EPI 2021: 17 ML/MIN/1.73M2 (ref 60–?)
EOSINOPHIL # BLD AUTO: 0.08 X10(3) UL (ref 0–0.7)
EOSINOPHIL NFR BLD AUTO: 0.6 %
ERYTHROCYTE [DISTWIDTH] IN BLOOD BY AUTOMATED COUNT: 15.4 %
GLUCOSE BLD-MCNC: 121 MG/DL (ref 70–99)
GLUCOSE BLD-MCNC: 164 MG/DL (ref 70–99)
GLUCOSE BLD-MCNC: 191 MG/DL (ref 70–99)
GLUCOSE BLD-MCNC: 78 MG/DL (ref 70–99)
GLUCOSE BLD-MCNC: 91 MG/DL (ref 70–99)
HCT VFR BLD AUTO: 23.1 %
HGB BLD-MCNC: 8 G/DL
IMM GRANULOCYTES # BLD AUTO: 0.11 X10(3) UL (ref 0–1)
IMM GRANULOCYTES NFR BLD: 0.8 %
IRON SERPL-MCNC: 64 UG/DL
LYMPHOCYTES # BLD AUTO: 1.72 X10(3) UL (ref 1–4)
LYMPHOCYTES NFR BLD AUTO: 12.9 %
MAGNESIUM SERPL-MCNC: 1.5 MG/DL (ref 1.6–2.6)
MCH RBC QN AUTO: 33.5 PG (ref 26–34)
MCHC RBC AUTO-ENTMCNC: 34.6 G/DL (ref 31–37)
MCV RBC AUTO: 96.7 FL
MONOCYTES # BLD AUTO: 0.79 X10(3) UL (ref 0.1–1)
MONOCYTES NFR BLD AUTO: 5.9 %
NEUTROPHILS # BLD AUTO: 10.57 X10 (3) UL (ref 1.5–7.7)
NEUTROPHILS # BLD AUTO: 10.57 X10(3) UL (ref 1.5–7.7)
NEUTROPHILS NFR BLD AUTO: 79.4 %
OSMOLALITY SERPL CALC.SUM OF ELEC: 286 MOSM/KG (ref 275–295)
PHOSPHATE SERPL-MCNC: 3.3 MG/DL (ref 2.5–4.9)
PLATELET # BLD AUTO: 156 10(3)UL (ref 150–450)
POTASSIUM SERPL-SCNC: 4.5 MMOL/L (ref 3.5–5.1)
RBC # BLD AUTO: 2.39 X10(6)UL
SODIUM SERPL-SCNC: 133 MMOL/L (ref 136–145)
WBC # BLD AUTO: 13.3 X10(3) UL (ref 4–11)

## 2023-08-05 RX ORDER — MAGNESIUM OXIDE 400 MG/1
800 TABLET ORAL ONCE
Status: COMPLETED | OUTPATIENT
Start: 2023-08-05 | End: 2023-08-05

## 2023-08-05 NOTE — PLAN OF CARE
Patient alert and oriented x 3 with  confusion and agitation , On room air. Sinus rhythm on cardiac monitor. Hr70's ,Patient denies any chest pain or chest discomfort at this time. Patient voids, last BM 8/4,  incontinence of urin  ,couple of time cleaned and changed puirwick  early shift , patient wands to keep puirewick  and brief , made comfortable, v/s  obtained, lungs sounds  clear , abdomen soft  +bs, +pp good, no acute distress noted, Plan of care updated, all questions answered. Safety precautions in place. Bed alarm on. Will continue to monitor tele/labs/vital signs closely.      Left emeka  cath flushed , blood return , specimen send to laboratory , mag will replace  Plan:   Monitor glucose level closely , safety precautions  Problem: METABOLIC/FLUID AND ELECTROLYTES - ADULT  Goal: Glucose maintained within prescribed range  Description: INTERVENTIONS:  - Monitor Blood Glucose as ordered  - Assess for signs and symptoms of hyperglycemia and hypoglycemia  - Administer ordered medications to maintain glucose within target range  - Assess barriers to adequate nutritional intake and initiate nutrition consult as needed  - Instruct patient on self management of diabetes  Outcome: Progressing  Goal: Electrolytes maintained within normal limits  Description: INTERVENTIONS:  - Monitor labs and rhythm and assess patient for signs and symptoms of electrolyte imbalances  - Administer electrolyte replacement as ordered  - Monitor response to electrolyte replacements, including rhythm and repeat lab results as appropriate  - Fluid restriction as ordered  - Instruct patient on fluid and nutrition restrictions as appropriate  Outcome: Progressing  Goal: Hemodynamic stability and optimal renal function maintained  Description: INTERVENTIONS:  - Monitor labs and assess for signs and symptoms of volume excess or deficit  - Monitor intake, output and patient weight  - Monitor urine specific gravity, serum osmolarity and serum sodium as indicated or ordered  - Monitor response to interventions for patient's volume status, including labs, urine output, blood pressure (other measures as available)  - Encourage oral intake as appropriate  - Instruct patient on fluid and nutrition restrictions as appropriate  Outcome: Progressing     Problem: CARDIOVASCULAR - ADULT  Goal: Maintains optimal cardiac output and hemodynamic stability  Description: INTERVENTIONS:  - Monitor vital signs, rhythm, and trends  - Monitor for bleeding, hypotension and signs of decreased cardiac output  - Evaluate effectiveness of vasoactive medications to optimize hemodynamic stability  - Monitor arterial and/or venous puncture sites for bleeding and/or hematoma  - Assess quality of pulses, skin color and temperature  - Assess for signs of decreased coronary artery perfusion - ex.  Angina  - Evaluate fluid balance, assess for edema, trend weights  Outcome: Progressing  Goal: Absence of cardiac arrhythmias or at baseline  Description: INTERVENTIONS:  - Continuous cardiac monitoring, monitor vital signs, obtain 12 lead EKG if indicated  - Evaluate effectiveness of antiarrhythmic and heart rate control medications as ordered  - Initiate emergency measures for life threatening arrhythmias  - Monitor electrolytes and administer replacement therapy as ordered  Outcome: Progressing     Problem: Patient/Family Goals  Goal: Patient/Family Long Term Goal  Description: Patient's Long Term Goal: go home    Interventions:  - manage blood sugar  - See additional Care Plan goals for specific interventions  Outcome: Progressing  Goal: Patient/Family Short Term Goal  Description: Patient's Short Term Goal: feel better    Interventions:   - manage blood sugar  - See additional Care Plan goals for specific interventions  Outcome: Progressing     Problem: GASTROINTESTINAL - ADULT  Goal: Maintains or returns to baseline bowel function  Description: INTERVENTIONS:  - Assess bowel function  - Maintain adequate hydration with IV or PO as ordered and tolerated  - Evaluate effectiveness of GI medications  - Encourage mobilization and activity  - Obtain nutritional consult as needed  - Establish a toileting routine/schedule  - Consider collaborating with pharmacy to review patient's medication profile  Outcome: Progressing     Problem: Delirium  Goal: Minimize duration of delirium  Description: Interventions:  - Encourage use of hearing aids, eye glasses  - Promote highest level of mobility daily  - Provide frequent reorientation  - Promote wakefulness i.e. lights on, blinds open  - Promote sleep, encourage patient's normal rest cycle i.e. lights off, TV off, minimize noise and interruptions  - Encourage family to assist in orientation and promotion of home routines  Outcome: Progressing     Problem: GASTROINTESTINAL - ADULT  Goal: Maintains or returns to baseline bowel function  Description: INTERVENTIONS:  - Assess bowel function  - Maintain adequate hydration with IV or PO as ordered and tolerated  - Evaluate effectiveness of GI medications  - Encourage mobilization and activity  - Obtain nutritional consult as needed  - Establish a toileting routine/schedule  - Consider collaborating with pharmacy to review patient's medication profile  Outcome: Progressing     Problem: Diabetes/Glucose Control  Goal: Glucose maintained within prescribed range  Description: INTERVENTIONS:  - Monitor Blood Glucose as ordered  - Assess for signs and symptoms of hyperglycemia and hypoglycemia  - Administer ordered medications to maintain glucose within target range  - Assess barriers to adequate nutritional intake and initiate nutrition consult as needed  - Instruct patient on self management of diabetes  Outcome: Progressing

## 2023-08-05 NOTE — PLAN OF CARE
Pt. Is alert and oriented times 4. NSR on tele. Pt. On RA. Pt denies chest pain or shortness of breath. Pt. Upset about current diet. Spoke with cardiology and cardiology does not feel comfortable changing with current EF. Discussed with pt. ]  Mg replaced per nephrology   [place] Cath removed yesterday. Dressing clean dry and intact.    Pt. Updated on plan of care call light within reach    Problem: Diabetes/Glucose Control  Goal: Glucose maintained within prescribed range  Description: INTERVENTIONS:  - Monitor Blood Glucose as ordered  - Assess for signs and symptoms of hyperglycemia and hypoglycemia  - Administer ordered medications to maintain glucose within target range  - Assess barriers to adequate nutritional intake and initiate nutrition consult as needed  - Instruct patient on self management of diabetes  Outcome: Progressing     Problem: Delirium  Goal: Minimize duration of delirium  Description: Interventions:  - Encourage use of hearing aids, eye glasses  - Promote highest level of mobility daily  - Provide frequent reorientation  - Promote wakefulness i.e. lights on, blinds open  - Promote sleep, encourage patient's normal rest cycle i.e. lights off, TV off, minimize noise and interruptions  - Encourage family to assist in orientation and promotion of home routines  Outcome: Progressing     Problem: GASTROINTESTINAL - ADULT  Goal: Maintains or returns to baseline bowel function  Description: INTERVENTIONS:  - Assess bowel function  - Maintain adequate hydration with IV or PO as ordered and tolerated  - Evaluate effectiveness of GI medications  - Encourage mobilization and activity  - Obtain nutritional consult as needed  - Establish a toileting routine/schedule  - Consider collaborating with pharmacy to review patient's medication profile  Outcome: Progressing     Problem: METABOLIC/FLUID AND ELECTROLYTES - ADULT  Goal: Glucose maintained within prescribed range  Description: INTERVENTIONS:  - Monitor Blood Glucose as ordered  - Assess for signs and symptoms of hyperglycemia and hypoglycemia  - Administer ordered medications to maintain glucose within target range  - Assess barriers to adequate nutritional intake and initiate nutrition consult as needed  - Instruct patient on self management of diabetes  Outcome: Progressing  Goal: Electrolytes maintained within normal limits  Description: INTERVENTIONS:  - Monitor labs and rhythm and assess patient for signs and symptoms of electrolyte imbalances  - Administer electrolyte replacement as ordered  - Monitor response to electrolyte replacements, including rhythm and repeat lab results as appropriate  - Fluid restriction as ordered  - Instruct patient on fluid and nutrition restrictions as appropriate  Outcome: Progressing  Goal: Hemodynamic stability and optimal renal function maintained  Description: INTERVENTIONS:  - Monitor labs and assess for signs and symptoms of volume excess or deficit  - Monitor intake, output and patient weight  - Monitor urine specific gravity, serum osmolarity and serum sodium as indicated or ordered  - Monitor response to interventions for patient's volume status, including labs, urine output, blood pressure (other measures as available)  - Encourage oral intake as appropriate  - Instruct patient on fluid and nutrition restrictions as appropriate  Outcome: Progressing     Problem: Patient/Family Goals  Goal: Patient/Family Long Term Goal  Description: Patient's Long Term Goal: go home    Interventions:  - manage blood sugar  - See additional Care Plan goals for specific interventions  Outcome: Progressing  Goal: Patient/Family Short Term Goal  Description: Patient's Short Term Goal: feel better    Interventions:   - manage blood sugar  - See additional Care Plan goals for specific interventions  Outcome: Progressing

## 2023-08-06 LAB
ALBUMIN SERPL-MCNC: 2 G/DL (ref 3.4–5)
ANION GAP SERPL CALC-SCNC: 7 MMOL/L (ref 0–18)
BASOPHILS # BLD AUTO: 0.06 X10(3) UL (ref 0–0.2)
BASOPHILS NFR BLD AUTO: 0.6 %
BUN BLD-MCNC: 42 MG/DL (ref 7–18)
CALCIUM BLD-MCNC: 7.9 MG/DL (ref 8.5–10.1)
CHLORIDE SERPL-SCNC: 106 MMOL/L (ref 98–112)
CO2 SERPL-SCNC: 19 MMOL/L (ref 21–32)
CREAT BLD-MCNC: 2.55 MG/DL
DEPRECATED HBV CORE AB SER IA-ACNC: 175.8 NG/ML
EGFRCR SERPLBLD CKD-EPI 2021: 20 ML/MIN/1.73M2 (ref 60–?)
EOSINOPHIL # BLD AUTO: 0.05 X10(3) UL (ref 0–0.7)
EOSINOPHIL NFR BLD AUTO: 0.5 %
ERYTHROCYTE [DISTWIDTH] IN BLOOD BY AUTOMATED COUNT: 16 %
GLUCOSE BLD-MCNC: 158 MG/DL (ref 70–99)
GLUCOSE BLD-MCNC: 169 MG/DL (ref 70–99)
GLUCOSE BLD-MCNC: 198 MG/DL (ref 70–99)
GLUCOSE BLD-MCNC: 236 MG/DL (ref 70–99)
GLUCOSE BLD-MCNC: 70 MG/DL (ref 70–99)
GLUCOSE BLD-MCNC: 83 MG/DL (ref 70–99)
HCT VFR BLD AUTO: 27.4 %
HGB BLD-MCNC: 9.4 G/DL
IMM GRANULOCYTES # BLD AUTO: 0.05 X10(3) UL (ref 0–1)
IMM GRANULOCYTES NFR BLD: 0.5 %
IRON SATN MFR SERPL: 23 %
IRON SERPL-MCNC: 48 UG/DL
LYMPHOCYTES # BLD AUTO: 1.07 X10(3) UL (ref 1–4)
LYMPHOCYTES NFR BLD AUTO: 10.9 %
MAGNESIUM SERPL-MCNC: 1.4 MG/DL (ref 1.6–2.6)
MAGNESIUM SERPL-MCNC: 1.4 MG/DL (ref 1.6–2.6)
MCH RBC QN AUTO: 34.7 PG (ref 26–34)
MCHC RBC AUTO-ENTMCNC: 34.3 G/DL (ref 31–37)
MCV RBC AUTO: 101.1 FL
MONOCYTES # BLD AUTO: 0.72 X10(3) UL (ref 0.1–1)
MONOCYTES NFR BLD AUTO: 7.3 %
NEUTROPHILS # BLD AUTO: 7.89 X10 (3) UL (ref 1.5–7.7)
NEUTROPHILS # BLD AUTO: 7.89 X10(3) UL (ref 1.5–7.7)
NEUTROPHILS NFR BLD AUTO: 80.2 %
OSMOLALITY SERPL CALC.SUM OF ELEC: 284 MOSM/KG (ref 275–295)
PHOSPHATE SERPL-MCNC: 2.9 MG/DL (ref 2.5–4.9)
PLATELET # BLD AUTO: 207 10(3)UL (ref 150–450)
POTASSIUM SERPL-SCNC: 4.7 MMOL/L (ref 3.5–5.1)
RBC # BLD AUTO: 2.71 X10(6)UL
SODIUM SERPL-SCNC: 132 MMOL/L (ref 136–145)
TIBC SERPL-MCNC: 207 UG/DL (ref 240–450)
TRANSFERRIN SERPL-MCNC: 139 MG/DL (ref 200–360)
WBC # BLD AUTO: 9.8 X10(3) UL (ref 4–11)

## 2023-08-06 RX ORDER — FUROSEMIDE 10 MG/ML
20 INJECTION INTRAMUSCULAR; INTRAVENOUS ONCE
Status: COMPLETED | OUTPATIENT
Start: 2023-08-06 | End: 2023-08-06

## 2023-08-06 RX ORDER — MAGNESIUM SULFATE HEPTAHYDRATE 40 MG/ML
2 INJECTION, SOLUTION INTRAVENOUS ONCE
Status: COMPLETED | OUTPATIENT
Start: 2023-08-06 | End: 2023-08-06

## 2023-08-06 NOTE — PLAN OF CARE
Assumed pt care at [facility]. A&O x4. Tele rhythm NSR. SPO2 96% on room air. Breath sounds clear bilaterally. Pt voiding with no issues. Hermel[place] Reed in in place d/t weakness No c/o chest pain or shortness of breath. Skin dry and intact. Bed is locked and in low position. Call light and personal items within reach. Reviewed plan of care and patient verbalizes understanding.   POC: Entresto, Aldactone, and Torsemide on hold d/t creatinine  Problem: Diabetes/Glucose Control  Goal: Glucose maintained within prescribed range  Description: INTERVENTIONS:  - Monitor Blood Glucose as ordered  - Assess for signs and symptoms of hyperglycemia and hypoglycemia  - Administer ordered medications to maintain glucose within target range  - Assess barriers to adequate nutritional intake and initiate nutrition consult as needed  - Instruct patient on self management of diabetes  Outcome: Progressing     Problem: Delirium  Goal: Minimize duration of delirium  Description: Interventions:  - Encourage use of hearing aids, eye glasses  - Promote highest level of mobility daily  - Provide frequent reorientation  - Promote wakefulness i.e. lights on, blinds open  - Promote sleep, encourage patient's normal rest cycle i.e. lights off, TV off, minimize noise and interruptions  - Encourage family to assist in orientation and promotion of home routines  Outcome: Progressing     Problem: GASTROINTESTINAL - ADULT  Goal: Maintains or returns to baseline bowel function  Description: INTERVENTIONS:  - Assess bowel function  - Maintain adequate hydration with IV or PO as ordered and tolerated  - Evaluate effectiveness of GI medications  - Encourage mobilization and activity  - Obtain nutritional consult as needed  - Establish a toileting routine/schedule  - Consider collaborating with pharmacy to review patient's medication profile  Outcome: Progressing     Problem: METABOLIC/FLUID AND ELECTROLYTES - ADULT  Goal: Glucose maintained within prescribed range  Description: INTERVENTIONS:  - Monitor Blood Glucose as ordered  - Assess for signs and symptoms of hyperglycemia and hypoglycemia  - Administer ordered medications to maintain glucose within target range  - Assess barriers to adequate nutritional intake and initiate nutrition consult as needed  - Instruct patient on self management of diabetes  Outcome: Progressing  Goal: Electrolytes maintained within normal limits  Description: INTERVENTIONS:  - Monitor labs and rhythm and assess patient for signs and symptoms of electrolyte imbalances  - Administer electrolyte replacement as ordered  - Monitor response to electrolyte replacements, including rhythm and repeat lab results as appropriate  - Fluid restriction as ordered  - Instruct patient on fluid and nutrition restrictions as appropriate  Outcome: Progressing  Goal: Hemodynamic stability and optimal renal function maintained  Description: INTERVENTIONS:  - Monitor labs and assess for signs and symptoms of volume excess or deficit  - Monitor intake, output and patient weight  - Monitor urine specific gravity, serum osmolarity and serum sodium as indicated or ordered  - Monitor response to interventions for patient's volume status, including labs, urine output, blood pressure (other measures as available)  - Encourage oral intake as appropriate  - Instruct patient on fluid and nutrition restrictions as appropriate  Outcome: Progressing     Problem: Patient/Family Goals  Goal: Patient/Family Long Term Goal  Description: Patient's Long Term Goal: go home    Interventions:  - manage blood sugar  - See additional Care Plan goals for specific interventions  Outcome: Progressing  Goal: Patient/Family Short Term Goal  Description: Patient's Short Term Goal: feel better    Interventions:   - manage blood sugar  - See additional Care Plan goals for specific interventions  Outcome: Progressing     Problem: CARDIOVASCULAR - ADULT  Goal: Maintains optimal cardiac output and hemodynamic stability  Description: INTERVENTIONS:  - Monitor vital signs, rhythm, and trends  - Monitor for bleeding, hypotension and signs of decreased cardiac output  - Evaluate effectiveness of vasoactive medications to optimize hemodynamic stability  - Monitor arterial and/or venous puncture sites for bleeding and/or hematoma  - Assess quality of pulses, skin color and temperature  - Assess for signs of decreased coronary artery perfusion - ex.  Angina  - Evaluate fluid balance, assess for edema, trend weights  Outcome: Progressing  Goal: Absence of cardiac arrhythmias or at baseline  Description: INTERVENTIONS:  - Continuous cardiac monitoring, monitor vital signs, obtain 12 lead EKG if indicated  - Evaluate effectiveness of antiarrhythmic and heart rate control medications as ordered  - Initiate emergency measures for life threatening arrhythmias  - Monitor electrolytes and administer replacement therapy as ordered  Outcome: Progressing

## 2023-08-06 NOTE — PLAN OF CARE
Assumed care of patient @ 0730. Patient is A&O x4, up standby assist with walker x1. No complaints of pain. Patient Sinus rhythm on tele. On room are, denies chest pain, denies SOB. Patient has BLE edema, nonpitting to legs, +1 to feet. Up in chair for meals. Port a cath decanulated, heparin flush used, this RN and Charge RN attempted to [employer] with no success. Patient port remains deaccessed. Lab draw attemped unable to obtain. Patient refused reattempt. Patient is continent of bladder and bowel last bm today, fall precautions in place, bed and chair alarm in use, call light and belongings within reach. Patient updated on plan of care.            Problem: Diabetes/Glucose Control  Goal: Glucose maintained within prescribed range  Description: INTERVENTIONS:  - Monitor Blood Glucose as ordered  - Assess for signs and symptoms of hyperglycemia and hypoglycemia  - Administer ordered medications to maintain glucose within target range  - Assess barriers to adequate nutritional intake and initiate nutrition consult as needed  - Instruct patient on self management of diabetes  Outcome: Not Progressing     Problem: Delirium  Goal: Minimize duration of delirium  Description: Interventions:  - Encourage use of hearing aids, eye glasses  - Promote highest level of mobility daily  - Provide frequent reorientation  - Promote wakefulness i.e. lights on, blinds open  - Promote sleep, encourage patient's normal rest cycle i.e. lights off, TV off, minimize noise and interruptions  - Encourage family to assist in orientation and promotion of home routines  Outcome: Progressing     Problem: GASTROINTESTINAL - ADULT  Goal: Maintains or returns to baseline bowel function  Description: INTERVENTIONS:  - Assess bowel function  - Maintain adequate hydration with IV or PO as ordered and tolerated  - Evaluate effectiveness of GI medications  - Encourage mobilization and activity  - Obtain nutritional consult as needed  - Establish a toileting routine/schedule  - Consider collaborating with pharmacy to review patient's medication profile  Outcome: Progressing     Problem: METABOLIC/FLUID AND ELECTROLYTES - ADULT  Goal: Glucose maintained within prescribed range  Description: INTERVENTIONS:  - Monitor Blood Glucose as ordered  - Assess for signs and symptoms of hyperglycemia and hypoglycemia  - Administer ordered medications to maintain glucose within target range  - Assess barriers to adequate nutritional intake and initiate nutrition consult as needed  - Instruct patient on self management of diabetes  Outcome: Not Progressing  Goal: Electrolytes maintained within normal limits  Description: INTERVENTIONS:  - Monitor labs and rhythm and assess patient for signs and symptoms of electrolyte imbalances  - Administer electrolyte replacement as ordered  - Monitor response to electrolyte replacements, including rhythm and repeat lab results as appropriate  - Fluid restriction as ordered  - Instruct patient on fluid and nutrition restrictions as appropriate  Outcome: Progressing  Goal: Hemodynamic stability and optimal renal function maintained  Description: INTERVENTIONS:  - Monitor labs and assess for signs and symptoms of volume excess or deficit  - Monitor intake, output and patient weight  - Monitor urine specific gravity, serum osmolarity and serum sodium as indicated or ordered  - Monitor response to interventions for patient's volume status, including labs, urine output, blood pressure (other measures as available)  - Encourage oral intake as appropriate  - Instruct patient on fluid and nutrition restrictions as appropriate  Outcome: Not Progressing     Problem: CARDIOVASCULAR - ADULT  Goal: Maintains optimal cardiac output and hemodynamic stability  Description: INTERVENTIONS:  - Monitor vital signs, rhythm, and trends  - Monitor for bleeding, hypotension and signs of decreased cardiac output  - Evaluate effectiveness of vasoactive medications to optimize hemodynamic stability  - Monitor arterial and/or venous puncture sites for bleeding and/or hematoma  - Assess quality of pulses, skin color and temperature  - Assess for signs of decreased coronary artery perfusion - ex.  Angina  - Evaluate fluid balance, assess for edema, trend weights  Outcome: Progressing  Goal: Absence of cardiac arrhythmias or at baseline  Description: INTERVENTIONS:  - Continuous cardiac monitoring, monitor vital signs, obtain 12 lead EKG if indicated  - Evaluate effectiveness of antiarrhythmic and heart rate control medications as ordered  - Initiate emergency measures for life threatening arrhythmias  - Monitor electrolytes and administer replacement therapy as ordered  Outcome: Progressing     Problem: Patient/Family Goals  Goal: Patient/Family Long Term Goal  Description: Patient's Long Term Goal: go home    Interventions:  - manage blood sugar  - See additional Care Plan goals for specific interventions  Outcome: Progressing  Goal: Patient/Family Short Term Goal  Description: Patient's Short Term Goal: feel better    Interventions:   - manage blood sugar  - See additional Care Plan goals for specific interventions  Outcome: Progressing

## 2023-08-07 LAB
ALBUMIN SERPL-MCNC: 2.1 G/DL (ref 3.4–5)
ANION GAP SERPL CALC-SCNC: 5 MMOL/L (ref 0–18)
BASOPHILS # BLD AUTO: 0.07 X10(3) UL (ref 0–0.2)
BASOPHILS NFR BLD AUTO: 0.7 %
BUN BLD-MCNC: 43 MG/DL (ref 7–18)
CALCIUM BLD-MCNC: 8.2 MG/DL (ref 8.5–10.1)
CHLORIDE SERPL-SCNC: 103 MMOL/L (ref 98–112)
CO2 SERPL-SCNC: 24 MMOL/L (ref 21–32)
CREAT BLD-MCNC: 2.27 MG/DL
EGFRCR SERPLBLD CKD-EPI 2021: 23 ML/MIN/1.73M2 (ref 60–?)
EOSINOPHIL # BLD AUTO: 0.07 X10(3) UL (ref 0–0.7)
EOSINOPHIL NFR BLD AUTO: 0.7 %
ERYTHROCYTE [DISTWIDTH] IN BLOOD BY AUTOMATED COUNT: 15.9 %
GLUCOSE BLD-MCNC: 159 MG/DL (ref 70–99)
GLUCOSE BLD-MCNC: 173 MG/DL (ref 70–99)
GLUCOSE BLD-MCNC: 210 MG/DL (ref 70–99)
GLUCOSE BLD-MCNC: 79 MG/DL (ref 70–99)
GLUCOSE BLD-MCNC: 81 MG/DL (ref 70–99)
HCT VFR BLD AUTO: 23.1 %
HGB BLD-MCNC: 8.2 G/DL
IMM GRANULOCYTES # BLD AUTO: 0.03 X10(3) UL (ref 0–1)
IMM GRANULOCYTES NFR BLD: 0.3 %
LYMPHOCYTES # BLD AUTO: 1.3 X10(3) UL (ref 1–4)
LYMPHOCYTES NFR BLD AUTO: 12.8 %
MAGNESIUM SERPL-MCNC: 1.9 MG/DL (ref 1.6–2.6)
MCH RBC QN AUTO: 34.5 PG (ref 26–34)
MCHC RBC AUTO-ENTMCNC: 35.5 G/DL (ref 31–37)
MCV RBC AUTO: 97.1 FL
MONOCYTES # BLD AUTO: 1.06 X10(3) UL (ref 0.1–1)
MONOCYTES NFR BLD AUTO: 10.4 %
NEUTROPHILS # BLD AUTO: 7.64 X10 (3) UL (ref 1.5–7.7)
NEUTROPHILS # BLD AUTO: 7.64 X10(3) UL (ref 1.5–7.7)
NEUTROPHILS NFR BLD AUTO: 75.1 %
OSMOLALITY SERPL CALC.SUM OF ELEC: 284 MOSM/KG (ref 275–295)
PHOSPHATE SERPL-MCNC: 2.9 MG/DL (ref 2.5–4.9)
PLATELET # BLD AUTO: 186 10(3)UL (ref 150–450)
POTASSIUM SERPL-SCNC: 4.6 MMOL/L (ref 3.5–5.1)
RBC # BLD AUTO: 2.38 X10(6)UL
SODIUM SERPL-SCNC: 132 MMOL/L (ref 136–145)
WBC # BLD AUTO: 10.2 X10(3) UL (ref 4–11)

## 2023-08-07 RX ORDER — TORSEMIDE 20 MG/1
20 TABLET ORAL DAILY
Status: DISCONTINUED | OUTPATIENT
Start: 2023-08-07 | End: 2023-08-09

## 2023-08-07 NOTE — PLAN OF CARE
Pt alert able to make needs known. Denies any pain. Left  facial droop noticeable, pt at baseline. Pt states she does not want the life vest.  Cont to monitor blood sugar. Plan of care discussed with pt , verbalized understanding. Call light within reach.        Problem: Diabetes/Glucose Control  Goal: Glucose maintained within prescribed range  Description: INTERVENTIONS:  - Monitor Blood Glucose as ordered  - Assess for signs and symptoms of hyperglycemia and hypoglycemia  - Administer ordered medications to maintain glucose within target range  - Assess barriers to adequate nutritional intake and initiate nutrition consult as needed  - Instruct patient on self management of diabetes  Outcome: Progressing     Problem: Delirium  Goal: Minimize duration of delirium  Description: Interventions:  - Encourage use of hearing aids, eye glasses  - Promote highest level of mobility daily  - Provide frequent reorientation  - Promote wakefulness i.e. lights on, blinds open  - Promote sleep, encourage patient's normal rest cycle i.e. lights off, TV off, minimize noise and interruptions  - Encourage family to assist in orientation and promotion of home routines  Outcome: Progressing     Problem: GASTROINTESTINAL - ADULT  Goal: Maintains or returns to baseline bowel function  Description: INTERVENTIONS:  - Assess bowel function  - Maintain adequate hydration with IV or PO as ordered and tolerated  - Evaluate effectiveness of GI medications  - Encourage mobilization and activity  - Obtain nutritional consult as needed  - Establish a toileting routine/schedule  - Consider collaborating with pharmacy to review patient's medication profile  Outcome: Progressing     Problem: METABOLIC/FLUID AND ELECTROLYTES - ADULT  Goal: Glucose maintained within prescribed range  Description: INTERVENTIONS:  - Monitor Blood Glucose as ordered  - Assess for signs and symptoms of hyperglycemia and hypoglycemia  - Administer ordered medications to maintain glucose within target range  - Assess barriers to adequate nutritional intake and initiate nutrition consult as needed  - Instruct patient on self management of diabetes  Outcome: Progressing  Goal: Electrolytes maintained within normal limits  Description: INTERVENTIONS:  - Monitor labs and rhythm and assess patient for signs and symptoms of electrolyte imbalances  - Administer electrolyte replacement as ordered  - Monitor response to electrolyte replacements, including rhythm and repeat lab results as appropriate  - Fluid restriction as ordered  - Instruct patient on fluid and nutrition restrictions as appropriate  Outcome: Progressing  Goal: Hemodynamic stability and optimal renal function maintained  Description: INTERVENTIONS:  - Monitor labs and assess for signs and symptoms of volume excess or deficit  - Monitor intake, output and patient weight  - Monitor urine specific gravity, serum osmolarity and serum sodium as indicated or ordered  - Monitor response to interventions for patient's volume status, including labs, urine output, blood pressure (other measures as available)  - Encourage oral intake as appropriate  - Instruct patient on fluid and nutrition restrictions as appropriate  Outcome: Progressing     Problem: Patient/Family Goals  Goal: Patient/Family Long Term Goal  Description: Patient's Long Term Goal: go home    Interventions:  - manage blood sugar  - See additional Care Plan goals for specific interventions  Outcome: Progressing  Goal: Patient/Family Short Term Goal  Description: Patient's Short Term Goal: feel better    Interventions:   - manage blood sugar  - See additional Care Plan goals for specific interventions  Outcome: Progressing     Problem: CARDIOVASCULAR - ADULT  Goal: Maintains optimal cardiac output and hemodynamic stability  Description: INTERVENTIONS:  - Monitor vital signs, rhythm, and trends  - Monitor for bleeding, hypotension and signs of decreased cardiac output  - Evaluate effectiveness of vasoactive medications to optimize hemodynamic stability  - Monitor arterial and/or venous puncture sites for bleeding and/or hematoma  - Assess quality of pulses, skin color and temperature  - Assess for signs of decreased coronary artery perfusion - ex.  Angina  - Evaluate fluid balance, assess for edema, trend weights  Outcome: Progressing  Goal: Absence of cardiac arrhythmias or at baseline  Description: INTERVENTIONS:  - Continuous cardiac monitoring, monitor vital signs, obtain 12 lead EKG if indicated  - Evaluate effectiveness of antiarrhythmic and heart rate control medications as ordered  - Initiate emergency measures for life threatening arrhythmias  - Monitor electrolytes and administer replacement therapy as ordered  Outcome: Progressing

## 2023-08-07 NOTE — PHYSICAL THERAPY NOTE
IP PT attempted, pt laying in bed, refusing therapy. Pt states \"I don't need any help. I will be ok when I go home\". Pt educated on benefits of mobility and role of PT. Pt states she's been up since 3AM and would like to rest. Pt also c/o low toilet , writer attempted to locate riser- unable. PCT made aware that pt would benefit from toilet riser.

## 2023-08-07 NOTE — PLAN OF CARE
Assumed care of pt at 1930  A/Ox4, up w/ assistance and a walker. Room air w/ adequate o2 sats. NSR on tele. No complaints of shortness of breath or chest pain at this time. Pt is continent of bladder and bowel. Last BM 8/6. BLE edema present. Pt tolerated meds and care given. Fall precautions in place. Call light and belongings in reach. Pt updated on plan of care.      Problem: Diabetes/Glucose Control  Goal: Glucose maintained within prescribed range  Description: INTERVENTIONS:  - Monitor Blood Glucose as ordered  - Assess for signs and symptoms of hyperglycemia and hypoglycemia  - Administer ordered medications to maintain glucose within target range  - Assess barriers to adequate nutritional intake and initiate nutrition consult as needed  - Instruct patient on self management of diabetes  Outcome: Progressing     Problem: Delirium  Goal: Minimize duration of delirium  Description: Interventions:  - Encourage use of hearing aids, eye glasses  - Promote highest level of mobility daily  - Provide frequent reorientation  - Promote wakefulness i.e. lights on, blinds open  - Promote sleep, encourage patient's normal rest cycle i.e. lights off, TV off, minimize noise and interruptions  - Encourage family to assist in orientation and promotion of home routines  Outcome: Progressing     Problem: GASTROINTESTINAL - ADULT  Goal: Maintains or returns to baseline bowel function  Description: INTERVENTIONS:  - Assess bowel function  - Maintain adequate hydration with IV or PO as ordered and tolerated  - Evaluate effectiveness of GI medications  - Encourage mobilization and activity  - Obtain nutritional consult as needed  - Establish a toileting routine/schedule  - Consider collaborating with pharmacy to review patient's medication profile  Outcome: Progressing     Problem: METABOLIC/FLUID AND ELECTROLYTES - ADULT  Goal: Glucose maintained within prescribed range  Description: INTERVENTIONS:  - Monitor Blood Glucose as ordered  - Assess for signs and symptoms of hyperglycemia and hypoglycemia  - Administer ordered medications to maintain glucose within target range  - Assess barriers to adequate nutritional intake and initiate nutrition consult as needed  - Instruct patient on self management of diabetes  Outcome: Progressing  Goal: Electrolytes maintained within normal limits  Description: INTERVENTIONS:  - Monitor labs and rhythm and assess patient for signs and symptoms of electrolyte imbalances  - Administer electrolyte replacement as ordered  - Monitor response to electrolyte replacements, including rhythm and repeat lab results as appropriate  - Fluid restriction as ordered  - Instruct patient on fluid and nutrition restrictions as appropriate  Outcome: Progressing  Goal: Hemodynamic stability and optimal renal function maintained  Description: INTERVENTIONS:  - Monitor labs and assess for signs and symptoms of volume excess or deficit  - Monitor intake, output and patient weight  - Monitor urine specific gravity, serum osmolarity and serum sodium as indicated or ordered  - Monitor response to interventions for patient's volume status, including labs, urine output, blood pressure (other measures as available)  - Encourage oral intake as appropriate  - Instruct patient on fluid and nutrition restrictions as appropriate  Outcome: Progressing     Problem: Patient/Family Goals  Goal: Patient/Family Long Term Goal  Description: Patient's Long Term Goal: go home    Interventions:  - manage blood sugar  - See additional Care Plan goals for specific interventions  Outcome: Progressing  Goal: Patient/Family Short Term Goal  Description: Patient's Short Term Goal: feel better    Interventions:   - manage blood sugar  - See additional Care Plan goals for specific interventions  Outcome: Progressing     Problem: CARDIOVASCULAR - ADULT  Goal: Maintains optimal cardiac output and hemodynamic stability  Description: INTERVENTIONS:  - Monitor vital signs, rhythm, and trends  - Monitor for bleeding, hypotension and signs of decreased cardiac output  - Evaluate effectiveness of vasoactive medications to optimize hemodynamic stability  - Monitor arterial and/or venous puncture sites for bleeding and/or hematoma  - Assess quality of pulses, skin color and temperature  - Assess for signs of decreased coronary artery perfusion - ex.  Angina  - Evaluate fluid balance, assess for edema, trend weights  Outcome: Progressing  Goal: Absence of cardiac arrhythmias or at baseline  Description: INTERVENTIONS:  - Continuous cardiac monitoring, monitor vital signs, obtain 12 lead EKG if indicated  - Evaluate effectiveness of antiarrhythmic and heart rate control medications as ordered  - Initiate emergency measures for life threatening arrhythmias  - Monitor electrolytes and administer replacement therapy as ordered  Outcome: Progressing

## 2023-08-08 LAB
ALBUMIN SERPL-MCNC: 2.2 G/DL (ref 3.4–5)
ANION GAP SERPL CALC-SCNC: 5 MMOL/L (ref 0–18)
BASOPHILS # BLD AUTO: 0.06 X10(3) UL (ref 0–0.2)
BASOPHILS NFR BLD AUTO: 0.8 %
BUN BLD-MCNC: 43 MG/DL (ref 7–18)
CALCIUM BLD-MCNC: 8.4 MG/DL (ref 8.5–10.1)
CHLORIDE SERPL-SCNC: 99 MMOL/L (ref 98–112)
CO2 SERPL-SCNC: 25 MMOL/L (ref 21–32)
CREAT BLD-MCNC: 2.11 MG/DL
EGFRCR SERPLBLD CKD-EPI 2021: 25 ML/MIN/1.73M2 (ref 60–?)
EOSINOPHIL # BLD AUTO: 0.06 X10(3) UL (ref 0–0.7)
EOSINOPHIL NFR BLD AUTO: 0.8 %
ERYTHROCYTE [DISTWIDTH] IN BLOOD BY AUTOMATED COUNT: 15.7 %
GLUCOSE BLD-MCNC: 136 MG/DL (ref 70–99)
GLUCOSE BLD-MCNC: 138 MG/DL (ref 70–99)
GLUCOSE BLD-MCNC: 200 MG/DL (ref 70–99)
GLUCOSE BLD-MCNC: 232 MG/DL (ref 70–99)
GLUCOSE BLD-MCNC: 266 MG/DL (ref 70–99)
HCT VFR BLD AUTO: 22.9 %
HGB BLD-MCNC: 8 G/DL
IMM GRANULOCYTES # BLD AUTO: 0.05 X10(3) UL (ref 0–1)
IMM GRANULOCYTES NFR BLD: 0.6 %
LYMPHOCYTES # BLD AUTO: 1.41 X10(3) UL (ref 1–4)
LYMPHOCYTES NFR BLD AUTO: 18.3 %
MAGNESIUM SERPL-MCNC: 1.6 MG/DL (ref 1.6–2.6)
MCH RBC QN AUTO: 34.2 PG (ref 26–34)
MCHC RBC AUTO-ENTMCNC: 34.9 G/DL (ref 31–37)
MCV RBC AUTO: 97.9 FL
MONOCYTES # BLD AUTO: 0.79 X10(3) UL (ref 0.1–1)
MONOCYTES NFR BLD AUTO: 10.3 %
NEUTROPHILS # BLD AUTO: 5.33 X10 (3) UL (ref 1.5–7.7)
NEUTROPHILS # BLD AUTO: 5.33 X10(3) UL (ref 1.5–7.7)
NEUTROPHILS NFR BLD AUTO: 69.2 %
OSMOLALITY SERPL CALC.SUM OF ELEC: 281 MOSM/KG (ref 275–295)
PHOSPHATE SERPL-MCNC: 2.9 MG/DL (ref 2.5–4.9)
PLATELET # BLD AUTO: 188 10(3)UL (ref 150–450)
POTASSIUM SERPL-SCNC: 4.4 MMOL/L (ref 3.5–5.1)
RBC # BLD AUTO: 2.34 X10(6)UL
SODIUM SERPL-SCNC: 129 MMOL/L (ref 136–145)
WBC # BLD AUTO: 7.7 X10(3) UL (ref 4–11)

## 2023-08-08 RX ORDER — MAGNESIUM OXIDE 400 MG/1
400 TABLET ORAL ONCE
Status: COMPLETED | OUTPATIENT
Start: 2023-08-08 | End: 2023-08-08

## 2023-08-08 RX ORDER — TOLVAPTAN 15 MG/1
15 TABLET ORAL ONCE
Status: COMPLETED | OUTPATIENT
Start: 2023-08-08 | End: 2023-08-08

## 2023-08-08 NOTE — PLAN OF CARE
Assumed care for pt. At approximately 2200. Patient resting in bed on and off. Denies pain or shortness of breath. Lungs clear bilaterally on room air. SR on tele. Purewick in place overnight. Plan to repeat labs in AM, monitor blood sugars. Patient is aware of the plan of care for the night and AM. Will continue to monitor.      Problem: Diabetes/Glucose Control  Goal: Glucose maintained within prescribed range  Description: INTERVENTIONS:  - Monitor Blood Glucose as ordered  - Assess for signs and symptoms of hyperglycemia and hypoglycemia  - Administer ordered medications to maintain glucose within target range  - Assess barriers to adequate nutritional intake and initiate nutrition consult as needed  - Instruct patient on self management of diabetes  Outcome: Progressing     Problem: Delirium  Goal: Minimize duration of delirium  Description: Interventions:  - Encourage use of hearing aids, eye glasses  - Promote highest level of mobility daily  - Provide frequent reorientation  - Promote wakefulness i.e. lights on, blinds open  - Promote sleep, encourage patient's normal rest cycle i.e. lights off, TV off, minimize noise and interruptions  - Encourage family to assist in orientation and promotion of home routines  Outcome: Progressing     Problem: GASTROINTESTINAL - ADULT  Goal: Maintains or returns to baseline bowel function  Description: INTERVENTIONS:  - Assess bowel function  - Maintain adequate hydration with IV or PO as ordered and tolerated  - Evaluate effectiveness of GI medications  - Encourage mobilization and activity  - Obtain nutritional consult as needed  - Establish a toileting routine/schedule  - Consider collaborating with pharmacy to review patient's medication profile  Outcome: Progressing     Problem: METABOLIC/FLUID AND ELECTROLYTES - ADULT  Goal: Glucose maintained within prescribed range  Description: INTERVENTIONS:  - Monitor Blood Glucose as ordered  - Assess for signs and symptoms of hyperglycemia and hypoglycemia  - Administer ordered medications to maintain glucose within target range  - Assess barriers to adequate nutritional intake and initiate nutrition consult as needed  - Instruct patient on self management of diabetes  Outcome: Progressing  Goal: Electrolytes maintained within normal limits  Description: INTERVENTIONS:  - Monitor labs and rhythm and assess patient for signs and symptoms of electrolyte imbalances  - Administer electrolyte replacement as ordered  - Monitor response to electrolyte replacements, including rhythm and repeat lab results as appropriate  - Fluid restriction as ordered  - Instruct patient on fluid and nutrition restrictions as appropriate  Outcome: Progressing  Goal: Hemodynamic stability and optimal renal function maintained  Description: INTERVENTIONS:  - Monitor labs and assess for signs and symptoms of volume excess or deficit  - Monitor intake, output and patient weight  - Monitor urine specific gravity, serum osmolarity and serum sodium as indicated or ordered  - Monitor response to interventions for patient's volume status, including labs, urine output, blood pressure (other measures as available)  - Encourage oral intake as appropriate  - Instruct patient on fluid and nutrition restrictions as appropriate  Outcome: Progressing     Problem: CARDIOVASCULAR - ADULT  Goal: Maintains optimal cardiac output and hemodynamic stability  Description: INTERVENTIONS:  - Monitor vital signs, rhythm, and trends  - Monitor for bleeding, hypotension and signs of decreased cardiac output  - Evaluate effectiveness of vasoactive medications to optimize hemodynamic stability  - Monitor arterial and/or venous puncture sites for bleeding and/or hematoma  - Assess quality of pulses, skin color and temperature  - Assess for signs of decreased coronary artery perfusion - ex.  Angina  - Evaluate fluid balance, assess for edema, trend weights  Outcome: Progressing  Goal: Absence of cardiac arrhythmias or at baseline  Description: INTERVENTIONS:  - Continuous cardiac monitoring, monitor vital signs, obtain 12 lead EKG if indicated  - Evaluate effectiveness of antiarrhythmic and heart rate control medications as ordered  - Initiate emergency measures for life threatening arrhythmias  - Monitor electrolytes and administer replacement therapy as ordered  Outcome: Progressing     Problem: Patient/Family Goals  Goal: Patient/Family Long Term Goal  Description: Patient's Long Term Goal: go home    Interventions:  - manage blood sugar  - See additional Care Plan goals for specific interventions  Outcome: Progressing  Goal: Patient/Family Short Term Goal  Description: Patient's Short Term Goal: feel better    Interventions:   - manage blood sugar  - See additional Care Plan goals for specific interventions  Outcome: Progressing

## 2023-08-08 NOTE — PLAN OF CARE
Assumed care of pt [id number]  A/Ox4, up w/ assistance a walker. Room air w/ adequate o2 sats. NSR on tele. Pt denies shortness of breath or chest pain at this time. Pt tolerated meds and care given. Continent of bladder and bowel. Bed locked and in lowest position w/ bed alarm on. Call light and belongings in reach. Pt updated on plan of care.      Problem: Diabetes/Glucose Control  Goal: Glucose maintained within prescribed range  Description: INTERVENTIONS:  - Monitor Blood Glucose as ordered  - Assess for signs and symptoms of hyperglycemia and hypoglycemia  - Administer ordered medications to maintain glucose within target range  - Assess barriers to adequate nutritional intake and initiate nutrition consult as needed  - Instruct patient on self management of diabetes  Outcome: Progressing     Problem: Delirium  Goal: Minimize duration of delirium  Description: Interventions:  - Encourage use of hearing aids, eye glasses  - Promote highest level of mobility daily  - Provide frequent reorientation  - Promote wakefulness i.e. lights on, blinds open  - Promote sleep, encourage patient's normal rest cycle i.e. lights off, TV off, minimize noise and interruptions  - Encourage family to assist in orientation and promotion of home routines  Outcome: Progressing     Problem: GASTROINTESTINAL - ADULT  Goal: Maintains or returns to baseline bowel function  Description: INTERVENTIONS:  - Assess bowel function  - Maintain adequate hydration with IV or PO as ordered and tolerated  - Evaluate effectiveness of GI medications  - Encourage mobilization and activity  - Obtain nutritional consult as needed  - Establish a toileting routine/schedule  - Consider collaborating with pharmacy to review patient's medication profile  Outcome: Progressing     Problem: METABOLIC/FLUID AND ELECTROLYTES - ADULT  Goal: Glucose maintained within prescribed range  Description: INTERVENTIONS:  - Monitor Blood Glucose as ordered  - Assess for signs and symptoms of hyperglycemia and hypoglycemia  - Administer ordered medications to maintain glucose within target range  - Assess barriers to adequate nutritional intake and initiate nutrition consult as needed  - Instruct patient on self management of diabetes  Outcome: Progressing  Goal: Electrolytes maintained within normal limits  Description: INTERVENTIONS:  - Monitor labs and rhythm and assess patient for signs and symptoms of electrolyte imbalances  - Administer electrolyte replacement as ordered  - Monitor response to electrolyte replacements, including rhythm and repeat lab results as appropriate  - Fluid restriction as ordered  - Instruct patient on fluid and nutrition restrictions as appropriate  Outcome: Progressing  Goal: Hemodynamic stability and optimal renal function maintained  Description: INTERVENTIONS:  - Monitor labs and assess for signs and symptoms of volume excess or deficit  - Monitor intake, output and patient weight  - Monitor urine specific gravity, serum osmolarity and serum sodium as indicated or ordered  - Monitor response to interventions for patient's volume status, including labs, urine output, blood pressure (other measures as available)  - Encourage oral intake as appropriate  - Instruct patient on fluid and nutrition restrictions as appropriate  Outcome: Progressing     Problem: Patient/Family Goals  Goal: Patient/Family Long Term Goal  Description: Patient's Long Term Goal: go home    Interventions:  - manage blood sugar  - See additional Care Plan goals for specific interventions  Outcome: Progressing  Goal: Patient/Family Short Term Goal  Description: Patient's Short Term Goal: feel better    Interventions:   - manage blood sugar  - See additional Care Plan goals for specific interventions  Outcome: Progressing     Problem: CARDIOVASCULAR - ADULT  Goal: Maintains optimal cardiac output and hemodynamic stability  Description: INTERVENTIONS:  - Monitor vital signs, rhythm, and trends  - Monitor for bleeding, hypotension and signs of decreased cardiac output  - Evaluate effectiveness of vasoactive medications to optimize hemodynamic stability  - Monitor arterial and/or venous puncture sites for bleeding and/or hematoma  - Assess quality of pulses, skin color and temperature  - Assess for signs of decreased coronary artery perfusion - ex.  Angina  - Evaluate fluid balance, assess for edema, trend weights  Outcome: Progressing  Goal: Absence of cardiac arrhythmias or at baseline  Description: INTERVENTIONS:  - Continuous cardiac monitoring, monitor vital signs, obtain 12 lead EKG if indicated  - Evaluate effectiveness of antiarrhythmic and heart rate control medications as ordered  - Initiate emergency measures for life threatening arrhythmias  - Monitor electrolytes and administer replacement therapy as ordered  Outcome: Progressing

## 2023-08-08 NOTE — PLAN OF CARE
Pt. Alert and oriented times 4. NSR on tele. On RA. Pt. Denies chest pain or shortness of breath. Up with standby assist. PO torsemide on hold. Mg replaced. Walked with pt. 200ft. Pt. Sammie Mullins and would like to walk more. Asked pt. Is she would like to change her depend. Pt. Refused. Pt. Updated on plan of care.      Problem: Diabetes/Glucose Control  Goal: Glucose maintained within prescribed range  Description: INTERVENTIONS:  - Monitor Blood Glucose as ordered  - Assess for signs and symptoms of hyperglycemia and hypoglycemia  - Administer ordered medications to maintain glucose within target range  - Assess barriers to adequate nutritional intake and initiate nutrition consult as needed  - Instruct patient on self management of diabetes  Outcome: Progressing     Problem: Delirium  Goal: Minimize duration of delirium  Description: Interventions:  - Encourage use of hearing aids, eye glasses  - Promote highest level of mobility daily  - Provide frequent reorientation  - Promote wakefulness i.e. lights on, blinds open  - Promote sleep, encourage patient's normal rest cycle i.e. lights off, TV off, minimize noise and interruptions  - Encourage family to assist in orientation and promotion of home routines  Outcome: Progressing     Problem: GASTROINTESTINAL - ADULT  Goal: Maintains or returns to baseline bowel function  Description: INTERVENTIONS:  - Assess bowel function  - Maintain adequate hydration with IV or PO as ordered and tolerated  - Evaluate effectiveness of GI medications  - Encourage mobilization and activity  - Obtain nutritional consult as needed  - Establish a toileting routine/schedule  - Consider collaborating with pharmacy to review patient's medication profile  Outcome: Progressing     Problem: METABOLIC/FLUID AND ELECTROLYTES - ADULT  Goal: Glucose maintained within prescribed range  Description: INTERVENTIONS:  - Monitor Blood Glucose as ordered  - Assess for signs and symptoms of hyperglycemia and hypoglycemia  - Administer ordered medications to maintain glucose within target range  - Assess barriers to adequate nutritional intake and initiate nutrition consult as needed  - Instruct patient on self management of diabetes  Outcome: Progressing  Goal: Electrolytes maintained within normal limits  Description: INTERVENTIONS:  - Monitor labs and rhythm and assess patient for signs and symptoms of electrolyte imbalances  - Administer electrolyte replacement as ordered  - Monitor response to electrolyte replacements, including rhythm and repeat lab results as appropriate  - Fluid restriction as ordered  - Instruct patient on fluid and nutrition restrictions as appropriate  Outcome: Progressing  Goal: Hemodynamic stability and optimal renal function maintained  Description: INTERVENTIONS:  - Monitor labs and assess for signs and symptoms of volume excess or deficit  - Monitor intake, output and patient weight  - Monitor urine specific gravity, serum osmolarity and serum sodium as indicated or ordered  - Monitor response to interventions for patient's volume status, including labs, urine output, blood pressure (other measures as available)  - Encourage oral intake as appropriate  - Instruct patient on fluid and nutrition restrictions as appropriate  Outcome: Progressing     Problem: CARDIOVASCULAR - ADULT  Goal: Maintains optimal cardiac output and hemodynamic stability  Description: INTERVENTIONS:  - Monitor vital signs, rhythm, and trends  - Monitor for bleeding, hypotension and signs of decreased cardiac output  - Evaluate effectiveness of vasoactive medications to optimize hemodynamic stability  - Monitor arterial and/or venous puncture sites for bleeding and/or hematoma  - Assess quality of pulses, skin color and temperature  - Assess for signs of decreased coronary artery perfusion - ex.  Angina  - Evaluate fluid balance, assess for edema, trend weights  Outcome: Progressing  Goal: Absence of cardiac arrhythmias or at baseline  Description: INTERVENTIONS:  - Continuous cardiac monitoring, monitor vital signs, obtain 12 lead EKG if indicated  - Evaluate effectiveness of antiarrhythmic and heart rate control medications as ordered  - Initiate emergency measures for life threatening arrhythmias  - Monitor electrolytes and administer replacement therapy as ordered  Outcome: Progressing     Problem: Patient/Family Goals  Goal: Patient/Family Long Term Goal  Description: Patient's Long Term Goal: go home    Interventions:  - manage blood sugar  - See additional Care Plan goals for specific interventions  Outcome: Progressing  Goal: Patient/Family Short Term Goal  Description: Patient's Short Term Goal: feel better    Interventions:   - manage blood sugar  - See additional Care Plan goals for specific interventions  Outcome: Progressing

## 2023-08-09 LAB
ALBUMIN SERPL ELPH-MCNC: 3.2 G/DL (ref 3.75–5.21)
ALBUMIN SERPL-MCNC: 2.4 G/DL (ref 3.4–5)
ALBUMIN/GLOB SERPL: 2 {RATIO} (ref 1–2)
ALPHA1 GLOB SERPL ELPH-MCNC: 0.24 G/DL (ref 0.19–0.46)
ALPHA2 GLOB SERPL ELPH-MCNC: 0.53 G/DL (ref 0.48–1.05)
ANION GAP SERPL CALC-SCNC: 6 MMOL/L (ref 0–18)
B-GLOBULIN SERPL ELPH-MCNC: 0.42 G/DL (ref 0.68–1.23)
BASOPHILS # BLD AUTO: 0.05 X10(3) UL (ref 0–0.2)
BASOPHILS NFR BLD AUTO: 0.7 %
BUN BLD-MCNC: 38 MG/DL (ref 7–18)
CALCIUM BLD-MCNC: 8.3 MG/DL (ref 8.5–10.1)
CHLORIDE SERPL-SCNC: 100 MMOL/L (ref 98–112)
CO2 SERPL-SCNC: 27 MMOL/L (ref 21–32)
CREAT BLD-MCNC: 2.02 MG/DL
EGFRCR SERPLBLD CKD-EPI 2021: 26 ML/MIN/1.73M2 (ref 60–?)
EOSINOPHIL # BLD AUTO: 0.07 X10(3) UL (ref 0–0.7)
EOSINOPHIL NFR BLD AUTO: 1 %
ERYTHROCYTE [DISTWIDTH] IN BLOOD BY AUTOMATED COUNT: 14.8 %
GAMMA GLOB SERPL ELPH-MCNC: 0.41 G/DL (ref 0.62–1.7)
GLUCOSE BLD-MCNC: 165 MG/DL (ref 70–99)
GLUCOSE BLD-MCNC: 177 MG/DL (ref 70–99)
GLUCOSE BLD-MCNC: 198 MG/DL (ref 70–99)
GLUCOSE BLD-MCNC: 263 MG/DL (ref 70–99)
GLUCOSE BLD-MCNC: 291 MG/DL (ref 70–99)
HCT VFR BLD AUTO: 24 %
HGB BLD-MCNC: 8.3 G/DL
IMM GRANULOCYTES # BLD AUTO: 0.02 X10(3) UL (ref 0–1)
IMM GRANULOCYTES NFR BLD: 0.3 %
KAPPA LC FREE SER-MCNC: 11.44 MG/DL (ref 0.33–1.94)
KAPPA LC FREE/LAMBDA FREE SER NEPH: 6.64 {RATIO} (ref 0.26–1.65)
LAMBDA LC FREE SERPL-MCNC: 1.72 MG/DL (ref 0.57–2.63)
LYMPHOCYTES # BLD AUTO: 1.28 X10(3) UL (ref 1–4)
LYMPHOCYTES NFR BLD AUTO: 19 %
MAGNESIUM SERPL-MCNC: 1.6 MG/DL (ref 1.6–2.6)
MCH RBC QN AUTO: 34.2 PG (ref 26–34)
MCHC RBC AUTO-ENTMCNC: 34.6 G/DL (ref 31–37)
MCV RBC AUTO: 98.8 FL
MONOCYTES # BLD AUTO: 0.96 X10(3) UL (ref 0.1–1)
MONOCYTES NFR BLD AUTO: 14.2 %
NEUTROPHILS # BLD AUTO: 4.37 X10 (3) UL (ref 1.5–7.7)
NEUTROPHILS # BLD AUTO: 4.37 X10(3) UL (ref 1.5–7.7)
NEUTROPHILS NFR BLD AUTO: 64.8 %
OSMOLALITY SERPL CALC.SUM OF ELEC: 289 MOSM/KG (ref 275–295)
PHOSPHATE SERPL-MCNC: 3 MG/DL (ref 2.5–4.9)
PLATELET # BLD AUTO: 208 10(3)UL (ref 150–450)
POTASSIUM SERPL-SCNC: 4 MMOL/L (ref 3.5–5.1)
PROT SERPL-MCNC: 4.8 G/DL (ref 6.4–8.2)
RBC # BLD AUTO: 2.43 X10(6)UL
SODIUM SERPL-SCNC: 133 MMOL/L (ref 136–145)
WBC # BLD AUTO: 6.8 X10(3) UL (ref 4–11)

## 2023-08-09 RX ORDER — TORSEMIDE 20 MG/1
20 TABLET ORAL DAILY
Status: DISCONTINUED | OUTPATIENT
Start: 2023-08-09 | End: 2023-08-12

## 2023-08-09 RX ORDER — MAGNESIUM OXIDE 400 MG/1
400 TABLET ORAL ONCE
Status: COMPLETED | OUTPATIENT
Start: 2023-08-09 | End: 2023-08-09

## 2023-08-09 NOTE — CM/SW NOTE
Care Progression Note:  Length of stay: 16  GMLOS: 2.9  Avoidable Delays:   Code Status: Full  LACE score: 73  30 day readmit: No    Acute Medical Issue/Factors:   DKA - insulin dependent DM, endo cons  Sepsis - BC+ staph epidermidis, repeat pend  HFrEF - EF 15-20% 7/25, dobutamine 7/31 stopped 8/3, RHC 8/1 indicates hypovolemia (albumin/IVF given), pt declines life vest  DAVID - renal bx 7/31 (ATN), tx to po diuretics 8/7, Na 129 8/8 --> tolvaptan, Na 133 8/9, Cr 2.02 8/9  PVD  AMS - resolved, now a+o x4 per nursing    Discharge Barriers: Nephrology clearance  Expected discharge date: 0-1 days  Expected next site of care: Home with [address] [facility]. SW arranged [facility]. / available for discharge planning.      LONNIE SanchezN, RN-BC    [id number]

## 2023-08-09 NOTE — CM/SW NOTE
Met with patient, who was alert and oriented, to discuss discharge planning. Confirmed plan for [facility] at discharge. She requested a short term hospital bed for her first floor because her bedroom is on the second floor, she has 12 stairs. She shared her  may be able to move a bed to the first floor until she is feeling stronger. Encouraged them to do that as this is not appropriate for hospital bed under insurance. Discussed per RN notes, patient up w/ 1 assist w/ walker. Ambulates in the hallway. Per PT notes on 8/1, she was able to ambulate  feet with RW. Offered [facility] information if she wishes to temporarily rent a hospital bed. Added information to AVS. SW will remain available. SW/CM to remain available for support and/or discharge planning.     LEONOR Melgar  Discharge Planner  [phone]

## 2023-08-09 NOTE — PLAN OF CARE
Pt. Alert and oriented x4. Resp. regular and easy. On RA. No pain complained. SR in tele. Up w/ 1 assist w/ walker. Ambulated in the hallway. Tolerated well. Needs attended promptly. Call light w/in reach.       Problem: Diabetes/Glucose Control  Goal: Glucose maintained within prescribed range  Description: INTERVENTIONS:  - Monitor Blood Glucose as ordered  - Assess for signs and symptoms of hyperglycemia and hypoglycemia  - Administer ordered medications to maintain glucose within target range  - Assess barriers to adequate nutritional intake and initiate nutrition consult as needed  - Instruct patient on self management of diabetes  Outcome: Progressing     Problem: Delirium  Goal: Minimize duration of delirium  Description: Interventions:  - Encourage use of hearing aids, eye glasses  - Promote highest level of mobility daily  - Provide frequent reorientation  - Promote wakefulness i.e. lights on, blinds open  - Promote sleep, encourage patient's normal rest cycle i.e. lights off, TV off, minimize noise and interruptions  - Encourage family to assist in orientation and promotion of home routines  Outcome: Progressing     Problem: GASTROINTESTINAL - ADULT  Goal: Maintains or returns to baseline bowel function  Description: INTERVENTIONS:  - Assess bowel function  - Maintain adequate hydration with IV or PO as ordered and tolerated  - Evaluate effectiveness of GI medications  - Encourage mobilization and activity  - Obtain nutritional consult as needed  - Establish a toileting routine/schedule  - Consider collaborating with pharmacy to review patient's medication profile  Outcome: Progressing     Problem: METABOLIC/FLUID AND ELECTROLYTES - ADULT  Goal: Glucose maintained within prescribed range  Description: INTERVENTIONS:  - Monitor Blood Glucose as ordered  - Assess for signs and symptoms of hyperglycemia and hypoglycemia  - Administer ordered medications to maintain glucose within target range  - Assess barriers to adequate nutritional intake and initiate nutrition consult as needed  - Instruct patient on self management of diabetes  Outcome: Progressing  Goal: Electrolytes maintained within normal limits  Description: INTERVENTIONS:  - Monitor labs and rhythm and assess patient for signs and symptoms of electrolyte imbalances  - Administer electrolyte replacement as ordered  - Monitor response to electrolyte replacements, including rhythm and repeat lab results as appropriate  - Fluid restriction as ordered  - Instruct patient on fluid and nutrition restrictions as appropriate  Outcome: Progressing  Goal: Hemodynamic stability and optimal renal function maintained  Description: INTERVENTIONS:  - Monitor labs and assess for signs and symptoms of volume excess or deficit  - Monitor intake, output and patient weight  - Monitor urine specific gravity, serum osmolarity and serum sodium as indicated or ordered  - Monitor response to interventions for patient's volume status, including labs, urine output, blood pressure (other measures as available)  - Encourage oral intake as appropriate  - Instruct patient on fluid and nutrition restrictions as appropriate  Outcome: Progressing     Problem: Patient/Family Goals  Goal: Patient/Family Long Term Goal  Description: Patient's Long Term Goal: go home    Interventions:  - manage blood sugar  - See additional Care Plan goals for specific interventions  Outcome: Progressing  Goal: Patient/Family Short Term Goal  Description: Patient's Short Term Goal: feel better    Interventions:   - manage blood sugar  - See additional Care Plan goals for specific interventions  Outcome: Progressing     Problem: CARDIOVASCULAR - ADULT  Goal: Maintains optimal cardiac output and hemodynamic stability  Description: INTERVENTIONS:  - Monitor vital signs, rhythm, and trends  - Monitor for bleeding, hypotension and signs of decreased cardiac output  - Evaluate effectiveness of vasoactive medications to optimize hemodynamic stability  - Monitor arterial and/or venous puncture sites for bleeding and/or hematoma  - Assess quality of pulses, skin color and temperature  - Assess for signs of decreased coronary artery perfusion - ex.  Angina  - Evaluate fluid balance, assess for edema, trend weights  Outcome: Progressing  Goal: Absence of cardiac arrhythmias or at baseline  Description: INTERVENTIONS:  - Continuous cardiac monitoring, monitor vital signs, obtain 12 lead EKG if indicated  - Evaluate effectiveness of antiarrhythmic and heart rate control medications as ordered  - Initiate emergency measures for life threatening arrhythmias  - Monitor electrolytes and administer replacement therapy as ordered  Outcome: Progressing

## 2023-08-09 NOTE — PLAN OF CARE
Patient alert and oriented x 4. On RA. Up with x1 w/ walker. NSR on tele. Incontinent at times of bowel/bladder. No complaints of pain, shortness of breath, chest pain/discomfort. POC: ambulate in halls, monitor blood sugar, neph managing meds. Call light within reach. Fall precautions in place.      Problem: Diabetes/Glucose Control  Goal: Glucose maintained within prescribed range  Description: INTERVENTIONS:  - Monitor Blood Glucose as ordered  - Assess for signs and symptoms of hyperglycemia and hypoglycemia  - Administer ordered medications to maintain glucose within target range  - Assess barriers to adequate nutritional intake and initiate nutrition consult as needed  - Instruct patient on self management of diabetes  Outcome: Progressing     Problem: Delirium  Goal: Minimize duration of delirium  Description: Interventions:  - Encourage use of hearing aids, eye glasses  - Promote highest level of mobility daily  - Provide frequent reorientation  - Promote wakefulness i.e. lights on, blinds open  - Promote sleep, encourage patient's normal rest cycle i.e. lights off, TV off, minimize noise and interruptions  - Encourage family to assist in orientation and promotion of home routines  Outcome: Progressing     Problem: GASTROINTESTINAL - ADULT  Goal: Maintains or returns to baseline bowel function  Description: INTERVENTIONS:  - Assess bowel function  - Maintain adequate hydration with IV or PO as ordered and tolerated  - Evaluate effectiveness of GI medications  - Encourage mobilization and activity  - Obtain nutritional consult as needed  - Establish a toileting routine/schedule  - Consider collaborating with pharmacy to review patient's medication profile  Outcome: Progressing     Problem: METABOLIC/FLUID AND ELECTROLYTES - ADULT  Goal: Glucose maintained within prescribed range  Description: INTERVENTIONS:  - Monitor Blood Glucose as ordered  - Assess for signs and symptoms of hyperglycemia and hypoglycemia  - Administer ordered medications to maintain glucose within target range  - Assess barriers to adequate nutritional intake and initiate nutrition consult as needed  - Instruct patient on self management of diabetes  Outcome: Progressing  Goal: Electrolytes maintained within normal limits  Description: INTERVENTIONS:  - Monitor labs and rhythm and assess patient for signs and symptoms of electrolyte imbalances  - Administer electrolyte replacement as ordered  - Monitor response to electrolyte replacements, including rhythm and repeat lab results as appropriate  - Fluid restriction as ordered  - Instruct patient on fluid and nutrition restrictions as appropriate  Outcome: Progressing  Goal: Hemodynamic stability and optimal renal function maintained  Description: INTERVENTIONS:  - Monitor labs and assess for signs and symptoms of volume excess or deficit  - Monitor intake, output and patient weight  - Monitor urine specific gravity, serum osmolarity and serum sodium as indicated or ordered  - Monitor response to interventions for patient's volume status, including labs, urine output, blood pressure (other measures as available)  - Encourage oral intake as appropriate  - Instruct patient on fluid and nutrition restrictions as appropriate  Outcome: Progressing     Problem: Patient/Family Goals  Goal: Patient/Family Long Term Goal  Description: Patient's Long Term Goal: go home    Interventions:  - manage blood sugar  - See additional Care Plan goals for specific interventions  Outcome: Progressing  Goal: Patient/Family Short Term Goal  Description: Patient's Short Term Goal: feel better    Interventions:   - manage blood sugar  - See additional Care Plan goals for specific interventions  Outcome: Progressing     Problem: CARDIOVASCULAR - ADULT  Goal: Maintains optimal cardiac output and hemodynamic stability  Description: INTERVENTIONS:  - Monitor vital signs, rhythm, and trends  - Monitor for bleeding, hypotension and signs of decreased cardiac output  - Evaluate effectiveness of vasoactive medications to optimize hemodynamic stability  - Monitor arterial and/or venous puncture sites for bleeding and/or hematoma  - Assess quality of pulses, skin color and temperature  - Assess for signs of decreased coronary artery perfusion - ex.  Angina  - Evaluate fluid balance, assess for edema, trend weights  Outcome: Progressing  Goal: Absence of cardiac arrhythmias or at baseline  Description: INTERVENTIONS:  - Continuous cardiac monitoring, monitor vital signs, obtain 12 lead EKG if indicated  - Evaluate effectiveness of antiarrhythmic and heart rate control medications as ordered  - Initiate emergency measures for life threatening arrhythmias  - Monitor electrolytes and administer replacement therapy as ordered  Outcome: Progressing

## 2023-08-10 LAB
ALBUMIN SERPL-MCNC: 2.3 G/DL (ref 3.4–5)
ANION GAP SERPL CALC-SCNC: 5 MMOL/L (ref 0–18)
BASOPHILS # BLD AUTO: 0.05 X10(3) UL (ref 0–0.2)
BASOPHILS NFR BLD AUTO: 0.8 %
BUN BLD-MCNC: 41 MG/DL (ref 7–18)
CALCIUM BLD-MCNC: 8.6 MG/DL (ref 8.5–10.1)
CHLORIDE SERPL-SCNC: 99 MMOL/L (ref 98–112)
CK SERPL-CCNC: 76 U/L
CO2 SERPL-SCNC: 28 MMOL/L (ref 21–32)
CREAT BLD-MCNC: 1.97 MG/DL
EGFRCR SERPLBLD CKD-EPI 2021: 27 ML/MIN/1.73M2 (ref 60–?)
EOSINOPHIL # BLD AUTO: 0.06 X10(3) UL (ref 0–0.7)
EOSINOPHIL NFR BLD AUTO: 0.9 %
ERYTHROCYTE [DISTWIDTH] IN BLOOD BY AUTOMATED COUNT: 15.2 %
GLUCOSE BLD-MCNC: 164 MG/DL (ref 70–99)
GLUCOSE BLD-MCNC: 179 MG/DL (ref 70–99)
GLUCOSE BLD-MCNC: 254 MG/DL (ref 70–99)
GLUCOSE BLD-MCNC: 281 MG/DL (ref 70–99)
GLUCOSE BLD-MCNC: 313 MG/DL (ref 70–99)
HCT VFR BLD AUTO: 22.7 %
HGB BLD-MCNC: 8 G/DL
IMM GRANULOCYTES # BLD AUTO: 0.03 X10(3) UL (ref 0–1)
IMM GRANULOCYTES NFR BLD: 0.5 %
LYMPHOCYTES # BLD AUTO: 1.08 X10(3) UL (ref 1–4)
LYMPHOCYTES NFR BLD AUTO: 17.1 %
MAGNESIUM SERPL-MCNC: 1.4 MG/DL (ref 1.6–2.6)
MCH RBC QN AUTO: 34.5 PG (ref 26–34)
MCHC RBC AUTO-ENTMCNC: 35.2 G/DL (ref 31–37)
MCV RBC AUTO: 97.8 FL
MONOCYTES # BLD AUTO: 1.14 X10(3) UL (ref 0.1–1)
MONOCYTES NFR BLD AUTO: 18 %
NEUTROPHILS # BLD AUTO: 3.96 X10 (3) UL (ref 1.5–7.7)
NEUTROPHILS # BLD AUTO: 3.96 X10(3) UL (ref 1.5–7.7)
NEUTROPHILS NFR BLD AUTO: 62.7 %
OSMOLALITY SERPL CALC.SUM OF ELEC: 288 MOSM/KG (ref 275–295)
PHOSPHATE SERPL-MCNC: 3 MG/DL (ref 2.5–4.9)
PLATELET # BLD AUTO: 215 10(3)UL (ref 150–450)
POTASSIUM SERPL-SCNC: 3.8 MMOL/L (ref 3.5–5.1)
RBC # BLD AUTO: 2.32 X10(6)UL
SODIUM SERPL-SCNC: 132 MMOL/L (ref 136–145)
WBC # BLD AUTO: 6.3 X10(3) UL (ref 4–11)

## 2023-08-10 RX ORDER — MAGNESIUM OXIDE 400 MG/1
800 TABLET ORAL ONCE
Status: COMPLETED | OUTPATIENT
Start: 2023-08-10 | End: 2023-08-10

## 2023-08-10 NOTE — PHYSICAL THERAPY NOTE
PHYSICAL THERAPY TREATMENT NOTE - INPATIENT    Room Number: 5844/6897-V     Session: 3    Number of Visits to Meet Established Goals: 5    Presenting Problem: DKA  Co-Morbidities : breast ca, DM, CHF, HTN, PVD  ASSESSMENT     Pt resistive to cues and education on safe mobility and fall prevention. Pt self reports no concerns c mobility for return home and is noted to become agitated easily. DISCHARGE RECOMMENDATIONS  PT Discharge Recommendations: Home with home health PT/OT     PLAN  PT Treatment Plan: Bed mobility; Body mechanics; Coordination; Endurance; Energy conservation;Patient education; Family education;Gait training;Neuromuscular re-educate;Range of motion;Strengthening;Stoop training;Stair training;Transfer training;Balance training  Rehab Potential : Good  Frequency (Obs): 3-5x/week    CURRENT GOALS       Goal #1 Patient is able to demonstrate supine - sit EOB @ level: supervision   MET    Goal #2 Patient is able to demonstrate transfers EOB to/from Chair/Wheelchair at assistance level: supervision   MET    Goal #3 Patient is able to ambulate 50 feet with assist device: walker - rolling at assistance level: minimum assistance   MET  progress to 150 with SBA   Goal #4  Patient will navigate stairs with rail and SBA  Pt states she does not need to negotiate stairs at home 8/10/23   Goal #5     Goal #6     Goal Comments: Goals established on 2023       8/10 2023 all goals ongoing      SUBJECTIVE  \"I have 12 stairs to the basement and I don't need to go down there\"       OBJECTIVE  Precautions: Bed/chair alarm    WEIGHT BEARING RESTRICTION  Weight Bearing Restriction: None                PAIN ASSESSMENT   Ratin          BALANCE                                                                                                                       Static Sitting: Good  Dynamic Sitting: Good           Static Standing: Fair -  Dynamic Standing: Not tested    ACTIVITY TOLERANCE O2 WALK         AM-PAC '6-Clicks' INPATIENT SHORT FORM - BASIC MOBILITY  How much difficulty does the patient currently have. .. Patient Difficulty: Turning over in bed (including adjusting bedclothes, sheets and blankets)?: None   Patient Difficulty: Sitting down on and standing up from a chair with arms (e.g., wheelchair, bedside commode, etc.): None   Patient Difficulty: Moving from lying on back to sitting on the side of the bed?: None   How much help from another person does the patient currently need. .. Help from Another: Moving to and from a bed to a chair (including a wheelchair)?: A Little   Help from Another: Need to walk in hospital room?: A Little   Help from Another: Climbing 3-5 steps with a railing?: A Lot       AM-PAC Score:  Raw Score: 20   Approx Degree of Impairment: 35.83%   Standardized Score (AM-PAC Scale): 47.67   CMS Modifier (G-Code): CJ    FUNCTIONAL ABILITY STATUS  Gait Assessment   Functional Mobility/Gait Assessment  Gait Assistance: Not tested  Distance (ft): 0  Assistive Device: Rolling walker  Pattern:  (sophie flat feet, no DF/PF)    Skilled Therapy Provided  Pt presents seated in BS chair c heating pad on chest. Pt repeatedly states she does not want to be asked if she's in pain and states she \"walks just fine. I don't need help. Everyone seems shocked that I walk so well\". Educated pt on benefits of mobility, role of PT and goals for session. Stair training deferred as pt denies that she needs to negotiate stairs at home. Pt requesting brief be changed prior to ambulation. Pt performs sit<>stand c CGA to supervision. Mod/max A to change brief. Pt demos fair - static stance balance. Pt abruptly returns to sit in [place] to [address]. Educated pt on reasoning for GB for safety, and fall prevention. pt states \"then, I'm not going\"   Pt left in chair, needs met.    Bed Mobility:  Rolling: i  Supine<>Sit:    Transfer Mobility:  Sit<>Stand: sup.min A , pt pulls up on RW, requiring bracing of RW   Stand<>Sit: mod I   Gait:       Patient End of Session: Up in chair;Needs met;Call light within reach;RN aware of session/findings; All patient questions and concerns addressed    PT Session Time: 23 minutes  Gait Training: minutes  Therapeutic Activity: 23 minutes  Therapeutic Exercise: minutes   Neuromuscular Re-education:  minutes

## 2023-08-10 NOTE — PLAN OF CARE
Received patient at 0730. Alert and Oriented x4. Tele Rhythm NSR. Pt on RA. Breath sounds clear/diminished. Bed is locked and in low position. Call light and personal items within reach. No C/O chest pain or shortness of breath. Pt up in chair. L arm precautions. Reviewed plan of care and patient verbalizes understanding. Plan:  Awaiting mds    1030: pt refusing gait belt for safety in order to ambulate with PT, will attempt again later.      Problem: Diabetes/Glucose Control  Goal: Glucose maintained within prescribed range  Description: INTERVENTIONS:  - Monitor Blood Glucose as ordered  - Assess for signs and symptoms of hyperglycemia and hypoglycemia  - Administer ordered medications to maintain glucose within target range  - Assess barriers to adequate nutritional intake and initiate nutrition consult as needed  - Instruct patient on self management of diabetes  8/10/2023 0858 by Dyllan Kaiser RN  Outcome: Progressing  8/10/2023 0858 by Dyllan Kaiser RN  Outcome: Progressing     Problem: Delirium  Goal: Minimize duration of delirium  Description: Interventions:  - Encourage use of hearing aids, eye glasses  - Promote highest level of mobility daily  - Provide frequent reorientation  - Promote wakefulness i.e. lights on, blinds open  - Promote sleep, encourage patient's normal rest cycle i.e. lights off, TV off, minimize noise and interruptions  - Encourage family to assist in orientation and promotion of home routines  8/10/2023 0858 by Dyllan Kaiser RN  Outcome: Progressing  8/10/2023 0858 by Dyllan Kaiser RN  Outcome: Progressing     Problem: GASTROINTESTINAL - ADULT  Goal: Maintains or returns to baseline bowel function  Description: INTERVENTIONS:  - Assess bowel function  - Maintain adequate hydration with IV or PO as ordered and tolerated  - Evaluate effectiveness of GI medications  - Encourage mobilization and activity  - Obtain nutritional consult as needed  - Establish a toileting routine/schedule  - Consider collaborating with pharmacy to review patient's medication profile  8/10/2023 0858 by Nury Amin RN  Outcome: Progressing  8/10/2023 0858 by Nury Amin RN  Outcome: Progressing     Problem: METABOLIC/FLUID AND ELECTROLYTES - ADULT  Goal: Glucose maintained within prescribed range  Description: INTERVENTIONS:  - Monitor Blood Glucose as ordered  - Assess for signs and symptoms of hyperglycemia and hypoglycemia  - Administer ordered medications to maintain glucose within target range  - Assess barriers to adequate nutritional intake and initiate nutrition consult as needed  - Instruct patient on self management of diabetes  8/10/2023 0858 by Nury Amin RN  Outcome: Progressing  8/10/2023 0858 by Nury Amin RN  Outcome: Progressing  Goal: Electrolytes maintained within normal limits  Description: INTERVENTIONS:  - Monitor labs and rhythm and assess patient for signs and symptoms of electrolyte imbalances  - Administer electrolyte replacement as ordered  - Monitor response to electrolyte replacements, including rhythm and repeat lab results as appropriate  - Fluid restriction as ordered  - Instruct patient on fluid and nutrition restrictions as appropriate  8/10/2023 0858 by Nury Amin RN  Outcome: Progressing  8/10/2023 0858 by Nury Amin RN  Outcome: Progressing  Goal: Hemodynamic stability and optimal renal function maintained  Description: INTERVENTIONS:  - Monitor labs and assess for signs and symptoms of volume excess or deficit  - Monitor intake, output and patient weight  - Monitor urine specific gravity, serum osmolarity and serum sodium as indicated or ordered  - Monitor response to interventions for patient's volume status, including labs, urine output, blood pressure (other measures as available)  - Encourage oral intake as appropriate  - Instruct patient on fluid and nutrition restrictions as appropriate  8/10/2023 0858 by Jeff Lopez RN  Outcome: Progressing  8/10/2023 0858 by Jeff Lopez RN  Outcome: Progressing     Problem: CARDIOVASCULAR - ADULT  Goal: Maintains optimal cardiac output and hemodynamic stability  Description: INTERVENTIONS:  - Monitor vital signs, rhythm, and trends  - Monitor for bleeding, hypotension and signs of decreased cardiac output  - Evaluate effectiveness of vasoactive medications to optimize hemodynamic stability  - Monitor arterial and/or venous puncture sites for bleeding and/or hematoma  - Assess quality of pulses, skin color and temperature  - Assess for signs of decreased coronary artery perfusion - ex.  Angina  - Evaluate fluid balance, assess for edema, trend weights  Outcome: Progressing  Goal: Absence of cardiac arrhythmias or at baseline  Description: INTERVENTIONS:  - Continuous cardiac monitoring, monitor vital signs, obtain 12 lead EKG if indicated  - Evaluate effectiveness of antiarrhythmic and heart rate control medications as ordered  - Initiate emergency measures for life threatening arrhythmias  - Monitor electrolytes and administer replacement therapy as ordered  Outcome: Progressing     Problem: Patient/Family Goals  Goal: Patient/Family Long Term Goal  Description: Patient's Long Term Goal: go home    Interventions:  - manage blood sugar  - See additional Care Plan goals for specific interventions  8/10/2023 0858 by Jeff Lopez RN  Outcome: Progressing  8/10/2023 0858 by Jeff Lopez RN  Outcome: Progressing  Goal: Patient/Family Short Term Goal  Description: Patient's Short Term Goal: feel better    Interventions:   - manage blood sugar  - See additional Care Plan goals for specific interventions  Outcome: Progressing

## 2023-08-10 NOTE — PLAN OF CARE
Assumed pt care at [facility]. A&O x4. Tele rhythm NSR. SPO2 94% on room air. Breath sounds clear and diminished Pt voiding with no issues. No c/o chest pain or shortness of breath. Skin dry and intact. Bed is locked and in low position. Call light and personal items within reach. Pt ambulated in hallway without issue. Reviewed plan of care and patient verbalizes understanding. Melatonin 5 mg given at 2146. Pt slept until 3 AM, but doesn't remember falling asleep.   0315: Pt ambulated in hallway, no issues  Problem: Diabetes/Glucose Control  Goal: Glucose maintained within prescribed range  Description: INTERVENTIONS:  - Monitor Blood Glucose as ordered  - Assess for signs and symptoms of hyperglycemia and hypoglycemia  - Administer ordered medications to maintain glucose within target range  - Assess barriers to adequate nutritional intake and initiate nutrition consult as needed  - Instruct patient on self management of diabetes  Outcome: Progressing     Problem: Delirium  Goal: Minimize duration of delirium  Description: Interventions:  - Encourage use of hearing aids, eye glasses  - Promote highest level of mobility daily  - Provide frequent reorientation  - Promote wakefulness i.e. lights on, blinds open  - Promote sleep, encourage patient's normal rest cycle i.e. lights off, TV off, minimize noise and interruptions  - Encourage family to assist in orientation and promotion of home routines  Outcome: Progressing     Problem: GASTROINTESTINAL - ADULT  Goal: Maintains or returns to baseline bowel function  Description: INTERVENTIONS:  - Assess bowel function  - Maintain adequate hydration with IV or PO as ordered and tolerated  - Evaluate effectiveness of GI medications  - Encourage mobilization and activity  - Obtain nutritional consult as needed  - Establish a toileting routine/schedule  - Consider collaborating with pharmacy to review patient's medication profile  Outcome: Progressing     Problem: METABOLIC/FLUID AND ELECTROLYTES - ADULT  Goal: Glucose maintained within prescribed range  Description: INTERVENTIONS:  - Monitor Blood Glucose as ordered  - Assess for signs and symptoms of hyperglycemia and hypoglycemia  - Administer ordered medications to maintain glucose within target range  - Assess barriers to adequate nutritional intake and initiate nutrition consult as needed  - Instruct patient on self management of diabetes  Outcome: Progressing  Goal: Electrolytes maintained within normal limits  Description: INTERVENTIONS:  - Monitor labs and rhythm and assess patient for signs and symptoms of electrolyte imbalances  - Administer electrolyte replacement as ordered  - Monitor response to electrolyte replacements, including rhythm and repeat lab results as appropriate  - Fluid restriction as ordered  - Instruct patient on fluid and nutrition restrictions as appropriate  Outcome: Progressing  Goal: Hemodynamic stability and optimal renal function maintained  Description: INTERVENTIONS:  - Monitor labs and assess for signs and symptoms of volume excess or deficit  - Monitor intake, output and patient weight  - Monitor urine specific gravity, serum osmolarity and serum sodium as indicated or ordered  - Monitor response to interventions for patient's volume status, including labs, urine output, blood pressure (other measures as available)  - Encourage oral intake as appropriate  - Instruct patient on fluid and nutrition restrictions as appropriate  Outcome: Progressing     Problem: CARDIOVASCULAR - ADULT  Goal: Maintains optimal cardiac output and hemodynamic stability  Description: INTERVENTIONS:  - Monitor vital signs, rhythm, and trends  - Monitor for bleeding, hypotension and signs of decreased cardiac output  - Evaluate effectiveness of vasoactive medications to optimize hemodynamic stability  - Monitor arterial and/or venous puncture sites for bleeding and/or hematoma  - Assess quality of pulses, skin color and temperature  - Assess for signs of decreased coronary artery perfusion - ex.  Angina  - Evaluate fluid balance, assess for edema, trend weights  Outcome: Progressing  Goal: Absence of cardiac arrhythmias or at baseline  Description: INTERVENTIONS:  - Continuous cardiac monitoring, monitor vital signs, obtain 12 lead EKG if indicated  - Evaluate effectiveness of antiarrhythmic and heart rate control medications as ordered  - Initiate emergency measures for life threatening arrhythmias  - Monitor electrolytes and administer replacement therapy as ordered  Outcome: Progressing     Problem: Patient/Family Goals  Goal: Patient/Family Long Term Goal  Description: Patient's Long Term Goal: go home    Interventions:  - manage blood sugar  - See additional Care Plan goals for specific interventions  Outcome: Progressing  Goal: Patient/Family Short Term Goal  Description: Patient's Short Term Goal: feel better    Interventions:   - manage blood sugar  - See additional Care Plan goals for specific interventions  Outcome: Progressing

## 2023-08-11 LAB
ALBUMIN SERPL-MCNC: 2.4 G/DL (ref 3.4–5)
ANION GAP SERPL CALC-SCNC: 4 MMOL/L (ref 0–18)
BASOPHILS # BLD AUTO: 0.03 X10(3) UL (ref 0–0.2)
BASOPHILS NFR BLD AUTO: 0.6 %
BUN BLD-MCNC: 40 MG/DL (ref 7–18)
CALCIUM BLD-MCNC: 8.3 MG/DL (ref 8.5–10.1)
CHLORIDE SERPL-SCNC: 99 MMOL/L (ref 98–112)
CO2 SERPL-SCNC: 30 MMOL/L (ref 21–32)
CREAT BLD-MCNC: 1.8 MG/DL
EGFRCR SERPLBLD CKD-EPI 2021: 30 ML/MIN/1.73M2 (ref 60–?)
EOSINOPHIL # BLD AUTO: 0.09 X10(3) UL (ref 0–0.7)
EOSINOPHIL NFR BLD AUTO: 1.7 %
ERYTHROCYTE [DISTWIDTH] IN BLOOD BY AUTOMATED COUNT: 15.3 %
GLUCOSE BLD-MCNC: 170 MG/DL (ref 70–99)
GLUCOSE BLD-MCNC: 193 MG/DL (ref 70–99)
GLUCOSE BLD-MCNC: 299 MG/DL (ref 70–99)
GLUCOSE BLD-MCNC: 326 MG/DL (ref 70–99)
GLUCOSE BLD-MCNC: 349 MG/DL (ref 70–99)
GLUCOSE BLD-MCNC: 364 MG/DL (ref 70–99)
HCT VFR BLD AUTO: 22 %
HGB BLD-MCNC: 7.6 G/DL
IMM GRANULOCYTES # BLD AUTO: 0.03 X10(3) UL (ref 0–1)
IMM GRANULOCYTES NFR BLD: 0.6 %
LYMPHOCYTES # BLD AUTO: 1.24 X10(3) UL (ref 1–4)
LYMPHOCYTES NFR BLD AUTO: 22.8 %
MAGNESIUM SERPL-MCNC: 1.4 MG/DL (ref 1.6–2.6)
MCH RBC QN AUTO: 34.4 PG (ref 26–34)
MCHC RBC AUTO-ENTMCNC: 34.5 G/DL (ref 31–37)
MCV RBC AUTO: 99.5 FL
MONOCYTES # BLD AUTO: 1.04 X10(3) UL (ref 0.1–1)
MONOCYTES NFR BLD AUTO: 19.2 %
NEUTROPHILS # BLD AUTO: 3 X10 (3) UL (ref 1.5–7.7)
NEUTROPHILS # BLD AUTO: 3 X10(3) UL (ref 1.5–7.7)
NEUTROPHILS NFR BLD AUTO: 55.1 %
OSMOLALITY SERPL CALC.SUM OF ELEC: 290 MOSM/KG (ref 275–295)
PHOSPHATE SERPL-MCNC: 2.7 MG/DL (ref 2.5–4.9)
PLATELET # BLD AUTO: 218 10(3)UL (ref 150–450)
POTASSIUM SERPL-SCNC: 3.6 MMOL/L (ref 3.5–5.1)
RBC # BLD AUTO: 2.21 X10(6)UL
SODIUM SERPL-SCNC: 133 MMOL/L (ref 136–145)
WBC # BLD AUTO: 5.4 X10(3) UL (ref 4–11)

## 2023-08-11 RX ORDER — METOPROLOL SUCCINATE 25 MG/1
25 TABLET, EXTENDED RELEASE ORAL DAILY
Qty: 90 TABLET | Refills: 3 | Status: SHIPPED | OUTPATIENT
Start: 2023-08-11 | End: 2024-08-05

## 2023-08-11 RX ORDER — MAGNESIUM OXIDE 400 MG/1
800 TABLET ORAL ONCE
Status: COMPLETED | OUTPATIENT
Start: 2023-08-11 | End: 2023-08-11

## 2023-08-11 RX ORDER — TORSEMIDE 20 MG/1
20 TABLET ORAL DAILY
Qty: 90 TABLET | Refills: 3 | Status: SHIPPED | OUTPATIENT
Start: 2023-08-12 | End: 2024-08-06

## 2023-08-11 RX ORDER — SPIRONOLACTONE 25 MG/1
25 TABLET ORAL DAILY
COMMUNITY
End: 2023-08-12

## 2023-08-11 NOTE — CM/SW NOTE
Informed by RN that patient will require a lifevest at discharge. LVM with Davey Zambrano ([phone]) from [place] [address] made him aware of the new order. Faxed referral. Awaiting approval. SW will remain available. Addendum 1:55pm: Spoke with Davey Zambrano from [place] [address] who will keep SW updated on status. 4pm: Spoke with Sean Hammer from [place] [address] ([phone]) who states lifevest still pending, he will update SW shortly. 4:30pm: Spoke with Sean Hammer from [place] [address] again, they do not have approval yet, he requested his team expedite. Provided Sean Pulling with José Marx RN number to contact with updates.        [facility] Lifevest: [address] [facility]  Discharge Planner  [phone]

## 2023-08-11 NOTE — PLAN OF CARE
Assumed pt care at [facility]. A&O x4. Tele rhythm NSR. SPO2 96% on room air. Breath sounds clear and diminished bilaterally. Pt voiding with no issues, purewick in place. No c/o chest pain or shortness of breath. Skin dry and intact. Bed is locked and in low position. Call light and personal items within reach. 2115 and 2300 Pt ambulated in hallway without issue. Reviewed plan of care and patient verbalizes understanding.   POC: Discharge 8/11  Problem: Diabetes/Glucose Control  Goal: Glucose maintained within prescribed range  Description: INTERVENTIONS:  - Monitor Blood Glucose as ordered  - Assess for signs and symptoms of hyperglycemia and hypoglycemia  - Administer ordered medications to maintain glucose within target range  - Assess barriers to adequate nutritional intake and initiate nutrition consult as needed  - Instruct patient on self management of diabetes  Outcome: Progressing     Problem: Delirium  Goal: Minimize duration of delirium  Description: Interventions:  - Encourage use of hearing aids, eye glasses  - Promote highest level of mobility daily  - Provide frequent reorientation  - Promote wakefulness i.e. lights on, blinds open  - Promote sleep, encourage patient's normal rest cycle i.e. lights off, TV off, minimize noise and interruptions  - Encourage family to assist in orientation and promotion of home routines  Outcome: Progressing     Problem: GASTROINTESTINAL - ADULT  Goal: Maintains or returns to baseline bowel function  Description: INTERVENTIONS:  - Assess bowel function  - Maintain adequate hydration with IV or PO as ordered and tolerated  - Evaluate effectiveness of GI medications  - Encourage mobilization and activity  - Obtain nutritional consult as needed  - Establish a toileting routine/schedule  - Consider collaborating with pharmacy to review patient's medication profile  Outcome: Progressing     Problem: METABOLIC/FLUID AND ELECTROLYTES - ADULT  Goal: Glucose maintained within prescribed range  Description: INTERVENTIONS:  - Monitor Blood Glucose as ordered  - Assess for signs and symptoms of hyperglycemia and hypoglycemia  - Administer ordered medications to maintain glucose within target range  - Assess barriers to adequate nutritional intake and initiate nutrition consult as needed  - Instruct patient on self management of diabetes  Outcome: Progressing  Goal: Electrolytes maintained within normal limits  Description: INTERVENTIONS:  - Monitor labs and rhythm and assess patient for signs and symptoms of electrolyte imbalances  - Administer electrolyte replacement as ordered  - Monitor response to electrolyte replacements, including rhythm and repeat lab results as appropriate  - Fluid restriction as ordered  - Instruct patient on fluid and nutrition restrictions as appropriate  Outcome: Progressing  Goal: Hemodynamic stability and optimal renal function maintained  Description: INTERVENTIONS:  - Monitor labs and assess for signs and symptoms of volume excess or deficit  - Monitor intake, output and patient weight  - Monitor urine specific gravity, serum osmolarity and serum sodium as indicated or ordered  - Monitor response to interventions for patient's volume status, including labs, urine output, blood pressure (other measures as available)  - Encourage oral intake as appropriate  - Instruct patient on fluid and nutrition restrictions as appropriate  Outcome: Progressing     Problem: CARDIOVASCULAR - ADULT  Goal: Maintains optimal cardiac output and hemodynamic stability  Description: INTERVENTIONS:  - Monitor vital signs, rhythm, and trends  - Monitor for bleeding, hypotension and signs of decreased cardiac output  - Evaluate effectiveness of vasoactive medications to optimize hemodynamic stability  - Monitor arterial and/or venous puncture sites for bleeding and/or hematoma  - Assess quality of pulses, skin color and temperature  - Assess for signs of decreased coronary artery perfusion - ex.  Angina  - Evaluate fluid balance, assess for edema, trend weights  Outcome: Progressing  Goal: Absence of cardiac arrhythmias or at baseline  Description: INTERVENTIONS:  - Continuous cardiac monitoring, monitor vital signs, obtain 12 lead EKG if indicated  - Evaluate effectiveness of antiarrhythmic and heart rate control medications as ordered  - Initiate emergency measures for life threatening arrhythmias  - Monitor electrolytes and administer replacement therapy as ordered  Outcome: Progressing     Problem: Patient/Family Goals  Goal: Patient/Family Long Term Goal  Description: Patient's Long Term Goal: go home    Interventions:  - manage blood sugar  - See additional Care Plan goals for specific interventions  Outcome: Progressing  Goal: Patient/Family Short Term Goal  Description: Patient's Short Term Goal: feel better    Interventions:   - manage blood sugar  - See additional Care Plan goals for specific interventions  Outcome: Progressing

## 2023-08-12 VITALS
HEART RATE: 82 BPM | BODY MASS INDEX: 20.2 KG/M2 | RESPIRATION RATE: 19 BRPM | OXYGEN SATURATION: 96 % | SYSTOLIC BLOOD PRESSURE: 134 MMHG | DIASTOLIC BLOOD PRESSURE: 51 MMHG | WEIGHT: 125.69 LBS | HEIGHT: 66 IN | TEMPERATURE: 99 F

## 2023-08-12 LAB
ALBUMIN SERPL-MCNC: 2.4 G/DL (ref 3.4–5)
ANION GAP SERPL CALC-SCNC: 6 MMOL/L (ref 0–18)
BASOPHILS # BLD AUTO: 0.02 X10(3) UL (ref 0–0.2)
BASOPHILS NFR BLD AUTO: 0.3 %
BUN BLD-MCNC: 37 MG/DL (ref 7–18)
CALCIUM BLD-MCNC: 8 MG/DL (ref 8.5–10.1)
CHLORIDE SERPL-SCNC: 97 MMOL/L (ref 98–112)
CO2 SERPL-SCNC: 29 MMOL/L (ref 21–32)
CREAT BLD-MCNC: 1.65 MG/DL
EGFRCR SERPLBLD CKD-EPI 2021: 34 ML/MIN/1.73M2 (ref 60–?)
EOSINOPHIL # BLD AUTO: 0.16 X10(3) UL (ref 0–0.7)
EOSINOPHIL NFR BLD AUTO: 2.4 %
ERYTHROCYTE [DISTWIDTH] IN BLOOD BY AUTOMATED COUNT: 15.3 %
GLUCOSE BLD-MCNC: 190 MG/DL (ref 70–99)
GLUCOSE BLD-MCNC: 194 MG/DL (ref 70–99)
GLUCOSE BLD-MCNC: 206 MG/DL (ref 70–99)
HCT VFR BLD AUTO: 22.8 %
HGB BLD-MCNC: 7.8 G/DL
IMM GRANULOCYTES # BLD AUTO: 0.06 X10(3) UL (ref 0–1)
IMM GRANULOCYTES NFR BLD: 0.9 %
LYMPHOCYTES # BLD AUTO: 1.93 X10(3) UL (ref 1–4)
LYMPHOCYTES NFR BLD AUTO: 29.2 %
MAGNESIUM SERPL-MCNC: 1.4 MG/DL (ref 1.6–2.6)
MCH RBC QN AUTO: 33.9 PG (ref 26–34)
MCHC RBC AUTO-ENTMCNC: 34.2 G/DL (ref 31–37)
MCV RBC AUTO: 99.1 FL
MONOCYTES # BLD AUTO: 1.17 X10(3) UL (ref 0.1–1)
MONOCYTES NFR BLD AUTO: 17.7 %
NEUTROPHILS # BLD AUTO: 3.28 X10 (3) UL (ref 1.5–7.7)
NEUTROPHILS # BLD AUTO: 3.28 X10(3) UL (ref 1.5–7.7)
NEUTROPHILS NFR BLD AUTO: 49.5 %
OSMOLALITY SERPL CALC.SUM OF ELEC: 288 MOSM/KG (ref 275–295)
PHOSPHATE SERPL-MCNC: 2.6 MG/DL (ref 2.5–4.9)
PLATELET # BLD AUTO: 249 10(3)UL (ref 150–450)
POTASSIUM SERPL-SCNC: 3.7 MMOL/L (ref 3.5–5.1)
RBC # BLD AUTO: 2.3 X10(6)UL
SODIUM SERPL-SCNC: 132 MMOL/L (ref 136–145)
WBC # BLD AUTO: 6.6 X10(3) UL (ref 4–11)

## 2023-08-12 NOTE — PLAN OF CARE
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 NSR, positive bowel sounds, voids +2 edema to lower extremities. Lifevest approved ( EF 15-20%). Awating [employer] rep. Patient is calling  to see if he wants to be here while lifevest is being placed. Then discharge home. 1225 Per [employer] Life vest rep: \" patient doesnt have the strength to press the button and is noncompliant with instructions. She will be shocked if she doesnt hit the button. \" Dr Jesus Martins. Latrell Welch (cards) notified. Dr Arnaud Cuba agreed that if patient cannot press the button d/t neuropathy that patient does not have [employer] life vest.  , Bud, and patient both agreed to not go home with [address] d/t neuropathy and not able to press button. Discharge instructions reviewed thoroughly with patient and . Entresto, jardiance on hold d/t creat level. Will FU with Olya Hudson ( APN) to monitor creat level and meds. Will also FU with PCP for better diabetes control.       Problem: Diabetes/Glucose Control  Goal: Glucose maintained within prescribed range  Description: INTERVENTIONS:  - Monitor Blood Glucose as ordered  - Assess for signs and symptoms of hyperglycemia and hypoglycemia  - Administer ordered medications to maintain glucose within target range  - Assess barriers to adequate nutritional intake and initiate nutrition consult as needed  - Instruct patient on self management of diabetes  Outcome: Adequate for Discharge

## 2023-08-12 NOTE — CM/SW NOTE
Spoke with Darrell hills from [employer] ([phone]) to check status of lifevest. He states it was approved later yesterday evening and they are working on delivery/fitting asap. He will update SW with time. SW/CM to remain available for support and/or discharge planning. Addendum 10:20am: Tamanna sewell from [employer] will be here within the hour to delivery/fitting. RN updated.      LEONOR Jiang  Discharge Planner  [phone]

## 2023-08-12 NOTE — PLAN OF CARE
Pt A&O X4. On RA. NSR on tele. Purewick in place, Up X1 with walker. No complaints of pain, SOB or CP. Blood sugar at 1635: 364. Gave 5 units on sliding scale. Dr. Zach Verdin notified. Plan of care discussed w/t pt. Call light with in reach. Fall precaution in place.     POC: life vest on discharge    Problem: Diabetes/Glucose Control  Goal: Glucose maintained within prescribed range  Description: INTERVENTIONS:  - Monitor Blood Glucose as ordered  - Assess for signs and symptoms of hyperglycemia and hypoglycemia  - Administer ordered medications to maintain glucose within target range  - Assess barriers to adequate nutritional intake and initiate nutrition consult as needed  - Instruct patient on self management of diabetes  Outcome: Progressing

## 2023-08-12 NOTE — PLAN OF CARE
Assumed care at [address] [place] [address]. Pt is A&Ox4, L facial droop at baseline due to acoustic neuroma. Pt is on RA, O2 sats WNL. NSR on tele, VSS. Incontinent of bladder, purewick in place. Denies pain at this time. Up w/ 1 and walker tolerating well. Plan of care reviewed with patient, verbalizes understanding, all needs addressed at this time, call light within reach.     POC: lifevest fitting and education prior to discharge    Problem: Diabetes/Glucose Control  Goal: Glucose maintained within prescribed range  Description: INTERVENTIONS:  - Monitor Blood Glucose as ordered  - Assess for signs and symptoms of hyperglycemia and hypoglycemia  - Administer ordered medications to maintain glucose within target range  - Assess barriers to adequate nutritional intake and initiate nutrition consult as needed  - Instruct patient on self management of diabetes  Outcome: Progressing     Problem: Delirium  Goal: Minimize duration of delirium  Description: Interventions:  - Encourage use of hearing aids, eye glasses  - Promote highest level of mobility daily  - Provide frequent reorientation  - Promote wakefulness i.e. lights on, blinds open  - Promote sleep, encourage patient's normal rest cycle i.e. lights off, TV off, minimize noise and interruptions  - Encourage family to assist in orientation and promotion of home routines  Outcome: Progressing     Problem: GASTROINTESTINAL - ADULT  Goal: Maintains or returns to baseline bowel function  Description: INTERVENTIONS:  - Assess bowel function  - Maintain adequate hydration with IV or PO as ordered and tolerated  - Evaluate effectiveness of GI medications  - Encourage mobilization and activity  - Obtain nutritional consult as needed  - Establish a toileting routine/schedule  - Consider collaborating with pharmacy to review patient's medication profile  Outcome: Progressing     Problem: METABOLIC/FLUID AND ELECTROLYTES - ADULT  Goal: Glucose maintained within prescribed range  Description: INTERVENTIONS:  - Monitor Blood Glucose as ordered  - Assess for signs and symptoms of hyperglycemia and hypoglycemia  - Administer ordered medications to maintain glucose within target range  - Assess barriers to adequate nutritional intake and initiate nutrition consult as needed  - Instruct patient on self management of diabetes  Outcome: Progressing  Goal: Electrolytes maintained within normal limits  Description: INTERVENTIONS:  - Monitor labs and rhythm and assess patient for signs and symptoms of electrolyte imbalances  - Administer electrolyte replacement as ordered  - Monitor response to electrolyte replacements, including rhythm and repeat lab results as appropriate  - Fluid restriction as ordered  - Instruct patient on fluid and nutrition restrictions as appropriate  Outcome: Progressing  Goal: Hemodynamic stability and optimal renal function maintained  Description: INTERVENTIONS:  - Monitor labs and assess for signs and symptoms of volume excess or deficit  - Monitor intake, output and patient weight  - Monitor urine specific gravity, serum osmolarity and serum sodium as indicated or ordered  - Monitor response to interventions for patient's volume status, including labs, urine output, blood pressure (other measures as available)  - Encourage oral intake as appropriate  - Instruct patient on fluid and nutrition restrictions as appropriate  Outcome: Progressing     Problem: CARDIOVASCULAR - ADULT  Goal: Maintains optimal cardiac output and hemodynamic stability  Description: INTERVENTIONS:  - Monitor vital signs, rhythm, and trends  - Monitor for bleeding, hypotension and signs of decreased cardiac output  - Evaluate effectiveness of vasoactive medications to optimize hemodynamic stability  - Monitor arterial and/or venous puncture sites for bleeding and/or hematoma  - Assess quality of pulses, skin color and temperature  - Assess for signs of decreased coronary artery perfusion - ex.  Angina  - Evaluate fluid balance, assess for edema, trend weights  Outcome: Progressing  Goal: Absence of cardiac arrhythmias or at baseline  Description: INTERVENTIONS:  - Continuous cardiac monitoring, monitor vital signs, obtain 12 lead EKG if indicated  - Evaluate effectiveness of antiarrhythmic and heart rate control medications as ordered  - Initiate emergency measures for life threatening arrhythmias  - Monitor electrolytes and administer replacement therapy as ordered  Outcome: Progressing     Problem: Patient/Family Goals  Goal: Patient/Family Long Term Goal  Description: Patient's Long Term Goal: go home    Interventions:  - manage blood sugar  - See additional Care Plan goals for specific interventions  Outcome: Progressing  Goal: Patient/Family Short Term Goal  Description: Patient's Short Term Goal: feel better    Interventions:   - manage blood sugar  - See additional Care Plan goals for specific interventions  Outcome: Progressing

## 2023-08-21 ENCOUNTER — PATIENT OUTREACH (OUTPATIENT)
Dept: CASE MANAGEMENT | Age: 68
End: 2023-08-21

## 2023-08-21 NOTE — PROGRESS NOTES
1st attempt DM [facility] apt request   No answer, generic vm   CARDS -existing apt (8/23) ORDER  PCP-existing apt (9/25)

## 2023-09-25 ENCOUNTER — APPOINTMENT (OUTPATIENT)
Dept: GENERAL RADIOLOGY | Facility: HOSPITAL | Age: 68
End: 2023-09-25
Attending: EMERGENCY MEDICINE
Payer: MEDICARE

## 2023-09-25 ENCOUNTER — HOSPITAL ENCOUNTER (INPATIENT)
Facility: HOSPITAL | Age: 68
LOS: 10 days | Discharge: NURSING, REHAB OR LONG-TERM CARE | End: 2023-10-06
Attending: EMERGENCY MEDICINE | Admitting: HOSPITALIST
Payer: MEDICARE

## 2023-09-25 DIAGNOSIS — E10.10 TYPE 1 DIABETES MELLITUS WITH KETOACIDOSIS WITHOUT COMA (HCC): Primary | ICD-10-CM

## 2023-09-25 LAB
ALBUMIN SERPL-MCNC: 3.6 G/DL (ref 3.4–5)
ALBUMIN/GLOB SERPL: 1.1 {RATIO} (ref 1–2)
ALP LIVER SERPL-CCNC: 94 U/L
ALT SERPL-CCNC: 71 U/L
ANION GAP SERPL CALC-SCNC: 29 MMOL/L (ref 0–18)
AST SERPL-CCNC: 200 U/L (ref 15–37)
BASE EXCESS BLDV CALC-SCNC: -25.4 MMOL/L
BASOPHILS # BLD: 0 X10(3) UL (ref 0–0.2)
BASOPHILS NFR BLD: 0 %
BILIRUB SERPL-MCNC: 0.4 MG/DL (ref 0.1–2)
BUN BLD-MCNC: 62 MG/DL (ref 7–18)
CALCIUM BLD-MCNC: 11.5 MG/DL (ref 8.5–10.1)
CHLORIDE SERPL-SCNC: 91 MMOL/L (ref 98–112)
CO2 SERPL-SCNC: 7 MMOL/L (ref 21–32)
CREAT BLD-MCNC: 2.15 MG/DL
EGFRCR SERPLBLD CKD-EPI 2021: 24 ML/MIN/1.73M2 (ref 60–?)
EOSINOPHIL # BLD: 0 X10(3) UL (ref 0–0.7)
EOSINOPHIL NFR BLD: 0 %
ERYTHROCYTE [DISTWIDTH] IN BLOOD BY AUTOMATED COUNT: 12.1 %
GLOBULIN PLAS-MCNC: 3.4 G/DL (ref 2.8–4.4)
GLUCOSE BLD-MCNC: 663 MG/DL (ref 70–99)
HCO3 BLDV-SCNC: 3.6 MEQ/L (ref 22–26)
HCT VFR BLD AUTO: 38.9 %
HGB BLD-MCNC: 12.5 G/DL
LACTATE BLD-SCNC: 4.9 MMOL/L (ref 0.5–2)
LYMPHOCYTES NFR BLD: 1.33 X10(3) UL (ref 1–4)
LYMPHOCYTES NFR BLD: 6 %
MCH RBC QN AUTO: 35 PG (ref 26–34)
MCHC RBC AUTO-ENTMCNC: 32.1 G/DL (ref 31–37)
MCV RBC AUTO: 109 FL
MONOCYTES # BLD: 0.66 X10(3) UL (ref 0.1–1)
MONOCYTES NFR BLD: 3 %
NEUTROPHILS # BLD AUTO: 20.23 X10 (3) UL (ref 1.5–7.7)
NEUTROPHILS NFR BLD: 90 %
NEUTS BAND NFR BLD: 1 %
NEUTS HYPERSEG # BLD: 20.11 X10(3) UL (ref 1.5–7.7)
OSMOLALITY SERPL CALC.SUM OF ELEC: 313 MOSM/KG (ref 275–295)
OXYHGB MFR BLDV: 41.4 % (ref 72–78)
PCO2 BLDV: 22 MM HG (ref 38–50)
PH BLDV: 6.97 [PH] (ref 7.33–7.43)
PLATELET # BLD AUTO: 172 10(3)UL (ref 150–450)
PLATELET MORPHOLOGY: NORMAL
PO2 BLDV: 33 MM HG (ref 30–50)
POTASSIUM SERPL-SCNC: 5.2 MMOL/L (ref 3.5–5.1)
PROT SERPL-MCNC: 7 G/DL (ref 6.4–8.2)
RBC # BLD AUTO: 3.57 X10(6)UL
SARS-COV-2 RNA RESP QL NAA+PROBE: NOT DETECTED
SODIUM SERPL-SCNC: 127 MMOL/L (ref 136–145)
TOTAL CELLS COUNTED BLD: 100
WBC # BLD AUTO: 22.1 X10(3) UL (ref 4–11)

## 2023-09-25 PROCEDURE — 71045 X-RAY EXAM CHEST 1 VIEW: CPT | Performed by: EMERGENCY MEDICINE

## 2023-09-25 RX ORDER — SODIUM CHLORIDE 9 MG/ML
125 INJECTION, SOLUTION INTRAVENOUS CONTINUOUS
Status: DISCONTINUED | OUTPATIENT
Start: 2023-09-25 | End: 2023-09-27

## 2023-09-25 RX ORDER — DEXTROSE MONOHYDRATE 25 G/50ML
50 INJECTION, SOLUTION INTRAVENOUS
Status: DISCONTINUED | OUTPATIENT
Start: 2023-09-25 | End: 2023-10-06

## 2023-09-25 RX ORDER — NICOTINE POLACRILEX 4 MG
30 LOZENGE BUCCAL
Status: DISCONTINUED | OUTPATIENT
Start: 2023-09-25 | End: 2023-10-06

## 2023-09-25 RX ORDER — NICOTINE POLACRILEX 4 MG
15 LOZENGE BUCCAL
Status: DISCONTINUED | OUTPATIENT
Start: 2023-09-25 | End: 2023-10-06

## 2023-09-26 PROBLEM — A41.9 SEPSIS WITHOUT ACUTE ORGAN DYSFUNCTION (HCC): Status: ACTIVE | Noted: 2023-09-26

## 2023-09-26 PROBLEM — R79.89 ELEVATED LFTS: Status: ACTIVE | Noted: 2023-09-26

## 2023-09-26 LAB
ALBUMIN SERPL-MCNC: 2.9 G/DL (ref 3.4–5)
ALP LIVER SERPL-CCNC: 74 U/L
ALT SERPL-CCNC: 66 U/L
ANION GAP SERPL CALC-SCNC: 10 MMOL/L (ref 0–18)
ANION GAP SERPL CALC-SCNC: 10 MMOL/L (ref 0–18)
ANION GAP SERPL CALC-SCNC: 11 MMOL/L (ref 0–18)
ANION GAP SERPL CALC-SCNC: 11 MMOL/L (ref 0–18)
ANION GAP SERPL CALC-SCNC: 20 MMOL/L (ref 0–18)
AST SERPL-CCNC: 216 U/L (ref 15–37)
BILIRUB DIRECT SERPL-MCNC: 0.1 MG/DL (ref 0–0.2)
BILIRUB SERPL-MCNC: 0.4 MG/DL (ref 0.1–2)
BILIRUB UR QL STRIP.AUTO: NEGATIVE
BUN BLD-MCNC: 53 MG/DL (ref 7–18)
BUN BLD-MCNC: 54 MG/DL (ref 7–18)
BUN BLD-MCNC: 58 MG/DL (ref 7–18)
BUN BLD-MCNC: 61 MG/DL (ref 7–18)
BUN BLD-MCNC: 62 MG/DL (ref 7–18)
CALCIUM BLD-MCNC: 10 MG/DL (ref 8.5–10.1)
CALCIUM BLD-MCNC: 10.1 MG/DL (ref 8.5–10.1)
CALCIUM BLD-MCNC: 10.5 MG/DL (ref 8.5–10.1)
CALCIUM BLD-MCNC: 9.3 MG/DL (ref 8.5–10.1)
CALCIUM BLD-MCNC: 9.5 MG/DL (ref 8.5–10.1)
CHLORIDE SERPL-SCNC: 104 MMOL/L (ref 98–112)
CHLORIDE SERPL-SCNC: 107 MMOL/L (ref 98–112)
CHLORIDE SERPL-SCNC: 107 MMOL/L (ref 98–112)
CHLORIDE SERPL-SCNC: 108 MMOL/L (ref 98–112)
CHLORIDE SERPL-SCNC: 109 MMOL/L (ref 98–112)
CLARITY UR REFRACT.AUTO: CLEAR
CO2 SERPL-SCNC: 13 MMOL/L (ref 21–32)
CO2 SERPL-SCNC: 16 MMOL/L (ref 21–32)
CO2 SERPL-SCNC: 17 MMOL/L (ref 21–32)
CO2 SERPL-SCNC: 19 MMOL/L (ref 21–32)
CO2 SERPL-SCNC: 20 MMOL/L (ref 21–32)
CREAT BLD-MCNC: 1.59 MG/DL
CREAT BLD-MCNC: 1.68 MG/DL
CREAT BLD-MCNC: 1.82 MG/DL
CREAT BLD-MCNC: 1.83 MG/DL
CREAT BLD-MCNC: 1.85 MG/DL
EGFRCR SERPLBLD CKD-EPI 2021: 29 ML/MIN/1.73M2 (ref 60–?)
EGFRCR SERPLBLD CKD-EPI 2021: 30 ML/MIN/1.73M2 (ref 60–?)
EGFRCR SERPLBLD CKD-EPI 2021: 30 ML/MIN/1.73M2 (ref 60–?)
EGFRCR SERPLBLD CKD-EPI 2021: 33 ML/MIN/1.73M2 (ref 60–?)
EGFRCR SERPLBLD CKD-EPI 2021: 35 ML/MIN/1.73M2 (ref 60–?)
ERYTHROCYTE [DISTWIDTH] IN BLOOD BY AUTOMATED COUNT: 11.6 %
GLUCOSE BLD-MCNC: 101 MG/DL (ref 70–99)
GLUCOSE BLD-MCNC: 102 MG/DL (ref 70–99)
GLUCOSE BLD-MCNC: 105 MG/DL (ref 70–99)
GLUCOSE BLD-MCNC: 111 MG/DL (ref 70–99)
GLUCOSE BLD-MCNC: 112 MG/DL (ref 70–99)
GLUCOSE BLD-MCNC: 116 MG/DL (ref 70–99)
GLUCOSE BLD-MCNC: 123 MG/DL (ref 70–99)
GLUCOSE BLD-MCNC: 125 MG/DL (ref 70–99)
GLUCOSE BLD-MCNC: 125 MG/DL (ref 70–99)
GLUCOSE BLD-MCNC: 129 MG/DL (ref 70–99)
GLUCOSE BLD-MCNC: 131 MG/DL (ref 70–99)
GLUCOSE BLD-MCNC: 143 MG/DL (ref 70–99)
GLUCOSE BLD-MCNC: 146 MG/DL (ref 70–99)
GLUCOSE BLD-MCNC: 154 MG/DL (ref 70–99)
GLUCOSE BLD-MCNC: 171 MG/DL (ref 70–99)
GLUCOSE BLD-MCNC: 209 MG/DL (ref 70–99)
GLUCOSE BLD-MCNC: 237 MG/DL (ref 70–99)
GLUCOSE BLD-MCNC: 250 MG/DL (ref 70–99)
GLUCOSE BLD-MCNC: 303 MG/DL (ref 70–99)
GLUCOSE BLD-MCNC: 395 MG/DL (ref 70–99)
GLUCOSE BLD-MCNC: 557 MG/DL (ref 70–99)
GLUCOSE BLD-MCNC: 86 MG/DL (ref 70–99)
GLUCOSE BLD-MCNC: 96 MG/DL (ref 70–99)
GLUCOSE BLD-MCNC: 97 MG/DL (ref 70–99)
GLUCOSE BLD-MCNC: 98 MG/DL (ref 70–99)
GLUCOSE UR STRIP.AUTO-MCNC: >1000 MG/DL
HCT VFR BLD AUTO: 33.1 %
HGB BLD-MCNC: 11.4 G/DL
KETONES UR STRIP.AUTO-MCNC: 80 MG/DL
LACTATE SERPL-SCNC: 3.4 MMOL/L (ref 0.4–2)
LACTATE SERPL-SCNC: 3.9 MMOL/L (ref 0.4–2)
LACTATE SERPL-SCNC: 3.9 MMOL/L (ref 0.4–2)
LEUKOCYTE ESTERASE UR QL STRIP.AUTO: NEGATIVE
MAGNESIUM SERPL-MCNC: 2.1 MG/DL (ref 1.6–2.6)
MAGNESIUM SERPL-MCNC: 2.1 MG/DL (ref 1.6–2.6)
MAGNESIUM SERPL-MCNC: 2.2 MG/DL (ref 1.6–2.6)
MAGNESIUM SERPL-MCNC: 2.2 MG/DL (ref 1.6–2.6)
MAGNESIUM SERPL-MCNC: 2.3 MG/DL (ref 1.6–2.6)
MCH RBC QN AUTO: 35.2 PG (ref 26–34)
MCHC RBC AUTO-ENTMCNC: 34.4 G/DL (ref 31–37)
MCV RBC AUTO: 102.2 FL
MRSA DNA SPEC QL NAA+PROBE: POSITIVE
NITRITE UR QL STRIP.AUTO: NEGATIVE
OSMOLALITY SERPL CALC.SUM OF ELEC: 299 MOSM/KG (ref 275–295)
OSMOLALITY SERPL CALC.SUM OF ELEC: 300 MOSM/KG (ref 275–295)
OSMOLALITY SERPL CALC.SUM OF ELEC: 300 MOSM/KG (ref 275–295)
OSMOLALITY SERPL CALC.SUM OF ELEC: 301 MOSM/KG (ref 275–295)
OSMOLALITY SERPL CALC.SUM OF ELEC: 306 MOSM/KG (ref 275–295)
PH UR STRIP.AUTO: 5 [PH] (ref 5–8)
PHOSPHATE SERPL-MCNC: 2.5 MG/DL (ref 2.5–4.9)
PHOSPHATE SERPL-MCNC: 3.4 MG/DL (ref 2.5–4.9)
PHOSPHATE SERPL-MCNC: 3.5 MG/DL (ref 2.5–4.9)
PHOSPHATE SERPL-MCNC: 3.8 MG/DL (ref 2.5–4.9)
PHOSPHATE SERPL-MCNC: 3.8 MG/DL (ref 2.5–4.9)
PLATELET # BLD AUTO: 148 10(3)UL (ref 150–450)
POTASSIUM SERPL-SCNC: 3.3 MMOL/L (ref 3.5–5.1)
POTASSIUM SERPL-SCNC: 3.5 MMOL/L (ref 3.5–5.1)
POTASSIUM SERPL-SCNC: 3.6 MMOL/L (ref 3.5–5.1)
POTASSIUM SERPL-SCNC: 3.7 MMOL/L (ref 3.5–5.1)
POTASSIUM SERPL-SCNC: 3.7 MMOL/L (ref 3.5–5.1)
PROCALCITONIN SERPL-MCNC: 0.47 NG/ML (ref ?–0.16)
PROT SERPL-MCNC: 5.8 G/DL (ref 6.4–8.2)
PROT UR STRIP.AUTO-MCNC: 30 MG/DL
RBC # BLD AUTO: 3.24 X10(6)UL
SODIUM SERPL-SCNC: 135 MMOL/L (ref 136–145)
SODIUM SERPL-SCNC: 136 MMOL/L (ref 136–145)
SODIUM SERPL-SCNC: 137 MMOL/L (ref 136–145)
SP GR UR STRIP.AUTO: 1.02 (ref 1–1.03)
UROBILINOGEN UR STRIP.AUTO-MCNC: NORMAL MG/DL
WBC # BLD AUTO: 11.5 X10(3) UL (ref 4–11)

## 2023-09-26 PROCEDURE — 99291 CRITICAL CARE FIRST HOUR: CPT | Performed by: NURSE PRACTITIONER

## 2023-09-26 RX ORDER — ESCITALOPRAM OXALATE 20 MG/1
20 TABLET ORAL DAILY
Status: DISCONTINUED | OUTPATIENT
Start: 2023-09-27 | End: 2023-10-06

## 2023-09-26 RX ORDER — SODIUM CHLORIDE 9 MG/ML
INJECTION, SOLUTION INTRAVENOUS CONTINUOUS
Status: DISCONTINUED | OUTPATIENT
Start: 2023-09-26 | End: 2023-09-26

## 2023-09-26 RX ORDER — ACETAMINOPHEN 500 MG
500 TABLET ORAL EVERY 4 HOURS PRN
Status: DISCONTINUED | OUTPATIENT
Start: 2023-09-26 | End: 2023-10-06

## 2023-09-26 RX ORDER — DEXTROSE AND SODIUM CHLORIDE 5; .45 G/100ML; G/100ML
100 INJECTION, SOLUTION INTRAVENOUS CONTINUOUS PRN
Status: DISCONTINUED | OUTPATIENT
Start: 2023-09-26 | End: 2023-09-28

## 2023-09-26 RX ORDER — TAMOXIFEN CITRATE 20 MG/1
20 TABLET ORAL DAILY
COMMUNITY

## 2023-09-26 RX ORDER — ONDANSETRON 2 MG/ML
4 INJECTION INTRAMUSCULAR; INTRAVENOUS EVERY 6 HOURS PRN
Status: DISCONTINUED | OUTPATIENT
Start: 2023-09-26 | End: 2023-10-06

## 2023-09-26 RX ORDER — METOCLOPRAMIDE HYDROCHLORIDE 5 MG/ML
5 INJECTION INTRAMUSCULAR; INTRAVENOUS EVERY 8 HOURS PRN
Status: DISCONTINUED | OUTPATIENT
Start: 2023-09-26 | End: 2023-09-27

## 2023-09-26 RX ORDER — SODIUM CHLORIDE 9 MG/ML
500 INJECTION, SOLUTION INTRAVENOUS ONCE
Status: COMPLETED | OUTPATIENT
Start: 2023-09-26 | End: 2023-09-26

## 2023-09-26 RX ORDER — ALPRAZOLAM 0.5 MG/1
0.5 TABLET ORAL 2 TIMES DAILY PRN
Status: DISCONTINUED | OUTPATIENT
Start: 2023-09-26 | End: 2023-10-01

## 2023-09-26 RX ORDER — HEPARIN SODIUM 5000 [USP'U]/ML
5000 INJECTION, SOLUTION INTRAVENOUS; SUBCUTANEOUS EVERY 8 HOURS SCHEDULED
Status: DISCONTINUED | OUTPATIENT
Start: 2023-09-26 | End: 2023-10-03

## 2023-09-26 NOTE — ED INITIAL ASSESSMENT (HPI)
Arrived via EMS for reports of hyperglycemia, onset around 2100, registering High on Accucheck per family and EMS.   Pt given a total of 25 units Insulin Lispro PTA per family and 300 NS bolus per EMS

## 2023-09-26 NOTE — PROGRESS NOTES
Admitted patient from the ER with insulin gtt infusing, following [facility] protocol. See orders. A&Ox3. Remains on room air, sats wnl. Trending lactic acid. Call light within reach. Will continue to monitor.

## 2023-09-26 NOTE — PHYSICAL THERAPY NOTE
PHYSICAL THERAPY EVALUATION - INPATIENT     Room Number: 458/458-A  Evaluation Date: 9/26/2023  Type of Evaluation: Initial  Physician Order: PT Eval and Treat    Presenting Problem: Diabetic ketoacidosis  Co-Morbidities : HTN, HLD, DM2, breast cancer, acoustic neuroma s/p resection w/ residual left facial droop, PVD  Reason for Therapy: Mobility Dysfunction and Discharge Planning    History related to current admission: Patient is a 70year old female admitted on 9/25/2023 from home for hyperglycemia. Pt diagnosed with diabetic ketoacidosis. Recent admissions:  7/24/23-8/12/23: DKA --> HHPT    ASSESSMENT   In this PT evaluation, the patient presents with the following impairments impaired cognition, impaired command following, decr activity tolerance/endurance, decr functional mobility. These impairments and comorbidities manifest themselves as functional limitations in independent bed mobility, transfers, and gait. The patient is below baseline and would benefit from skilled inpatient PT to address the above deficits to assist patient in returning to prior to level of function. Functional outcome measures completed include [facility]. The AM-PAC '6-Clicks' Inpatient Basic Mobility Short Form was completed and this patient is demonstrating a Approx Degree of Impairment: 57.7%  degree of impairment in mobility. Research supports that patients with this level of impairment may benefit from undetermined due to patient's current mentation status limiting ability to participate in functional mobility. DISCHARGE RECOMMENDATIONS  PT Discharge Recommendations: Undetermined    PLAN  PT Treatment Plan: Bed mobility; Body mechanics; Endurance; Energy conservation;Patient education; Family education;Range of motion; Neuromuscular re-educate;Gait training;Strengthening;Stoop training;Stair training;Balance training;Transfer training  Rehab Potential : Fair  Frequency (Obs): 3-5x/week  Number of Visits to Meet Established Goals: 7      CURRENT GOALS    Goal #1 Patient is able to demonstrate supine - sit EOB @ level: supervision     Goal #2 Patient is able to demonstrate transfers Sit to/from Stand at assistance level: supervision     Goal #3 Patient is able to ambulate 25 feet with assist device: walker - rolling at assistance level: supervision     Goal #4    Goal #5    Goal #6    Goal Comments: Goals established on 2023    HOME SITUATION  Type of Home: House   Home Layout: Two level;Bed/bath upstairs  Stairs to Enter : 2  Railing: No  Stairs to Bedroom: 16  Railing: Yes    Lives With: Spouse; Son  Drives: Yes  Patient Owned Equipment: Rolling walker  Patient Regularly Uses: Reading glasses    Prior Level of [place]: Pt very poor historian due to current mentation status- PLOF taken from prior admission- ambulation with RW. SUBJECTIVE  \"HELP! \"      OBJECTIVE  Precautions: Bed/chair alarm  Fall Risk: High fall risk    WEIGHT BEARING RESTRICTION  Weight Bearing Restriction: None                PAIN ASSESSMENT  Ratin  Location: denies       COGNITION  Overall Cognitive Status:  Impaired  Arousal/Alertness:  inconsistent responses to stimuli  Attention Span:  difficulty attending to directions  Following Commands:  unable to follow one step commands  Initiation: hand over hand to initiate tasks  Safety Judgement:  decreased awareness of need for assistance and decreased awareness of need for safety  Awareness of Errors:  assistance required to identify errors made, assistance required to correct errors made, and decreased awareness of errors   Awareness of Deficits:  decreased awareness of deficits    RANGE OF MOTION AND STRENGTH ASSESSMENT  Upper extremity ROM and strength are within functional limits     Lower extremity ROM is within functional limits     Lower extremity strength is within functional limits       BALANCE  Static Sitting: Poor  Dynamic Sitting: Poor -  Static Standing: Not tested  Dynamic Standing: Not tested    ADDITIONAL TESTS                                    ACTIVITY TOLERANCE                         O2 WALK       NEUROLOGICAL FINDINGS                        AM-PAC '6-Clicks' INPATIENT SHORT FORM - BASIC MOBILITY  How much difficulty does the patient currently have. .. Patient Difficulty: Turning over in bed (including adjusting bedclothes, sheets and blankets)?: A Little   Patient Difficulty: Sitting down on and standing up from a chair with arms (e.g., wheelchair, bedside commode, etc.): A Lot   Patient Difficulty: Moving from lying on back to sitting on the side of the bed?: A Little   How much help from another person does the patient currently need. .. Help from Another: Moving to and from a bed to a chair (including a wheelchair)?: A Little   Help from Another: Need to walk in hospital room?: A Lot   Help from Another: Climbing 3-5 steps with a railing?: A Lot       AM-PAC Score:  Raw Score: 15   Approx Degree of Impairment: 57.7%   Standardized Score (AM-PAC Scale): 39.45   CMS Modifier (G-Code): CK    FUNCTIONAL ABILITY STATUS  Gait Assessment   Functional Mobility/Gait Assessment  Gait Assistance: Not tested    Skilled Therapy Provided     Bed Mobility:  Rolling: modA to roll to either side  Supine to sit: mod/maxA    Sit to supine: mod/maxA      Transfer Mobility:  Sit to stand: NT for safety reasons as pt unable to follow commands at this time    Stand to sit: NT for safety reasons as pt unable to follow commands at this time   Gait = NT     Therapist's Comments: Patient presents in bed asking for help. Discussed role and goal of physical therapy. Pt in agreement to session. Pt unable to follow simple one step commands. Intermittently opens eyes. Continues to yell out \"help\" during session. Bed mobility completed at mod/maxA for supine to sit and vice versa. Pt unable to maintain static sitting balance.  Completed supine to sit x 3 trials, pt intermittently assisting with pulling self up to reach sitting position. BP in sittin/57 with MAP of 61. Pt preferring to lean on therapist. When returned supine, pt noted to be having bowel movement. OT totalA with pericare. Pt repositioned in bed at end of session at [address]. BP supine: 88/45, MAP 55. Pt will continue to benefit from IP PT interventions. Discharge recommendation is undetermined at this time due to pt's inability to follow commands due to mentation status which is limiting ability to participate in therapy. RN and SW aware. Exercise/Education Provided:  Bed mobility  Body mechanics  Energy conservation  Functional activity tolerated    Patient End of Session: In bed;Needs met;Call light within reach;RN aware of session/findings; All patient questions and concerns addressed;SCDs in place; Alarm set; Discussed recommendations with /      Patient Evaluation Complexity Level:  History Moderate - 1 or 2 personal factors and/or co-morbidities   Examination of body systems Moderate - addressing a total of 3 or more elements   Clinical Presentation Moderate - Evolving   Clinical Decision Making Moderate - Evolving       PT Session Time: 25 minutes  Gait Training:  minutes  Therapeutic Activity: 11 minutes  Neuromuscular Re-education:  minutes  Therapeutic Exercise:  minutes

## 2023-09-26 NOTE — PROGRESS NOTES
Assumed Patient care following shift report. A&Ox1-2, Room air, O2 sats 90%<, Insulin gtt infusing following [facility] protocol. Niesha vest in place, patient continues to exit bed and remove medical equipment, non-redirectable, Straight cath x1 400mL out. See MAR and flowsheets for additional information.

## 2023-09-27 ENCOUNTER — APPOINTMENT (OUTPATIENT)
Dept: ULTRASOUND IMAGING | Facility: HOSPITAL | Age: 68
End: 2023-09-27
Attending: INTERNAL MEDICINE
Payer: MEDICARE

## 2023-09-27 ENCOUNTER — APPOINTMENT (OUTPATIENT)
Dept: GENERAL RADIOLOGY | Facility: HOSPITAL | Age: 68
End: 2023-09-27
Attending: INTERNAL MEDICINE
Payer: MEDICARE

## 2023-09-27 LAB
ALBUMIN SERPL-MCNC: 2.6 G/DL (ref 3.4–5)
ALBUMIN/GLOB SERPL: 1 {RATIO} (ref 1–2)
ALP LIVER SERPL-CCNC: 56 U/L
ALT SERPL-CCNC: 66 U/L
ANION GAP SERPL CALC-SCNC: 10 MMOL/L (ref 0–18)
ANION GAP SERPL CALC-SCNC: 11 MMOL/L (ref 0–18)
ANION GAP SERPL CALC-SCNC: 13 MMOL/L (ref 0–18)
ANION GAP SERPL CALC-SCNC: 13 MMOL/L (ref 0–18)
ANION GAP SERPL CALC-SCNC: 15 MMOL/L (ref 0–18)
ANION GAP SERPL CALC-SCNC: 18 MMOL/L (ref 0–18)
ARTERIAL PATENCY WRIST A: POSITIVE
AST SERPL-CCNC: 189 U/L (ref 15–37)
BASE EXCESS BLDA CALC-SCNC: -10.7 MMOL/L (ref ?–2)
BASOPHILS # BLD AUTO: 0.04 X10(3) UL (ref 0–0.2)
BASOPHILS NFR BLD AUTO: 0.6 %
BILIRUB SERPL-MCNC: 0.3 MG/DL (ref 0.1–2)
BODY TEMPERATURE: 98.6 F
BUN BLD-MCNC: 54 MG/DL (ref 7–18)
BUN BLD-MCNC: 56 MG/DL (ref 7–18)
BUN BLD-MCNC: 61 MG/DL (ref 7–18)
BUN BLD-MCNC: 61 MG/DL (ref 7–18)
CA-I BLD-SCNC: 1.34 MMOL/L (ref 0.95–1.32)
CALCIUM BLD-MCNC: 8.5 MG/DL (ref 8.5–10.1)
CALCIUM BLD-MCNC: 8.5 MG/DL (ref 8.5–10.1)
CALCIUM BLD-MCNC: 8.6 MG/DL (ref 8.5–10.1)
CALCIUM BLD-MCNC: 8.7 MG/DL (ref 8.5–10.1)
CALCIUM BLD-MCNC: 8.8 MG/DL (ref 8.5–10.1)
CALCIUM BLD-MCNC: 9.4 MG/DL (ref 8.5–10.1)
CHLORIDE SERPL-SCNC: 105 MMOL/L (ref 98–112)
CHLORIDE SERPL-SCNC: 107 MMOL/L (ref 98–112)
CHLORIDE SERPL-SCNC: 108 MMOL/L (ref 98–112)
CHLORIDE SERPL-SCNC: 110 MMOL/L (ref 98–112)
CHLORIDE SERPL-SCNC: 111 MMOL/L (ref 98–112)
CHLORIDE SERPL-SCNC: 111 MMOL/L (ref 98–112)
CO2 SERPL-SCNC: 11 MMOL/L (ref 21–32)
CO2 SERPL-SCNC: 13 MMOL/L (ref 21–32)
CO2 SERPL-SCNC: 14 MMOL/L (ref 21–32)
CO2 SERPL-SCNC: 14 MMOL/L (ref 21–32)
CO2 SERPL-SCNC: 15 MMOL/L (ref 21–32)
CO2 SERPL-SCNC: 16 MMOL/L (ref 21–32)
COHGB MFR BLD: 1 % SAT (ref 0–3)
CREAT BLD-MCNC: 1.46 MG/DL
CREAT BLD-MCNC: 1.51 MG/DL
CREAT BLD-MCNC: 1.58 MG/DL
CREAT BLD-MCNC: 1.61 MG/DL
CREAT BLD-MCNC: 1.67 MG/DL
CREAT BLD-MCNC: 1.7 MG/DL
EGFRCR SERPLBLD CKD-EPI 2021: 32 ML/MIN/1.73M2 (ref 60–?)
EGFRCR SERPLBLD CKD-EPI 2021: 33 ML/MIN/1.73M2 (ref 60–?)
EGFRCR SERPLBLD CKD-EPI 2021: 34 ML/MIN/1.73M2 (ref 60–?)
EGFRCR SERPLBLD CKD-EPI 2021: 35 ML/MIN/1.73M2 (ref 60–?)
EGFRCR SERPLBLD CKD-EPI 2021: 37 ML/MIN/1.73M2 (ref 60–?)
EGFRCR SERPLBLD CKD-EPI 2021: 39 ML/MIN/1.73M2 (ref 60–?)
EOSINOPHIL # BLD AUTO: 0 X10(3) UL (ref 0–0.7)
EOSINOPHIL NFR BLD AUTO: 0 %
ERYTHROCYTE [DISTWIDTH] IN BLOOD BY AUTOMATED COUNT: 11.8 %
GLOBULIN PLAS-MCNC: 2.7 G/DL (ref 2.8–4.4)
GLUCOSE BLD-MCNC: 100 MG/DL (ref 70–99)
GLUCOSE BLD-MCNC: 112 MG/DL (ref 70–99)
GLUCOSE BLD-MCNC: 115 MG/DL (ref 70–99)
GLUCOSE BLD-MCNC: 144 MG/DL (ref 70–99)
GLUCOSE BLD-MCNC: 155 MG/DL (ref 70–99)
GLUCOSE BLD-MCNC: 157 MG/DL (ref 70–99)
GLUCOSE BLD-MCNC: 158 MG/DL (ref 70–99)
GLUCOSE BLD-MCNC: 167 MG/DL (ref 70–99)
GLUCOSE BLD-MCNC: 169 MG/DL (ref 70–99)
GLUCOSE BLD-MCNC: 176 MG/DL (ref 70–99)
GLUCOSE BLD-MCNC: 187 MG/DL (ref 70–99)
GLUCOSE BLD-MCNC: 193 MG/DL (ref 70–99)
GLUCOSE BLD-MCNC: 195 MG/DL (ref 70–99)
GLUCOSE BLD-MCNC: 208 MG/DL (ref 70–99)
GLUCOSE BLD-MCNC: 212 MG/DL (ref 70–99)
GLUCOSE BLD-MCNC: 245 MG/DL (ref 70–99)
GLUCOSE BLD-MCNC: 262 MG/DL (ref 70–99)
GLUCOSE BLD-MCNC: 275 MG/DL (ref 70–99)
GLUCOSE BLD-MCNC: 275 MG/DL (ref 70–99)
GLUCOSE BLD-MCNC: 319 MG/DL (ref 70–99)
GLUCOSE BLD-MCNC: 62 MG/DL (ref 70–99)
GLUCOSE BLD-MCNC: 78 MG/DL (ref 70–99)
GLUCOSE BLD-MCNC: 80 MG/DL (ref 70–99)
GLUCOSE BLD-MCNC: 94 MG/DL (ref 70–99)
GLUCOSE BLD-MCNC: >600 MG/DL (ref 70–99)
GLUCOSE BLD-MCNC: >600 MG/DL (ref 70–99)
HCO3 BLDA-SCNC: 16.5 MEQ/L (ref 21–27)
HCT VFR BLD AUTO: 27.5 %
HGB BLD-MCNC: 9.9 G/DL
HGB BLD-MCNC: 9.9 G/DL
IMM GRANULOCYTES # BLD AUTO: 0.07 X10(3) UL (ref 0–1)
IMM GRANULOCYTES NFR BLD: 1.1 %
LACTATE BLD-SCNC: 2.3 MMOL/L (ref 0.5–2)
LYMPHOCYTES # BLD AUTO: 0.42 X10(3) UL (ref 1–4)
LYMPHOCYTES NFR BLD AUTO: 6.5 %
MAGNESIUM SERPL-MCNC: 1.9 MG/DL (ref 1.6–2.6)
MAGNESIUM SERPL-MCNC: 2 MG/DL (ref 1.6–2.6)
MAGNESIUM SERPL-MCNC: 2 MG/DL (ref 1.6–2.6)
MAGNESIUM SERPL-MCNC: 2.2 MG/DL (ref 1.6–2.6)
MCH RBC QN AUTO: 35.4 PG (ref 26–34)
MCHC RBC AUTO-ENTMCNC: 36 G/DL (ref 31–37)
MCV RBC AUTO: 98.2 FL
METHGB MFR BLD: 1.3 % SAT (ref 0.4–1.5)
MONOCYTES # BLD AUTO: 0.16 X10(3) UL (ref 0.1–1)
MONOCYTES NFR BLD AUTO: 2.5 %
NEUTROPHILS # BLD AUTO: 5.81 X10 (3) UL (ref 1.5–7.7)
NEUTROPHILS # BLD AUTO: 5.81 X10(3) UL (ref 1.5–7.7)
NEUTROPHILS NFR BLD AUTO: 89.3 %
OSMOLALITY SERPL CALC.SUM OF ELEC: 299 MOSM/KG (ref 275–295)
OSMOLALITY SERPL CALC.SUM OF ELEC: 301 MOSM/KG (ref 275–295)
OSMOLALITY SERPL CALC.SUM OF ELEC: 303 MOSM/KG (ref 275–295)
OSMOLALITY SERPL CALC.SUM OF ELEC: 304 MOSM/KG (ref 275–295)
OSMOLALITY SERPL CALC.SUM OF ELEC: 305 MOSM/KG (ref 275–295)
OSMOLALITY SERPL CALC.SUM OF ELEC: 310 MOSM/KG (ref 275–295)
OXYHGB MFR BLDA: 94.2 % (ref 92–100)
PCO2 BLDA: 20 MM HG (ref 35–45)
PH BLDA: 7.4 [PH] (ref 7.35–7.45)
PHOSPHATE SERPL-MCNC: 2.4 MG/DL (ref 2.5–4.9)
PHOSPHATE SERPL-MCNC: 2.4 MG/DL (ref 2.5–4.9)
PHOSPHATE SERPL-MCNC: 2.6 MG/DL (ref 2.5–4.9)
PHOSPHATE SERPL-MCNC: 2.8 MG/DL (ref 2.5–4.9)
PHOSPHATE SERPL-MCNC: 3.5 MG/DL (ref 2.5–4.9)
PHOSPHATE SERPL-MCNC: 4.2 MG/DL (ref 2.5–4.9)
PLATELET # BLD AUTO: 116 10(3)UL (ref 150–450)
PO2 BLDA: 92 MM HG (ref 80–100)
POTASSIUM BLD-SCNC: 3.8 MMOL/L (ref 3.6–5.1)
POTASSIUM SERPL-SCNC: 3.5 MMOL/L (ref 3.5–5.1)
POTASSIUM SERPL-SCNC: 3.5 MMOL/L (ref 3.5–5.1)
POTASSIUM SERPL-SCNC: 3.7 MMOL/L (ref 3.5–5.1)
POTASSIUM SERPL-SCNC: 3.7 MMOL/L (ref 3.5–5.1)
POTASSIUM SERPL-SCNC: 3.8 MMOL/L (ref 3.5–5.1)
POTASSIUM SERPL-SCNC: 4.1 MMOL/L (ref 3.5–5.1)
PROT SERPL-MCNC: 5.3 G/DL (ref 6.4–8.2)
RBC # BLD AUTO: 2.8 X10(6)UL
SODIUM BLD-SCNC: 132 MMOL/L (ref 135–145)
SODIUM SERPL-SCNC: 134 MMOL/L (ref 136–145)
SODIUM SERPL-SCNC: 134 MMOL/L (ref 136–145)
SODIUM SERPL-SCNC: 136 MMOL/L (ref 136–145)
SODIUM SERPL-SCNC: 137 MMOL/L (ref 136–145)
WBC # BLD AUTO: 6.5 X10(3) UL (ref 4–11)

## 2023-09-27 PROCEDURE — 99291 CRITICAL CARE FIRST HOUR: CPT | Performed by: INTERNAL MEDICINE

## 2023-09-27 PROCEDURE — 71045 X-RAY EXAM CHEST 1 VIEW: CPT | Performed by: INTERNAL MEDICINE

## 2023-09-27 PROCEDURE — 76700 US EXAM ABDOM COMPLETE: CPT | Performed by: INTERNAL MEDICINE

## 2023-09-27 RX ORDER — HYDRALAZINE HYDROCHLORIDE 20 MG/ML
10 INJECTION INTRAMUSCULAR; INTRAVENOUS EVERY 6 HOURS PRN
Status: DISCONTINUED | OUTPATIENT
Start: 2023-09-27 | End: 2023-10-06

## 2023-09-27 RX ORDER — SODIUM CHLORIDE, SODIUM LACTATE, POTASSIUM CHLORIDE, CALCIUM CHLORIDE 600; 310; 30; 20 MG/100ML; MG/100ML; MG/100ML; MG/100ML
INJECTION, SOLUTION INTRAVENOUS CONTINUOUS
Status: DISCONTINUED | OUTPATIENT
Start: 2023-09-27 | End: 2023-09-28

## 2023-09-27 RX ORDER — HALOPERIDOL 5 MG/ML
2 INJECTION INTRAMUSCULAR EVERY 6 HOURS PRN
Status: DISCONTINUED | OUTPATIENT
Start: 2023-09-27 | End: 2023-10-06

## 2023-09-27 NOTE — PROGRESS NOTES
Pharmacy Note: Renal dose adjustment   Ewa Mcwilliams was originally prescribed Zosyn every 8 hours  which was renally dose adjusted at the time of the original order per P&T approved renal dosing protocol. Renal function has now improved. Estimated Creatinine Clearance: 22 mL/min (A) (based on SCr of 1.51 mg/dL (H)). Her calculated creatinine clearance is now > 20 mL/min, therefore, the dose has been changed back to the original order per P&T approved protocol.       Thank you,   Geoffrey Burr, TimurD

## 2023-09-27 NOTE — PLAN OF CARE
Assumed care of patient following shift report. Patient remains in posey vest throughout the shift to prevent patient from climbing out of bed and harming herself. NSR/ST on monitor. Patient denies pain. Replacing potassium per electrolyte protocol. On insulin gtt & dextrose. Patient screaming out the entire night for help, but when asked what she needs, she denies needing help with anything. Xanax and melatonin given to promote sleep. Will continue to monitor.

## 2023-09-27 NOTE — PLAN OF CARE
Received pt around 1315. Pt resting bed on room air. Aox1/2. Follows commands. Pt did not want to eat, offered multiple times. Denies pain. See mar and flow sheets for more details.

## 2023-09-28 LAB
ALBUMIN SERPL-MCNC: 2.4 G/DL (ref 3.4–5)
ALBUMIN/GLOB SERPL: 0.9 {RATIO} (ref 1–2)
ALP LIVER SERPL-CCNC: 51 U/L
ALT SERPL-CCNC: 57 U/L
ANION GAP SERPL CALC-SCNC: 10 MMOL/L (ref 0–18)
ANION GAP SERPL CALC-SCNC: 15 MMOL/L (ref 0–18)
AST SERPL-CCNC: 97 U/L (ref 15–37)
ATRIAL RATE: 92 BPM
BASOPHILS # BLD: 0 X10(3) UL (ref 0–0.2)
BASOPHILS NFR BLD: 0 %
BILIRUB SERPL-MCNC: 0.4 MG/DL (ref 0.1–2)
BUN BLD-MCNC: 44 MG/DL (ref 7–18)
BUN BLD-MCNC: 46 MG/DL (ref 7–18)
BUN BLD-MCNC: 47 MG/DL (ref 7–18)
BUN BLD-MCNC: 47 MG/DL (ref 7–18)
BUN BLD-MCNC: 51 MG/DL (ref 7–18)
CALCIUM BLD-MCNC: 8.1 MG/DL (ref 8.5–10.1)
CALCIUM BLD-MCNC: 8.3 MG/DL (ref 8.5–10.1)
CALCIUM BLD-MCNC: 8.4 MG/DL (ref 8.5–10.1)
CHLORIDE SERPL-SCNC: 106 MMOL/L (ref 98–112)
CHLORIDE SERPL-SCNC: 109 MMOL/L (ref 98–112)
CHLORIDE SERPL-SCNC: 109 MMOL/L (ref 98–112)
CHLORIDE SERPL-SCNC: 110 MMOL/L (ref 98–112)
CHLORIDE SERPL-SCNC: 111 MMOL/L (ref 98–112)
CO2 SERPL-SCNC: 14 MMOL/L (ref 21–32)
CO2 SERPL-SCNC: 15 MMOL/L (ref 21–32)
CO2 SERPL-SCNC: 15 MMOL/L (ref 21–32)
CO2 SERPL-SCNC: 17 MMOL/L (ref 21–32)
CO2 SERPL-SCNC: 17 MMOL/L (ref 21–32)
CREAT BLD-MCNC: 1.43 MG/DL
CREAT BLD-MCNC: 1.47 MG/DL
CREAT BLD-MCNC: 1.47 MG/DL
CREAT BLD-MCNC: 1.52 MG/DL
CREAT BLD-MCNC: 1.74 MG/DL
DOHLE BOD BLD QL SMEAR: PRESENT
EGFRCR SERPLBLD CKD-EPI 2021: 31 ML/MIN/1.73M2 (ref 60–?)
EGFRCR SERPLBLD CKD-EPI 2021: 37 ML/MIN/1.73M2 (ref 60–?)
EGFRCR SERPLBLD CKD-EPI 2021: 38 ML/MIN/1.73M2 (ref 60–?)
EGFRCR SERPLBLD CKD-EPI 2021: 38 ML/MIN/1.73M2 (ref 60–?)
EGFRCR SERPLBLD CKD-EPI 2021: 40 ML/MIN/1.73M2 (ref 60–?)
EOSINOPHIL # BLD: 0.09 X10(3) UL (ref 0–0.7)
EOSINOPHIL NFR BLD: 1 %
ERYTHROCYTE [DISTWIDTH] IN BLOOD BY AUTOMATED COUNT: 12.3 %
EST. AVERAGE GLUCOSE BLD GHB EST-MCNC: 266 MG/DL (ref 68–126)
GLOBULIN PLAS-MCNC: 2.7 G/DL (ref 2.8–4.4)
GLUCOSE BLD-MCNC: 114 MG/DL (ref 70–99)
GLUCOSE BLD-MCNC: 116 MG/DL (ref 70–99)
GLUCOSE BLD-MCNC: 128 MG/DL (ref 70–99)
GLUCOSE BLD-MCNC: 129 MG/DL (ref 70–99)
GLUCOSE BLD-MCNC: 130 MG/DL (ref 70–99)
GLUCOSE BLD-MCNC: 130 MG/DL (ref 70–99)
GLUCOSE BLD-MCNC: 135 MG/DL (ref 70–99)
GLUCOSE BLD-MCNC: 135 MG/DL (ref 70–99)
GLUCOSE BLD-MCNC: 141 MG/DL (ref 70–99)
GLUCOSE BLD-MCNC: 144 MG/DL (ref 70–99)
GLUCOSE BLD-MCNC: 147 MG/DL (ref 70–99)
GLUCOSE BLD-MCNC: 156 MG/DL (ref 70–99)
GLUCOSE BLD-MCNC: 157 MG/DL (ref 70–99)
GLUCOSE BLD-MCNC: 162 MG/DL (ref 70–99)
GLUCOSE BLD-MCNC: 178 MG/DL (ref 70–99)
GLUCOSE BLD-MCNC: 178 MG/DL (ref 70–99)
GLUCOSE BLD-MCNC: 192 MG/DL (ref 70–99)
GLUCOSE BLD-MCNC: 222 MG/DL (ref 70–99)
GLUCOSE BLD-MCNC: 224 MG/DL (ref 70–99)
GLUCOSE BLD-MCNC: 513 MG/DL (ref 70–99)
GLUCOSE BLD-MCNC: 77 MG/DL (ref 70–99)
GLUCOSE BLD-MCNC: 82 MG/DL (ref 70–99)
HBA1C MFR BLD: 10.9 % (ref ?–5.7)
HCT VFR BLD AUTO: 27.3 %
HGB BLD-MCNC: 9.8 G/DL
LACTATE SERPL-SCNC: 2.2 MMOL/L (ref 0.4–2)
LYMPHOCYTES NFR BLD: 0.44 X10(3) UL (ref 1–4)
LYMPHOCYTES NFR BLD: 5 %
MAGNESIUM SERPL-MCNC: 1.8 MG/DL (ref 1.6–2.6)
MAGNESIUM SERPL-MCNC: 2.4 MG/DL (ref 1.6–2.6)
MAGNESIUM SERPL-MCNC: 2.4 MG/DL (ref 1.6–2.6)
MAGNESIUM SERPL-MCNC: 2.7 MG/DL (ref 1.6–2.6)
MCH RBC QN AUTO: 35.1 PG (ref 26–34)
MCHC RBC AUTO-ENTMCNC: 35.9 G/DL (ref 31–37)
MCV RBC AUTO: 97.8 FL
METAMYELOCYTES # BLD: 0.35 X10(3) UL
METAMYELOCYTES NFR BLD: 4 %
MONOCYTES # BLD: 0.44 X10(3) UL (ref 0.1–1)
MONOCYTES NFR BLD: 5 %
NEUTROPHILS # BLD AUTO: 7.63 X10 (3) UL (ref 1.5–7.7)
NEUTROPHILS NFR BLD: 58 %
NEUTS BAND NFR BLD: 27 %
NEUTS HYPERSEG # BLD: 7.48 X10(3) UL (ref 1.5–7.7)
OSMOLALITY SERPL CALC.SUM OF ELEC: 295 MOSM/KG (ref 275–295)
OSMOLALITY SERPL CALC.SUM OF ELEC: 296 MOSM/KG (ref 275–295)
OSMOLALITY SERPL CALC.SUM OF ELEC: 301 MOSM/KG (ref 275–295)
P AXIS: 78 DEGREES
P-R INTERVAL: 104 MS
PHOSPHATE SERPL-MCNC: 2.3 MG/DL (ref 2.5–4.9)
PHOSPHATE SERPL-MCNC: 2.7 MG/DL (ref 2.5–4.9)
PHOSPHATE SERPL-MCNC: 2.8 MG/DL (ref 2.5–4.9)
PHOSPHATE SERPL-MCNC: 3.5 MG/DL (ref 2.5–4.9)
PLATELET # BLD AUTO: 104 10(3)UL (ref 150–450)
POTASSIUM SERPL-SCNC: 3.1 MMOL/L (ref 3.5–5.1)
POTASSIUM SERPL-SCNC: 3.3 MMOL/L (ref 3.5–5.1)
POTASSIUM SERPL-SCNC: 3.7 MMOL/L (ref 3.5–5.1)
PROCALCITONIN SERPL-MCNC: 0.89 NG/ML (ref ?–0.16)
PROT SERPL-MCNC: 5.1 G/DL (ref 6.4–8.2)
Q-T INTERVAL: 334 MS
QRS DURATION: 78 MS
QTC CALCULATION (BEZET): 413 MS
R AXIS: 66 DEGREES
RBC # BLD AUTO: 2.79 X10(6)UL
SODIUM SERPL-SCNC: 135 MMOL/L (ref 136–145)
SODIUM SERPL-SCNC: 135 MMOL/L (ref 136–145)
SODIUM SERPL-SCNC: 136 MMOL/L (ref 136–145)
T AXIS: 242 DEGREES
TOTAL CELLS COUNTED BLD: 100
VENTRICULAR RATE: 92 BPM
WBC # BLD AUTO: 8.8 X10(3) UL (ref 4–11)

## 2023-09-28 PROCEDURE — 99291 CRITICAL CARE FIRST HOUR: CPT | Performed by: INTERNAL MEDICINE

## 2023-09-28 RX ORDER — DEXTROSE MONOHYDRATE 100 MG/ML
INJECTION, SOLUTION INTRAVENOUS CONTINUOUS
Status: DISCONTINUED | OUTPATIENT
Start: 2023-09-28 | End: 2023-09-28

## 2023-09-28 RX ORDER — METOPROLOL SUCCINATE 25 MG/1
25 TABLET, EXTENDED RELEASE ORAL
Status: DISCONTINUED | OUTPATIENT
Start: 2023-09-28 | End: 2023-09-30

## 2023-09-28 RX ORDER — SPIRONOLACTONE 25 MG/1
25 TABLET ORAL DAILY
Status: DISCONTINUED | OUTPATIENT
Start: 2023-09-28 | End: 2023-10-06

## 2023-09-28 RX ORDER — MAGNESIUM SULFATE HEPTAHYDRATE 40 MG/ML
2 INJECTION, SOLUTION INTRAVENOUS ONCE
Status: COMPLETED | OUTPATIENT
Start: 2023-09-28 | End: 2023-09-28

## 2023-09-28 NOTE — PLAN OF CARE
Assumed care of patient following shift report. Pt lethargic for most of shift. Oriented x2. Denies pain. RA, oxygen saturations stable. NSR/ST on tele monitor. POPEYE Sears notified of increasing anion gap - insulin gtt restarted. Q1H accuchecks per order. Bladder scan/straight cath per protocol. See MAR and flowsheets for additional information.

## 2023-09-28 NOTE — PHYSICAL THERAPY NOTE
PHYSICAL THERAPY TREATMENT NOTE - INPATIENT    Room Number: 458/458-A     Session: 1     Number of Visits to Meet Established Goals: 7    Presenting Problem: Diabetic ketoacidosis  Co-Morbidities : HTN, HLD, DM2, breast cancer, acoustic neuroma s/p resection w/ residual left facial droop, PVD  ASSESSMENT     Pt continues to present with impaired strength  , decreased endurance and impaired balance below PLOF  . Pt more alert , able to follow commands, however, with profound weakness and limited activity tolerance . Pt will continue to benefit from ongoing IP PT to maximize functional independence. The AM-PAC '6-Clicks' Inpatient Basic Mobility Short Form was completed and this patient is demonstrating a 69% degree of impairment. DISCHARGE RECOMMENDATIONS  PT Discharge Recommendations: Undetermined     PLAN  PT Treatment Plan: Bed mobility; Body mechanics; Endurance; Energy conservation;Patient education; Family education;Range of motion; Neuromuscular re-educate;Gait training;Strengthening;Stoop training;Stair training;Balance training;Transfer training  Rehab Potential : Fair  Frequency (Obs): 3-5x/week    CURRENT GOALS     Goal #1 Patient is able to demonstrate supine - sit EOB @ level: supervision      Goal #2 Patient is able to demonstrate transfers Sit to/from Stand at assistance level: supervision      Goal #3 Patient is able to ambulate 25 feet with assist device: walker - rolling at assistance level: supervision      Goal #4     Goal #5     Goal #6     Goal Comments: Goals established on 2023 all goals ongoing       SUBJECTIVE  \"I want to walk to the bathroom, bring me a walker\"     OBJECTIVE  Precautions: Bed/chair alarm    WEIGHT BEARING RESTRICTION  Weight Bearing Restriction: None                PAIN ASSESSMENT   Ratin  Location: denies       BALANCE                                                                                                                       Static Sitting: Fair -  Dynamic Sitting: Poor +           Static Standing: Poor -  Dynamic Standing: Dependent    ACTIVITY TOLERANCE                         O2 WALK         AM-PAC '6-Clicks' INPATIENT SHORT FORM - BASIC MOBILITY  How much difficulty does the patient currently have. .. Patient Difficulty: Turning over in bed (including adjusting bedclothes, sheets and blankets)?: A Little   Patient Difficulty: Sitting down on and standing up from a chair with arms (e.g., wheelchair, bedside commode, etc.): A Lot   Patient Difficulty: Moving from lying on back to sitting on the side of the bed?: A Little   How much help from another person does the patient currently need. .. Help from Another: Moving to and from a bed to a chair (including a wheelchair)?: A Lot   Help from Another: Need to walk in hospital room?: Total   Help from Another: Climbing 3-5 steps with a railing?: Total       AM-PAC Score:  Raw Score: 12   Approx Degree of Impairment: 68.66%   Standardized Score (AM-PAC Scale): 35.33   CMS Modifier (G-Code): CL    FUNCTIONAL ABILITY STATUS  Gait Assessment   Functional Mobility/Gait Assessment  Gait Assistance: Dependent assistance  Distance (ft): SPT x 2  Assistive Device: None    Skilled Therapy Provided  Pt somnolent laying in bed, responds to voice, performs AROM BLE   Mod A to t/f EOB, initially c L lean requires mod A for neutral position, progressed to supervision c fair - seated balance  Pt performed AROM BLE, reported need to use BR  Commode placed by bed- sit<>stand max A L foot blocked, to RW, pt unable to achieve full stance, SPT bed>commode   Max A to stand c RW for posterior hygiene s/p BM PCT in to assist  HHA x 2 max A to t/f to bed.  BP monitored and stable throughout   Bed Mobility:  Rolling:    Supine<>Sit: mod A for trunk d/t L lean    Sit<>Supine: mod A      Transfer Mobility:  Sit<>Stand: max  A    Stand<>Sit: max A    Gait: dep, SPT x 1, SPT x 2 s/p commode, pt fatigues quickly Therapist's Comments: BP 91/49 supine   103/63 seated EOB   97/57 s/p commode t/f           THERAPEUTIC EXERCISES  Lower Extremity Ankle pumps  Heel slides  Knee extension  LAQ  Leg slides  SLR     Upper Extremity  - open/close     Position Supine     Repetitions   10   Sets   1     Patient End of Session: In bed;Needs met;Call light within reach;RN aware of session/findings; All patient questions and concerns addressed; Alarm set    PT Session Time: 30 minutes  Gait Training:  minutes  Therapeutic Activity: 30 minutes  Therapeutic Exercise:  minutes   Neuromuscular Re-education:  minutes

## 2023-09-28 NOTE — PLAN OF CARE
Assumed care of pt at change of shift. Pt lethargic at beginning of shift with insulin gtt infusing. Potassium and phosphate replaced today per electrolyte replacement protocol. Pt more alert throughout shift and Oriented x2. Insulin gtt discontinued and pt given subcutaneous levemir and transitioned to a carb controlled diet with insulin correction coverage. Posey vest discontinued today. Bladder scan/ straight cath per protocol. See MAR and flowsheets for additional details.

## 2023-09-28 NOTE — PROGRESS NOTES
Anion gap increased to 18, serum CO2 11, glucose 319  Orders placed to restart insulin gtt.   Sliding scale and levemir dc'd  Continue serial lytes and hourly accu checks    Vargas Estrella 3 NP  [facility]: [id number]  Pgr: [id number]

## 2023-09-29 ENCOUNTER — APPOINTMENT (OUTPATIENT)
Dept: ULTRASOUND IMAGING | Facility: HOSPITAL | Age: 68
End: 2023-09-29
Attending: NURSE PRACTITIONER
Payer: MEDICARE

## 2023-09-29 LAB
ALBUMIN SERPL-MCNC: 2.2 G/DL (ref 3.4–5)
ALBUMIN SERPL-MCNC: 2.4 G/DL (ref 3.4–5)
ALBUMIN/GLOB SERPL: 0.8 {RATIO} (ref 1–2)
ALBUMIN/GLOB SERPL: 0.9 {RATIO} (ref 1–2)
ALP LIVER SERPL-CCNC: 51 U/L
ALP LIVER SERPL-CCNC: 56 U/L
ALT SERPL-CCNC: 45 U/L
ALT SERPL-CCNC: 49 U/L
ANION GAP SERPL CALC-SCNC: 10 MMOL/L (ref 0–18)
ANION GAP SERPL CALC-SCNC: 14 MMOL/L (ref 0–18)
AST SERPL-CCNC: 36 U/L (ref 15–37)
AST SERPL-CCNC: 50 U/L (ref 15–37)
BASOPHILS # BLD AUTO: 0.02 X10(3) UL (ref 0–0.2)
BASOPHILS NFR BLD AUTO: 0.4 %
BILIRUB SERPL-MCNC: 0.3 MG/DL (ref 0.1–2)
BILIRUB SERPL-MCNC: 0.4 MG/DL (ref 0.1–2)
BUN BLD-MCNC: 40 MG/DL (ref 7–18)
BUN BLD-MCNC: 42 MG/DL (ref 7–18)
CALCIUM BLD-MCNC: 8.1 MG/DL (ref 8.5–10.1)
CALCIUM BLD-MCNC: 8.3 MG/DL (ref 8.5–10.1)
CHLORIDE SERPL-SCNC: 108 MMOL/L (ref 98–112)
CHLORIDE SERPL-SCNC: 108 MMOL/L (ref 98–112)
CO2 SERPL-SCNC: 13 MMOL/L (ref 21–32)
CO2 SERPL-SCNC: 14 MMOL/L (ref 21–32)
CREAT BLD-MCNC: 1.42 MG/DL
CREAT BLD-MCNC: 1.58 MG/DL
EGFRCR SERPLBLD CKD-EPI 2021: 35 ML/MIN/1.73M2 (ref 60–?)
EGFRCR SERPLBLD CKD-EPI 2021: 40 ML/MIN/1.73M2 (ref 60–?)
EOSINOPHIL # BLD AUTO: 0.02 X10(3) UL (ref 0–0.7)
EOSINOPHIL NFR BLD AUTO: 0.4 %
ERYTHROCYTE [DISTWIDTH] IN BLOOD BY AUTOMATED COUNT: 13 %
GLOBULIN PLAS-MCNC: 2.7 G/DL (ref 2.8–4.4)
GLOBULIN PLAS-MCNC: 2.8 G/DL (ref 2.8–4.4)
GLUCOSE BLD-MCNC: 181 MG/DL (ref 70–99)
GLUCOSE BLD-MCNC: 270 MG/DL (ref 70–99)
GLUCOSE BLD-MCNC: 274 MG/DL (ref 70–99)
GLUCOSE BLD-MCNC: 325 MG/DL (ref 70–99)
GLUCOSE BLD-MCNC: 355 MG/DL (ref 70–99)
GLUCOSE BLD-MCNC: 363 MG/DL (ref 70–99)
HCT VFR BLD AUTO: 29.2 %
HGB BLD-MCNC: 10.5 G/DL
IMM GRANULOCYTES # BLD AUTO: 0.08 X10(3) UL (ref 0–1)
IMM GRANULOCYTES NFR BLD: 1.8 %
LYMPHOCYTES # BLD AUTO: 0.95 X10(3) UL (ref 1–4)
LYMPHOCYTES NFR BLD AUTO: 21.1 %
MAGNESIUM SERPL-MCNC: 2.2 MG/DL (ref 1.6–2.6)
MCH RBC QN AUTO: 35.8 PG (ref 26–34)
MCHC RBC AUTO-ENTMCNC: 36 G/DL (ref 31–37)
MCV RBC AUTO: 99.7 FL
MONOCYTES # BLD AUTO: 0.34 X10(3) UL (ref 0.1–1)
MONOCYTES NFR BLD AUTO: 7.5 %
NEUTROPHILS # BLD AUTO: 3.1 X10 (3) UL (ref 1.5–7.7)
NEUTROPHILS # BLD AUTO: 3.1 X10(3) UL (ref 1.5–7.7)
NEUTROPHILS NFR BLD AUTO: 68.8 %
OSMOLALITY SERPL CALC.SUM OF ELEC: 295 MOSM/KG (ref 275–295)
OSMOLALITY SERPL CALC.SUM OF ELEC: 302 MOSM/KG (ref 275–295)
PHOSPHATE SERPL-MCNC: 3.2 MG/DL (ref 2.5–4.9)
PLATELET # BLD AUTO: 110 10(3)UL (ref 150–450)
POTASSIUM SERPL-SCNC: 3.6 MMOL/L (ref 3.5–5.1)
POTASSIUM SERPL-SCNC: 4 MMOL/L (ref 3.5–5.1)
PROT SERPL-MCNC: 4.9 G/DL (ref 6.4–8.2)
PROT SERPL-MCNC: 5.2 G/DL (ref 6.4–8.2)
RBC # BLD AUTO: 2.93 X10(6)UL
SODIUM SERPL-SCNC: 131 MMOL/L (ref 136–145)
SODIUM SERPL-SCNC: 136 MMOL/L (ref 136–145)
WBC # BLD AUTO: 4.5 X10(3) UL (ref 4–11)

## 2023-09-29 PROCEDURE — 99233 SBSQ HOSP IP/OBS HIGH 50: CPT | Performed by: INTERNAL MEDICINE

## 2023-09-29 PROCEDURE — 76775 US EXAM ABDO BACK WALL LIM: CPT | Performed by: NURSE PRACTITIONER

## 2023-09-29 RX ORDER — FAMOTIDINE 10 MG/ML
20 INJECTION, SOLUTION INTRAVENOUS DAILY
Status: DISCONTINUED | OUTPATIENT
Start: 2023-09-29 | End: 2023-10-06

## 2023-09-29 RX ORDER — CALCIUM CARBONATE 500 MG/1
1500 TABLET, CHEWABLE ORAL EVERY 6 HOURS PRN
Status: DISCONTINUED | OUTPATIENT
Start: 2023-09-29 | End: 2023-10-06

## 2023-09-29 RX ORDER — FAMOTIDINE 10 MG/ML
INJECTION, SOLUTION INTRAVENOUS
Status: COMPLETED
Start: 2023-09-29 | End: 2023-09-29

## 2023-09-29 NOTE — CONSULTS
[facility]  Diabetes Consult Note    Kitty Mtz Patient Status:  Inpatient    1955 MRN [id number]   [facility] 4SW-A Attending Nava Andrade MD   Hosp Day # 3 PCP Ruben Bejarano MD     Reason for Consult:     Recommendations for uncontrolled type 2 diabetes    Diagnosis:    Patient Active Problem List:     PURE HYPERCHOLESTEROLEM     Tobacco abuse     Vitamin D deficiency     Atherosclerosis of native arteries of extremity with intermittent claudication ([address]     Osteoporosis     PVD (peripheral vascular disease) ([address]     Anxiety     Senile nuclear sclerosis, bilateral     History of alcohol abuse     HTN (hypertension)     Facial droop     Leukocytosis     Diabetes type 2, controlled (HCC)     Mild nonproliferative diabetic retinopathy of both eyes without macular edema associated with type 2 diabetes mellitus ([address]     Facial paralysis     Diabetic ketoacidosis without coma associated with type 2 diabetes mellitus ([address]     Scalp laceration, initial encounter     Multiple contusions     DAVID (acute kidney injury) ([address]     Fall     Postural dizziness with near syncope     Protein-calorie malnutrition, unspecified severity ([address]     Malignant neoplasm of upper-outer quadrant of right breast in female, estrogen receptor positive      Dehydration     Hypokalemia     Anemia, unspecified type     Leukocytosis, unspecified type     Acute UTI     Hyponatremia     Hyperglycemia     Type 1 diabetes mellitus with ketoacidosis without coma (HCC)     Metabolic acidosis     Azotemia     Hyperkalemia     Anemia     Acute renal failure (ARF) ([address]     Acute kidney injury ([address]     Sepsis without acute organ dysfunction (HCC)     Elevated LFTs      Medical History:  Past Medical History:   Diagnosis Date    Acoustic neuroma ([address]     Left facial droop --after surgery    Anxiety     Cataract     Diabetic retinopathy ([address]     DKA (diabetic ketoacidoses)     4/15/21    DM2     Hearing impairment hearing loss in left ear    Hyperlipidemia     Hypertension     Osteoporosis     Pancreatitis     PVD (peripheral vascular disease) ([address]     Iliac stents-Bilateral--9/11/14    Recovering alcoholic (HCC)     Recurrent genital herpes     Right breast cancer     Uterine fibroid      Past Surgical History:   Procedure Laterality Date    BRAIN SURGERY      CATARACT      EXTERN DRAIN PANC PSEUDOCYST,OPEN      FRACTURE SURGERY      L shoulder surgery    HYSTERECTOMY  6-29-10    exp lap KODAK BSO,    OTHER  11/23/15    LEFT IMAGE GUIDED CRANIOTOMY FOR RESECTION OF ACOUSTIC NEUROMA    OTHER  12/16    left humerus repair for FXR--plate in place    OTHER SURGICAL HISTORY      ex-lap x 2 for pancreatic pseudocysts, both in mid 80's, had shunt placed with second sx    PANCREATECTOMY,DISTAL+PRESERV DUOD      REPAIR INCIS HERNIA W MESH  9/20/2012    Procedure: Amy Burnett;  Surgeon: Rc Stapleton MD;  Location: [address] [place] [address]  9/20/2012    Procedure: Amy Burnett;  Surgeon: Rc Stapleton MD;  Location: [address] [place] [address]    SURGICAL STENT Bilateral 9/11/14    Bilateral femoral stent placement    TUBAL LIGATION       Family History   Problem Relation Age of Onset    Cancer Father     Colon Cancer Mother        Diabetes history:  Type:  Type 2  Onset: Approximately 20 years ago per EMR  Family history of DM: Unknown    Allergies:   Nucynta [Tapentadol]    HALLUCINATION    Comment:Cannot tolerate  Lisinopril              Coughing    Medications: Complete list reviewed.  Active diabetes medications include     Inpatient:  detemir 3 units nightly; changed today to 10 units nightly; aspart CF 1:30/140 mg/dl    Prior to admission home medications per EMR:    Detemir 3 units twice daily  Aspart 3 units TID with meals  Empagliflozin ER 25 units daily    Labs:    Recent Labs   Lab 09/28/23  1258 09/28/23  1608 09/28/23  2033 09/29/23  1005 09/29/23  1142 PGLU 129* 77 82 363* 355*     Recent Labs     09/27/23  0430 09/27/23  0531 09/28/23  0411 09/28/23  0502 09/28/23  3147 09/28/23  0904 09/28/23  1206 09/28/23  1258 09/29/23  0458 09/29/23  1005 09/29/23  1142 09/29/23  1213      < > 136  136  --  135*  --  136  --  136  --   --  131*      < > 109  109  --  110  --  111  --  108  --   --  108   CO2 14.0*   < > 17.0*  17.0*  --  15.0*  --  15.0*  --  14.0*  --   --  13.0*   BUN 61*   < > 47*  47*  --  46*  --  44*  --  40*  --   --  42*   CREATSERUM 1.70*   < > 1.47*  1.47*  --  1.43*  --  1.52*  --  1.42*  --   --  1.58*   A1C  --   --  10.9*  --   --   --   --   --   --   --   --   --    PGLU  --    < >  --    < >  --    < >  --    < >  --    < > 355*  --    CA 8.7   < > 8.3*  8.3*  --  8.1*  --  8.3*  --  8.3*  --   --  8.1*   ALB 2.6*  --  2.4*  --   --   --   --   --  2.4*  --   --  2.2*    < > = values in this interval not displayed. History of Present Illness: Dianne Quinn is a 70year old female with uncontrolled type 2 diabetes admitted 9/25/2023 for hyperglycemia/DKA. Assessment/Recommendations:    Met with the patient to provide recommendations for uncontrolled type 2 diabetes, recurrent DKA. Blood glucose on admission was 663; A1c 10.9; C02 7; anion gap 29; Cr 2.15; venous pH 6.97. PMH is significant for type 2 diabetes, HTN/HL, acoustic neuroma, s/p resection with residual left facial droop, PVD, breast cancer per EMR. She was admitted for DKA 7/24/2023, precipitated by [facility] [place]. The patient was seen by endocrinology at the July 2023 admission for DKA and was advised to discontinue Jardiance. Noted Jeannie Aguilar is still listed in her med rec on admission. Asked the patient if she was still taking Jardiance and she stated she did not know. She asked that we speak to her significant other who administers her medications who could confirm this.   She stated she was not able to provide information on how she is managing her diabetes and again, stated her significant other, Brendan, would be able to provide this information and asked that we speak to him. Explained that based on her complex medical history and readmission for DKA, we would recommend endocrinology follow-up. She agreed to this. Was able to speak to Brendan. Brendan confirmed he has POA and agreed to bring a copy to the hospital to be scanned into the EMR. He confirmed the patient stopped the Jardiance as directed, but two weeks ago the [place] was restarted by cardiology. Explained we will be recommending follow-up with endocrinology, hold the Jardiance until then, and Brendan confirmed he would. He assured the patient will follow-up as directed. On last admission, endocrinology order test for ЕКАТЕРИНА which were negative. Results were:  CRYSTAL-65 <5.0 - normal; ZNT8 Abs <15 - negative; IA-2 Ab <7.5 - negative. Plan:    Dietitian consult due to low BMI completed; appreciated recommendations  Discharge to [facility] until appointment with [facility] 10/10/23    PRESCRIPTION RECOMMENDATIONS:    Medicare: Insulin:   Novolog, Levemir  Basal/Correction Insulin scale - to be ordered by hospitalist prior to discharge based on hospital needs  Oral glycemics:  Discontinue Jardiance empagliflozin - do not resume at discharge. Endocrinologist to determine if beneficial and to order. Significant other, Brendan, agreed to this plan.   Supplies:  BD pen needles (Gabriella)    PROVIDER F/U RECOMMENDATIONS:    [facility] - insulin titration until follow-up with [facility] Endocrinology - 10/10/23  Patient's current PCP  Endocrinologist - Laverne Thorne MD - 10/10/23    A total of 32 minutes were spent with the patient, speaking with the patient's significant other 100% was spent counseling and coordinating care for uncontrolled type 2 diabetes self-management including DKA prevention, nutrition, blood glucose monitoring, insulin administration, medications, treatment options, follow-up coordination and resources.     Ermalene Essex, APRN  9/29/2023  2:14 PM

## 2023-09-29 NOTE — PLAN OF CARE
Received patient following report. Patient alert, oriented X3. Follows commands. Hemodynamically stable. Afebrile. Denies pain. Straight cath for urine X1. Port intact. See flowsheets and MAR.

## 2023-09-29 NOTE — PLAN OF CARE
Patient received from ICU asleep,vital signs stable. Not in any form of distress. Woke up,verbally responsive & alert x 3-4.,L facial droop noted. Comfortable at this time. Patient oriented to room & unit set up. Call light placed in reach.

## 2023-09-30 LAB
ANION GAP SERPL CALC-SCNC: 9 MMOL/L (ref 0–18)
BASOPHILS # BLD AUTO: 0.01 X10(3) UL (ref 0–0.2)
BASOPHILS NFR BLD AUTO: 0.3 %
BUN BLD-MCNC: 35 MG/DL (ref 7–18)
CALCIUM BLD-MCNC: 8.1 MG/DL (ref 8.5–10.1)
CHLORIDE SERPL-SCNC: 109 MMOL/L (ref 98–112)
CO2 SERPL-SCNC: 16 MMOL/L (ref 21–32)
CREAT BLD-MCNC: 1.51 MG/DL
EGFRCR SERPLBLD CKD-EPI 2021: 37 ML/MIN/1.73M2 (ref 60–?)
EOSINOPHIL # BLD AUTO: 0.07 X10(3) UL (ref 0–0.7)
EOSINOPHIL NFR BLD AUTO: 2.1 %
ERYTHROCYTE [DISTWIDTH] IN BLOOD BY AUTOMATED COUNT: 13 %
GLUCOSE BLD-MCNC: 165 MG/DL (ref 70–99)
GLUCOSE BLD-MCNC: 174 MG/DL (ref 70–99)
GLUCOSE BLD-MCNC: 306 MG/DL (ref 70–99)
GLUCOSE BLD-MCNC: 308 MG/DL (ref 70–99)
GLUCOSE BLD-MCNC: 393 MG/DL (ref 70–99)
HCT VFR BLD AUTO: 27 %
HGB BLD-MCNC: 9.4 G/DL
IMM GRANULOCYTES # BLD AUTO: 0.03 X10(3) UL (ref 0–1)
IMM GRANULOCYTES NFR BLD: 0.9 %
LYMPHOCYTES # BLD AUTO: 1.13 X10(3) UL (ref 1–4)
LYMPHOCYTES NFR BLD AUTO: 34.6 %
MCH RBC QN AUTO: 34.6 PG (ref 26–34)
MCHC RBC AUTO-ENTMCNC: 34.8 G/DL (ref 31–37)
MCV RBC AUTO: 99.3 FL
MONOCYTES # BLD AUTO: 0.47 X10(3) UL (ref 0.1–1)
MONOCYTES NFR BLD AUTO: 14.4 %
NEUTROPHILS # BLD AUTO: 1.56 X10 (3) UL (ref 1.5–7.7)
NEUTROPHILS # BLD AUTO: 1.56 X10(3) UL (ref 1.5–7.7)
NEUTROPHILS NFR BLD AUTO: 47.7 %
OSMOLALITY SERPL CALC.SUM OF ELEC: 290 MOSM/KG (ref 275–295)
PLATELET # BLD AUTO: 113 10(3)UL (ref 150–450)
POTASSIUM SERPL-SCNC: 3.8 MMOL/L (ref 3.5–5.1)
RBC # BLD AUTO: 2.72 X10(6)UL
SODIUM SERPL-SCNC: 134 MMOL/L (ref 136–145)
WBC # BLD AUTO: 3.3 X10(3) UL (ref 4–11)

## 2023-09-30 RX ORDER — METOPROLOL SUCCINATE 50 MG/1
50 TABLET, EXTENDED RELEASE ORAL
Status: DISCONTINUED | OUTPATIENT
Start: 2023-10-01 | End: 2023-10-06

## 2023-09-30 RX ORDER — TAMSULOSIN HYDROCHLORIDE 0.4 MG/1
0.4 CAPSULE ORAL
Status: DISCONTINUED | OUTPATIENT
Start: 2023-09-30 | End: 2023-10-06

## 2023-09-30 RX ORDER — MAGNESIUM HYDROXIDE/ALUMINUM HYDROXICE/SIMETHICONE 120; 1200; 1200 MG/30ML; MG/30ML; MG/30ML
30 SUSPENSION ORAL
Status: DISCONTINUED | OUTPATIENT
Start: 2023-09-30 | End: 2023-10-01

## 2023-09-30 RX ORDER — MAGNESIUM HYDROXIDE/ALUMINUM HYDROXICE/SIMETHICONE 120; 1200; 1200 MG/30ML; MG/30ML; MG/30ML
30 SUSPENSION ORAL
Status: DISCONTINUED | OUTPATIENT
Start: 2023-09-30 | End: 2023-09-30

## 2023-09-30 NOTE — PLAN OF CARE
Patient alert and oriented x3-4. Speech slurred d/t L sided facial droop. VSS. Afebrile. No c/o pain at this time. Medications administered per MAR. BG = 181. Coverage provided per sliding scale. Safety precautions continued. Call light within reach.   Problem: METABOLIC/FLUID AND ELECTROLYTES - ADULT  Goal: Glucose maintained within prescribed range  Description: INTERVENTIONS:  - Monitor Blood Glucose as ordered  - Assess for signs and symptoms of hyperglycemia and hypoglycemia  - Administer ordered medications to maintain glucose within target range  - Assess barriers to adequate nutritional intake and initiate nutrition consult as needed  - Instruct patient on self management of diabetes  Outcome: Progressing  Goal: Electrolytes maintained within normal limits  Description: INTERVENTIONS:  - Monitor labs and rhythm and assess patient for signs and symptoms of electrolyte imbalances  - Administer electrolyte replacement as ordered  - Monitor response to electrolyte replacements, including rhythm and repeat lab results as appropriate  - Fluid restriction as ordered  - Instruct patient on fluid and nutrition restrictions as appropriate  Outcome: Progressing     Problem: PAIN - ADULT  Goal: Verbalizes/displays adequate comfort level or patient's stated pain goal  Description: INTERVENTIONS:  - Encourage pt to monitor pain and request assistance  - Assess pain using appropriate pain scale  - Administer analgesics based on type and severity of pain and evaluate response  - Implement non-pharmacological measures as appropriate and evaluate response  - Consider cultural and social influences on pain and pain management  - Manage/alleviate anxiety  - Utilize distraction and/or relaxation techniques  - Monitor for opioid side effects  - Notify MD/LIP if interventions unsuccessful or patient reports new pain  - Anticipate increased pain with activity and pre-medicate as appropriate  Outcome: Progressing     Problem: RISK FOR INFECTION - ADULT  Goal: Absence of fever/infection during anticipated neutropenic period  Description: INTERVENTIONS  - Monitor WBC  - Administer growth factors as ordered  - Implement neutropenic guidelines  Outcome: Progressing     Problem: SAFETY ADULT - FALL  Goal: Free from fall injury  Description: INTERVENTIONS:  - Assess pt frequently for physical needs  - Identify cognitive and physical deficits and behaviors that affect risk of falls.   - [place] fall precautions as indicated by assessment.  - Educate pt/family on patient safety including physical limitations  - Instruct pt to call for assistance with activity based on assessment  - Modify environment to reduce risk of injury  - Provide assistive devices as appropriate  - Consider OT/PT consult to assist with strengthening/mobility  - Encourage toileting schedule  Outcome: Progressing     Problem: HEMATOLOGIC - ADULT  Goal: Free from bleeding injury  Description: (Example usage: patient with low platelets)  INTERVENTIONS:  - Avoid intramuscular injections, enemas and rectal medication administration  - Ensure safe mobilization of patient  - Hold pressure on venipuncture sites to achieve adequate hemostasis  - Assess for signs and symptoms of internal bleeding  - Monitor lab trends  - Patient is to report abnormal signs of bleeding to staff  - Avoid use of toothpicks and dental floss  - Use electric shaver for shaving  - Use soft bristle tooth brush  - Limit straining and forceful nose blowing  Outcome: Progressing

## 2023-09-30 NOTE — PLAN OF CARE
Problem: PAIN - ADULT  Goal: Verbalizes/displays adequate comfort level or patient's stated pain goal  Description: INTERVENTIONS:  - Encourage pt to monitor pain and request assistance  - Assess pain using appropriate pain scale  - Administer analgesics based on type and severity of pain and evaluate response  - Implement non-pharmacological measures as appropriate and evaluate response  - Consider cultural and social influences on pain and pain management  - Manage/alleviate anxiety  - Utilize distraction and/or relaxation techniques  - Monitor for opioid side effects  - Notify MD/LIP if interventions unsuccessful or patient reports new pain  - Anticipate increased pain with activity and pre-medicate as appropriate  Outcome: Progressing     Problem: RISK FOR INFECTION - ADULT  Goal: Absence of fever/infection during anticipated neutropenic period  Description: INTERVENTIONS  - Monitor WBC  - Administer growth factors as ordered  - Implement neutropenic guidelines  Outcome: Not Progressing     Problem: SAFETY ADULT - FALL  Goal: Free from fall injury  Description: INTERVENTIONS:  - Assess pt frequently for physical needs  - Identify cognitive and physical deficits and behaviors that affect risk of falls.   - [place] fall precautions as indicated by assessment.  - Educate pt/family on patient safety including physical limitations  - Instruct pt to call for assistance with activity based on assessment  - Modify environment to reduce risk of injury  - Provide assistive devices as appropriate  - Consider OT/PT consult to assist with strengthening/mobility  - Encourage toileting schedule  Outcome: Progressing     Problem: Delirium  Goal: Minimize duration of delirium  Description: Interventions:  - Encourage use of hearing aids, eye glasses  - Promote highest level of mobility daily  - Provide frequent reorientation  - Promote wakefulness i.e. lights on, blinds open  - Promote sleep, encourage patient's normal rest cycle i.e. lights off, TV off, minimize noise and interruptions  - Encourage family to assist in orientation and promotion of home routines  Outcome: Progressing   On iv abt for sepsis of unknown origin. C/O acid reflux, Sched meds given,Insulin given   Per correction factor, Encourage to eat, Has urinary retention,Bladder scan was 476 ml,   Straight cath was done as ordered. Seen by urulogist w/ order,Flomax started, Monitored blood sugar.

## 2023-10-01 LAB
ANION GAP SERPL CALC-SCNC: 11 MMOL/L (ref 0–18)
ANION GAP SERPL CALC-SCNC: 11 MMOL/L (ref 0–18)
ANION GAP SERPL CALC-SCNC: 12 MMOL/L (ref 0–18)
ANION GAP SERPL CALC-SCNC: 15 MMOL/L (ref 0–18)
BASOPHILS # BLD AUTO: 0.02 X10(3) UL (ref 0–0.2)
BASOPHILS NFR BLD AUTO: 0.4 %
BUN BLD-MCNC: 31 MG/DL (ref 7–18)
BUN BLD-MCNC: 31 MG/DL (ref 7–18)
BUN BLD-MCNC: 33 MG/DL (ref 7–18)
BUN BLD-MCNC: 33 MG/DL (ref 7–18)
CALCIUM BLD-MCNC: 8 MG/DL (ref 8.5–10.1)
CALCIUM BLD-MCNC: 8.2 MG/DL (ref 8.5–10.1)
CALCIUM BLD-MCNC: 8.3 MG/DL (ref 8.5–10.1)
CALCIUM BLD-MCNC: 8.3 MG/DL (ref 8.5–10.1)
CHLORIDE SERPL-SCNC: 102 MMOL/L (ref 98–112)
CHLORIDE SERPL-SCNC: 103 MMOL/L (ref 98–112)
CHLORIDE SERPL-SCNC: 103 MMOL/L (ref 98–112)
CHLORIDE SERPL-SCNC: 105 MMOL/L (ref 98–112)
CO2 SERPL-SCNC: 12 MMOL/L (ref 21–32)
CO2 SERPL-SCNC: 15 MMOL/L (ref 21–32)
CO2 SERPL-SCNC: 16 MMOL/L (ref 21–32)
CO2 SERPL-SCNC: 16 MMOL/L (ref 21–32)
CREAT BLD-MCNC: 1.48 MG/DL
CREAT BLD-MCNC: 1.8 MG/DL
CREAT BLD-MCNC: 1.83 MG/DL
CREAT BLD-MCNC: 1.93 MG/DL
EGFRCR SERPLBLD CKD-EPI 2021: 28 ML/MIN/1.73M2 (ref 60–?)
EGFRCR SERPLBLD CKD-EPI 2021: 30 ML/MIN/1.73M2 (ref 60–?)
EGFRCR SERPLBLD CKD-EPI 2021: 30 ML/MIN/1.73M2 (ref 60–?)
EGFRCR SERPLBLD CKD-EPI 2021: 38 ML/MIN/1.73M2 (ref 60–?)
EOSINOPHIL # BLD AUTO: 0.02 X10(3) UL (ref 0–0.7)
EOSINOPHIL NFR BLD AUTO: 0.4 %
ERYTHROCYTE [DISTWIDTH] IN BLOOD BY AUTOMATED COUNT: 13.3 %
GLUCOSE BLD-MCNC: 378 MG/DL (ref 70–99)
GLUCOSE BLD-MCNC: 387 MG/DL (ref 70–99)
GLUCOSE BLD-MCNC: 571 MG/DL (ref 70–99)
GLUCOSE BLD-MCNC: 579 MG/DL (ref 70–99)
GLUCOSE BLD-MCNC: 579 MG/DL (ref 70–99)
GLUCOSE BLD-MCNC: 611 MG/DL (ref 70–99)
GLUCOSE BLD-MCNC: 641 MG/DL (ref 70–99)
GLUCOSE BLD-MCNC: 641 MG/DL (ref 70–99)
GLUCOSE BLD-MCNC: 671 MG/DL (ref 70–99)
HCT VFR BLD AUTO: 27.4 %
HGB BLD-MCNC: 9.4 G/DL
IMM GRANULOCYTES # BLD AUTO: 0.05 X10(3) UL (ref 0–1)
IMM GRANULOCYTES NFR BLD: 1.1 %
LYMPHOCYTES # BLD AUTO: 1.12 X10(3) UL (ref 1–4)
LYMPHOCYTES NFR BLD AUTO: 24.5 %
MCH RBC QN AUTO: 34.6 PG (ref 26–34)
MCHC RBC AUTO-ENTMCNC: 34.3 G/DL (ref 31–37)
MCV RBC AUTO: 100.7 FL
MONOCYTES # BLD AUTO: 0.72 X10(3) UL (ref 0.1–1)
MONOCYTES NFR BLD AUTO: 15.8 %
NEUTROPHILS # BLD AUTO: 2.64 X10 (3) UL (ref 1.5–7.7)
NEUTROPHILS # BLD AUTO: 2.64 X10(3) UL (ref 1.5–7.7)
NEUTROPHILS NFR BLD AUTO: 57.8 %
NT-PROBNP SERPL-MCNC: ABNORMAL PG/ML (ref ?–125)
OSMOLALITY SERPL CALC.SUM OF ELEC: 297 MOSM/KG (ref 275–295)
OSMOLALITY SERPL CALC.SUM OF ELEC: 304 MOSM/KG (ref 275–295)
OSMOLALITY SERPL CALC.SUM OF ELEC: 305 MOSM/KG (ref 275–295)
OSMOLALITY SERPL CALC.SUM OF ELEC: 305 MOSM/KG (ref 275–295)
PLATELET # BLD AUTO: 131 10(3)UL (ref 150–450)
PLATELET MORPHOLOGY: NORMAL
POTASSIUM SERPL-SCNC: 4.1 MMOL/L (ref 3.5–5.1)
POTASSIUM SERPL-SCNC: 4.4 MMOL/L (ref 3.5–5.1)
POTASSIUM SERPL-SCNC: 4.6 MMOL/L (ref 3.5–5.1)
POTASSIUM SERPL-SCNC: 4.9 MMOL/L (ref 3.5–5.1)
RBC # BLD AUTO: 2.72 X10(6)UL
SODIUM SERPL-SCNC: 129 MMOL/L (ref 136–145)
SODIUM SERPL-SCNC: 130 MMOL/L (ref 136–145)
SODIUM SERPL-SCNC: 130 MMOL/L (ref 136–145)
SODIUM SERPL-SCNC: 132 MMOL/L (ref 136–145)
WBC # BLD AUTO: 4.6 X10(3) UL (ref 4–11)

## 2023-10-01 PROCEDURE — 99291 CRITICAL CARE FIRST HOUR: CPT | Performed by: NURSE PRACTITIONER

## 2023-10-01 RX ORDER — DEXTROSE AND SODIUM CHLORIDE 5; .45 G/100ML; G/100ML
100 INJECTION, SOLUTION INTRAVENOUS CONTINUOUS PRN
Status: DISCONTINUED | OUTPATIENT
Start: 2023-10-01 | End: 2023-10-02

## 2023-10-01 RX ORDER — SODIUM CHLORIDE 9 MG/ML
INJECTION, SOLUTION INTRAVENOUS CONTINUOUS
Status: DISCONTINUED | OUTPATIENT
Start: 2023-10-02 | End: 2023-10-02

## 2023-10-01 RX ORDER — ALPRAZOLAM 0.5 MG/1
0.5 TABLET ORAL 3 TIMES DAILY PRN
Status: DISCONTINUED | OUTPATIENT
Start: 2023-10-01 | End: 2023-10-06

## 2023-10-01 RX ORDER — SODIUM CHLORIDE 9 MG/ML
INJECTION, SOLUTION INTRAVENOUS ONCE
Status: COMPLETED | OUTPATIENT
Start: 2023-10-02 | End: 2023-10-01

## 2023-10-01 RX ORDER — SUCRALFATE ORAL 1 G/10ML
1 SUSPENSION ORAL
Status: DISCONTINUED | OUTPATIENT
Start: 2023-10-01 | End: 2023-10-06

## 2023-10-01 NOTE — PLAN OF CARE
Patient alert and oriented x4. VSS. Afebrile. Speech slurred d/t L sided facial droop. No c/o pain at this time. Medications administered per MAR. BG = 306. Coverage provided per sliding scale. BM x1. Urine noted in cannister and brief soaked when changed. Safety precautions continued. Call light within reach.   0179: Bladder scan = 101  Problem: METABOLIC/FLUID AND ELECTROLYTES - ADULT  Goal: Glucose maintained within prescribed range  Description: INTERVENTIONS:  - Monitor Blood Glucose as ordered  - Assess for signs and symptoms of hyperglycemia and hypoglycemia  - Administer ordered medications to maintain glucose within target range  - Assess barriers to adequate nutritional intake and initiate nutrition consult as needed  - Instruct patient on self management of diabetes  Outcome: Progressing     Problem: PAIN - ADULT  Goal: Verbalizes/displays adequate comfort level or patient's stated pain goal  Description: INTERVENTIONS:  - Encourage pt to monitor pain and request assistance  - Assess pain using appropriate pain scale  - Administer analgesics based on type and severity of pain and evaluate response  - Implement non-pharmacological measures as appropriate and evaluate response  - Consider cultural and social influences on pain and pain management  - Manage/alleviate anxiety  - Utilize distraction and/or relaxation techniques  - Monitor for opioid side effects  - Notify MD/LIP if interventions unsuccessful or patient reports new pain  - Anticipate increased pain with activity and pre-medicate as appropriate  Outcome: Progressing     Problem: RISK FOR INFECTION - ADULT  Goal: Absence of fever/infection during anticipated neutropenic period  Description: INTERVENTIONS  - Monitor WBC  - Administer growth factors as ordered  - Implement neutropenic guidelines  Outcome: Progressing     Problem: SAFETY ADULT - FALL  Goal: Free from fall injury  Description: INTERVENTIONS:  - Assess pt frequently for physical needs  - Identify cognitive and physical deficits and behaviors that affect risk of falls.   - [place] fall precautions as indicated by assessment.  - Educate pt/family on patient safety including physical limitations  - Instruct pt to call for assistance with activity based on assessment  - Modify environment to reduce risk of injury  - Provide assistive devices as appropriate  - Consider OT/PT consult to assist with strengthening/mobility  - Encourage toileting schedule  Outcome: Progressing     Problem: HEMATOLOGIC - ADULT  Goal: Free from bleeding injury  Description: (Example usage: patient with low platelets)  INTERVENTIONS:  - Avoid intramuscular injections, enemas and rectal medication administration  - Ensure safe mobilization of patient  - Hold pressure on venipuncture sites to achieve adequate hemostasis  - Assess for signs and symptoms of internal bleeding  - Monitor lab trends  - Patient is to report abnormal signs of bleeding to staff  - Avoid use of toothpicks and dental floss  - Use electric shaver for shaving  - Use soft bristle tooth brush  - Limit straining and forceful nose blowing  Outcome: Progressing     Problem: Delirium  Goal: Minimize duration of delirium  Description: Interventions:  - Encourage use of hearing aids, eye glasses  - Promote highest level of mobility daily  - Provide frequent reorientation  - Promote wakefulness i.e. lights on, blinds open  - Promote sleep, encourage patient's normal rest cycle i.e. lights off, TV off, minimize noise and interruptions  - Encourage family to assist in orientation and promotion of home routines  Outcome: Progressing

## 2023-10-01 NOTE — PLAN OF CARE
Problem: METABOLIC/FLUID AND ELECTROLYTES - ADULT  Goal: Glucose maintained within prescribed range  Description: INTERVENTIONS:  - Monitor Blood Glucose as ordered  - Assess for signs and symptoms of hyperglycemia and hypoglycemia  - Administer ordered medications to maintain glucose within target range  - Assess barriers to adequate nutritional intake and initiate nutrition consult as needed  - Instruct patient on self management of diabetes  Outcome: Not Progressing     Problem: RISK FOR INFECTION - ADULT  Goal: Absence of fever/infection during anticipated neutropenic period  Description: INTERVENTIONS  - Monitor WBC  - Administer growth factors as ordered  - Implement neutropenic guidelines  Outcome: Not Progressing   Noon bs is showing Hi in the Accucheck machine,Notified Dr. Ana Coe if he wants to order,serum glucose. It was ordered, Glucose level was drawn from emeka cath,BS result was up to 671, Patient was asymptomatic  Ordered received to give insulin per  correction factor,and redraw BMP AT 1600, Patient is stable,Denies any discomfort,Remains alert and oriented, Will continue to monitor.

## 2023-10-02 PROBLEM — R73.9 HYPERGLYCEMIA WITHOUT KETOSIS: Status: ACTIVE | Noted: 2022-10-06

## 2023-10-02 PROBLEM — Z51.5 PALLIATIVE CARE ENCOUNTER: Status: ACTIVE | Noted: 2023-10-02

## 2023-10-02 PROBLEM — Z71.89 COUNSELING REGARDING ADVANCE CARE PLANNING AND GOALS OF CARE: Status: ACTIVE | Noted: 2023-10-02

## 2023-10-02 PROBLEM — E11.01 HYPERGLYCEMIC HYPEROSMOLAR NONKETOTIC COMA (HCC): Status: ACTIVE | Noted: 2023-10-02

## 2023-10-02 PROBLEM — E87.20 LACTIC ACIDOSIS: Status: ACTIVE | Noted: 2023-07-24

## 2023-10-02 LAB
ACETONE: NEGATIVE
ANION GAP SERPL CALC-SCNC: 5 MMOL/L (ref 0–18)
ANION GAP SERPL CALC-SCNC: 8 MMOL/L (ref 0–18)
ANION GAP SERPL CALC-SCNC: 8 MMOL/L (ref 0–18)
ANTIBODY SCREEN: NEGATIVE
BASOPHILS # BLD AUTO: 0 X10(3) UL (ref 0–0.2)
BASOPHILS NFR BLD AUTO: 0 %
BUN BLD-MCNC: 25 MG/DL (ref 7–18)
BUN BLD-MCNC: 28 MG/DL (ref 7–18)
BUN BLD-MCNC: 29 MG/DL (ref 7–18)
CALCIUM BLD-MCNC: 8.1 MG/DL (ref 8.5–10.1)
CALCIUM BLD-MCNC: 8.1 MG/DL (ref 8.5–10.1)
CALCIUM BLD-MCNC: 8.3 MG/DL (ref 8.5–10.1)
CHLORIDE SERPL-SCNC: 109 MMOL/L (ref 98–112)
CHLORIDE SERPL-SCNC: 109 MMOL/L (ref 98–112)
CHLORIDE SERPL-SCNC: 110 MMOL/L (ref 98–112)
CO2 SERPL-SCNC: 18 MMOL/L (ref 21–32)
CO2 SERPL-SCNC: 19 MMOL/L (ref 21–32)
CO2 SERPL-SCNC: 21 MMOL/L (ref 21–32)
CREAT BLD-MCNC: 1.55 MG/DL
CREAT BLD-MCNC: 1.63 MG/DL
CREAT BLD-MCNC: 1.78 MG/DL
EGFRCR SERPLBLD CKD-EPI 2021: 31 ML/MIN/1.73M2 (ref 60–?)
EGFRCR SERPLBLD CKD-EPI 2021: 34 ML/MIN/1.73M2 (ref 60–?)
EGFRCR SERPLBLD CKD-EPI 2021: 36 ML/MIN/1.73M2 (ref 60–?)
EOSINOPHIL # BLD AUTO: 0.03 X10(3) UL (ref 0–0.7)
EOSINOPHIL NFR BLD AUTO: 0.7 %
ERYTHROCYTE [DISTWIDTH] IN BLOOD BY AUTOMATED COUNT: 13.2 %
GLUCOSE BLD-MCNC: 106 MG/DL (ref 70–99)
GLUCOSE BLD-MCNC: 107 MG/DL (ref 70–99)
GLUCOSE BLD-MCNC: 115 MG/DL (ref 70–99)
GLUCOSE BLD-MCNC: 125 MG/DL (ref 70–99)
GLUCOSE BLD-MCNC: 129 MG/DL (ref 70–99)
GLUCOSE BLD-MCNC: 131 MG/DL (ref 70–99)
GLUCOSE BLD-MCNC: 136 MG/DL (ref 70–99)
GLUCOSE BLD-MCNC: 162 MG/DL (ref 70–99)
GLUCOSE BLD-MCNC: 164 MG/DL (ref 70–99)
GLUCOSE BLD-MCNC: 175 MG/DL (ref 70–99)
GLUCOSE BLD-MCNC: 188 MG/DL (ref 70–99)
GLUCOSE BLD-MCNC: 196 MG/DL (ref 70–99)
GLUCOSE BLD-MCNC: 209 MG/DL (ref 70–99)
GLUCOSE BLD-MCNC: 279 MG/DL (ref 70–99)
GLUCOSE BLD-MCNC: 362 MG/DL (ref 70–99)
GLUCOSE BLD-MCNC: 48 MG/DL (ref 70–99)
GLUCOSE BLD-MCNC: 507 MG/DL (ref 70–99)
GLUCOSE BLD-MCNC: 53 MG/DL (ref 70–99)
GLUCOSE BLD-MCNC: 58 MG/DL (ref 70–99)
GLUCOSE BLD-MCNC: 86 MG/DL (ref 70–99)
HCT VFR BLD AUTO: 19.1 %
HGB BLD-MCNC: 10.1 G/DL
HGB BLD-MCNC: 6.5 G/DL
IMM GRANULOCYTES # BLD AUTO: 0.06 X10(3) UL (ref 0–1)
IMM GRANULOCYTES NFR BLD: 1.3 %
LACTATE SERPL-SCNC: 2.8 MMOL/L (ref 0.4–2)
LACTATE SERPL-SCNC: 3.5 MMOL/L (ref 0.4–2)
LACTATE SERPL-SCNC: 4.6 MMOL/L (ref 0.4–2)
LYMPHOCYTES # BLD AUTO: 0.87 X10(3) UL (ref 1–4)
LYMPHOCYTES NFR BLD AUTO: 18.9 %
MAGNESIUM SERPL-MCNC: 1.8 MG/DL (ref 1.6–2.6)
MAGNESIUM SERPL-MCNC: 1.9 MG/DL (ref 1.6–2.6)
MAGNESIUM SERPL-MCNC: 1.9 MG/DL (ref 1.6–2.6)
MAGNESIUM SERPL-MCNC: 2.1 MG/DL (ref 1.6–2.6)
MCH RBC QN AUTO: 34.8 PG (ref 26–34)
MCHC RBC AUTO-ENTMCNC: 34 G/DL (ref 31–37)
MCV RBC AUTO: 102.1 FL
MONOCYTES # BLD AUTO: 0.41 X10(3) UL (ref 0.1–1)
MONOCYTES NFR BLD AUTO: 8.9 %
NEUTROPHILS # BLD AUTO: 3.23 X10 (3) UL (ref 1.5–7.7)
NEUTROPHILS # BLD AUTO: 3.23 X10(3) UL (ref 1.5–7.7)
NEUTROPHILS NFR BLD AUTO: 70.2 %
OSMOLALITY SERPL CALC.SUM OF ELEC: 289 MOSM/KG (ref 275–295)
OSMOLALITY SERPL CALC.SUM OF ELEC: 289 MOSM/KG (ref 275–295)
OSMOLALITY SERPL CALC.SUM OF ELEC: 291 MOSM/KG (ref 275–295)
PHOSPHATE SERPL-MCNC: 1.1 MG/DL (ref 2.5–4.9)
PHOSPHATE SERPL-MCNC: 1.2 MG/DL (ref 2.5–4.9)
PHOSPHATE SERPL-MCNC: 1.2 MG/DL (ref 2.5–4.9)
PHOSPHATE SERPL-MCNC: 2.9 MG/DL (ref 2.5–4.9)
PLATELET # BLD AUTO: 117 10(3)UL (ref 150–450)
PLATELET MORPHOLOGY: NORMAL
POTASSIUM SERPL-SCNC: 3.6 MMOL/L (ref 3.5–5.1)
POTASSIUM SERPL-SCNC: 3.8 MMOL/L (ref 3.5–5.1)
POTASSIUM SERPL-SCNC: 4 MMOL/L (ref 3.5–5.1)
RBC # BLD AUTO: 1.87 X10(6)UL
RH BLOOD TYPE: POSITIVE
SODIUM SERPL-SCNC: 135 MMOL/L (ref 136–145)
SODIUM SERPL-SCNC: 136 MMOL/L (ref 136–145)
SODIUM SERPL-SCNC: 136 MMOL/L (ref 136–145)
WBC # BLD AUTO: 4.6 X10(3) UL (ref 4–11)

## 2023-10-02 PROCEDURE — 99233 SBSQ HOSP IP/OBS HIGH 50: CPT | Performed by: STUDENT IN AN ORGANIZED HEALTH CARE EDUCATION/TRAINING PROGRAM

## 2023-10-02 PROCEDURE — 99223 1ST HOSP IP/OBS HIGH 75: CPT | Performed by: NURSE PRACTITIONER

## 2023-10-02 PROCEDURE — 30233H1 TRANSFUSION OF NONAUTOLOGOUS WHOLE BLOOD INTO PERIPHERAL VEIN, PERCUTANEOUS APPROACH: ICD-10-PCS | Performed by: HOSPITALIST

## 2023-10-02 PROCEDURE — 30233N1 TRANSFUSION OF NONAUTOLOGOUS RED BLOOD CELLS INTO PERIPHERAL VEIN, PERCUTANEOUS APPROACH: ICD-10-PCS | Performed by: HOSPITALIST

## 2023-10-02 RX ORDER — MAGNESIUM OXIDE 400 MG/1
400 TABLET ORAL ONCE
Status: COMPLETED | OUTPATIENT
Start: 2023-10-02 | End: 2023-10-02

## 2023-10-02 RX ORDER — DEXTROSE AND SODIUM CHLORIDE 5; .9 G/100ML; G/100ML
INJECTION, SOLUTION INTRAVENOUS CONTINUOUS
Status: DISCONTINUED | OUTPATIENT
Start: 2023-10-02 | End: 2023-10-02

## 2023-10-02 RX ORDER — ALBUMIN (HUMAN) 12.5 G/50ML
25 SOLUTION INTRAVENOUS ONCE
Status: COMPLETED | OUTPATIENT
Start: 2023-10-02 | End: 2023-10-02

## 2023-10-02 RX ORDER — SODIUM CHLORIDE 9 MG/ML
INJECTION, SOLUTION INTRAVENOUS ONCE
Status: COMPLETED | OUTPATIENT
Start: 2023-10-02 | End: 2023-10-02

## 2023-10-02 NOTE — PLAN OF CARE
Received pt from med onc AOX4, on room air. Pt with  requiring insulin gtt. Pt with lactic acid 4.6. APN notified no new orders received. Hgb 6.5, pt [id number] PRBC. Pt hypoglycemic received 1 amp D50, BG after 20 min 106, recheck per protocol.

## 2023-10-02 NOTE — PHYSICAL THERAPY NOTE
PHYSICAL THERAPY TREATMENT NOTE - INPATIENT    Room Number: 465/465-A     Session: 2/7     Number of Visits to Meet Established Goals: 7    Presenting Problem: Diabetic ketoacidosis  Co-Morbidities : HTN, HLD, DM2, breast cancer, acoustic neuroma s/p resection w/ residual left facial droop, PVD     History related to current admission: Patient is a 70year old female admitted on 9/25/2023 from home for hyperglycemia. Pt diagnosed with diabetic ketoacidosis. On 9/29 transferred to med onc floor. However 10/1 transferred back to ICU due to BMP/ serum glucose levels. Recent admissions:  7/24/23-8/12/23: DKA --> HHPT  ASSESSMENT   Patient with improvements in cognition and mobility today in comparison to last session. Pt following commands 100% of the time, but continues to have significant weakness and limited activity tolerance/endurance. Improved overall static and dynamic sitting balance, only one major loss of balance to left side when donning socks, but has chronic left side weakness due to previous medical event. She will continue to benefit from IP PT interventions. Upon discharge, rec to [facility] as pt is functioning well below her baseline and would benefit from continued therapies to maximize patient's independence with functional mobility. DISCHARGE RECOMMENDATIONS  PT Discharge Recommendations: Sub-acute rehabilitation     PLAN  PT Treatment Plan: Bed mobility; Body mechanics; Endurance; Energy conservation;Patient education; Family education;Range of motion; Neuromuscular re-educate;Gait training;Strengthening;Stoop training;Stair training;Balance training;Transfer training  Rehab Potential : Fair  Frequency (Obs): 3-5x/week    CURRENT GOALS      Goal #1 Patient is able to demonstrate supine - sit EOB @ level: supervision      Goal #2 Patient is able to demonstrate transfers Sit to/from Stand at assistance level: supervision      Goal #3 Patient is able to ambulate 25 feet with assist device: walker - rolling at assistance level: supervision      Goal #4     Goal #5     Goal #6     Goal Comments: Goals established on 9/26/202  10/2/2023 all goals ongoing      SUBJECTIVE  \"This was really nice. Thank you! \"    OBJECTIVE  Precautions: Bed/chair alarm    WEIGHT BEARING RESTRICTION  Weight Bearing Restriction: None                PAIN ASSESSMENT   Ratin  Location: denies       BALANCE                                                                                                                       Static Sitting: Fair +  Dynamic Sitting: Fair           Static Standing: Fair -  Dynamic Standing: Poor +    ACTIVITY TOLERANCE                         O2 WALK         AM-PAC '6-Clicks' INPATIENT SHORT FORM - BASIC MOBILITY  How much difficulty does the patient currently have. .. Patient Difficulty: Turning over in bed (including adjusting bedclothes, sheets and blankets)?: None   Patient Difficulty: Sitting down on and standing up from a chair with arms (e.g., wheelchair, bedside commode, etc.): A Little   Patient Difficulty: Moving from lying on back to sitting on the side of the bed?: A Little   How much help from another person does the patient currently need. .. Help from Another: Moving to and from a bed to a chair (including a wheelchair)?: A Little   Help from Another: Need to walk in hospital room?: A Little   Help from Another: Climbing 3-5 steps with a railing?: A Lot       AM-PAC Score:  Raw Score: 18   Approx Degree of Impairment: 46.58%   Standardized Score (AM-PAC Scale): 43.63   CMS Modifier (G-Code): CK    FUNCTIONAL ABILITY STATUS  Gait Assessment   Functional Mobility/Gait Assessment  Gait Assistance: Minimum assistance  Distance (ft): 3  Assistive Device: Rolling walker  Pattern: Shuffle    Skilled Therapy Provided    Bed Mobility:  Rolling: ind   Supine<>Sit: Yoko - required at BLE to transfer off bed. Sit<>Supine: Yoko- required at BLE to transfer onto bed.  Able to reposition trunk independently     Transfer Mobility:  Sit<>Stand: Yoko to RW- verbal cues for hand placement. Incr time to reach full erect posture. Stand<>Sit: Yoko    Gait: Yoko w/ RW x 3 feet, shuffle gait pattern. Incr weakness noted. Therapist's Comments: Patient presents sleeping in bed- easy to arouse and awake. In agreement to session. Bed mobility completed at [facility]- required at BLE to transfer on and off bed from sidelying to sitting EOB. Completed seated therapeutic exercise: see below for specifics. Sit to stand to RW at [facility], verbal cues for hand placement, incr time to reach full erect posture. Took a few steps w/ RW but then stating unable to walk any further requesting to sit down. Sat EOB. Rest break taken. Sit to stand again at [facility], reached higher towards head of bed. Sit to supine at Yoko. Did ask pt if she wanted to sit in the chair, but pt politely refusing at this time. At this time upon discharge recommendation to [facility] to maximize patient's ability to return to [facility] and independence with mobility. RN and SW aware. THERAPEUTIC EXERCISES  Lower Extremity Alternating marching  Ankle pumps  LAQ     Upper Extremity Elbow flex/ext and Shoulder flex/ext     Position Sitting     Repetitions   10   Sets   1     Patient End of Session: In bed;Needs met;Call light within reach;RN aware of session/findings; All patient questions and concerns addressed; With [facility] staff; Alarm set; Discussed recommendations with /    PT Session Time: 35 minutes  Gait Training:  minutes  Therapeutic Activity: 12 minutes  Therapeutic Exercise: 15 minutes   Neuromuscular Re-education:  minutes

## 2023-10-02 NOTE — PLAN OF CARE
Patient alert and oriented x4. VSS. Afebrile. No c/o pain at this time. Medications administered per MAR. Accucheck machine unable to give reading. MD notified, orders received. Reading from BMP/serum glucose = 641. MD notified, orders to transfer to ICU received. Family notified of transfer and room number. Problem: PAIN - ADULT  Goal: Verbalizes/displays adequate comfort level or patient's stated pain goal  Description: INTERVENTIONS:  - Encourage pt to monitor pain and request assistance  - Assess pain using appropriate pain scale  - Administer analgesics based on type and severity of pain and evaluate response  - Implement non-pharmacological measures as appropriate and evaluate response  - Consider cultural and social influences on pain and pain management  - Manage/alleviate anxiety  - Utilize distraction and/or relaxation techniques  - Monitor for opioid side effects  - Notify MD/LIP if interventions unsuccessful or patient reports new pain  - Anticipate increased pain with activity and pre-medicate as appropriate  Outcome: Progressing     Problem: RISK FOR INFECTION - ADULT  Goal: Absence of fever/infection during anticipated neutropenic period  Description: INTERVENTIONS  - Monitor WBC  - Administer growth factors as ordered  - Implement neutropenic guidelines  Outcome: Progressing     Problem: SAFETY ADULT - FALL  Goal: Free from fall injury  Description: INTERVENTIONS:  - Assess pt frequently for physical needs  - Identify cognitive and physical deficits and behaviors that affect risk of falls.   - [place] fall precautions as indicated by assessment.  - Educate pt/family on patient safety including physical limitations  - Instruct pt to call for assistance with activity based on assessment  - Modify environment to reduce risk of injury  - Provide assistive devices as appropriate  - Consider OT/PT consult to assist with strengthening/mobility  - Encourage toileting schedule  Outcome: Progressing Problem: HEMATOLOGIC - ADULT  Goal: Free from bleeding injury  Description: (Example usage: patient with low platelets)  INTERVENTIONS:  - Avoid intramuscular injections, enemas and rectal medication administration  - Ensure safe mobilization of patient  - Hold pressure on venipuncture sites to achieve adequate hemostasis  - Assess for signs and symptoms of internal bleeding  - Monitor lab trends  - Patient is to report abnormal signs of bleeding to staff  - Avoid use of toothpicks and dental floss  - Use electric shaver for shaving  - Use soft bristle tooth brush  - Limit straining and forceful nose blowing  Outcome: Progressing     Problem: Delirium  Goal: Minimize duration of delirium  Description: Interventions:  - Encourage use of hearing aids, eye glasses  - Promote highest level of mobility daily  - Provide frequent reorientation  - Promote wakefulness i.e. lights on, blinds open  - Promote sleep, encourage patient's normal rest cycle i.e. lights off, TV off, minimize noise and interruptions  - Encourage family to assist in orientation and promotion of home routines  Outcome: Progressing     Problem: METABOLIC/FLUID AND ELECTROLYTES - ADULT  Goal: Glucose maintained within prescribed range  Description: INTERVENTIONS:  - Monitor Blood Glucose as ordered  - Assess for signs and symptoms of hyperglycemia and hypoglycemia  - Administer ordered medications to maintain glucose within target range  - Assess barriers to adequate nutritional intake and initiate nutrition consult as needed  - Instruct patient on self management of diabetes  Outcome: Not Progressing  Goal: Electrolytes maintained within normal limits  Description: INTERVENTIONS:  - Monitor labs and rhythm and assess patient for signs and symptoms of electrolyte imbalances  - Administer electrolyte replacement as ordered  - Monitor response to electrolyte replacements, including rhythm and repeat lab results as appropriate  - Fluid restriction as ordered  - Instruct patient on fluid and nutrition restrictions as appropriate  Outcome: Not Progressing

## 2023-10-02 NOTE — PLAN OF CARE
Assumed care of pt at change of shift with 1 u prbc infusing and insulin gtt paused for hypoglycemic episodes. 0800 blood glucose reading 58, see MAR and flowsheets for treatment and recheck. Repeat hgb 10.1. Pt transitoned to levemir with novlog corrections and carb coverage. Pt incontinent of urine this evening, post void residual 104. See MAR and flowsheets for further data.

## 2023-10-03 LAB
ANION GAP SERPL CALC-SCNC: 6 MMOL/L (ref 0–18)
BLOOD TYPE BARCODE: 6200
BUN BLD-MCNC: 25 MG/DL (ref 7–18)
CALCIUM BLD-MCNC: 7.9 MG/DL (ref 8.5–10.1)
CHLORIDE SERPL-SCNC: 111 MMOL/L (ref 98–112)
CO2 SERPL-SCNC: 21 MMOL/L (ref 21–32)
CREAT BLD-MCNC: 1.31 MG/DL
EGFRCR SERPLBLD CKD-EPI 2021: 44 ML/MIN/1.73M2 (ref 60–?)
ERYTHROCYTE [DISTWIDTH] IN BLOOD BY AUTOMATED COUNT: 14.7 %
GLUCOSE BLD-MCNC: 116 MG/DL (ref 70–99)
GLUCOSE BLD-MCNC: 124 MG/DL (ref 70–99)
GLUCOSE BLD-MCNC: 162 MG/DL (ref 70–99)
GLUCOSE BLD-MCNC: 173 MG/DL (ref 70–99)
GLUCOSE BLD-MCNC: 179 MG/DL (ref 70–99)
GLUCOSE BLD-MCNC: 206 MG/DL (ref 70–99)
GLUCOSE BLD-MCNC: 41 MG/DL (ref 70–99)
GLUCOSE BLD-MCNC: 48 MG/DL (ref 70–99)
GLUCOSE BLD-MCNC: >600 MG/DL (ref 70–99)
HCT VFR BLD AUTO: 29.8 %
HGB BLD-MCNC: 10.3 G/DL
MAGNESIUM SERPL-MCNC: 1.8 MG/DL (ref 1.6–2.6)
MCH RBC QN AUTO: 33.6 PG (ref 26–34)
MCHC RBC AUTO-ENTMCNC: 34.6 G/DL (ref 31–37)
MCV RBC AUTO: 97.1 FL
OSMOLALITY SERPL CALC.SUM OF ELEC: 287 MOSM/KG (ref 275–295)
PHOSPHATE SERPL-MCNC: 2.8 MG/DL (ref 2.5–4.9)
PLATELET # BLD AUTO: 142 10(3)UL (ref 150–450)
POTASSIUM SERPL-SCNC: 3.8 MMOL/L (ref 3.5–5.1)
RBC # BLD AUTO: 3.07 X10(6)UL
SODIUM SERPL-SCNC: 138 MMOL/L (ref 136–145)
UNIT VOLUME: 350 ML
WBC # BLD AUTO: 8.3 X10(3) UL (ref 4–11)

## 2023-10-03 PROCEDURE — 99233 SBSQ HOSP IP/OBS HIGH 50: CPT | Performed by: STUDENT IN AN ORGANIZED HEALTH CARE EDUCATION/TRAINING PROGRAM

## 2023-10-03 PROCEDURE — 99233 SBSQ HOSP IP/OBS HIGH 50: CPT | Performed by: NURSE PRACTITIONER

## 2023-10-03 RX ORDER — MAGNESIUM OXIDE 400 MG/1
400 TABLET ORAL ONCE
Status: COMPLETED | OUTPATIENT
Start: 2023-10-03 | End: 2023-10-03

## 2023-10-03 RX ORDER — ENOXAPARIN SODIUM 100 MG/ML
30 INJECTION SUBCUTANEOUS DAILY
Status: DISCONTINUED | OUTPATIENT
Start: 2023-10-03 | End: 2023-10-05

## 2023-10-03 NOTE — CM/SW NOTE
10/03/23 1600   CM/SW Referral Data   Referral Source Physician   Reason for Referral Discharge planning   Informant Patient;Spouse/Significant Other;Daughter   Patient Info   Patient's Current Mental Status at Time of Assessment Alert;Oriented   Patient's [address]   Number of Levels in Home 2   Patient lives with Spouse/Significant other;Daughter   Patient Status Prior to Admission   Independent with ADLs and Mobility No   Pt. requires assistance with Housework;Driving   Services in place prior to admission DME/Supplies at home   Type of DME/Supplies [employer]; Wheeled Walker;[employer]   Discharge Needs   Anticipated D/C needs Subacute rehab   Services Requested   PASRR Level 1 Submitted Yes   Choice of Post-Acute Provider   Informed patient of right to choose their preferred provider Yes     Order received for discharge planning. Pt is a 70year old female admitted with hyperglycemia, abdominal pain and poor appetite. Pt was admitted to ICU on insulin gtt. Pt w/ hx cardiomyopathy and Cardiology following. Per chart review, Life Vest was ordered at discharge. Met w/pt and her SO, Bud and daughter, Mar Henderson at the bedside. She reports she is able to ambulate short distances at home, stays on main floor and uses commode. Pt reports she was unable to use the Life Vest due to her neuropathy so when rep came to demonstrate, decision was made not to go home with the vest.    Discussed PT/OT recommendations for [facility] and she is agreeable and would like to go to [address] [place] [address] Road if accepted. CM requested department  [facility]) to initiate AIDIN referral for [facility]. SW/CM will continue to follow.      Dorene OCAMPOA MSN, RN CTL/  [id number]

## 2023-10-03 NOTE — PLAN OF CARE
Assumed care of patient following shift report. Pt alert and oriented x3-4. Follows commands, denies pain. RA, oxygen saturations stable. NSR on tele monitor. Normotensive. Bladder scan/straight cath per protocol. Hypoglycemic this AM, see MAR for treatment, CCAPN notified. See MAR and flowsheets for additional information.

## 2023-10-03 NOTE — CM/SW NOTE
Department  notified of request for georgina PAUL referrals started. Assigned CM/SW to follow up with pt/family on further discharge planning.       Isamar Chaudhari  [facility]

## 2023-10-03 NOTE — PLAN OF CARE
Assumed care of pt after RN report. Alert and oriented, forgetful at times. RA. VSS. Minimal appetite, per pt improved today. Voiding. BM. See flowsheets for full assessments. POC discussed with pt and family bedside.

## 2023-10-04 LAB
ANION GAP SERPL CALC-SCNC: 5 MMOL/L (ref 0–18)
BUN BLD-MCNC: 23 MG/DL (ref 7–18)
CALCIUM BLD-MCNC: 8.1 MG/DL (ref 8.5–10.1)
CHLORIDE SERPL-SCNC: 108 MMOL/L (ref 98–112)
CO2 SERPL-SCNC: 20 MMOL/L (ref 21–32)
CREAT BLD-MCNC: 1.17 MG/DL
EGFRCR SERPLBLD CKD-EPI 2021: 51 ML/MIN/1.73M2 (ref 60–?)
ERYTHROCYTE [DISTWIDTH] IN BLOOD BY AUTOMATED COUNT: 15 %
GLUCOSE BLD-MCNC: 186 MG/DL (ref 70–99)
GLUCOSE BLD-MCNC: 231 MG/DL (ref 70–99)
GLUCOSE BLD-MCNC: 244 MG/DL (ref 70–99)
GLUCOSE BLD-MCNC: 256 MG/DL (ref 70–99)
GLUCOSE BLD-MCNC: 274 MG/DL (ref 70–99)
HCT VFR BLD AUTO: 33.5 %
HGB BLD-MCNC: 11.6 G/DL
MAGNESIUM SERPL-MCNC: 1.8 MG/DL (ref 1.6–2.6)
MCH RBC QN AUTO: 33.3 PG (ref 26–34)
MCHC RBC AUTO-ENTMCNC: 34.6 G/DL (ref 31–37)
MCV RBC AUTO: 96.3 FL
OSMOLALITY SERPL CALC.SUM OF ELEC: 288 MOSM/KG (ref 275–295)
PLATELET # BLD AUTO: 182 10(3)UL (ref 150–450)
POTASSIUM SERPL-SCNC: 4.2 MMOL/L (ref 3.5–5.1)
RBC # BLD AUTO: 3.48 X10(6)UL
SODIUM SERPL-SCNC: 133 MMOL/L (ref 136–145)
WBC # BLD AUTO: 7.2 X10(3) UL (ref 4–11)

## 2023-10-04 PROCEDURE — 99233 SBSQ HOSP IP/OBS HIGH 50: CPT | Performed by: INTERNAL MEDICINE

## 2023-10-04 PROCEDURE — 99231 SBSQ HOSP IP/OBS SF/LOW 25: CPT | Performed by: CLINICAL NURSE SPECIALIST

## 2023-10-04 PROCEDURE — 99223 1ST HOSP IP/OBS HIGH 75: CPT | Performed by: STUDENT IN AN ORGANIZED HEALTH CARE EDUCATION/TRAINING PROGRAM

## 2023-10-04 RX ORDER — MAGNESIUM OXIDE 400 MG/1
400 TABLET ORAL ONCE
Status: COMPLETED | OUTPATIENT
Start: 2023-10-04 | End: 2023-10-04

## 2023-10-04 NOTE — PHYSICAL THERAPY NOTE
Attempted to see Pt this AM - RN aware of attempt. Pt requesting to rest - reports being up until 2am.  Will f/u later today if time permits, after all other patients are attempted per tentative schedule.

## 2023-10-04 NOTE — CM/SW NOTE
[facility]. CM/SW to remain available to assist with discharge planning.     LONNIE DamicoN, RN-BC    [id number]

## 2023-10-04 NOTE — OCCUPATIONAL THERAPY NOTE
Attempted to see pt for skilled OT services this date. Pt is currently refusing OOB activity. Will re-attempt as schedule allows.  Tiffanie Arauz, 10/04/23

## 2023-10-04 NOTE — PLAN OF CARE
Received pt at change of shift. Pt alert and oriented x4 on room air. Pt following all commands. Pt with good appetite. 1BM this morning. Voiding with purewick. VSS. Afebrile. Will continue to monitor. Transfer orders in place.

## 2023-10-05 LAB
ANION GAP SERPL CALC-SCNC: 6 MMOL/L (ref 0–18)
BUN BLD-MCNC: 23 MG/DL (ref 7–18)
CALCIUM BLD-MCNC: 7.8 MG/DL (ref 8.5–10.1)
CHLORIDE SERPL-SCNC: 108 MMOL/L (ref 98–112)
CO2 SERPL-SCNC: 22 MMOL/L (ref 21–32)
CREAT BLD-MCNC: 1.1 MG/DL
EGFRCR SERPLBLD CKD-EPI 2021: 54 ML/MIN/1.73M2 (ref 60–?)
ERYTHROCYTE [DISTWIDTH] IN BLOOD BY AUTOMATED COUNT: 15.1 %
GLUCOSE BLD-MCNC: 202 MG/DL (ref 70–99)
GLUCOSE BLD-MCNC: 225 MG/DL (ref 70–99)
GLUCOSE BLD-MCNC: 252 MG/DL (ref 70–99)
GLUCOSE BLD-MCNC: 256 MG/DL (ref 70–99)
GLUCOSE BLD-MCNC: 257 MG/DL (ref 70–99)
GLUCOSE BLD-MCNC: 288 MG/DL (ref 70–99)
HCT VFR BLD AUTO: 29.6 %
HGB BLD-MCNC: 10.4 G/DL
MAGNESIUM SERPL-MCNC: 1.8 MG/DL (ref 1.6–2.6)
MCH RBC QN AUTO: 33.9 PG (ref 26–34)
MCHC RBC AUTO-ENTMCNC: 35.1 G/DL (ref 31–37)
MCV RBC AUTO: 96.4 FL
OSMOLALITY SERPL CALC.SUM OF ELEC: 294 MOSM/KG (ref 275–295)
PLATELET # BLD AUTO: 175 10(3)UL (ref 150–450)
POTASSIUM SERPL-SCNC: 3.8 MMOL/L (ref 3.5–5.1)
RBC # BLD AUTO: 3.07 X10(6)UL
SODIUM SERPL-SCNC: 136 MMOL/L (ref 136–145)
WBC # BLD AUTO: 8.6 X10(3) UL (ref 4–11)

## 2023-10-05 PROCEDURE — 99233 SBSQ HOSP IP/OBS HIGH 50: CPT | Performed by: INTERNAL MEDICINE

## 2023-10-05 PROCEDURE — 99231 SBSQ HOSP IP/OBS SF/LOW 25: CPT

## 2023-10-05 RX ORDER — MAGNESIUM OXIDE 400 MG/1
400 TABLET ORAL ONCE
Status: COMPLETED | OUTPATIENT
Start: 2023-10-05 | End: 2023-10-05

## 2023-10-05 RX ORDER — ENOXAPARIN SODIUM 100 MG/ML
40 INJECTION SUBCUTANEOUS DAILY
Status: DISCONTINUED | OUTPATIENT
Start: 2023-10-06 | End: 2023-10-06

## 2023-10-05 NOTE — PLAN OF CARE
Received patient anxious, asking for sleeping pill, follows commands, denies pain, urine incontinent, new purewick applied, irritable during cares, plan of care explained, support provided. Patient is aware might be transferred to floor when bed available. Around 10 pm blood pressure down to 73/36, rechecked 90/52, APN notified, will monitor. Problem: METABOLIC/FLUID AND ELECTROLYTES - ADULT  Goal: Glucose maintained within prescribed range  Description: INTERVENTIONS:  - Monitor Blood Glucose as ordered  - Assess for signs and symptoms of hyperglycemia and hypoglycemia  - Administer ordered medications to maintain glucose within target range  - Assess barriers to adequate nutritional intake and initiate nutrition consult as needed  - Instruct patient on self management of diabetes  Outcome: Progressing     Problem: HEMATOLOGIC - ADULT  Goal: Free from bleeding injury  Description: (Example usage: patient with low platelets)  INTERVENTIONS:  - Avoid intramuscular injections, enemas and rectal medication administration  - Ensure safe mobilization of patient  - Hold pressure on venipuncture sites to achieve adequate hemostasis  - Assess for signs and symptoms of internal bleeding  - Monitor lab trends  - Patient is to report abnormal signs of bleeding to staff  - Avoid use of toothpicks and dental floss  - Use electric shaver for shaving  - Use soft bristle tooth brush  - Limit straining and forceful nose blowing  Outcome: Progressing     Problem: SAFETY ADULT - FALL  Goal: Free from fall injury  Description: INTERVENTIONS:  - Assess pt frequently for physical needs  - Identify cognitive and physical deficits and behaviors that affect risk of falls.   - Rule fall precautions as indicated by assessment.  - Educate pt/family on patient safety including physical limitations  - Instruct pt to call for assistance with activity based on assessment  - Modify environment to reduce risk of injury  - Provide assistive devices as appropriate  - Consider OT/PT consult to assist with strengthening/mobility  - Encourage toileting schedule  Outcome: Progressing     Problem: PAIN - ADULT  Goal: Verbalizes/displays adequate comfort level or patient's stated pain goal  Description: INTERVENTIONS:  - Encourage pt to monitor pain and request assistance  - Assess pain using appropriate pain scale  - Administer analgesics based on type and severity of pain and evaluate response  - Implement non-pharmacological measures as appropriate and evaluate response  - Consider cultural and social influences on pain and pain management  - Manage/alleviate anxiety  - Utilize distraction and/or relaxation techniques  - Monitor for opioid side effects  - Notify MD/LIP if interventions unsuccessful or patient reports new pain  - Anticipate increased pain with activity and pre-medicate as appropriate  Outcome: Progressing

## 2023-10-05 NOTE — PHYSICAL THERAPY NOTE
PHYSICAL THERAPY TREATMENT NOTE - INPATIENT    Room Number: 465/465-A     Session: 3/7     Number of Visits to Meet Established Goals: 7    Presenting Problem: Diabetic ketoacidosis  Co-Morbidities : HTN, HLD, DM2, breast cancer, acoustic neuroma s/p resection w/ residual left facial droop, PVD    History related to current admission: Patient is a 70year old female admitted on 9/25/2023 from home for hyperglycemia. Pt diagnosed with diabetic ketoacidosis. On 9/29 transferred to med onc floor. However 10/1 transferred back to ICU due to BMP/ serum glucose levels. Recent admissions:  7/24/23-8/12/23: DKA --> HHPT  ASSESSMENT   Patient continues to make improvements in overall functional mobility. Ambulated at [facility] w/ RW x 25 feet, minor loss of balances requiring CGA to Yoko to regain. She continues to present with the following limitations: global decr in weakness, decr activity tolerance/endurance, decr static and dynamic standing balance, decr functional mobility. These present themselves as barriers to independent bed mobility, transfers, and ambulation. She will continue to benefit from IP PT interventions. Upon discharge rec to [facility] as pt is functioning well below her baseline and would benefit from continued therapies to maximize patient's independence with functional mobility. DISCHARGE RECOMMENDATIONS  PT Discharge Recommendations: Sub-acute rehabilitation     PLAN  PT Treatment Plan: Bed mobility; Body mechanics; Endurance; Energy conservation;Patient education; Family education;Range of motion; Neuromuscular re-educate;Gait training;Strengthening;Stoop training;Stair training;Balance training;Transfer training  Rehab Potential : Fair  Frequency (Obs): 3-5x/week    CURRENT GOALS      Goal #1 Patient is able to demonstrate supine - sit EOB @ level: supervision      Goal #2 Patient is able to demonstrate transfers Sit to/from Stand at assistance level: supervision      Goal #3 Patient is able to ambulate 25 feet with assist device: walker - rolling at assistance level: supervision      Goal #4     Goal #5     Goal #6     Goal Comments: Goals established on 2023  10/5/2023 all goals ongoing      SUBJECTIVE  \"No one believed me that I could walk, but look at me now. \"    OBJECTIVE  Precautions: Bed/chair alarm    WEIGHT BEARING RESTRICTION  Weight Bearing Restriction: None                PAIN ASSESSMENT   Ratin  Location: denies       BALANCE                                                                                                                       Static Sitting: Good  Dynamic Sitting: Fair +           Static Standing: Fair -  Dynamic Standing: Poor +    ACTIVITY TOLERANCE                         O2 WALK         AM-PAC '6-Clicks' INPATIENT SHORT FORM - BASIC MOBILITY  How much difficulty does the patient currently have. .. Patient Difficulty: Turning over in bed (including adjusting bedclothes, sheets and blankets)?: None   Patient Difficulty: Sitting down on and standing up from a chair with arms (e.g., wheelchair, bedside commode, etc.): A Little   Patient Difficulty: Moving from lying on back to sitting on the side of the bed?: None   How much help from another person does the patient currently need. ..    Help from Another: Moving to and from a bed to a chair (including a wheelchair)?: A Little   Help from Another: Need to walk in hospital room?: A Little   Help from Another: Climbing 3-5 steps with a railing?: A Little       AM-PAC Score:  Raw Score: 20   Approx Degree of Impairment: 35.83%   Standardized Score (AM-PAC Scale): 47.67   CMS Modifier (G-Code): CJ    FUNCTIONAL ABILITY STATUS  Gait Assessment   Functional Mobility/Gait Assessment  Gait Assistance: Contact guard assist  Distance (ft): 25  Assistive Device: Rolling walker  Pattern: Shuffle    Skilled Therapy Provided    Bed Mobility: NT      Transfer Mobility:  Sit<>Stand: CGA to RW- verbal cues for hand placement Stand<>Sit: CGA    Gait: CGA to Yoko w/ RW x 25 feet. Shuffle gait pattern. Occasional minor los of balance to left side requiring Yoko to regain. Therapist's Comments: Patient presents sitting upright in chair. Agreeable to follow up therapy session. No pain reported at this time. Completed seated upper and lower extremity therapeutic exercises: see below for specifics. Tolerated well. Initial sit to stand to RW at [facility], returned to sitting position as pt requesting to walk but lines needed to be managed first. Second sit to stand at [facility], with good carry through for hand placement. Ambulated 25 w/ RW at [facility] to [facility], shuffle gait pattern. Occasional minor loss of balance to left side requiring Yoko to regain balance. Pt upright in chair at end of session. Pt will continue to benefit from IP PT interventions. Upon discharge, rec to [facility]. RN aware. THERAPEUTIC EXERCISES  Lower Extremity Alternating marching  Heel raises  Hip adduction squeezes  LAQ  Toe raises     Upper Extremity Elbow flex/ext,  - open/close, Scapular Retraction, Shoulder flex/ext, Wrist flex/ext, and punches     Position Sitting     Repetitions   20   Sets   1     Patient End of Session: Up in chair;Needs met;Call light within reach;RN aware of session/findings; All patient questions and concerns addressed; Alarm set; Discussed recommendations with /    PT Session Time: 35 minutes  Gait Trainin minutes  Therapeutic Activity:  minutes  Therapeutic Exercise: 16 minutes   Neuromuscular Re-education:  minutes

## 2023-10-05 NOTE — CM/SW NOTE
Clinical updates sent to [facility] via [facility]. Message sent to facility to inform of medical clearance for discharge today - await response to coordinate transfer. Addendum: Jaquan Sherwood with [address] confirms pt can admit today at 5 pm. [facility] arranged for 5 pm. PCS updated and available for RN to print. Will meet with pt to discuss medicar costs and confirm plan. RN to call report: Kwesi Phone [phone]   [facility]: [id number]    PU indicating pt not medically cleared for discharge today. DC canceled. Updated pt's spouse via phone. He indicates [facility] is their preference facility when she is medically ready for discharge.      Denise Gonzalez, BSN, RN-BC    [id number]

## 2023-10-05 NOTE — PLAN OF CARE
Assumed care of patient after RN report. Patient alert and oriented x4. On RA. SR on monitor. Tolerating diet. 3 BM soft, loose. Purewick with yellow urine and incontinence episodes. Denies pain. Awaiting transfer to floor. See flowsheet for full assessment.

## 2023-10-06 VITALS
HEIGHT: 65 IN | WEIGHT: 110.19 LBS | RESPIRATION RATE: 12 BRPM | TEMPERATURE: 99 F | SYSTOLIC BLOOD PRESSURE: 140 MMHG | DIASTOLIC BLOOD PRESSURE: 61 MMHG | OXYGEN SATURATION: 96 % | HEART RATE: 77 BPM | BODY MASS INDEX: 18.36 KG/M2

## 2023-10-06 LAB
ANION GAP SERPL CALC-SCNC: 6 MMOL/L (ref 0–18)
BUN BLD-MCNC: 21 MG/DL (ref 7–18)
C DIFF TOX B STL QL: NEGATIVE
CALCIUM BLD-MCNC: 8 MG/DL (ref 8.5–10.1)
CHLORIDE SERPL-SCNC: 110 MMOL/L (ref 98–112)
CO2 SERPL-SCNC: 21 MMOL/L (ref 21–32)
CREAT BLD-MCNC: 1.12 MG/DL
EGFRCR SERPLBLD CKD-EPI 2021: 53 ML/MIN/1.73M2 (ref 60–?)
GLUCOSE BLD-MCNC: 141 MG/DL (ref 70–99)
GLUCOSE BLD-MCNC: 157 MG/DL (ref 70–99)
GLUCOSE BLD-MCNC: 160 MG/DL (ref 70–99)
GLUCOSE BLD-MCNC: 288 MG/DL (ref 70–99)
MAGNESIUM SERPL-MCNC: 1.5 MG/DL (ref 1.6–2.6)
OSMOLALITY SERPL CALC.SUM OF ELEC: 290 MOSM/KG (ref 275–295)
POTASSIUM SERPL-SCNC: 3.5 MMOL/L (ref 3.5–5.1)
SODIUM SERPL-SCNC: 137 MMOL/L (ref 136–145)

## 2023-10-06 PROCEDURE — 99231 SBSQ HOSP IP/OBS SF/LOW 25: CPT

## 2023-10-06 PROCEDURE — 99232 SBSQ HOSP IP/OBS MODERATE 35: CPT | Performed by: STUDENT IN AN ORGANIZED HEALTH CARE EDUCATION/TRAINING PROGRAM

## 2023-10-06 RX ORDER — POTASSIUM CHLORIDE 20 MEQ/1
40 TABLET, EXTENDED RELEASE ORAL ONCE
Status: COMPLETED | OUTPATIENT
Start: 2023-10-06 | End: 2023-10-06

## 2023-10-06 RX ORDER — FAMOTIDINE 20 MG/1
20 TABLET, FILM COATED ORAL DAILY
Qty: 30 TABLET | Refills: 0 | Status: SHIPPED | OUTPATIENT
Start: 2023-10-07

## 2023-10-06 RX ORDER — SPIRONOLACTONE 25 MG/1
25 TABLET ORAL DAILY
Qty: 30 TABLET | Refills: 0 | Status: SHIPPED | OUTPATIENT
Start: 2023-10-07

## 2023-10-06 RX ORDER — METOPROLOL SUCCINATE 50 MG/1
50 TABLET, EXTENDED RELEASE ORAL
Qty: 30 TABLET | Refills: 0 | Status: SHIPPED | OUTPATIENT
Start: 2023-10-07

## 2023-10-06 RX ORDER — FAMOTIDINE 20 MG/1
20 TABLET, FILM COATED ORAL DAILY
Status: DISCONTINUED | OUTPATIENT
Start: 2023-10-07 | End: 2023-10-06

## 2023-10-06 RX ORDER — MAGNESIUM OXIDE 400 MG/1
800 TABLET ORAL ONCE
Status: COMPLETED | OUTPATIENT
Start: 2023-10-06 | End: 2023-10-06

## 2023-10-06 RX ORDER — TAMSULOSIN HYDROCHLORIDE 0.4 MG/1
0.4 CAPSULE ORAL DAILY
Qty: 30 CAPSULE | Refills: 0 | Status: SHIPPED | OUTPATIENT
Start: 2023-10-06 | End: 2023-11-05

## 2023-10-06 RX ORDER — SUCRALFATE ORAL 1 G/10ML
1 SUSPENSION ORAL 3 TIMES DAILY PRN
Qty: 500 ML | Refills: 0 | Status: SHIPPED | OUTPATIENT
Start: 2023-10-06

## 2023-10-06 NOTE — DISCHARGE INSTRUCTIONS
Diabetes meds:   Take Levemir 5 u every morning, 4 u every night  Take 2 u of NovoLog based on meal size, plus a correction based on blood sugar going into the meal:  <140: no extra Novolog  140-220- 1 unit of extra   221-300- 2 units of extra   301-380- 3 units of extra   381-460- 4 units of extra

## 2023-10-06 NOTE — PLAN OF CARE
Assumed pt care at [address] [place] [address]. A&Ox4, dentures/glasses at bedside. L facial droop/slurred speech at baseline. RA, lung sounds clear/diminished. NSR on tele. BL hand nonpitting edema. Purewick in place. PVR q shift. Incontinent. Up with 1/walker. Mepilex on sacrum d/t redness. Possible discharge tomorrow. POC updated with pt, questions answered, verbalized understanding. Will continue to monitor.     Problem: METABOLIC/FLUID AND ELECTROLYTES - ADULT  Goal: Glucose maintained within prescribed range  Description: INTERVENTIONS:  - Monitor Blood Glucose as ordered  - Assess for signs and symptoms of hyperglycemia and hypoglycemia  - Administer ordered medications to maintain glucose within target range  - Assess barriers to adequate nutritional intake and initiate nutrition consult as needed  - Instruct patient on self management of diabetes  Outcome: Progressing  Goal: Electrolytes maintained within normal limits  Description: INTERVENTIONS:  - Monitor labs and rhythm and assess patient for signs and symptoms of electrolyte imbalances  - Administer electrolyte replacement as ordered  - Monitor response to electrolyte replacements, including rhythm and repeat lab results as appropriate  - Fluid restriction as ordered  - Instruct patient on fluid and nutrition restrictions as appropriate  Outcome: Progressing     Problem: PAIN - ADULT  Goal: Verbalizes/displays adequate comfort level or patient's stated pain goal  Description: INTERVENTIONS:  - Encourage pt to monitor pain and request assistance  - Assess pain using appropriate pain scale  - Administer analgesics based on type and severity of pain and evaluate response  - Implement non-pharmacological measures as appropriate and evaluate response  - Consider cultural and social influences on pain and pain management  - Manage/alleviate anxiety  - Utilize distraction and/or relaxation techniques  - Monitor for opioid side effects  - Notify MD/LIP if interventions unsuccessful or patient reports new pain  - Anticipate increased pain with activity and pre-medicate as appropriate  Outcome: Progressing     Problem: RISK FOR INFECTION - ADULT  Goal: Absence of fever/infection during anticipated neutropenic period  Description: INTERVENTIONS  - Monitor WBC  - Administer growth factors as ordered  - Implement neutropenic guidelines  Outcome: Progressing     Problem: SAFETY ADULT - FALL  Goal: Free from fall injury  Description: INTERVENTIONS:  - Assess pt frequently for physical needs  - Identify cognitive and physical deficits and behaviors that affect risk of falls.   - [place] fall precautions as indicated by assessment.  - Educate pt/family on patient safety including physical limitations  - Instruct pt to call for assistance with activity based on assessment  - Modify environment to reduce risk of injury  - Provide assistive devices as appropriate  - Consider OT/PT consult to assist with strengthening/mobility  - Encourage toileting schedule  Outcome: Progressing     Problem: HEMATOLOGIC - ADULT  Goal: Free from bleeding injury  Description: (Example usage: patient with low platelets)  INTERVENTIONS:  - Avoid intramuscular injections, enemas and rectal medication administration  - Ensure safe mobilization of patient  - Hold pressure on venipuncture sites to achieve adequate hemostasis  - Assess for signs and symptoms of internal bleeding  - Monitor lab trends  - Patient is to report abnormal signs of bleeding to staff  - Avoid use of toothpicks and dental floss  - Use electric shaver for shaving  - Use soft bristle tooth brush  - Limit straining and forceful nose blowing  Outcome: Progressing     Problem: Delirium  Goal: Minimize duration of delirium  Description: Interventions:  - Encourage use of hearing aids, eye glasses  - Promote highest level of mobility daily  - Provide frequent reorientation  - Promote wakefulness i.e. lights on, blinds open  - Promote sleep, encourage patient's normal rest cycle i.e. lights off, TV off, minimize noise and interruptions  - Encourage family to assist in orientation and promotion of home routines  Outcome: Progressing     Problem: Patient/Family Goals  Goal: Patient/Family Long Term Goal  Description: Patient's Long Term Goal: to go home    Interventions:  - meds, labs, pt/ot  - See additional Care Plan goals for specific interventions  Outcome: Progressing  Goal: Patient/Family Short Term Goal  Description: Patient's Short Term Goal: blood sugar control    Interventions:   - meds, labs  - See additional Care Plan goals for specific interventions  Outcome: Progressing     Problem: DISCHARGE PLANNING  Goal: Discharge to home or other facility with appropriate resources  Description: INTERVENTIONS:  - Identify barriers to discharge w/pt and caregiver  - Include patient/family/discharge partner in discharge planning  - Arrange for needed discharge resources and transportation as appropriate  - Identify discharge learning needs (meds, wound care, etc)  - Arrange for interpreters to assist at discharge as needed  - Consider post-discharge preferences of patient/family/discharge partner  - Complete POLST form as appropriate  - Assess patient's ability to be responsible for managing their own health  - Refer to Case Management Department for coordinating discharge planning if the patient needs post-hospital services based on physician/LIP order or complex needs related to functional status, cognitive ability or social support system  Outcome: Progressing

## 2023-10-06 NOTE — CM/SW NOTE
10/06/23 1300   Discharge disposition   Expected discharge disposition subacute   Post Acute Care Provider   (Keyana Davison)   Discharge transportation [address] clearance per hospitalist. Facility can accept pt today and pt is eager to be discharged. [facility] arranged for 3 pm. PCS updated and available for RN to print. Spoke with pt's life partner, Brendan, via phone regarding costs and he is agreeable. RN to call report:  Kwesi Phone [phone] [facility]: [address], BSN, RN-BC    [id number]

## 2023-10-06 NOTE — PLAN OF CARE
Received in bed alert and oriented x 4, noted being irritable at times, cooperative with care when it is explained to her. On room air, no SOB noted. Sat up in chair this morning. Ate breakfast and lunch with fair appetite. Seen by Cards and Endocrinologist this morning, also seen by Hospitalist, patient okayed for discharge, informed  and arranged transportation. Report given to Zena in [place] in [place]. Patient agreed for the transfer, she stated she tried to contact her life partner Bud about the transfer. IV line and Port acess removed prior to discharge. Susan Whalen came and picked her up at 1500H.

## 2023-10-06 NOTE — DISCHARGE SUMMARY
[facility] SPECIALTY [facility] Hospitalist Discharge Summary    Patient ID  Barber Bangura  [id number]  70year old  9/14/1955    Admit date: 9/25/2023    Discharge date: 10/06/23    Attending: Grupo Escalona DO     Primary Care Physician: Scotty Hurst MD     Reason for admission: dka    Discharge condition: stable    Disposition: valentin    Important follow up:  -PCP within 7 d  -specialists:           Additional patient instructions       Discharge med list     Medication List        CONTINUE taking these medications      FreeStyle System Kit  1 each by Does not apply route as needed for Other. Insulin Pen Needle 32G X 4 MM Misc  May substitute if not on insurance formulary            STOP taking these medications      Jardiance 10 MG Tabs  Generic drug: empagliflozin            ASK your doctor about these medications      citalopram 40 MG Tabs  Commonly known as: CeleXA  Take 1 tablet (40 mg total) by mouth daily. * insulin aspart 100 Units/mL Sopn  Commonly known as: NovoLOG  Inject 3 Units into the skin 3 (three) times daily with meals. * insulin aspart 100 Units/mL Sopn  Commonly known as: NovoLOG  Inject 1-5 Units into the skin 4 (four) times daily before meals and nightly. insulin detemir 100 UNIT/ML Sopn  Commonly known as: Levemir  Inject 3 Units into the skin 2 (two) times daily. metoprolol succinate ER 25 MG Tb24  Commonly known as: Toprol XL  Take 1 tablet (25 mg total) by mouth daily. One-Daily Multi Vitamins Tabs     * sacubitril-valsartan 24-26 MG Tabs  Commonly known as: Entresto     * sacubitril-valsartan 24-26 MG Tabs  Commonly known as: Entresto     simvastatin 20 MG Tabs  Commonly known as: Zocor  TAKE 1 TABLET(20 MG) BY MOUTH DAILY     tamoxifen 20 MG Tabs  Commonly known as: Nolvadex     torsemide 20 MG Tabs  Commonly known as: Demadex  Take 1 tablet (20 mg total) by mouth daily.      Vitamin D 1000 units Tabs  Take 3,000 units by mouth once daily           * This list has 4 medication(s) that are the same as other medications prescribed for you. Read the directions carefully, and ask your doctor or other care provider to review them with you. Discharge Diagnoses:    DKA - resolved  DM 2, insulin dependent  Metabolic acidosis  DAVID on CKD 3  Transaminitis  HFrEF  DCM  NICM  hypomg    Consults:  IP CONSULT TO PULMONOLOGY  IP CONSULT TO PHARMACY  IP CONSULT TO CARDIOLOGY  IP CONSULT TO DIABETES APRN/PA  IP CONSULT TO UROLOGY  IP CONSULT PALLIATIVE CARE  IP CONSULT TO SOCIAL WORK  IP CONSULT TO PHYSICIAN    Radiology:  US KIDNEYS (ATI=08334)    Result Date: 9/29/2023  PROCEDURE:  US KIDNEYS (JKL=51204)  COMPARISON:  [facility], US ABDOMEN COMPLETE (CPT=76700), 9/27/2023, 4:38 PM.  INDICATIONS:  acidosis  TECHNIQUE:  Transabdominal gray scale ultrasound imaging of the bilateral kidneys and bladder was performed. Routine technique was utilized. PATIENT STATED HISTORY: (As transcribed by Technologist)     FINDINGS:   RIGHT KIDNEY MEASUREMENTS:  10.2 x 4.8 x 6.7 cm ECHOGENICITY:  Right kidney is echogenic. HYDRONEPHROSIS:  None. CYSTS/STONES/MASSES:  None. LEFT KIDNEY MEASUREMENTS:  7.8 x 4.4 x 4.8 cm ECHOGENICITY:  Left kidney is atrophic and echogenic. HYDRONEPHROSIS:  None. CYSTS/STONES/MASSES:  None. BLADDER:  Bladder volume measures 157 mL. There is bladder wall thickening. OTHER:  Negative. CONCLUSION:  1. Kidneys are echogenic which may represent medical renal disease. The left kidney is asymmetrically atrophic. 2. No evidence of hydronephrosis. 3. There is bladder wall thickening. This may relate to underdistention or cystitis. Consider correlation with urinalysis. LOCATION:  [facility]     Dictated by (CST): Jarred Otoole MD on 9/29/2023 at 12:40 PM     Finalized by (CST): Jarred Otoole MD on 9/29/2023 at 12:41 PM       [address] (HTL=[id number])    Result Date: 9/27/2023  PROCEDURE:  US ABDOMEN COMPLETE (CPT=76700)  COMPARISON:  None.   INDICATIONS: elevated LFTs  TECHNIQUE:  Real time gray-scale ultrasound was used to evaluate the abdomen. The exam includes images of the liver, gallbladder, common bile duct, pancreas, spleen, kidneys, IVC, and aorta. PATIENT STATED HISTORY: (As transcribed by Technologist)     FINDINGS:  LIVER:  Normal size and echogenicity. No significant masses. BILIARY:  Normal appearing gallbladder, intrahepatic ducts, and common bile duct. Common bile duct diameter is 3 mm. Negative sonographic Belcher's sign. PANCREAS:  Unremarkable. SPLEEN:  Status post splenectomy. KIDNEYS:  No hydronephrosis. Right kidney measures 9.9 cm. Left kidney measures 7.2 cm. AORTA/IVC:  Visualized portions are unremarkable. CONCLUSION:  Status post splenectomy. Otherwise unremarkable abdominal ultrasound. LOCATION:  [facility]    Dictated by (CST): Queenie Layton MD on 9/27/2023 at 6:22 PM     Finalized by (CST): Queenie Layton MD on 9/27/2023 at 6:23 PM       XR CHEST AP PORTABLE  (CPT=71045)    Result Date: 9/27/2023  PROCEDURE:  XR CHEST AP PORTABLE  (CPT=71045)  TECHNIQUE:  AP chest radiograph was obtained. COMPARISON:  [facility], XR CHEST AP PORTABLE  (CPT=71045), 9/25/2023, 11:38 PM.  INDICATIONS:  Pneumonia  PATIENT STATED HISTORY: (As transcribed by Technologist)  Patient offered no additional history at this time. CONCLUSION:    Extensive overlying artifact with a very large amount of wires over the chest.  Central venous catheter present with the tip in the SVC. Heart appears normal in size. No definite focal consolidation, sign of CHF, pneumothorax or sizable effusion.   Assessment of the left apex in particular is limited because of the severity of the wires artifact over the that portion of the chest.  LOCATION:  [facility]      Dictated by (CST): Nicholas Morales MD on 9/27/2023 at 11:01 AM     Finalized by (CST): Nicholas Morales MD on 9/27/2023 at 11:02 AM       XR CHEST AP PORTABLE  (CPT=71045)    Result Date: 9/26/2023  PROCEDURE:  XR CHEST AP PORTABLE  (CPT=71045)  TECHNIQUE:  AP chest radiograph was obtained. COMPARISON:  [facility] , XR, XR CHEST AP PORTABLE  (CPT=71045), 7/24/2023, 7:47 AM.  INDICATIONS:  arrived via EMS for reports of hyperglycemia, registered High on the accucheck machine  PATIENT STATED HISTORY: (As transcribed by Technologist)     FINDINGS:  Cardiomediastinal silhouette is stable in size and appearance with left Port-A-Cath and atherosclerotic aortic arch. No focal consolidation, pleural effusion, or pneumothorax on this portable upright view of the chest.             CONCLUSION:  1. No focal consolidation. LOCATION:  [facility]      Dictated by (CST): Azar Jones MD on 9/26/2023 at 0:28 AM     Finalized by (CST): Azar Jones MD on 9/26/2023 at 0:29 AM         Operative reports:      Hospital course:    Miranda Cloud Is a a 70year old female who presented with DKA     DKA  DM2 with hyperglycemia   - s/p insulin gtt, anion gap closed 9/26  - endo saw. Insulin adjusted  - levemir 5 / 4 (qam / qpm) and 2 units fixed novolog with sliding scale of 1 unit for every 80 pts above 140.  Dw endo  - stopped jardiance permanently      Metabolic acidosis   - likely multifactorial from dehydration/dka but also is on jardiance as OP  - repeat UA 9/29 c/w ketosis, CTM   - resolved    GERD  - Tums prn  - famotidine   - she feels better with carafate - can cont at dc prn     Possible Sepsis  - initial infectious workup unremarkable but given multiple SIRS criteria met & overall critical illness being s/p empiric zosyn for 5 days  - Bcx's NGTD   - monitor off abx     DAVID on CKD3 -- resolved   - resolved with IVF   - renally dose meds, avoid nephrotoxic agents      Transaminitis -- resolved   - abd US unremarkable     HFrEF  NICM  - LVEF 15% on echo July 2023  - was supposed to be discharged with LifeVest & not currently wearing  - pt refused LifeVest as well as ICD, educated as to risks   -medical management with aldactone, toprolol, entresto      Urinary Retention  - required multiple large volume straight caths this admission  -  consult appreciated, pt wanted to avoid grande cath       Day of discharge exam:   10/06/23  1000   BP:    Pulse: 72   Resp: 14   Temp:      No acute distress, alert and oriented   Lungs clear  Heart regular  Abdomen benign    Total time coordinating care 45 min      Patient and/or family had opportunity to ask questions and expressed understanding and agreement with therapeutic plan as outlined         [facility] [address] Hospitalist  [phone]  Answering Service: [phone]

## 2023-10-11 ENCOUNTER — HOSPITAL ENCOUNTER (EMERGENCY)
Facility: HOSPITAL | Age: 68
Discharge: HOME | End: 2023-10-11
Attending: EMERGENCY MEDICINE
Payer: MEDICARE

## 2023-10-11 ENCOUNTER — APPOINTMENT (OUTPATIENT)
Dept: CT IMAGING | Facility: HOSPITAL | Age: 68
End: 2023-10-11
Attending: EMERGENCY MEDICINE
Payer: MEDICARE

## 2023-10-11 VITALS
SYSTOLIC BLOOD PRESSURE: 152 MMHG | RESPIRATION RATE: 14 BRPM | DIASTOLIC BLOOD PRESSURE: 64 MMHG | BODY MASS INDEX: 19 KG/M2 | OXYGEN SATURATION: 100 % | WEIGHT: 113.31 LBS | TEMPERATURE: 97 F | HEART RATE: 87 BPM

## 2023-10-11 DIAGNOSIS — E16.2 HYPOGLYCEMIA: Primary | ICD-10-CM

## 2023-10-11 LAB
ALBUMIN SERPL-MCNC: 2.3 G/DL (ref 3.4–5)
ALBUMIN/GLOB SERPL: 0.8 {RATIO} (ref 1–2)
ALP LIVER SERPL-CCNC: 52 U/L
ALT SERPL-CCNC: 20 U/L
ANION GAP SERPL CALC-SCNC: 12 MMOL/L (ref 0–18)
APTT PPP: 26.8 SECONDS (ref 23.3–35.6)
AST SERPL-CCNC: 19 U/L (ref 15–37)
ATRIAL RATE: 86 BPM
BASOPHILS # BLD AUTO: 0.03 X10(3) UL (ref 0–0.2)
BASOPHILS NFR BLD AUTO: 0.3 %
BILIRUB SERPL-MCNC: 0.1 MG/DL (ref 0.1–2)
BUN BLD-MCNC: 20 MG/DL (ref 7–18)
CALCIUM BLD-MCNC: 8 MG/DL (ref 8.5–10.1)
CHLORIDE SERPL-SCNC: 109 MMOL/L (ref 98–112)
CO2 SERPL-SCNC: 18 MMOL/L (ref 21–32)
CREAT BLD-MCNC: 1.11 MG/DL
EGFRCR SERPLBLD CKD-EPI 2021: 54 ML/MIN/1.73M2 (ref 60–?)
EOSINOPHIL # BLD AUTO: 0.01 X10(3) UL (ref 0–0.7)
EOSINOPHIL NFR BLD AUTO: 0.1 %
ERYTHROCYTE [DISTWIDTH] IN BLOOD BY AUTOMATED COUNT: 14.5 %
GLOBULIN PLAS-MCNC: 3 G/DL (ref 2.8–4.4)
GLUCOSE BLD-MCNC: 131 MG/DL (ref 70–99)
GLUCOSE BLD-MCNC: 133 MG/DL (ref 70–99)
GLUCOSE BLD-MCNC: 139 MG/DL (ref 70–99)
GLUCOSE BLD-MCNC: 151 MG/DL (ref 70–99)
GLUCOSE BLD-MCNC: 49 MG/DL (ref 70–99)
GLUCOSE BLD-MCNC: 67 MG/DL (ref 70–99)
HCT VFR BLD AUTO: 31.6 %
HGB BLD-MCNC: 10.4 G/DL
IMM GRANULOCYTES # BLD AUTO: 0.06 X10(3) UL (ref 0–1)
IMM GRANULOCYTES NFR BLD: 0.5 %
INR BLD: 0.98 (ref 0.85–1.16)
LYMPHOCYTES # BLD AUTO: 0.75 X10(3) UL (ref 1–4)
LYMPHOCYTES NFR BLD AUTO: 6.4 %
MCH RBC QN AUTO: 33.5 PG (ref 26–34)
MCHC RBC AUTO-ENTMCNC: 32.9 G/DL (ref 31–37)
MCV RBC AUTO: 101.9 FL
MONOCYTES # BLD AUTO: 0.98 X10(3) UL (ref 0.1–1)
MONOCYTES NFR BLD AUTO: 8.4 %
NEUTROPHILS # BLD AUTO: 9.88 X10 (3) UL (ref 1.5–7.7)
NEUTROPHILS # BLD AUTO: 9.88 X10(3) UL (ref 1.5–7.7)
NEUTROPHILS NFR BLD AUTO: 84.3 %
OSMOLALITY SERPL CALC.SUM OF ELEC: 292 MOSM/KG (ref 275–295)
P AXIS: 81 DEGREES
P-R INTERVAL: 154 MS
PLATELET # BLD AUTO: 178 10(3)UL (ref 150–450)
POTASSIUM SERPL-SCNC: 4.1 MMOL/L (ref 3.5–5.1)
PROT SERPL-MCNC: 5.3 G/DL (ref 6.4–8.2)
PROTHROMBIN TIME: 13 SECONDS (ref 11.6–14.8)
Q-T INTERVAL: 286 MS
QRS DURATION: 58 MS
QTC CALCULATION (BEZET): 342 MS
R AXIS: 73 DEGREES
RBC # BLD AUTO: 3.1 X10(6)UL
SODIUM SERPL-SCNC: 139 MMOL/L (ref 136–145)
T AXIS: 67 DEGREES
TROPONIN I HIGH SENSITIVITY: 52 NG/L
VENTRICULAR RATE: 86 BPM
WBC # BLD AUTO: 11.7 X10(3) UL (ref 4–11)

## 2023-10-11 PROCEDURE — 80053 COMPREHEN METABOLIC PANEL: CPT | Performed by: EMERGENCY MEDICINE

## 2023-10-11 PROCEDURE — 70450 CT HEAD/BRAIN W/O DYE: CPT | Performed by: EMERGENCY MEDICINE

## 2023-10-11 PROCEDURE — 93005 ELECTROCARDIOGRAM TRACING: CPT

## 2023-10-11 PROCEDURE — 82962 GLUCOSE BLOOD TEST: CPT

## 2023-10-11 PROCEDURE — 84484 ASSAY OF TROPONIN QUANT: CPT | Performed by: EMERGENCY MEDICINE

## 2023-10-11 PROCEDURE — 70498 CT ANGIOGRAPHY NECK: CPT | Performed by: EMERGENCY MEDICINE

## 2023-10-11 PROCEDURE — 93010 ELECTROCARDIOGRAM REPORT: CPT

## 2023-10-11 PROCEDURE — 99291 CRITICAL CARE FIRST HOUR: CPT

## 2023-10-11 PROCEDURE — 99285 EMERGENCY DEPT VISIT HI MDM: CPT

## 2023-10-11 PROCEDURE — 70496 CT ANGIOGRAPHY HEAD: CPT | Performed by: EMERGENCY MEDICINE

## 2023-10-11 PROCEDURE — 85025 COMPLETE CBC W/AUTO DIFF WBC: CPT | Performed by: EMERGENCY MEDICINE

## 2023-10-11 PROCEDURE — 96374 THER/PROPH/DIAG INJ IV PUSH: CPT

## 2023-10-11 PROCEDURE — 85610 PROTHROMBIN TIME: CPT | Performed by: EMERGENCY MEDICINE

## 2023-10-11 PROCEDURE — 85730 THROMBOPLASTIN TIME PARTIAL: CPT | Performed by: EMERGENCY MEDICINE

## 2023-10-11 RX ORDER — NICOTINE POLACRILEX 4 MG
30 LOZENGE BUCCAL
Status: DISCONTINUED | OUTPATIENT
Start: 2023-10-11 | End: 2023-10-11

## 2023-10-11 RX ORDER — DEXTROSE MONOHYDRATE 25 G/50ML
50 INJECTION, SOLUTION INTRAVENOUS
Status: DISCONTINUED | OUTPATIENT
Start: 2023-10-11 | End: 2023-10-11

## 2023-10-11 RX ORDER — NICOTINE POLACRILEX 4 MG
15 LOZENGE BUCCAL
Status: DISCONTINUED | OUTPATIENT
Start: 2023-10-11 | End: 2023-10-11

## 2023-10-11 NOTE — ED INITIAL ASSESSMENT (HPI)
Pt presents to ed via ems from [address] [place] [address], ems called or unresponsive pt, per ems bs on arrival to nh was 33, dextrose given, pt became more alert, left sided facial droop noted, repeat bs pta of 121.

## 2025-01-07 ENCOUNTER — HOSPITAL ENCOUNTER (INPATIENT)
Facility: HOSPITAL | Age: 70
LOS: 6 days | Discharge: NURSING, REHAB OR LONG-TERM CARE | End: 2025-01-13
Attending: EMERGENCY MEDICINE | Admitting: HOSPITALIST
Payer: MEDICARE

## 2025-01-07 ENCOUNTER — APPOINTMENT (OUTPATIENT)
Dept: GENERAL RADIOLOGY | Facility: HOSPITAL | Age: 70
End: 2025-01-07
Attending: EMERGENCY MEDICINE
Payer: MEDICARE

## 2025-01-07 DIAGNOSIS — E87.20 LACTIC ACIDOSIS: ICD-10-CM

## 2025-01-07 DIAGNOSIS — E10.10 DIABETIC KETOACIDOSIS WITHOUT COMA ASSOCIATED WITH TYPE 1 DIABETES MELLITUS (HCC): Primary | ICD-10-CM

## 2025-01-07 DIAGNOSIS — E87.5 HYPERKALEMIA: ICD-10-CM

## 2025-01-07 DIAGNOSIS — R94.31 ABNORMAL EKG: ICD-10-CM

## 2025-01-07 DIAGNOSIS — D69.6 THROMBOCYTOPENIA: ICD-10-CM

## 2025-01-07 LAB
ALBUMIN SERPL-MCNC: 3.5 G/DL (ref 3.2–4.8)
ALBUMIN SERPL-MCNC: 4 G/DL (ref 3.2–4.8)
ALBUMIN/GLOB SERPL: 1.5 {RATIO} (ref 1–2)
ALBUMIN/GLOB SERPL: 1.5 {RATIO} (ref 1–2)
ALP LIVER SERPL-CCNC: 70 U/L
ALP LIVER SERPL-CCNC: 71 U/L
ALT SERPL-CCNC: 16 U/L
ALT SERPL-CCNC: 19 U/L
APTT PPP: 26 SECONDS (ref 23–36)
AST SERPL-CCNC: 22 U/L (ref ?–34)
AST SERPL-CCNC: 25 U/L (ref ?–34)
BASE EXCESS BLDV CALC-SCNC: -25.1 MMOL/L
BASOPHILS # BLD AUTO: 0.1 X10(3) UL (ref 0–0.2)
BASOPHILS NFR BLD AUTO: 0.5 %
BILIRUB SERPL-MCNC: 0.3 MG/DL (ref 0.2–1.1)
BILIRUB SERPL-MCNC: 0.4 MG/DL (ref 0.2–1.1)
BILIRUB UR QL STRIP.AUTO: NEGATIVE
BUN BLD-MCNC: 23 MG/DL (ref 9–23)
BUN BLD-MCNC: 25 MG/DL (ref 9–23)
BUN BLD-MCNC: 26 MG/DL (ref 9–23)
BUN BLD-MCNC: 26 MG/DL (ref 9–23)
CALCIUM BLD-MCNC: 11 MG/DL (ref 8.7–10.4)
CALCIUM BLD-MCNC: 11.4 MG/DL (ref 8.7–10.4)
CALCIUM BLD-MCNC: 11.5 MG/DL (ref 8.7–10.4)
CALCIUM BLD-MCNC: 12.3 MG/DL (ref 8.7–10.4)
CHLORIDE SERPL-SCNC: 87 MMOL/L (ref 98–112)
CHLORIDE SERPL-SCNC: 95 MMOL/L (ref 98–112)
CHLORIDE SERPL-SCNC: 95 MMOL/L (ref 98–112)
CHLORIDE SERPL-SCNC: 99 MMOL/L (ref 98–112)
CHOLEST SERPL-MCNC: 184 MG/DL (ref ?–200)
CLARITY UR REFRACT.AUTO: CLEAR
CO2 SERPL-SCNC: <10 MMOL/L (ref 21–32)
CREAT BLD-MCNC: 1.88 MG/DL
CREAT BLD-MCNC: 1.99 MG/DL
CREAT BLD-MCNC: 2.01 MG/DL
CREAT BLD-MCNC: 2.28 MG/DL
EGFRCR SERPLBLD CKD-EPI 2021: 23 ML/MIN/1.73M2 (ref 60–?)
EGFRCR SERPLBLD CKD-EPI 2021: 26 ML/MIN/1.73M2 (ref 60–?)
EGFRCR SERPLBLD CKD-EPI 2021: 27 ML/MIN/1.73M2 (ref 60–?)
EGFRCR SERPLBLD CKD-EPI 2021: 28 ML/MIN/1.73M2 (ref 60–?)
EOSINOPHIL # BLD AUTO: 0.01 X10(3) UL (ref 0–0.7)
EOSINOPHIL NFR BLD AUTO: 0.1 %
ERYTHROCYTE [DISTWIDTH] IN BLOOD BY AUTOMATED COUNT: 13 %
EST. AVERAGE GLUCOSE BLD GHB EST-MCNC: 272 MG/DL (ref 68–126)
FLUAV + FLUBV RNA SPEC NAA+PROBE: NEGATIVE
FLUAV + FLUBV RNA SPEC NAA+PROBE: NEGATIVE
GLOBULIN PLAS-MCNC: 2.3 G/DL (ref 2–3.5)
GLOBULIN PLAS-MCNC: 2.6 G/DL (ref 2–3.5)
GLUCOSE BLD-MCNC: 267 MG/DL (ref 70–99)
GLUCOSE BLD-MCNC: 349 MG/DL (ref 70–99)
GLUCOSE BLD-MCNC: 427 MG/DL (ref 70–99)
GLUCOSE BLD-MCNC: 473 MG/DL (ref 70–99)
GLUCOSE BLD-MCNC: 478 MG/DL (ref 70–99)
GLUCOSE BLD-MCNC: 533 MG/DL (ref 70–99)
GLUCOSE BLD-MCNC: 688 MG/DL (ref 70–99)
GLUCOSE BLD-MCNC: 714 MG/DL (ref 70–99)
GLUCOSE BLD-MCNC: 795 MG/DL (ref 70–99)
GLUCOSE BLD-MCNC: >600 MG/DL (ref 70–99)
GLUCOSE UR STRIP.AUTO-MCNC: >1000 MG/DL
HBA1C MFR BLD: 11.1 % (ref ?–5.7)
HCO3 BLDV-SCNC: 4.5 MEQ/L (ref 22–26)
HCT VFR BLD AUTO: 37.2 %
HDLC SERPL-MCNC: 35 MG/DL (ref 40–59)
HGB BLD-MCNC: 12.2 G/DL
IMM GRANULOCYTES # BLD AUTO: 0.49 X10(3) UL (ref 0–1)
IMM GRANULOCYTES NFR BLD: 2.6 %
INR BLD: 1.12 (ref 0.8–1.2)
KETONES UR STRIP.AUTO-MCNC: >150 MG/DL
LACTATE SERPL-SCNC: 3.2 MMOL/L (ref 0.5–2)
LACTATE SERPL-SCNC: 5.2 MMOL/L (ref 0.5–2)
LACTATE SERPL-SCNC: 5.4 MMOL/L (ref 0.5–2)
LDLC SERPL CALC-MCNC: 93 MG/DL (ref ?–100)
LEUKOCYTE ESTERASE UR QL STRIP.AUTO: NEGATIVE
LYMPHOCYTES # BLD AUTO: 1.27 X10(3) UL (ref 1–4)
LYMPHOCYTES NFR BLD AUTO: 6.8 %
MAGNESIUM SERPL-MCNC: 2.1 MG/DL (ref 1.6–2.6)
MAGNESIUM SERPL-MCNC: 2.3 MG/DL (ref 1.6–2.6)
MCH RBC QN AUTO: 34.3 PG (ref 26–34)
MCHC RBC AUTO-ENTMCNC: 32.8 G/DL (ref 31–37)
MCV RBC AUTO: 104.5 FL
MONOCYTES # BLD AUTO: 1.44 X10(3) UL (ref 0.1–1)
MONOCYTES NFR BLD AUTO: 7.7 %
NEUTROPHILS # BLD AUTO: 15.34 X10 (3) UL (ref 1.5–7.7)
NEUTROPHILS # BLD AUTO: 15.34 X10(3) UL (ref 1.5–7.7)
NEUTROPHILS NFR BLD AUTO: 82.3 %
NITRITE UR QL STRIP.AUTO: NEGATIVE
NONHDLC SERPL-MCNC: 149 MG/DL (ref ?–130)
OSMOLALITY SERPL CALC.SUM OF ELEC: 301 MOSM/KG (ref 275–295)
OSMOLALITY SERPL CALC.SUM OF ELEC: 302 MOSM/KG (ref 275–295)
OSMOLALITY SERPL CALC.SUM OF ELEC: 304 MOSM/KG (ref 275–295)
OSMOLALITY SERPL CALC.SUM OF ELEC: 305 MOSM/KG (ref 275–295)
OXYHGB MFR BLDV: 64.7 % (ref 72–78)
PCO2 BLDV: 26 MM HG (ref 38–50)
PH BLDV: 6.96 [PH] (ref 7.33–7.43)
PH UR STRIP.AUTO: 5 [PH] (ref 5–8)
PHOSPHATE SERPL-MCNC: 5.3 MG/DL (ref 2.4–5.1)
PHOSPHATE SERPL-MCNC: 6.5 MG/DL (ref 2.4–5.1)
PHOSPHATE SERPL-MCNC: 7 MG/DL (ref 2.4–5.1)
PLATELET # BLD AUTO: 133 10(3)UL (ref 150–450)
PO2 BLDV: 45 MM HG (ref 30–50)
POTASSIUM SERPL-SCNC: 4 MMOL/L (ref 3.5–5.1)
POTASSIUM SERPL-SCNC: 5.1 MMOL/L (ref 3.5–5.1)
POTASSIUM SERPL-SCNC: 5.1 MMOL/L (ref 3.5–5.1)
POTASSIUM SERPL-SCNC: 5.5 MMOL/L (ref 3.5–5.1)
PROCALCITONIN SERPL-MCNC: 0.34 NG/ML (ref ?–0.05)
PROT SERPL-MCNC: 5.8 G/DL (ref 5.7–8.2)
PROT SERPL-MCNC: 6.6 G/DL (ref 5.7–8.2)
PROT UR STRIP.AUTO-MCNC: 50 MG/DL
PROTHROMBIN TIME: 14.5 SECONDS (ref 11.6–14.8)
RBC # BLD AUTO: 3.56 X10(6)UL
RSV RNA SPEC NAA+PROBE: NEGATIVE
SARS-COV-2 RNA RESP QL NAA+PROBE: NOT DETECTED
SODIUM SERPL-SCNC: 124 MMOL/L (ref 136–145)
SODIUM SERPL-SCNC: 128 MMOL/L (ref 136–145)
SODIUM SERPL-SCNC: 129 MMOL/L (ref 136–145)
SODIUM SERPL-SCNC: 133 MMOL/L (ref 136–145)
SP GR UR STRIP.AUTO: 1.02 (ref 1–1.03)
TRIGL SERPL-MCNC: 335 MG/DL (ref 30–149)
TROPONIN I SERPL HS-MCNC: 30 NG/L
UROBILINOGEN UR STRIP.AUTO-MCNC: NORMAL MG/DL
VLDLC SERPL CALC-MCNC: 55 MG/DL (ref 0–30)
WBC # BLD AUTO: 18.7 X10(3) UL (ref 4–11)

## 2025-01-07 PROCEDURE — 99291 CRITICAL CARE FIRST HOUR: CPT | Performed by: INTERNAL MEDICINE

## 2025-01-07 PROCEDURE — 71045 X-RAY EXAM CHEST 1 VIEW: CPT | Performed by: EMERGENCY MEDICINE

## 2025-01-07 RX ORDER — NICOTINE POLACRILEX 4 MG
30 LOZENGE BUCCAL
Status: DISCONTINUED | OUTPATIENT
Start: 2025-01-07 | End: 2025-01-13

## 2025-01-07 RX ORDER — SODIUM CHLORIDE 9 MG/ML
INJECTION, SOLUTION INTRAVENOUS CONTINUOUS
Status: DISCONTINUED | OUTPATIENT
Start: 2025-01-07 | End: 2025-01-09

## 2025-01-07 RX ORDER — SENNOSIDES 8.6 MG
17.2 TABLET ORAL NIGHTLY PRN
Status: DISCONTINUED | OUTPATIENT
Start: 2025-01-07 | End: 2025-01-13

## 2025-01-07 RX ORDER — METOCLOPRAMIDE HYDROCHLORIDE 5 MG/ML
5 INJECTION INTRAMUSCULAR; INTRAVENOUS EVERY 8 HOURS PRN
Status: DISCONTINUED | OUTPATIENT
Start: 2025-01-07 | End: 2025-01-08

## 2025-01-07 RX ORDER — DEXTROSE MONOHYDRATE AND SODIUM CHLORIDE 5; .45 G/100ML; G/100ML
100 INJECTION, SOLUTION INTRAVENOUS CONTINUOUS PRN
Status: DISCONTINUED | OUTPATIENT
Start: 2025-01-07 | End: 2025-01-11

## 2025-01-07 RX ORDER — POLYETHYLENE GLYCOL 3350 17 G/17G
17 POWDER, FOR SOLUTION ORAL DAILY PRN
Status: DISCONTINUED | OUTPATIENT
Start: 2025-01-07 | End: 2025-01-13

## 2025-01-07 RX ORDER — DEXTROSE MONOHYDRATE 25 G/50ML
50 INJECTION, SOLUTION INTRAVENOUS
Status: DISCONTINUED | OUTPATIENT
Start: 2025-01-07 | End: 2025-01-13

## 2025-01-07 RX ORDER — INSULIN LISPRO 100 [IU]/ML
INJECTION, SOLUTION INTRAVENOUS; SUBCUTANEOUS
COMMUNITY

## 2025-01-07 RX ORDER — NICOTINE POLACRILEX 4 MG
15 LOZENGE BUCCAL
Status: DISCONTINUED | OUTPATIENT
Start: 2025-01-07 | End: 2025-01-13

## 2025-01-07 RX ORDER — ACETAMINOPHEN 500 MG
500 TABLET ORAL EVERY 4 HOURS PRN
Status: DISCONTINUED | OUTPATIENT
Start: 2025-01-07 | End: 2025-01-11

## 2025-01-07 RX ORDER — INSULIN GLARGINE 100 [IU]/ML
6 INJECTION, SOLUTION SUBCUTANEOUS NIGHTLY
Status: ON HOLD | COMMUNITY
End: 2025-01-13

## 2025-01-07 RX ORDER — BISACODYL 10 MG
10 SUPPOSITORY, RECTAL RECTAL
Status: DISCONTINUED | OUTPATIENT
Start: 2025-01-07 | End: 2025-01-13

## 2025-01-07 RX ORDER — METOPROLOL SUCCINATE 25 MG/1
25 TABLET, EXTENDED RELEASE ORAL DAILY
COMMUNITY

## 2025-01-07 RX ORDER — HEPARIN SODIUM 5000 [USP'U]/ML
5000 INJECTION, SOLUTION INTRAVENOUS; SUBCUTANEOUS EVERY 12 HOURS SCHEDULED
Status: DISCONTINUED | OUTPATIENT
Start: 2025-01-07 | End: 2025-01-09

## 2025-01-07 RX ORDER — HEPARIN SODIUM 5000 [USP'U]/ML
5000 INJECTION, SOLUTION INTRAVENOUS; SUBCUTANEOUS EVERY 8 HOURS SCHEDULED
Status: DISCONTINUED | OUTPATIENT
Start: 2025-01-07 | End: 2025-01-07

## 2025-01-07 RX ORDER — ONDANSETRON 2 MG/ML
4 INJECTION INTRAMUSCULAR; INTRAVENOUS EVERY 6 HOURS PRN
Status: DISCONTINUED | OUTPATIENT
Start: 2025-01-07 | End: 2025-01-13

## 2025-01-07 NOTE — ED PROVIDER NOTES
Patient Seen in: [facility] Emergency Department      History     Chief Complaint   Patient presents with    Hyperglycemia     Stated Complaint: hyperglycemia    Subjective:   69-year-old female, history of breast cancer, brain cancer, type 1 diabetes, heart failure, nonobstructive CAD, presents via EMS for hyperglycemia.  Glucose monitor at home read \"high.\"  Patient is oriented to person only.  She is tachypneic upon arrival, mottled skin, high concern for diabetic ketoacidosis.  Glucometer here reading high as well.  Workup initiated.  Limited HPI's ROS secondary to critical state              Objective:     Past Medical History:    Acoustic neuroma (HCC)    Left facial droop --after surgery    Anxiety    Cataract    Diabetic retinopathy (HCC)    DKA (diabetic ketoacidoses)    4/15/21    DM2    Hearing impairment    hearing loss in left ear    Hyperlipidemia    Hypertension    Osteoporosis    Pancreatitis (HCC)    PVD (peripheral vascular disease) (HCC)    Iliac stents-Bilateral--9/11/14    Recovering alcoholic (HCC)    Recurrent genital herpes    Right breast cancer    Uterine fibroid              Past Surgical History:   Procedure Laterality Date    Brain surgery      Cataract      Extern drain panc pseudocyst,open      Fracture surgery      L shoulder surgery    Hysterectomy  6-29-10    exp lap KODAK BSO,    Other  11/23/15    LEFT IMAGE GUIDED CRANIOTOMY FOR RESECTION OF ACOUSTIC NEUROMA    Other  12/16    left humerus repair for FXR--plate in place    Other surgical history      ex-lap x 2 for pancreatic pseudocysts, both in mid 80's, had shunt placed with second sx    Pancreatectomy,distal+preserv duod      Repair incis hernia w mesh  9/20/2012    Procedure: INCISIONAL   HERNIA REPAIR;  Surgeon: Aaliyah Bennett MD;  Location: [facility]    Repair incisional hernia,reducible  9/20/2012    Procedure: INCISIONAL   HERNIA REPAIR;  Surgeon: Aaliyah Bennett MD;  Location: [facility]    Surgical stent Bilateral 9/11/14    Bilateral femoral stent placement    Tubal ligation                  Social History     Socioeconomic History    Marital status: Single   Tobacco Use    Smoking status: Every Day     Current packs/day: 0.50     Average packs/day: 0.5 packs/day for 35.0 years (17.5 ttl pk-yrs)     Types: Cigarettes    Smokeless tobacco: Never    Tobacco comments:     3 cigarettes/day   Vaping Use    Vaping status: Never Used   Substance and Sexual Activity    Alcohol use: No     Alcohol/week: 0.0 standard drinks of alcohol     Comment: quit 1991-- 25 years ago as of 2016    Drug use: No   Other Topics Concern    Caffeine Concern Yes     Comment: diet soda daily    Exercise Yes     Comment: walking   Social History Narrative    Single/, 2 children, working     Social Drivers of Health     Financial Resource Strain: Low Risk  (5/8/2023)    Received from [facility]    Financial Resource Strain     In the past year, have you or any family members you live with been unable to get any of the following when it was really needed? Check all that apply.: None   Food Insecurity: No Food Insecurity (10/6/2023)    Food Insecurity     Food Insecurity: Never true   Transportation Needs: No Transportation Needs (10/6/2023)    Transportation Needs     Lack of Transportation: No   Social Connections: Socially Integrated (5/8/2023)    Received from [employer], [facility]    Social Connections     How often do you see or talk to people that you care about and feel close to? (For example: talking to friends on the phone, visiting friends or family, going to Christianity or club meetings): 5 or more times a week   Housing Stability: Low Risk  (10/6/2023)    Housing Stability     Housing Instability: No                  Physical Exam     ED Triage Vitals   BP 01/07/25 1323 145/49   Pulse 01/07/25 1313 88   Resp 01/07/25 1313 21   Temp 01/07/25 1332 96.8  °F (36 °C)   Temp src 01/07/25 1332 Temporal   SpO2 01/07/25 1313 100 %   O2 Device 01/07/25 1313 None (Room air)       Current Vitals:   Vital Signs  BP: 129/52  Pulse: 86  Resp: 18  Temp: 96.8 °F (36 °C)  Temp src: Temporal  MAP (mmHg): 75    Oxygen Therapy  SpO2: 97 %  O2 Device: None (Room air)        Physical Exam  Vitals and nursing note reviewed.   Constitutional:       General: She is in acute distress.      Appearance: She is ill-appearing.   HENT:      Head:      Comments: Left-sided facial droop, chronic per report from her history of brain cancer     Nose: Nose normal.      Mouth/Throat:      Mouth: Mucous membranes are dry.   Cardiovascular:      Rate and Rhythm: Normal rate.      Pulses: Normal pulses.   Pulmonary:      Effort: Respiratory distress present.   Abdominal:      Palpations: Abdomen is soft.   Musculoskeletal:      Cervical back: Neck supple.   Skin:     General: Skin is dry.      Comments: Cool, mottled extremities   Neurological:      Mental Status: She is alert.      Motor: Weakness present.             ED Course     Labs Reviewed   CBC WITH DIFFERENTIAL WITH PLATELET - Abnormal; Notable for the following components:       Result Value    WBC 18.7 (*)     RBC 3.56 (*)     .0 (*)     .5 (*)     MCH 34.3 (*)     Neutrophil Absolute Prelim 15.34 (*)     Neutrophil Absolute 15.34 (*)     Monocyte Absolute 1.44 (*)     All other components within normal limits   COMP METABOLIC PANEL (14) - Abnormal; Notable for the following components:    Glucose 795 (*)     Sodium 124 (*)     Chloride 87 (*)     CO2 <10.0 (*)     BUN 26 (*)     Creatinine 2.28 (*)     Calcium, Total 12.3 (*)     Calculated Osmolality 301 (*)     eGFR-Cr 23 (*)     All other components within normal limits   VENOUS BLOOD GAS - Abnormal; Notable for the following components:    Venous pH 6.96 (*)     Venous pCO2 26 (*)     Venous HCO3 4.5 (*)     Venous O2Hb 64.7 (*)     All other components within normal limits    LACTIC ACID, PLASMA - Abnormal; Notable for the following components:    Lactic Acid 5.2 (*)     All other components within normal limits   ACETONE - Abnormal; Notable for the following components:    Acetone Small (*)     All other components within normal limits   LACTIC ACID REFLEX POST POSTIVE - Abnormal; Notable for the following components:    Lactic Acid 3.2 (*)     All other components within normal limits   REDRAW POTASSIUM (P) - Abnormal; Notable for the following components:    Potassium 5.5 (*)     All other components within normal limits   LIPID PANEL - Abnormal; Notable for the following components:    HDL Cholesterol 35 (*)     Triglycerides 335 (*)     VLDL 55 (*)     Non HDL Chol 149 (*)     All other components within normal limits   PHOSPHORUS - Abnormal; Notable for the following components:    Phosphorus 7.0 (*)     All other components within normal limits   HEMOGLOBIN A1C - Abnormal; Notable for the following components:    HgbA1C 11.1 (*)     Estimated Average Glucose 272 (*)     All other components within normal limits   COMP METABOLIC PANEL (14) - Abnormal; Notable for the following components:    Glucose 714 (*)     Sodium 128 (*)     Chloride 95 (*)     CO2 <10.0 (*)     BUN 25 (*)     Creatinine 1.88 (*)     Calcium, Total 11.0 (*)     Calculated Osmolality 305 (*)     eGFR-Cr 28 (*)     All other components within normal limits   PROCALCITONIN - Abnormal; Notable for the following components:    Procalcitonin 0.34 (*)     All other components within normal limits   BASIC METABOLIC PANEL (8) - Abnormal; Notable for the following components:    Glucose 688 (*)     Sodium 129 (*)     Chloride 95 (*)     CO2 <10.0 (*)     Creatinine 1.99 (*)     Calcium, Total 11.5 (*)     Calculated Osmolality 304 (*)     eGFR-Cr 27 (*)     All other components within normal limits   PHOSPHORUS - Abnormal; Notable for the following components:    Phosphorus 6.5 (*)     All other components within  normal limits   POCT GLUCOSE - Abnormal; Notable for the following components:    POC Glucose >600 (*)     All other components within normal limits   POCT GLUCOSE - Abnormal; Notable for the following components:    POC Glucose >600 (*)     All other components within normal limits   POCT GLUCOSE - Abnormal; Notable for the following components:    POC Glucose >600 (*)     All other components within normal limits   POCT GLUCOSE - Abnormal; Notable for the following components:    POC Glucose >600 (*)     All other components within normal limits   POCT GLUCOSE - Abnormal; Notable for the following components:    POC Glucose 478 (*)     All other components within normal limits   PROTHROMBIN TIME (PT) - Normal   PTT, ACTIVATED - Normal   TROPONIN I HIGH SENSITIVITY - Normal   REDRAW AST (SGOT) (P) - Normal   MAGNESIUM - Normal   SARS-COV-2/FLU A AND B/RSV BY PCR (GENEXPERT) - Normal    Narrative:     This test is intended for the qualitative detection and differentiation of SARS-CoV-2, influenza A, influenza B, and respiratory syncytial virus (RSV) viral RNA in nasopharyngeal or nares swabs from individuals suspected of respiratory viral infection consistent with COVID-19 by their healthcare provider. Signs and symptoms of respiratory viral infection due to SARS-CoV-2, influenza, and RSV can be similar.    Test performed using the Xpert Xpress SARS-CoV-2/FLU/RSV (real time RT-PCR)  assay on the [employer] instrument, [employer], [place] [address].   This test is being used under the Food and Drug Administration's Emergency Use Authorization.    The authorized Fact Sheet for Healthcare Providers for this assay is available upon request from the laboratory.   URINALYSIS WITH CULTURE REFLEX   C-PEPTIDE   VENOUS BLOOD GAS   LACTIC ACID REFLEX POST POSITIVE [id number]   BASIC METABOLIC PANEL (8)   MAGNESIUM   PHOSPHORUS   RAINBOW DRAW LAVENDER   RAINBOW DRAW LIGHT GREEN   RAINBOW DRAW BLUE   RAINBOW DRAW GOLD   BLOOD CULTURE    BLOOD CULTURE     EKG    Rate, intervals and axes as noted on EKG Report.  Rate: 91  Rhythm: Sinus Rhythm  Reading: EKG sinus rhythm 91 bpm.  Normal axis.  Low voltage EKG.  ST depression in leads II, III and aVF with ST elevation in lead aVR, also some lateral ST depression.  High lateral is difficult to interpret secondary to poor baseline.  When compared to October 2023, these changes are new.  Of note patient is a pH of 6.96 and suspected diabetic ketoacidosis, critically ill at this time.  Denying any chest pain.    Patient placed on cardiac monitor for telemetry monitoring secondary to dka, abn ekg. Interpretation at bedside by me is sinus rhythm.             ED Course as of 01/07/25 1950  ------------------------------------------------------------  Time: 01/07 1340  Comment: Cards paged immediately after EKG rec'd  ------------------------------------------------------------  Time: 01/07 6399  Comment: Delay on insulin gtt 2/2 no potassium value yet. Had to be repeated, it is in process  ------------------------------------------------------------  Time: 01/07 3496  Comment: K 5.5. Insulin gtt initiated  Rpt VBG and CMP drawn now  ------------------------------------------------------------  Time: 01/07 6933  Comment: Intensivist called, busy, will re attempt in 10 mins  ------------------------------------------------------------  Time: 01/07 1949  Comment: 1946 moriah Loyd8              MDM      XR CHEST AP PORTABLE  (CPT=71045)    Result Date: 1/7/2025  CONCLUSION:  1. Hyperexpansion of the lungs. 2. Atelectasis or scarring within the lung bases.    LOCATION:  [facility]      Dictated by (CST): Emelyn Abdullahi MD on 1/07/2025 at 1:59 PM     Finalized by (CST): Emelyn Abdullahi MD on 1/07/2025 at 2:02 PM            Admission disposition: 1/7/2025  4:06 PM         I independently interpretation of the chest without any obvious signs of acute infiltrate     at bedside helpful to provide information on  the history presenting illness.  States that she been having polyuria and polydipsia for about 3 to 4 days.  Did not take her insulin yesterday, not sure if she took it 2 days ago 3 days ago.  States has been in ketoacidosis before, years ago.  States she had her brain tumor resection about 10 years ago the left facial nerve paralysis.  She had bilateral mastectomy and breast cancer in 2022.  States that her glucose monitor stopped working several days ago.  Patient had insulin several days at least 48 hours.    Differential diagnosis includes, but not limited to, infection, dehydration, DKA, electrolyte disturbance, noncompliance    External chart review demonstrates her outpatient cardiology visit in the past,    CRITICAL CARE:  A total of 115 minutes of critical care time (exclusive of billable procedures) was administered to manage the patient's critical lab values, respiratory instability, and metabolic instability due to her diabetic ketoacidosis likely secondary to noncompliance with insulin.  Managing volume status, initiation of insulin drip after we get all of her labs back including a potassium which is critical to initiation to monitor for signs of hypokalemia.  Sepsis coverage as you rule out infection with suspected lactic acidosis from the significant metabolic derangements.  Abnormal EKG discussed with cardiology which is also likely secondary to her severely metabolic acidotic state.  Chart review, neck rotation, imaging reviewed interpretation.  This involved direct patient intervention, complex decision making, and/or extensive discussions with the patient, family, and clinical staff.      69-year-old female with diabetic ketoacidosis.  Likely secondary to noncompliance.  See the above note.  Blood pressure stable.  Heart rate is improved.  Still has not lower extremities but distal pulses are intact.  Her breathing/respiration/tachypnea is improving.  Repeat VBG and chemistry is pending.  Insulin  being started now has been found to have potassium result.  IV fluids for lactic acidosis.  Get some broad-spectrum antibiotic coverage to protect against infection.  No urinalysis as of yet.  Remaining labs are pending.  Intensivist aware, admited to duly hospitalist, awaiting bed assignment.  Critically ill.   made aware, in agreement.  She is oriented to person and situation. No trauma        Medical Decision Making      Disposition and Plan     Clinical Impression:  1. Diabetic ketoacidosis without coma associated with type 1 diabetes mellitus (HCC)    2. Abnormal EKG    3. Lactic acidosis    4. Hyperkalemia    5. Thrombocytopenia (HCC)         Disposition:  Admit  1/7/2025  4:06 pm    Follow-up:  No follow-up provider specified.        Medications Prescribed:  Current Discharge Medication List              Supplementary Documentation:     [facility]   part of [facility]      Sepsis Reassessment Note    /49   Pulse 88   Temp 96.8 °F (36 °C) (Temporal)   Resp 21   Ht 165.1 cm (5' 5\")   Wt 47.2 kg   LMP  (LMP Unknown)   SpO2 100%   BMI 17.31 kg/m²      I completed the sepsis reassessment at 1500    Cardiac:  Regularity: Regular  Rate: Normal  Heart Sounds: S1,S2    Lungs:   Right: Clear  Left: Clear    Peripheral Pulses:  Radial: Right 1+ or Left 1+      Capillary Refill:  >3 Secs    Skin:  Temp/Moisture: Warm and Dry  Color: Mottled      Tremaine Jiang DO  1/7/2025  1:41 PM             Hospital Problems       Present on Admission  Date Reviewed: 9/25/2023            ICD-10-CM Noted POA    * (Principal) Diabetic ketoacidosis without coma associated with type 2 diabetes mellitus (HCC) E11.10 4/15/2021 Unknown

## 2025-01-07 NOTE — CONSULTS
Cardiology Consultation Note      Anna Montalvo Patient Status:  Emergency    1955 MRN [id number]   Location [facility] EMERGENCY DEPARTMENT Attending Tremaine Jiang,    Hosp Day # 0 PCP Justin Newberry MD     Reason for consultation:  EKG changes     Impression:  DKA with multiple electrolyte abnormalities and diffuse ST depression  Previous HFRef with normalization of EF  Non obstructive CAD  Breast cancer following with Dr. Trinidad.  On tamoxifen  Hypertension  CKD stage III    Plan:  EKG changes likely related to acidosis along with multiple electrolyte abnormalities.  Continue to treat DKA for now and plan for echocardiogram tomorrow  Restart metoprolol when possible  Hold Entresto and spironolactone until electrolyte issues have stabilized  Telemetry      History of Present Illness:  Anna Montalvo is a 69 year old female who presented to [facility] on 2025.      The patient has a history as above who presents with elevated blood sugars.  On arrival was having difficulty breathing and having chest pains.  Found to have DKA on presentation with elevated lactic acidosis and hyperkalemia.    Denies chest pain or palpitations currently .    Cardiology consultation was requested.    Medications:  Current Facility-Administered Medications   Medication Dose Route Frequency    sodium chloride 0.9 % IV bolus 1,416 mL  30 mL/kg Intravenous Once    glucose (Dex4) 15 GM/59ML oral liquid 15 g  15 g Oral Q15 Min PRN    Or    glucose (Glutose) 40% oral gel 15 g  15 g Oral Q15 Min PRN    Or    glucose-vitamin C (Dex-4) chewable tab 4 tablet  4 tablet Oral Q15 Min PRN    Or    dextrose 50% injection 50 mL  50 mL Intravenous Q15 Min PRN    Or    glucose (Dex4) 15 GM/59ML oral liquid 30 g  30 g Oral Q15 Min PRN    Or    glucose (Glutose) 40% oral gel 30 g  30 g Oral Q15 Min PRN    Or    glucose-vitamin C (Dex-4) chewable tab 8 tablet  8 tablet Oral Q15 Min PRN    insulin regular human (Novolin R,  Humulin R) 100 Units in sodium chloride 0.9% 100 mL standard infusion (100 mL)  0.5-9 Units/hr Intravenous Continuous       Past Medical History:    Acoustic neuroma (HCC)    Left facial droop --after surgery    Anxiety    Cataract    Diabetic retinopathy (HCC)    DKA (diabetic ketoacidoses)    4/15/21    DM2    Hearing impairment    hearing loss in left ear    Hyperlipidemia    Hypertension    Osteoporosis    Pancreatitis (HCC)    PVD (peripheral vascular disease) (HCC)    Iliac stents-Bilateral--9/11/14    Recovering alcoholic (HCC)    Recurrent genital herpes    Right breast cancer    Uterine fibroid       Past Surgical History:   Procedure Laterality Date    Brain surgery      Cataract      Extern drain panc pseudocyst,open      Fracture surgery      L shoulder surgery    Hysterectomy  6-29-10    exp lap KODAK BSO,    Other  11/23/15    LEFT IMAGE GUIDED CRANIOTOMY FOR RESECTION OF ACOUSTIC NEUROMA    Other  12/16    left humerus repair for FXR--plate in place    Other surgical history      ex-lap x 2 for pancreatic pseudocysts, both in mid 80's, had shunt placed with second sx    Pancreatectomy,distal+preserv duod      Repair incis hernia w mesh  9/20/2012    Procedure: INCISIONAL   HERNIA REPAIR;  Surgeon: Aaliyah Bennett MD;  Location: [facility]    Repair incisional hernia,reducible  9/20/2012    Procedure: INCISIONAL   HERNIA REPAIR;  Surgeon: Aaliyah Bennett MD;  Location: [facility]    Surgical stent Bilateral 9/11/14    Bilateral femoral stent placement    Tubal ligation         Family History  There is no family history of sudden cardiac death.    Social History   reports that she has been smoking cigarettes. She has a 17.5 pack-year smoking history. She has never used smokeless tobacco. She reports that she does not drink alcohol and does not use drugs.     Allergies  Allergies[1]      Review of Systems:  Constitutional: negative for fevers  Eyes: negative for visual  disturbance  Ears, nose, mouth, throat, and face: negative for epistaxis  Respiratory: negative for dyspnea on exertion  Cardiovascular: negative for chest pain  Gastrointestinal: negative for melena  Genitourinary:negative for hematuria  Hematologic/lymphatic: negative for bleeding  Musculoskeletal:negative for myalgias  Neurological: negative for dizziness and headaches  Endocrine: negative for temperature intolerance      Physical Exam:  Blood pressure 134/45, pulse 78, temperature 96.8 °F (36 °C), temperature source Temporal, resp. rate 23, height 5' 5\" (1.651 m), weight 104 lb (47.2 kg), SpO2 100%, not currently breastfeeding.  Temp (24hrs), Av.8 °F (36 °C), Min:96.8 °F (36 °C), Max:96.8 °F (36 °C)    Wt Readings from Last 3 Encounters:   25 104 lb (47.2 kg)   10/11/23 113 lb 5.1 oz (51.4 kg)   10/06/23 110 lb 3.2 oz (50 kg)       General: Awake but lethargic   Neck: Supple; no JVD; no carotid bruits  Cardiac: Regular rate and regular rhythm; no murmurs/rubs/gallops are appreciated; PMI is non-displaced; there is no evidence of a sternal heave  Lungs: Clear to auscultation bilaterally; no accessory muscle use is noted  Abdomen: Soft, non-tender; bowel sounds are normoactive; no hepatosplenomegaly  Extremities: No clubbing or cyanosis; moves all 4 extremities normally  Psychiatric: Normal mood and affect; answers questions appropriately  Dermatologic: No rashes; normal skin turgor    Diagnostic testing:    EKG: Normal sinus rhythm with diffuse ST depressions     Labs:   Lab Results   Component Value Date    INR 1.12 2025    INR 0.98 10/11/2023        Lab Results   Component Value Date    WBC 18.7 2025    HGB 12.2 2025    HCT 37.2 2025    .0 2025    CREATSERUM 2.28 2025    BUN 26 2025     2025    K 5.5 2025    CL 87 2025    CO2 <10.0 2025     2025    CA 12.3 2025    ALB 4.0 2025    DREA 71  01/07/2025    BILT 0.4 01/07/2025    TP 6.6 01/07/2025    AST 25 01/07/2025    ALT 19 01/07/2025    PTT 26.0 01/07/2025    INR 1.12 01/07/2025    PTP 14.5 01/07/2025    PGLU >600 01/07/2025         Thank you for allowing our practice to participate in the care of your patient. Please do not hesitate to contact me if you have any questions.    Nida Lima MD           [1]   Allergies  Allergen Reactions    Nucynta [Tapentadol] HALLUCINATION     Cannot tolerate    Lisinopril Coughing

## 2025-01-07 NOTE — CONSULTS
Cardiology Consultation Note      Anna Montalvo Patient Status:  Emergency    1955 MRN [id number]   Location [facility] EMERGENCY DEPARTMENT Attending Tremaine Jiang, DO   Hosp Day # 0 PCP Justin Newberry MD     Chronic heart failure with preserved ejection fraction (HCC): EF normalized as of November. No need for ICD at this point.  -Continue metoprolol 25 mg  -She is on half dose Entresto 24-26 only once daily. Is unable to take this twice daily  -Continue spironolactone  -Continue torsemide 20 mg daily    Coronary artery disease due to lipid rich plaque  -Nonobstructive disease  -Goal LDL less than 100. Last LDL was 86 done in . She needs repeat labs per PCP this year    Hyperlipidemia, : Continue statin plan as above    Primary hypertension: Goal less than 130/80 given previous history of cardiomyopathy. At goal  -Continue medications as above    PVD:  - s/p stent R SFA, and bilateral iliac stents 2014  - moderate bilateral carotid stenosis  -Continue with blood pressure and lipid control    Reason for consultation:  ***    Impression:  ***     Plan:  ***      History of Present Illness:  Anna Montalvo is a 69 year old female who presented to [facility] on 2025.    This is a patient of my partner:  ***.    The patient ***.    Cardiology consultation was requested.    Medications:  Current Facility-Administered Medications   Medication Dose Route Frequency    sodium chloride 0.9 % IV bolus 1,416 mL  30 mL/kg Intravenous Once       Past Medical History:    Acoustic neuroma (HCC)    Left facial droop --after surgery    Anxiety    Cataract    Diabetic retinopathy (HCC)    DKA (diabetic ketoacidoses)    4/15/21    DM2    Hearing impairment    hearing loss in left ear    Hyperlipidemia    Hypertension    Osteoporosis    Pancreatitis (HCC)    PVD (peripheral vascular disease) (HCC)    Iliac stents-Bilateral--14    Recovering alcoholic (HCC)    Recurrent genital herpes     Right breast cancer    Uterine fibroid       Past Surgical History:   Procedure Laterality Date    Brain surgery      Cataract      Extern drain panc pseudocyst,open      Fracture surgery      L shoulder surgery    Hysterectomy  6-29-10    exp lap KODAK BSO,    Other  11/23/15    LEFT IMAGE GUIDED CRANIOTOMY FOR RESECTION OF ACOUSTIC NEUROMA    Other  12/16    left humerus repair for FXR--plate in place    Other surgical history      ex-lap x 2 for pancreatic pseudocysts, both in mid 80's, had shunt placed with second sx    Pancreatectomy,distal+preserv duod      Repair incis hernia w mesh  9/20/2012    Procedure: INCISIONAL   HERNIA REPAIR;  Surgeon: Aaliyah Bennett MD;  Location: [facility]    Repair incisional hernia,reducible  9/20/2012    Procedure: INCISIONAL   HERNIA REPAIR;  Surgeon: Aaliyah Bennett MD;  Location: [facility], [facility]    Surgical stent Bilateral 9/11/14    Bilateral femoral stent placement    Tubal ligation         Family History  There is no family history of sudden cardiac death.    Social History   reports that she has been smoking cigarettes. She has a 17.5 pack-year smoking history. She has never used smokeless tobacco. She reports that she does not drink alcohol and does not use drugs.     Allergies  Allergies[1]      Review of Systems:  Constitutional: negative for fevers  Eyes: negative for visual disturbance  Ears, nose, mouth, throat, and face: negative for epistaxis  Respiratory: negative for dyspnea on exertion  Cardiovascular: negative for chest pain  Gastrointestinal: negative for melena  Genitourinary:negative for hematuria  Hematologic/lymphatic: negative for bleeding  Musculoskeletal:negative for myalgias  Neurological: negative for dizziness and headaches  Endocrine: negative for temperature intolerance      Physical Exam:  Blood pressure 134/45, pulse 78, temperature 96.8 °F (36 °C), temperature source Temporal, resp. rate 23, height 5' 5\" (1.651 m), weight  104 lb (47.2 kg), SpO2 100%, not currently breastfeeding.  Temp (24hrs), Av.8 °F (36 °C), Min:96.8 °F (36 °C), Max:96.8 °F (36 °C)    Wt Readings from Last 3 Encounters:   25 104 lb (47.2 kg)   10/11/23 113 lb 5.1 oz (51.4 kg)   10/06/23 110 lb 3.2 oz (50 kg)       General: Awake and alert; in no acute distress  HEENT: Extraocular movements are intact; sclerae are anicteric; scalp is atrauamatic; no thyromegaly  Neck: Supple; no JVD; no carotid bruits  Cardiac: Regular rate and regular rhythm; no murmurs/rubs/gallops are appreciated; PMI is non-displaced; there is no evidence of a sternal heave  Lungs: Clear to auscultation bilaterally; no accessory muscle use is noted  Abdomen: Soft, non-tender; bowel sounds are normoactive; no hepatosplenomegaly  Extremities: No clubbing or cyanosis; moves all 4 extremities normally  Psychiatric: Normal mood and affect; answers questions appropriately  Dermatologic: No rashes; normal skin turgor    Diagnostic testing:    EKG: Normal sinus rhythm    Labs:   Lab Results   Component Value Date    INR 1.12 2025    INR 0.98 10/11/2023        Lab Results   Component Value Date    WBC 18.7 2025    HGB 12.2 2025    HCT 37.2 2025    .0 2025    CREATSERUM 2.28 2025    BUN 26 2025     2025    K  2025      Comment:      The specimen has an unacceptable amount of hemolysis for the test result to be acceptably accurate.     The specimen needs to be recollected and have less hemolysis for an acceptably accurate result for the test.    CL 87 2025    CO2 <10.0 2025     2025    CA 12.3 2025    ALB 4.0 2025    ALKPHO 71 2025    BILT 0.4 2025    TP 6.6 2025    AST  2025      Comment:      The specimen has an unacceptable amount of hemolysis for the test result to be acceptably accurate.     The specimen needs to be recollected and have less hemolysis for an  acceptably accurate result for the test.    ALT 19 01/07/2025    PTT 26.0 01/07/2025    INR 1.12 01/07/2025    PTP 14.5 01/07/2025    PGLU >600 01/07/2025         Thank you for allowing our practice to participate in the care of your patient. Please do not hesitate to contact me if you have any questions.    Nida Lima MD         [1]   Allergies  Allergen Reactions    Nucynta [Tapentadol] HALLUCINATION     Cannot tolerate    Lisinopril Coughing

## 2025-01-07 NOTE — CONSULTS
ICU  Critical Care APRN Consult Note    NAME: Anna Montalvo - ROOM: A7/ - MRN: [id number] - Age: 69 year old - :1955    History Of Present Illness:  Anan Montalvo is a 69 year old female with PMHx significant for DM type1, HFpEF, non-obstructive CAD, brain and breast CA who presented to the ED with complaints of hyperglycemia.Patient is oriented to self only, knows she is at the hospital. No family present. Patient reporting that since Friday her glucose meter stopped working.  reported to ED physician that she has not taken her insulin over the past 3 days due to meter not working and has had polyuria and polydypsia. Patient repetitively states she does know what happened or why. Denies abdominal pain or nausea/emesis. No SOB, CP/pressure.  Labs in the ED notable for , CO2 <10, pH 6.96, anion gap 27, lactic acid 5.2, K+ 5.5, WBC 18.7, small acetone, Na+ 124-corrected with glucose-141. Insulin gtt started in the ED for DKA and patient is being admitted to ICU.    Patient was seen and evaluated in the ED. Patient is confused, restless at times but redirectable. Has no current complaints. Does not understand what happened.     Past Medical History:  Past Medical History:    Acoustic neuroma (HCC)    Left facial droop --after surgery    Anxiety    Cataract    Diabetic retinopathy (HCC)    DKA (diabetic ketoacidoses)    4/15/21    DM2    Hearing impairment    hearing loss in left ear    Hyperlipidemia    Hypertension    Osteoporosis    Pancreatitis (HCC)    PVD (peripheral vascular disease) (HCC)    Iliac stents-Bilateral--14    Recovering alcoholic (HCC)    Recurrent genital herpes    Right breast cancer    Uterine fibroid     Past Surgical History:   Past Surgical History:   Procedure Laterality Date    Brain surgery      Cataract      Extern drain panc pseudocyst,open      Fracture surgery      L shoulder surgery    Hysterectomy  6-29-10    exp lap KODAK BSO,    Other  11/23/15    LEFT  IMAGE GUIDED CRANIOTOMY FOR RESECTION OF ACOUSTIC NEUROMA    Other  12/16    left humerus repair for FXR--plate in place    Other surgical history      ex-lap x 2 for pancreatic pseudocysts, both in mid 80's, had shunt placed with second sx    Pancreatectomy,distal+preserv duod      Repair incis hernia w mesh  9/20/2012    Procedure: INCISIONAL   HERNIA REPAIR;  Surgeon: Aaliyah Bennett MD;  Location: [facility]    Repair incisional hernia,reducible  9/20/2012    Procedure: INCISIONAL   HERNIA REPAIR;  Surgeon: Aaliyah Bennett MD;  Location: [facility]    Surgical stent Bilateral 9/11/14    Bilateral femoral stent placement    Tubal ligation        Family History:   Family History   Problem Relation Age of Onset    Cancer Father     Colon Cancer Mother      Social History:    reports that she has been smoking cigarettes. She has a 17.5 pack-year smoking history. She has never used smokeless tobacco. She reports that she does not drink alcohol and does not use drugs.     Review of Systems:   A comprehensive 10 point review of systems was completed.  Pertinent positives and negatives noted in the HPI.    Current Facility-Administered Medications   Medication Dose Route Frequency    sodium chloride 0.9 % IV bolus 1,416 mL  30 mL/kg Intravenous Once    glucose (Dex4) 15 GM/59ML oral liquid 15 g  15 g Oral Q15 Min PRN    Or    glucose (Glutose) 40% oral gel 15 g  15 g Oral Q15 Min PRN    Or    glucose-vitamin C (Dex-4) chewable tab 4 tablet  4 tablet Oral Q15 Min PRN    Or    dextrose 50% injection 50 mL  50 mL Intravenous Q15 Min PRN    Or    glucose (Dex4) 15 GM/59ML oral liquid 30 g  30 g Oral Q15 Min PRN    Or    glucose (Glutose) 40% oral gel 30 g  30 g Oral Q15 Min PRN    Or    glucose-vitamin C (Dex-4) chewable tab 8 tablet  8 tablet Oral Q15 Min PRN    insulin regular human (Novolin R, Humulin R) 100 Units in sodium chloride 0.9% 100 mL standard infusion (100 mL)  0.5-9 Units/hr  Intravenous Continuous     OBJECTIVE  Vitals:  /48   Pulse 79   Temp 96.8 °F (36 °C) (Temporal)   Resp 23   Ht 165.1 cm (5' 5\")   Wt 104 lb (47.2 kg)   LMP  (LMP Unknown)   SpO2 100%   BMI 17.31 kg/m²                 Physical Exam:    General Appearance: Oriented to self and knows she is at hospital, restless  Neck: No JVD  Lungs: Clear to auscultation bilaterally, respirations unlabored  Heart: Regular rate and rhythm, S1 and S2 normal, no murmur, rub or gallop  Abdomen: Soft, non-tender, bowel sounds active   Extremities: Atraumatic, no cyanosis or edema, capillary refill <3 sec.    Pulses: 2+ and symmetric all extremities  Skin: generalized LE mottling, texture, turgor normal for ethnicity, no rashes or lesions, warm and dry  Neurologic: normal strength    Data this admission:  XR CHEST AP PORTABLE  (CPT=71045)    Result Date: 1/7/2025  CONCLUSION:  1. Hyperexpansion of the lungs. 2. Atelectasis or scarring within the lung bases.    LOCATION:  [facility]      Dictated by (CST): Emelyn Abdullahi MD on 1/07/2025 at 1:59 PM     Finalized by (CST): Emelyn Abdullahi MD on 1/07/2025 at 2:02 PM       Labs:  Lab Results   Component Value Date    WBC 18.7 01/07/2025    HGB 12.2 01/07/2025    HCT 37.2 01/07/2025    .0 01/07/2025    CREATSERUM 2.28 01/07/2025    BUN 26 01/07/2025     01/07/2025    K 5.5 01/07/2025    CL 87 01/07/2025    CO2 <10.0 01/07/2025     01/07/2025    CA 12.3 01/07/2025    ALB 4.0 01/07/2025    ALKPHO 71 01/07/2025    BILT 0.4 01/07/2025    TP 6.6 01/07/2025    AST 25 01/07/2025    ALT 19 01/07/2025    PTT 26.0 01/07/2025    INR 1.12 01/07/2025    PHOS 7.0 01/07/2025       Assessment/Plan:      DKA  History DM type 2  -A1c 11.1  -, CO2 <10, pH 6.96  -Anion gap 27  -IVF bolus- 1.4 L   -Insulin gtt per DKA protocol  -Start D5 1/2 when BG < 250  -Accu-checks Q 1 hr  -BMP, Mg, Phos Q 4 hrs  -NPO    Leukocytosis: Infectious work-up pending, no signs of infection or  current complaints  Lactic Acidosis  -WBC 18.7 on admit  -LA 5.2-->3.2, trend until cleared  -Afebrile--monitor  -PCT 0.34  -B. Cxs pending  -UA pending  -CXR without infiltrate or consolidation  -continue zosyn (1/7-)    Pseudo-hyponatremia  -Na+ corrected for glucose- 141 (124 on serum)  -Monitor BMP     Acute Encephalopathy: Likely toxic metabolic due to DKA, possible infection/sepsis  -Will need to clarify with family, pt's baseline  -Consider CT brain if no improvement with DKA mgt    CAD, non-obstructive  -EKG with NSR and ST depression  -Troponin negative  -Holding BB  -Statin    DAVID on CKD stage IIIA: Likely due to dehydration in setting of DKA  -BL Cr- ~ 0.9-1.1. Cr- on admit 2.28  -IVF bolus in ED, continue IVFs  -Avoid nephrotoxic agents  -Strict I/Os  -Monitor BMP    HTN  -Holding, entresto, shayne and BB  -monitor on tele    Chronic systolic HF, now with preserved EF 2/2 chemotherapy. Last TTE with EF 50-55%. History of EF 15-20% in 7/2023  -TTE pending  -Holding entresto and spironolactone until electrolytes stabilize  -Cards following    Breast CA Stage IIA (Dx 2021) s/p right mastectomy  -On tamoxifen  -Follows with Dr. Trinidad as outpt    History Brain CA s/p resection with residual left facial droop      F/E/N:  -IV fluids  -Follow lytes and replete prn    Proph:  -subcutaneous heparin    Dispo:     Code Status: Full Code  -ICU for close monitoring    Plan of care discussed with intensivist, Dr. Bailey Kendall, [facility]  ICU  Phone  [phone]   Pager [phone]    Pulmonary Critical Care Physician ADDENDUM     I have personally seen and examined the patient.  I have reviewed and agree with Earline NYE's documentation with the following highlights/changes.       Pt presented to the ER after noting high blood sugar readings at home, history is limited as pt is confused, but she states that her glucose meter had stopped working several days ago so she has not taken any insulin.  She denies pain, but does  note some SOB as well as polyuria and polydipsia.  On admission to the  ER pt was hyperglycemic, labs c/w DKA with lactic acidosis as well as leukocytosis.  EKG with ST depressions.  Pt was started on IVF and insulin gtt and admitted to the ICU for further care.  Cardiology was consulted, EKG abnormalities suspected to be related to severe acidemia, plan for echo in the am and correction of metabolic derangements.  Pt started on empiric zosyn.  Will continue insulin gtt until gap is closed.  Monitor mental status for improvement as metabolic abnormalities normalize, if not improving would consider brain imaging although already appears to have significant improvement.     Critical care time: 45 minutes    Jing NICK

## 2025-01-07 NOTE — ED INITIAL ASSESSMENT (HPI)
Pt to ER from home via EMS. Hx breast and brain cancer. Blood sugar read \"high\" for medics. Pt unsure when she ate or drank last or took her insulin.

## 2025-01-08 ENCOUNTER — APPOINTMENT (OUTPATIENT)
Dept: CV DIAGNOSTICS | Facility: HOSPITAL | Age: 70
End: 2025-01-08
Attending: INTERNAL MEDICINE
Payer: MEDICARE

## 2025-01-08 ENCOUNTER — APPOINTMENT (OUTPATIENT)
Dept: CT IMAGING | Facility: HOSPITAL | Age: 70
End: 2025-01-08
Attending: NURSE PRACTITIONER
Payer: MEDICARE

## 2025-01-08 LAB
ANION GAP SERPL CALC-SCNC: 11 MMOL/L (ref 0–18)
ANION GAP SERPL CALC-SCNC: 12 MMOL/L (ref 0–18)
ANION GAP SERPL CALC-SCNC: 13 MMOL/L (ref 0–18)
ANION GAP SERPL CALC-SCNC: 15 MMOL/L (ref 0–18)
ANION GAP SERPL CALC-SCNC: 21 MMOL/L (ref 0–18)
ANION GAP SERPL CALC-SCNC: 9 MMOL/L (ref 0–18)
BASOPHILS # BLD AUTO: 0.02 X10(3) UL (ref 0–0.2)
BASOPHILS NFR BLD AUTO: 0.1 %
BUN BLD-MCNC: 24 MG/DL (ref 9–23)
BUN BLD-MCNC: 25 MG/DL (ref 9–23)
C-PEPTIDE: 0.6 NG/ML
CALCIUM BLD-MCNC: 10.2 MG/DL (ref 8.7–10.4)
CALCIUM BLD-MCNC: 10.5 MG/DL (ref 8.7–10.4)
CALCIUM BLD-MCNC: 11 MG/DL (ref 8.7–10.4)
CALCIUM BLD-MCNC: 9.3 MG/DL (ref 8.7–10.4)
CALCIUM BLD-MCNC: 9.4 MG/DL (ref 8.7–10.4)
CALCIUM BLD-MCNC: 9.9 MG/DL (ref 8.7–10.4)
CHLORIDE SERPL-SCNC: 106 MMOL/L (ref 98–112)
CHLORIDE SERPL-SCNC: 107 MMOL/L (ref 98–112)
CHLORIDE SERPL-SCNC: 107 MMOL/L (ref 98–112)
CHLORIDE SERPL-SCNC: 108 MMOL/L (ref 98–112)
CHLORIDE SERPL-SCNC: 108 MMOL/L (ref 98–112)
CHLORIDE SERPL-SCNC: 109 MMOL/L (ref 98–112)
CO2 SERPL-SCNC: 11 MMOL/L (ref 21–32)
CO2 SERPL-SCNC: 15 MMOL/L (ref 21–32)
CO2 SERPL-SCNC: 15 MMOL/L (ref 21–32)
CO2 SERPL-SCNC: 16 MMOL/L (ref 21–32)
CO2 SERPL-SCNC: 16 MMOL/L (ref 21–32)
CO2 SERPL-SCNC: 18 MMOL/L (ref 21–32)
CREAT BLD-MCNC: 1.26 MG/DL
CREAT BLD-MCNC: 1.39 MG/DL
CREAT BLD-MCNC: 1.49 MG/DL
CREAT BLD-MCNC: 1.59 MG/DL
CREAT BLD-MCNC: 1.6 MG/DL
CREAT BLD-MCNC: 1.74 MG/DL
CREAT UR-SCNC: 26.4 MG/DL
EGFRCR SERPLBLD CKD-EPI 2021: 31 ML/MIN/1.73M2 (ref 60–?)
EGFRCR SERPLBLD CKD-EPI 2021: 34 ML/MIN/1.73M2 (ref 60–?)
EGFRCR SERPLBLD CKD-EPI 2021: 35 ML/MIN/1.73M2 (ref 60–?)
EGFRCR SERPLBLD CKD-EPI 2021: 38 ML/MIN/1.73M2 (ref 60–?)
EGFRCR SERPLBLD CKD-EPI 2021: 41 ML/MIN/1.73M2 (ref 60–?)
EGFRCR SERPLBLD CKD-EPI 2021: 46 ML/MIN/1.73M2 (ref 60–?)
EOSINOPHIL # BLD AUTO: 0 X10(3) UL (ref 0–0.7)
EOSINOPHIL NFR BLD AUTO: 0 %
ERYTHROCYTE [DISTWIDTH] IN BLOOD BY AUTOMATED COUNT: 12 %
GLUCOSE BLD-MCNC: 100 MG/DL (ref 70–99)
GLUCOSE BLD-MCNC: 104 MG/DL (ref 70–99)
GLUCOSE BLD-MCNC: 111 MG/DL (ref 70–99)
GLUCOSE BLD-MCNC: 112 MG/DL (ref 70–99)
GLUCOSE BLD-MCNC: 113 MG/DL (ref 70–99)
GLUCOSE BLD-MCNC: 122 MG/DL (ref 70–99)
GLUCOSE BLD-MCNC: 125 MG/DL (ref 70–99)
GLUCOSE BLD-MCNC: 129 MG/DL (ref 70–99)
GLUCOSE BLD-MCNC: 134 MG/DL (ref 70–99)
GLUCOSE BLD-MCNC: 144 MG/DL (ref 70–99)
GLUCOSE BLD-MCNC: 146 MG/DL (ref 70–99)
GLUCOSE BLD-MCNC: 148 MG/DL (ref 70–99)
GLUCOSE BLD-MCNC: 152 MG/DL (ref 70–99)
GLUCOSE BLD-MCNC: 156 MG/DL (ref 70–99)
GLUCOSE BLD-MCNC: 158 MG/DL (ref 70–99)
GLUCOSE BLD-MCNC: 161 MG/DL (ref 70–99)
GLUCOSE BLD-MCNC: 161 MG/DL (ref 70–99)
GLUCOSE BLD-MCNC: 164 MG/DL (ref 70–99)
GLUCOSE BLD-MCNC: 167 MG/DL (ref 70–99)
GLUCOSE BLD-MCNC: 168 MG/DL (ref 70–99)
GLUCOSE BLD-MCNC: 170 MG/DL (ref 70–99)
GLUCOSE BLD-MCNC: 188 MG/DL (ref 70–99)
GLUCOSE BLD-MCNC: 193 MG/DL (ref 70–99)
GLUCOSE BLD-MCNC: 214 MG/DL (ref 70–99)
GLUCOSE BLD-MCNC: 227 MG/DL (ref 70–99)
GLUCOSE BLD-MCNC: 81 MG/DL (ref 70–99)
GLUCOSE BLD-MCNC: 86 MG/DL (ref 70–99)
GLUCOSE BLD-MCNC: 90 MG/DL (ref 70–99)
GLUCOSE BLD-MCNC: 95 MG/DL (ref 70–99)
GLUCOSE BLD-MCNC: 97 MG/DL (ref 70–99)
HCT VFR BLD AUTO: 31.4 %
HGB BLD-MCNC: 11.6 G/DL
IMM GRANULOCYTES # BLD AUTO: 0.14 X10(3) UL (ref 0–1)
IMM GRANULOCYTES NFR BLD: 0.8 %
LACTATE SERPL-SCNC: 3.5 MMOL/L (ref 0.5–2)
LACTATE SERPL-SCNC: 5.2 MMOL/L (ref 0.5–2)
LACTATE SERPL-SCNC: 7.3 MMOL/L (ref 0.5–2)
LYMPHOCYTES # BLD AUTO: 0.53 X10(3) UL (ref 1–4)
LYMPHOCYTES NFR BLD AUTO: 2.9 %
MAGNESIUM SERPL-MCNC: 1.6 MG/DL (ref 1.6–2.6)
MAGNESIUM SERPL-MCNC: 1.8 MG/DL (ref 1.6–2.6)
MAGNESIUM SERPL-MCNC: 1.9 MG/DL (ref 1.6–2.6)
MAGNESIUM SERPL-MCNC: 1.9 MG/DL (ref 1.6–2.6)
MAGNESIUM SERPL-MCNC: 2.1 MG/DL (ref 1.6–2.6)
MAGNESIUM SERPL-MCNC: 2.4 MG/DL (ref 1.6–2.6)
MCH RBC QN AUTO: 34.7 PG (ref 26–34)
MCHC RBC AUTO-ENTMCNC: 36.9 G/DL (ref 31–37)
MCV RBC AUTO: 94 FL
MONOCYTES # BLD AUTO: 0.97 X10(3) UL (ref 0.1–1)
MONOCYTES NFR BLD AUTO: 5.4 %
MRSA DNA SPEC QL NAA+PROBE: NEGATIVE
NEUTROPHILS # BLD AUTO: 16.42 X10 (3) UL (ref 1.5–7.7)
NEUTROPHILS # BLD AUTO: 16.42 X10(3) UL (ref 1.5–7.7)
NEUTROPHILS NFR BLD AUTO: 90.8 %
OSMOLALITY SERPL CALC.SUM OF ELEC: 281 MOSM/KG (ref 275–295)
OSMOLALITY SERPL CALC.SUM OF ELEC: 288 MOSM/KG (ref 275–295)
OSMOLALITY SERPL CALC.SUM OF ELEC: 288 MOSM/KG (ref 275–295)
OSMOLALITY SERPL CALC.SUM OF ELEC: 291 MOSM/KG (ref 275–295)
OSMOLALITY SERPL CALC.SUM OF ELEC: 293 MOSM/KG (ref 275–295)
OSMOLALITY SERPL CALC.SUM OF ELEC: 295 MOSM/KG (ref 275–295)
OSMOLALITY UR: 395 MOSM/KG (ref 300–1300)
PHOSPHATE SERPL-MCNC: 1.2 MG/DL (ref 2.4–5.1)
PHOSPHATE SERPL-MCNC: 1.4 MG/DL (ref 2.4–5.1)
PHOSPHATE SERPL-MCNC: 1.6 MG/DL (ref 2.4–5.1)
PHOSPHATE SERPL-MCNC: 2 MG/DL (ref 2.4–5.1)
PLATELET # BLD AUTO: 121 10(3)UL (ref 150–450)
POTASSIUM SERPL-SCNC: 3.3 MMOL/L (ref 3.5–5.1)
POTASSIUM SERPL-SCNC: 3.6 MMOL/L (ref 3.5–5.1)
POTASSIUM SERPL-SCNC: 3.6 MMOL/L (ref 3.5–5.1)
POTASSIUM SERPL-SCNC: 3.7 MMOL/L (ref 3.5–5.1)
POTASSIUM SERPL-SCNC: 3.8 MMOL/L (ref 3.5–5.1)
POTASSIUM SERPL-SCNC: 3.8 MMOL/L (ref 3.5–5.1)
PROCALCITONIN SERPL-MCNC: 2.17 NG/ML (ref ?–0.05)
RBC # BLD AUTO: 3.34 X10(6)UL
SODIUM SERPL-SCNC: 132 MMOL/L (ref 136–145)
SODIUM SERPL-SCNC: 135 MMOL/L (ref 136–145)
SODIUM SERPL-SCNC: 136 MMOL/L (ref 136–145)
SODIUM SERPL-SCNC: 137 MMOL/L (ref 136–145)
SODIUM SERPL-SCNC: 138 MMOL/L (ref 136–145)
SODIUM SERPL-SCNC: 139 MMOL/L (ref 136–145)
SODIUM SERPL-SCNC: 99 MMOL/L
UUN UR-MCNC: 280 MG/DL
WBC # BLD AUTO: 18.1 X10(3) UL (ref 4–11)

## 2025-01-08 PROCEDURE — 93306 TTE W/DOPPLER COMPLETE: CPT | Performed by: INTERNAL MEDICINE

## 2025-01-08 PROCEDURE — 70450 CT HEAD/BRAIN W/O DYE: CPT | Performed by: NURSE PRACTITIONER

## 2025-01-08 PROCEDURE — 99222 1ST HOSP IP/OBS MODERATE 55: CPT | Performed by: CLINICAL NURSE SPECIALIST

## 2025-01-08 PROCEDURE — 99232 SBSQ HOSP IP/OBS MODERATE 35: CPT | Performed by: INTERNAL MEDICINE

## 2025-01-08 RX ORDER — ESCITALOPRAM OXALATE 20 MG/1
20 TABLET ORAL DAILY
Status: DISCONTINUED | OUTPATIENT
Start: 2025-01-08 | End: 2025-01-13

## 2025-01-08 RX ORDER — DEXMEDETOMIDINE HYDROCHLORIDE 4 UG/ML
INJECTION, SOLUTION INTRAVENOUS CONTINUOUS
Status: DISCONTINUED | OUTPATIENT
Start: 2025-01-08 | End: 2025-01-10

## 2025-01-08 RX ORDER — METOPROLOL SUCCINATE 25 MG/1
25 TABLET, EXTENDED RELEASE ORAL
Status: DISCONTINUED | OUTPATIENT
Start: 2025-01-08 | End: 2025-01-09

## 2025-01-08 RX ORDER — HALOPERIDOL 5 MG/ML
2 INJECTION INTRAMUSCULAR ONCE
Status: COMPLETED | OUTPATIENT
Start: 2025-01-08 | End: 2025-01-08

## 2025-01-08 RX ORDER — MAGNESIUM SULFATE HEPTAHYDRATE 40 MG/ML
2 INJECTION, SOLUTION INTRAVENOUS ONCE
Status: COMPLETED | OUTPATIENT
Start: 2025-01-08 | End: 2025-01-08

## 2025-01-08 RX ORDER — DOXEPIN HYDROCHLORIDE 50 MG/1
1 CAPSULE ORAL DAILY
Status: DISCONTINUED | OUTPATIENT
Start: 2025-01-09 | End: 2025-01-13

## 2025-01-08 NOTE — PLAN OF CARE
Received patient from ED around 2000. Patient alert, oriented to self and place. Baseline slurred speech and facial droop. Restless and calling out at times, restraints in place for safety. NSR on tele. BP stable. Afebrile. Straight cath X2, purewick in place. No BM. Insulin gtt infusing and protocol followed. Q4 BMP. IVF. Lytes replaced per protocol. See flowsheets and MAR.

## 2025-01-08 NOTE — PROGRESS NOTES
ICU  Critical Care APRN Progress Note    NAME: Anna Montalvo - ROOM: A7/A7 - MRN: [id number] - Age: 69 year old - :1955    Subjective:  No acute events overnight.  Patient with confusion and yelling out.    Current Facility-Administered Medications   Medication Dose Route Frequency    potassium chloride 40 mEq in 250mL sodium chloride 0.9% IVPB premix  40 mEq Intravenous Once    sodium chloride 0.9 % IV bolus 1,416 mL  30 mL/kg Intravenous Once    glucose (Dex4) 15 GM/59ML oral liquid 15 g  15 g Oral Q15 Min PRN    Or    glucose (Glutose) 40% oral gel 15 g  15 g Oral Q15 Min PRN    Or    glucose-vitamin C (Dex-4) chewable tab 4 tablet  4 tablet Oral Q15 Min PRN    Or    dextrose 50% injection 50 mL  50 mL Intravenous Q15 Min PRN    Or    glucose (Dex4) 15 GM/59ML oral liquid 30 g  30 g Oral Q15 Min PRN    Or    glucose (Glutose) 40% oral gel 30 g  30 g Oral Q15 Min PRN    Or    glucose-vitamin C (Dex-4) chewable tab 8 tablet  8 tablet Oral Q15 Min PRN    piperacillin-tazobactam (Zosyn) 3.375 g in dextrose 5% 100 mL IVPB-ADDV  3.375 g Intravenous Q12H    sodium chloride 0.9% infusion   Intravenous Continuous    insulin regular human (Novolin R, Humulin R) 100 Units in sodium chloride 0.9% 100 mL standard infusion (100 mL)  2-52 Units/hr Intravenous Continuous    dextrose 5%-sodium chloride 0.45% infusion  100 mL/hr Intravenous Continuous PRN    heparin (Porcine) 5000 UNIT/ML injection 5,000 Units  5,000 Units Subcutaneous 2 times per day    melatonin tab 3 mg  3 mg Oral Nightly PRN    polyethylene glycol (PEG 3350) (Miralax) 17 g oral packet 17 g  17 g Oral Daily PRN    sennosides (Senokot) tab 17.2 mg  17.2 mg Oral Nightly PRN    bisacodyl (Dulcolax) 10 MG rectal suppository 10 mg  10 mg Rectal Daily PRN    ondansetron (Zofran) 4 MG/2ML injection 4 mg  4 mg Intravenous Q6H PRN    metoclopramide (Reglan) 5 mg/mL injection 5 mg  5 mg Intravenous Q8H PRN    acetaminophen (Tylenol Extra Strength) tab 500 mg   500 mg Oral Q4H PRN     OBJECTIVE  Vitals:  /67   Pulse 77   Temp 97.5 °F (36.4 °C) (Temporal)   Resp 16   Ht 165.1 cm (5' 5\")   Wt 97 lb 10.6 oz (44.3 kg)   LMP  (LMP Unknown)   SpO2 98%   BMI 16.25 kg/m²                 Physical Exam:    General Appearance: Oriented to self and knows she is at hospital, restless  Neck: No JVD  Lungs: Clear to auscultation bilaterally, respirations unlabored  Heart: Regular rate and rhythm, S1 and S2 normal, no murmur, rub or gallop  Abdomen: Soft, non-tender, bowel sounds active   Extremities: Atraumatic, no cyanosis or edema, capillary refill <3 sec.    Pulses: 2+ and symmetric all extremities  Skin: generalized LE mottling, texture, turgor normal for ethnicity, no rashes or lesions, warm and dry  Neurologic: normal strength    Data this admission:  XR CHEST AP PORTABLE  (CPT=71045)    Result Date: 1/7/2025  CONCLUSION:  1. Hyperexpansion of the lungs. 2. Atelectasis or scarring within the lung bases.    LOCATION:  [facility]      Dictated by (CST): Emelyn Abdullahi MD on 1/07/2025 at 1:59 PM     Finalized by (CST): Emelyn Abdullahi MD on 1/07/2025 at 2:02 PM       Labs:  Lab Results   Component Value Date    WBC 18.1 01/08/2025    HGB 11.6 01/08/2025    HCT 31.4 01/08/2025    .0 01/08/2025    CREATSERUM 1.60 01/08/2025    BUN 24 01/08/2025     01/08/2025    K 3.3 01/08/2025     01/08/2025    CO2 15.0 01/08/2025     01/08/2025    CA 10.5 01/08/2025    ALB 3.5 01/07/2025    ALKPHO 70 01/07/2025    BILT 0.3 01/07/2025    TP 5.8 01/07/2025    AST 22 01/07/2025    ALT 16 01/07/2025    PTT 26.0 01/07/2025    INR 1.12 01/07/2025    MG 2.4 01/08/2025    PHOS 1.6 01/08/2025       Assessment/Plan:      DKA  History DM type 2  -A1c 11.1  -IVF bolus- 1.4 L   -Insulin gtt per DKA protocol  -Start D5 1/2 when BG < 250  -Accu-checks Q 1 hr  -BMP, Mg, Phos Q 4 hrs  -NPO    Leukocytosis: Infectious work-up pending, no signs of infection or current  complaints  Lactic Acidosis  -WBC 18.7 on admit  -LA 5.2-->3.2, trend until cleared  -Afebrile--monitor  -PCT 0.34  -B. Cxs pending  -UA pending  -CXR without infiltrate or consolidation  -continue zosyn (1/7-)    Pseudo-hyponatremia  -Na+ corrected for glucose- 141 (124 on serum)  -Monitor BMP     Acute Encephalopathy: Likely toxic metabolic due to DKA, possible infection/sepsis  -Will need to clarify with family, pt's baseline  -CT brain pending    CAD, non-obstructive  -EKG with NSR and ST depression  -Troponin negative  -restart BB  -Statin    DAVID on CKD stage IIIA: Likely due to dehydration in setting of DKA  -BL Cr- ~ 0.9-1.1. Cr- on admit 2.28  -IVF bolus in ED, continue IVFs  -Avoid nephrotoxic agents  -Strict I/Os  -Monitor BMP  -Consult renal    HTN  -Holding, entresto, shayne  -monitor on tele    Chronic systolic HF, now with preserved EF 2/2 chemotherapy. Last TTE with EF 50-55%. History of EF 15-20% in 7/2023  -TTE pending  -Holding entresto and spironolactone until electrolytes stabilize  -Cards following    Breast CA Stage IIA (Dx 2021) s/p right mastectomy  -On tamoxifen  -Follows with Dr. Trinidad as outpt    History Brain CA s/p resection with residual left facial droop      F/E/N:  -IV fluids  -Follow lytes and replete prn  -NPO while on insulin drip    Proph:  -subcutaneous heparin    Dispo:     Code Status: Full Code  -ICU while on insulin    Plan of care discussed with intensivist, Dr. Bailey Avila, [facility]  Critical Care NP  Phone [phone]

## 2025-01-08 NOTE — PROGRESS NOTES
Cardiology Progress Note    Anna Montalvo Patient Status:  Inpatient    1955 MRN [id number]   Location [facility] 4SW-A Attending Fabian Miranda MD   Hosp Day # 1 PCP Justin Newberry MD     Reason for consultation:  EKG changes      Impression:  DKA with multiple electrolyte abnormalities and diffuse ST depression  Previous HFRef with normalization of EF  Non obstructive CAD  Breast cancer following with Dr. Trinidad.  On tamoxifen  Hypertension  CKD stage III     Plan:  EKG changes likely related to acidosis along with multiple electrolyte abnormalities.  Continue to treat DKA   Review of preliminary echo images reveals normal EF  Repeat EKG today shows normalization of ST segments   Continue BB  Hold Entresto and spironolactone until electrolyte issues have stabilized  Telemetry  Cardiology to follow peripherally if final echo read is normal. Please call with ?'s       Subjective:  The patient denies any chest pain or dyspnea at this time.    Objective:  /74   Pulse 78   Temp 99.7 °F (37.6 °C) (Temporal)   Resp 24   Ht 5' 5\" (1.651 m)   Wt 97 lb 10.6 oz (44.3 kg)   LMP  (LMP Unknown)   SpO2 95%   BMI 16.25 kg/m²   Temp (24hrs), Av.8 °F (36.6 °C), Min:96.8 °F (36 °C), Max:99.7 °F (37.6 °C)      Intake/Output Summary (Last 24 hours) at 2025 1036  Last data filed at 2025 1009  Gross per 24 hour   Intake 1716 ml   Output 1400 ml   Net 316 ml     Wt Readings from Last 3 Encounters:   25 97 lb 10.6 oz (44.3 kg)   10/11/23 113 lb 5.1 oz (51.4 kg)   10/06/23 110 lb 3.2 oz (50 kg)       General: in mild distress  Cardiac: Regular rate and regular rhythm; no murmurs/rubs/gallops are appreciated  Lungs: Clear to auscultation bilaterally; no accessory muscle use  Abdomen: Soft, non-tender; bowel sounds are normoactive  Extremities: No clubbing/cyanosis; moves all 4 extremities normally    Current Facility-Administered Medications   Medication Dose Route Frequency    haloperidol  lactate (Haldol) 5 MG/ML injection 2 mg  2 mg Intravenous Once    metoprolol succinate ER (Toprol XL) 24 hr tab 25 mg  25 mg Oral Daily Beta Blocker    escitalopram (Lexapro) tab 20 mg  20 mg Oral Daily    glucose (Dex4) 15 GM/59ML oral liquid 15 g  15 g Oral Q15 Min PRN    Or    glucose (Glutose) 40% oral gel 15 g  15 g Oral Q15 Min PRN    Or    glucose-vitamin C (Dex-4) chewable tab 4 tablet  4 tablet Oral Q15 Min PRN    Or    dextrose 50% injection 50 mL  50 mL Intravenous Q15 Min PRN    Or    glucose (Dex4) 15 GM/59ML oral liquid 30 g  30 g Oral Q15 Min PRN    Or    glucose (Glutose) 40% oral gel 30 g  30 g Oral Q15 Min PRN    Or    glucose-vitamin C (Dex-4) chewable tab 8 tablet  8 tablet Oral Q15 Min PRN    sodium chloride 0.9% infusion   Intravenous Continuous    insulin regular human (Novolin R, Humulin R) 100 Units in sodium chloride 0.9% 100 mL standard infusion (100 mL)  2-52 Units/hr Intravenous Continuous    dextrose 5%-sodium chloride 0.45% infusion  100 mL/hr Intravenous Continuous PRN    heparin (Porcine) 5000 UNIT/ML injection 5,000 Units  5,000 Units Subcutaneous 2 times per day    melatonin tab 3 mg  3 mg Oral Nightly PRN    polyethylene glycol (PEG 3350) (Miralax) 17 g oral packet 17 g  17 g Oral Daily PRN    sennosides (Senokot) tab 17.2 mg  17.2 mg Oral Nightly PRN    bisacodyl (Dulcolax) 10 MG rectal suppository 10 mg  10 mg Rectal Daily PRN    ondansetron (Zofran) 4 MG/2ML injection 4 mg  4 mg Intravenous Q6H PRN    acetaminophen (Tylenol Extra Strength) tab 500 mg  500 mg Oral Q4H PRN       Laboratory Data:  Lab Results   Component Value Date    WBC 18.1 01/08/2025    HGB 11.6 01/08/2025    HCT 31.4 01/08/2025    .0 01/08/2025     Lab Results   Component Value Date    INR 1.12 01/07/2025    INR 0.98 10/11/2023    INR 1.00 07/31/2023     Lab Results   Component Value Date     01/08/2025    K 3.8 01/08/2025     01/08/2025    CO2 16.0 01/08/2025    BUN 24 01/08/2025     CREATSERUM 1.59 01/08/2025     01/08/2025    CA 10.2 01/08/2025    MG 2.1 01/08/2025       Telemetry: No malignant tachyarrhythmias or bradyarrhythmias      Thank you for allowing our practice to participate in the care of your patient. Please do not hesitate to contact me if you have any questions.      Nida Lima MD

## 2025-01-08 NOTE — DIETARY MALNUTRITION NOTE
[facility]   part of [facility]  NUTRITION ASSESSMENT    Pt meets moderate malnutrition criteria at this time.    CRITERIA FOR MALNUTRITION DIAGNOSIS:  Criteria for non-severe malnutrition diagnosis: chronic illness related to body fat mild depletion and muscle mass mild depletion    NUTRITION INTERVENTION:    RD nutrition Care Plan- See RD nutrition assessment for additional recommendations  Meal and Snacks - ADAT per DKA protocol; monitor patient po intake. Encourage adequate po of appropriate diet.  Medical Food Supplements - RD added Glucerna BID once diet advanced. Rationale/use for oral supplements discussed.  Nutrition Education - Will attempt CHO Counting education upon follow up as appropriate. Pt/family receptive to education, no barriers noted. Handouts provided.   Vitamin and Mineral Supplements - Recommend adding Multivitamin with minerals  Coordination of Nutrition Care - Recommend Diabetes nurse specialist consult for further DM management    PATIENT STATUS: 69 year old female admitted on 1/7 presents with DKA. Pt screened d/t low BMI and elevated A1c 11.1%. Visited pt at bedside but pt was lethargic and unable to participate in interview. Per H&P, pt reportedly with decreased appetite/PO intake for a couple days PTA and had not been taking her insulin. Pt remains NPO per DKA protocol. No GI symptoms noted. No chewing or swallowing difficulties and NKFA. Will order ONS once diet advanced and offer education if appropriate.    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   BMI: Body mass index is 16.25 kg/m².  IBW: 56.8 kg    WEIGHT HISTORY: Per chart review, pt with ~5 lb  wt loss x 2 months (5%, not significant per standards).  Patient Weight(s) for the past 336 hrs:   Weight   01/08/25 0400 44.3 kg (97 lb 10.6 oz)   01/07/25 1325 47.2 kg (104 lb)       Wt Readings from Last 5 Encounters:   01/08/25 44.3 kg (97 lb 10.6 oz)   10/11/23 51.4 kg (113 lb 5.1 oz)   10/06/23 50 kg (110 lb 3.2 oz)   08/12/23 57 kg (125 lb 10.6 oz)   10/06/22 46.4 kg (102 lb 6.4 oz)   Per Care Everywhere:  11/19/24 109 lb 12.8 oz (49.8 kg)  10/15/24 106 lb 14.4 oz (48.5 kg)  09/09/24 107 lb (48.5 kg)  06/25/24 107 lb (48.5 kg)  06/20/24 109 lb (49.4 kg)  05/21/24 107 lb 6.4 oz (48.7 kg)     NUTRITION:  Diet:       Procedures    NPO      Food Allergies: No  Cultural/Ethnic/Scientology Preferences Addressed: Yes    Percent Meals Eaten (last 3 days)       None            GI SYSTEM REVIEW: WNL  Skin/Wounds: WNL    NUTRITION RELATED PHYSICAL FINDINGS:     1. Body Fat/Muscle Mass: moderate depletion body fat Buccal fat pad and Triceps and moderate muscle depletion Temple region and Clavicle region     2. Fluid Accumulation: none per RN documentation    NUTRITION PRESCRIPTION:  47.2 kg Admit Wt  Calories: 9406-8996 calories/day (30-35 kcal/kg)  Protein: 57-71 grams protein/day (1.2-1.5 gm/kg)  Fluid: ~1 ml/kcal or per MD discretion    NUTRITION DIAGNOSIS/PROBLEM:  Malnutrition related to physiological causes as evidenced by loss of fat mass, loss of muscle mass, and low BMI    MONITOR AND EVALUATE/NUTRITION GOALS:  Weight stable within 1 to 2 lbs during admission - New  Return to PO intake or advance diet in 24-48 hrs - New      MEDICATIONS:  Abx  Gtt: D5W-1/2NS at 100 ml/hr, insulin at 1.5 units/hr    LABS:  , Na 135, Phos 1.4, A1c = 11.1%    Pt is at Moderate nutrition risk    Luz Elena Ramsay RD, LDN, [facility]  Clinical Dietitian  Spectra: [phone]

## 2025-01-08 NOTE — PLAN OF CARE
Received pt after report. Alert to self. Denies pain. Pt agitated, restless, prn haldol given. Harvey inserted per order. Hypotensive, 1L bolus. Insulin gtt infusing. Q1 accuckecks.

## 2025-01-08 NOTE — CONSULTS
[facility]  Diabetes Consult Note    Anna Montalvo Patient Status:  Inpatient    1955 MRN [id number]   Location [facility] 4SW-A Attending Fabian Miranda MD   Hosp Day # 1 PCP Justin Newberry MD     Reason for Consult:   Management recommendations in setting of DKA in T1DM      Provider Requesting Consult:  Earline Kendall, ICU APN      Diagnosis:  Patient Active Problem List   Diagnosis    PURE HYPERCHOLESTEROLEM    Tobacco abuse    Vitamin D deficiency    Atherosclerosis of native arteries of extremity with intermittent claudication (HCC)    Osteoporosis    PVD (peripheral vascular disease) ([facility])    Anxiety    Senile nuclear sclerosis, bilateral    History of alcohol abuse    HTN (hypertension)    Facial droop    Leukocytosis    Diabetes type 2, controlled ([facility])    Mild nonproliferative diabetic retinopathy of both eyes without macular edema associated with type 2 diabetes mellitus (HCC)    Facial paralysis    Diabetic ketoacidosis without coma associated with type 2 diabetes mellitus (HCC)    Scalp laceration, initial encounter    Multiple contusions    DAVID (acute kidney injury) (HCC)    Fall    Postural dizziness with near syncope    Protein-calorie malnutrition, unspecified severity (HCC)    Malignant neoplasm of upper-outer quadrant of right breast in female, estrogen receptor positive (HCC)    Dehydration    Hypokalemia    Anemia, unspecified type    Leukocytosis, unspecified type    Acute UTI    Hyponatremia    Hyperglycemia without ketosis    Type 1 diabetes mellitus with ketoacidosis without coma (HCC)    Lactic acidosis    Azotemia    Hyperkalemia    Anemia    Acute renal failure (ARF) (HCC)    Acute kidney injury (HCC)    Sepsis without acute organ dysfunction (HCC)    Elevated LFTs    Hyperglycemic hyperosmolar nonketotic coma (HCC)    Palliative care encounter    Counseling regarding advance care planning and goals of care    Diabetic ketoacidosis without coma associated with type 1  diabetes mellitus (HCC)    Abnormal EKG    Thrombocytopenia (HCC)         Medical History:  Past Medical History:    Acoustic neuroma (HCC)    Left facial droop --after surgery    Anxiety    Cataract    Diabetic retinopathy (HCC)    DKA (diabetic ketoacidoses)    4/15/21    DM2    Hearing impairment    hearing loss in left ear    Hyperlipidemia    Hypertension    Osteoporosis    Pancreatitis (HCC)    PVD (peripheral vascular disease) (HCC)    Iliac stents-Bilateral--9/11/14    Recovering alcoholic (HCC)    Recurrent genital herpes    Right breast cancer    Uterine fibroid     Past Surgical History:   Procedure Laterality Date    Brain surgery      Cataract      Extern drain panc pseudocyst,open      Fracture surgery      L shoulder surgery    Hysterectomy  6-29-10    exp lap KODAK BSO,    Other  11/23/15    LEFT IMAGE GUIDED CRANIOTOMY FOR RESECTION OF ACOUSTIC NEUROMA    Other  12/16    left humerus repair for FXR--plate in place    Other surgical history      ex-lap x 2 for pancreatic pseudocysts, both in mid 80's, had shunt placed with second sx    Pancreatectomy,distal+preserv duod      Repair incis hernia w mesh  9/20/2012    Procedure: INCISIONAL   HERNIA REPAIR;  Surgeon: Aaliyah Bennett MD;  Location: [facility]    Repair incisional hernia,reducible  9/20/2012    Procedure: INCISIONAL   HERNIA REPAIR;  Surgeon: Aaliyah Bennett MD;  Location: [facility]    Surgical stent Bilateral 9/11/14    Bilateral femoral stent placement    Tubal ligation       Family History   Problem Relation Age of Onset    Cancer Father     Colon Cancer Mother          Diabetes history:  Type:  T1DM  Onset: unknown  Family history of DM: unknown      Allergies: Allergies[1]      Medications: Complete list reviewed. Active diabetes medications include continuous insulin infusion.      Labs:  Recent Labs   Lab 01/08/25  0809 01/08/25  0904 01/08/25  0958 01/08/25  1055 01/08/25  1238   PGLU 188* 214* 193* 158*  146*     Recent Labs     01/07/25  1317 01/07/25  1318 01/07/25  1318 01/07/25  1602 01/07/25  1618 01/07/25  1715 01/07/25  1717 01/07/25  1956 01/07/25 2028 01/08/25  0007 01/08/25  0103 01/08/25  0359 01/08/25  0456 01/08/25  0605 01/08/25  0644 01/08/25  1238   NA  --  124*   < > 128*  --  129*  --  133*  --  139  --  138  --  137  --   --    CL  --  87*   < > 95*  --  95*  --  99  --  107  --  108  --  108  --   --    CO2  --  <10.0*   < > <10.0*  --  <10.0*  --  <10.0*  --  11.0*  --  15.0*  --  16.0*  --   --    BUN  --  26*   < > 25*  --  23  --  26*  --  25*  --  24*  --  24*  --   --    CREATSERUM  --  2.28*   < > 1.88*  --  1.99*  --  2.01*  --  1.74*  --  1.60*  --  1.59*  --   --    A1C 11.1*  --   --   --   --   --   --   --   --   --   --   --   --   --   --   --    PGLU  --   --   --   --    < >  --    < >  --    < >  --    < >  --    < >  --    < > 146*   CA  --  12.3*   < > 11.0*  --  11.5*  --  11.4*  --  11.0*  --  10.5*  --  10.2  --   --    ALB  --  4.0  --  3.5  --   --   --   --   --   --   --   --   --   --   --   --     < > = values in this interval not displayed.                    History of Present Illness: Anna Montalvo is a 69 year old female with a PMH of T1DM cb/ diabetic retinopathy, breast and brain CA, CHF, non-obstructive CAD, HTN, CKD3 and PVD s/p b/l iliac stents admitted 1/7/2025 with complaints of hyperglycemia on home meter (per ED notes, patient stopped insulin 3 days ago). ED evaluation revealed DKA (, CO2<10, pH 6.96, anion gap 27, lactic acid 5.2 and A1C 11.1%), as well as DAVID (Cr 2.88).         Assessment/Recommendations:  Upon interview today, patient screaming and combative despite redirection from nursing team; states she has no pain and is unsure why she is screaming.     Upon chart review, patient follows with [facility] (next appointment Feb 2025); is on basal/bolus insulin regimen at home. With A1C of 11.1% on admission, either doses need to be  up-titrated or patient non-complaint with regimen. At this point, patient not able to hold beneficial conversation regarding prior to admission medications or plan of care. Will continue to follow.       Plan:  Inpatient recommendations -   Continue continuous insulin infusion per ICU team recommendations  Once labs are stable to transition to subcutaneous insulin, recommend:  Degludec = 6u every day  Aspart = 1:40>140 & 1:20gm CHO  Discharge recommendations -   Restart basal/bolus insulin:  Lispro = dose TBD pending clinical course  Glargine = dose TBD pending clinical course      Prescription Recommendations:  Medicare:  Insulin:   Novolog, Fiasp, Apidra, Admelog, Levemir, Lantus, Basaglar, Tresiba, Toujeo  (If no secondary insurance, may need prior auth)  Supplies:  BD pen needles (Gabriella)  Glucometer:  OneTouch      Provider F/U Recommendations:  PCP - Dr. Newberry (Vikki)  Endocrinology - Dr. Silver (Vikki)      A total of 60 minutes were spent with the patient, 100% was spent counseling and coordinating care for T1 diabetes self-management including nutrition, exercise, blood glucose monitoring, insulin administration, medications, treatment options, follow-up coordination and resources.    Jennifer Coreas, APRN  1/8/2025  12:42 PM       [1]   Allergies  Allergen Reactions    Nucynta [Tapentadol] HALLUCINATION     Cannot tolerate    Lisinopril Coughing

## 2025-01-08 NOTE — H&P
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  Patient herself is not a great historian.  History obtained from patient's chart, staff, patient.  Appears the patient has not had a great appetite the last couple days.  Appears the patient has not been taking her insulin last couple days.  Appears the patient has had increased urination and thirst during this time.  Patient denies any chest pain, palpitation, shortness of breath, nausea, vomiting.  Patient denies any other significant positive review of systems.  Patient was brought in for significant hyperglycemia.    Past Medical History:  Past Medical History:    Acoustic neuroma (HCC)    Left facial droop --after surgery    Anxiety    Cataract    Diabetic retinopathy (HCC)    DKA (diabetic ketoacidoses)    4/15/21    DM2    Hearing impairment    hearing loss in left ear    Hyperlipidemia    Hypertension    Osteoporosis    Pancreatitis (HCC)    PVD (peripheral vascular disease) (HCC)    Iliac stents-Bilateral--14    Recovering alcoholic (HCC)    Recurrent genital herpes    Right breast cancer    Uterine fibroid        Past Surgical History:   Past Surgical History:   Procedure Laterality Date    Brain surgery      Cataract      Extern drain panc pseudocyst,open      Fracture surgery      L shoulder surgery     Hysterectomy  6-29-10    exp lap KODAK BSO,    Other  11/23/15    LEFT IMAGE GUIDED CRANIOTOMY FOR RESECTION OF ACOUSTIC NEUROMA    Other  12/16    left humerus repair for FXR--plate in place    Other surgical history      ex-lap x 2 for pancreatic pseudocysts, both in mid 80's, had shunt placed with second sx    Pancreatectomy,distal+preserv duod      Repair incis hernia w mesh  9/20/2012    Procedure: INCISIONAL   HERNIA REPAIR;  Surgeon: Aaliyah Bennett MD;  Location: [facility]    Repair incisional hernia,reducible  9/20/2012    Procedure: INCISIONAL   HERNIA REPAIR;  Surgeon: Aaliyah Bennett MD;  Location: [facility]    Surgical stent Bilateral 9/11/14    Bilateral femoral stent placement    Tubal ligation         Social History:  reports that she has been smoking cigarettes. She has a 17.5 pack-year smoking history. She has never used smokeless tobacco. She reports that she does not drink alcohol and does not use drugs.NO ILLEGAL DRUGS, not , no children,     Family History:   Family History   Problem Relation Age of Onset    Cancer Father     Colon Cancer Mother        Allergies: Allergies[1]    Medications:  Medications Ordered Prior to Encounter[2]    Review of Systems:   A comprehensive 14 point review of systems was completed.    Pertinent positives and negatives noted in the HPI.    Physical Exam:    /70   Pulse 91   Temp 96.8 °F (36 °C) (Temporal)   Resp 23   Ht 5' 5\" (1.651 m)   Wt 104 lb (47.2 kg)   LMP  (LMP Unknown)   SpO2 100%   BMI 17.31 kg/m²   General: No acute distress. Alert and oriented name  HEENT: Normocephalic atraumatic. EOM-I.  Neck: No JVD. No carotid bruits.  Respiratory: Clear to auscultation bilaterally. No wheezes. No crackles  Cardiovascular: S1, S2. Regular rate and rhythm. No murmurs  Chest and Back: No tenderness or deformity.  Abdomen: Soft, nontender, nondistended.  Positive bowel sounds. No rebound, guarding  Neurologic: No  new focal neurological deficits. CNII-XII grossly intact except chronic facial droop, sensation intact. Strength intact  Musculoskeletal: Moves all extremities.  Extremities: No edema or tenderness of the LE  Integument: No new rashes or lesions.   Psychiatric: Appropriate mood and affect. Confused       Diagnostic Data:      Labs:  Recent Labs   Lab 01/07/25  1317 01/07/25  1318   WBC 18.7*  --    HGB 12.2  --    .5*  --    .0*  --    INR  --  1.12       Recent Labs   Lab 01/07/25  1318 01/07/25  1514 01/07/25  1602 01/07/25  1715   *  --  714* 688*   BUN 26*  --  25* 23   CREATSERUM 2.28*  --  1.88* 1.99*   CA 12.3*  --  11.0* 11.5*   ALB 4.0  --  3.5  --    *  --  128* 129*   K  --  5.5* 5.1 5.1   CL 87*  --  95* 95*   CO2 <10.0*  --  <10.0* <10.0*   ALKPHO 71  --  70  --    AST  --  25 22  --    ALT 19  --  16  --    BILT 0.4  --  0.3  --    TP 6.6  --  5.8  --        Estimated Creatinine Clearance: 19.6 mL/min (A) (based on SCr of 1.99 mg/dL (H)).    Recent Labs   Lab 01/07/25  1318   PTP 14.5   INR 1.12       No results for input(s): \"TROP\", \"CK\" in the last 168 hours.    Imaging: Imaging data reviewed in Epic.      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    DKA  -Hold home meds  -ICU admission, Critical Care Consulted  -Insulin drip  -electrolyte monitoring and replacement per protocol    Leukocytosis  -etiology uncertain  -likely reactive to dka  -no obvious infection  -cx pending  -IV abx per Critical Care    DAVID on CKD  -creat baseline low to mid 1s  -secondary to dehydration from dka  -ivf  -I/o  -trend creat    Abnormal EKG  -pt denies chest  pain  -trop wnl  -cardiology consulted    Quality:  DVT Prophylaxis: scd, heparin sq  CODE status: Full per chart  Harvey:     Plan of care discussed with patient and staff    Dispo: no discharge    MD Vikki Rubio Hospitalist  [phone]                           [1]   Allergies  Allergen Reactions    Nucynta [Tapentadol] HALLUCINATION     Cannot tolerate    Lisinopril Coughing   [2]   No current facility-administered medications on file prior to encounter.     Current Outpatient Medications on File Prior to Encounter   Medication Sig Dispense Refill    insulin lispro (HUMALOG) 100 UNIT/ML Injection Solution Inject 2-15 Units into the skin 3 (three) times daily before meals. Please give 2 units fixed dose with meals and correction dose of 1 unit for every 80 pts above 140.      sacubitril-valsartan 24-26 MG Oral Tab Take 0.5 tablets by mouth once daily.      insulin glargine 100 UNIT/ML Subcutaneous Solution Inject 6 Units into the skin nightly.      metoprolol succinate ER (TOPROL XL) 25 MG Oral Tablet 24 Hr Take 1 tablet (25 mg total) by mouth daily.      spironolactone 25 MG Oral Tab Take 1 tablet (25 mg total) by mouth daily. 30 tablet 0    tamoxifen 20 MG Oral Tab Take 1 tablet (20 mg total) by mouth daily.      SIMVASTATIN 20 MG Oral Tab TAKE 1 TABLET(20 MG) BY MOUTH DAILY 90 tablet 0    citalopram 40 MG Oral Tab Take 1 tablet (40 mg total) by mouth daily. 90 tablet 1    Cholecalciferol (VITAMIN D) 1000 UNITS Oral Tab Take 3,000 units by mouth once daily 30 tablet 0    Multiple Vitamin (ONE-DAILY MULTI VITAMINS) Oral Tab Take 1 tablet by mouth daily.      Insulin Pen Needle 32G X 4 MM Does not apply Misc May substitute if not on insurance formulary 100 each 5    FreeStyle System Does not apply Kit 1 each by Does not apply route as needed for Other. 1 kit 0

## 2025-01-09 ENCOUNTER — APPOINTMENT (OUTPATIENT)
Dept: GENERAL RADIOLOGY | Facility: HOSPITAL | Age: 70
End: 2025-01-09
Attending: NURSE PRACTITIONER
Payer: MEDICARE

## 2025-01-09 ENCOUNTER — APPOINTMENT (OUTPATIENT)
Dept: ULTRASOUND IMAGING | Facility: HOSPITAL | Age: 70
End: 2025-01-09
Attending: INTERNAL MEDICINE
Payer: MEDICARE

## 2025-01-09 ENCOUNTER — APPOINTMENT (OUTPATIENT)
Dept: CT IMAGING | Facility: HOSPITAL | Age: 70
End: 2025-01-09
Attending: NURSE PRACTITIONER
Payer: MEDICARE

## 2025-01-09 LAB
ALBUMIN SERPL-MCNC: 2.7 G/DL (ref 3.2–4.8)
ALBUMIN SERPL-MCNC: 2.7 G/DL (ref 3.2–4.8)
ALBUMIN/GLOB SERPL: 1.6 {RATIO} (ref 1–2)
ALP LIVER SERPL-CCNC: 39 U/L
ALT SERPL-CCNC: 44 U/L
AMMONIA PLAS-MCNC: <10 UMOL/L (ref 11–32)
ANION GAP SERPL CALC-SCNC: 11 MMOL/L (ref 0–18)
ANION GAP SERPL CALC-SCNC: 13 MMOL/L (ref 0–18)
ANION GAP SERPL CALC-SCNC: 5 MMOL/L (ref 0–18)
ANION GAP SERPL CALC-SCNC: 9 MMOL/L (ref 0–18)
ARTERIAL PATENCY WRIST A: POSITIVE
AST SERPL-CCNC: 134 U/L (ref ?–34)
ATRIAL RATE: 73 BPM
ATRIAL RATE: 91 BPM
BASE EXCESS BLDA CALC-SCNC: -13 MMOL/L (ref ?–2)
BASOPHILS # BLD: 0 X10(3) UL (ref 0–0.2)
BASOPHILS NFR BLD: 0 %
BILIRUB SERPL-MCNC: 0.2 MG/DL (ref 0.2–1.1)
BODY TEMPERATURE: 98.6 F
BUN BLD-MCNC: 23 MG/DL (ref 9–23)
BUN BLD-MCNC: 25 MG/DL (ref 9–23)
BUN BLD-MCNC: 26 MG/DL (ref 9–23)
CA-I BLD-SCNC: 1.45 MMOL/L (ref 0.95–1.32)
CALCIUM BLD-MCNC: 9 MG/DL (ref 8.7–10.4)
CALCIUM BLD-MCNC: 9 MG/DL (ref 8.7–10.4)
CALCIUM BLD-MCNC: 9.2 MG/DL (ref 8.7–10.4)
CALCIUM BLD-MCNC: 9.4 MG/DL (ref 8.7–10.4)
CHLORIDE SERPL-SCNC: 105 MMOL/L (ref 98–112)
CHLORIDE SERPL-SCNC: 106 MMOL/L (ref 98–112)
CHLORIDE SERPL-SCNC: 108 MMOL/L (ref 98–112)
CHLORIDE SERPL-SCNC: 109 MMOL/L (ref 98–112)
CK SERPL-CCNC: 1136 U/L
CO2 SERPL-SCNC: 14 MMOL/L (ref 21–32)
CO2 SERPL-SCNC: 16 MMOL/L (ref 21–32)
CO2 SERPL-SCNC: 18 MMOL/L (ref 21–32)
CO2 SERPL-SCNC: 18 MMOL/L (ref 21–32)
COHGB MFR BLD: 0.9 % SAT (ref 0–3)
CREAT BLD-MCNC: 1.2 MG/DL
CREAT BLD-MCNC: 1.21 MG/DL
CREAT BLD-MCNC: 1.3 MG/DL
CREAT BLD-MCNC: 1.38 MG/DL
EGFRCR SERPLBLD CKD-EPI 2021: 41 ML/MIN/1.73M2 (ref 60–?)
EGFRCR SERPLBLD CKD-EPI 2021: 44 ML/MIN/1.73M2 (ref 60–?)
EGFRCR SERPLBLD CKD-EPI 2021: 48 ML/MIN/1.73M2 (ref 60–?)
EGFRCR SERPLBLD CKD-EPI 2021: 49 ML/MIN/1.73M2 (ref 60–?)
EOSINOPHIL # BLD: 0 X10(3) UL (ref 0–0.7)
EOSINOPHIL NFR BLD: 0 %
ERYTHROCYTE [DISTWIDTH] IN BLOOD BY AUTOMATED COUNT: 12.4 %
GLOBULIN PLAS-MCNC: 1.7 G/DL (ref 2–3.5)
GLUCOSE BLD-MCNC: 106 MG/DL (ref 70–99)
GLUCOSE BLD-MCNC: 133 MG/DL (ref 70–99)
GLUCOSE BLD-MCNC: 136 MG/DL (ref 70–99)
GLUCOSE BLD-MCNC: 137 MG/DL (ref 70–99)
GLUCOSE BLD-MCNC: 149 MG/DL (ref 70–99)
GLUCOSE BLD-MCNC: 152 MG/DL (ref 70–99)
GLUCOSE BLD-MCNC: 156 MG/DL (ref 70–99)
GLUCOSE BLD-MCNC: 162 MG/DL (ref 70–99)
GLUCOSE BLD-MCNC: 173 MG/DL (ref 70–99)
GLUCOSE BLD-MCNC: 196 MG/DL (ref 70–99)
GLUCOSE BLD-MCNC: 197 MG/DL (ref 70–99)
GLUCOSE BLD-MCNC: 197 MG/DL (ref 70–99)
GLUCOSE BLD-MCNC: 207 MG/DL (ref 70–99)
GLUCOSE BLD-MCNC: 242 MG/DL (ref 70–99)
GLUCOSE BLD-MCNC: 48 MG/DL (ref 70–99)
GLUCOSE BLD-MCNC: 93 MG/DL (ref 70–99)
GLUCOSE BLD-MCNC: 94 MG/DL (ref 70–99)
HCO3 BLDA-SCNC: 14.5 MEQ/L (ref 21–27)
HCT VFR BLD AUTO: 26.3 %
HGB BLD-MCNC: 11 G/DL
HGB BLD-MCNC: 9.7 G/DL
L PNEUMO AG UR QL: NEGATIVE
L/M: 6 L/MIN
LACTATE BLD-SCNC: 2.4 MMOL/L (ref 0.5–2)
LACTATE SERPL-SCNC: 2 MMOL/L (ref 0.5–2)
LACTATE SERPL-SCNC: 3.5 MMOL/L (ref 0.5–2)
LYMPHOCYTES NFR BLD: 0.18 X10(3) UL (ref 1–4)
LYMPHOCYTES NFR BLD: 1 %
MAGNESIUM SERPL-MCNC: 1.7 MG/DL (ref 1.6–2.6)
MAGNESIUM SERPL-MCNC: 1.7 MG/DL (ref 1.6–2.6)
MAGNESIUM SERPL-MCNC: 2.5 MG/DL (ref 1.6–2.6)
MCH RBC QN AUTO: 34.5 PG (ref 26–34)
MCHC RBC AUTO-ENTMCNC: 36.9 G/DL (ref 31–37)
MCV RBC AUTO: 93.6 FL
METAMYELOCYTES # BLD: 0.18 X10(3) UL
METAMYELOCYTES NFR BLD: 1 %
METHGB MFR BLD: 0 % SAT (ref 0.4–1.5)
MONOCYTES # BLD: 0 X10(3) UL (ref 0.1–1)
MONOCYTES NFR BLD: 0 %
NEUTROPHILS # BLD AUTO: 15.57 X10 (3) UL (ref 1.5–7.7)
NEUTROPHILS NFR BLD: 89 %
NEUTS BAND NFR BLD: 9 %
NEUTS HYPERSEG # BLD: 17.84 X10(3) UL (ref 1.5–7.7)
OSMOLALITY SERPL CALC.SUM OF ELEC: 283 MOSM/KG (ref 275–295)
OSMOLALITY SERPL CALC.SUM OF ELEC: 284 MOSM/KG (ref 275–295)
OSMOLALITY SERPL CALC.SUM OF ELEC: 286 MOSM/KG (ref 275–295)
OSMOLALITY SERPL CALC.SUM OF ELEC: 287 MOSM/KG (ref 275–295)
OXYHGB MFR BLDA: 82.7 % (ref 92–100)
P AXIS: 65 DEGREES
P AXIS: 84 DEGREES
P-R INTERVAL: 150 MS
P-R INTERVAL: 170 MS
PCO2 BLDA: 21 MM HG (ref 35–45)
PH BLDA: 7.33 [PH] (ref 7.35–7.45)
PHOSPHATE SERPL-MCNC: 1.6 MG/DL (ref 2.4–5.1)
PHOSPHATE SERPL-MCNC: 1.6 MG/DL (ref 2.4–5.1)
PHOSPHATE SERPL-MCNC: 2 MG/DL (ref 2.4–5.1)
PHOSPHATE SERPL-MCNC: 2.8 MG/DL (ref 2.4–5.1)
PLATELET # BLD AUTO: 82 10(3)UL (ref 150–450)
PLATELET MORPHOLOGY: NORMAL
PLATELETS.RETICULATED NFR BLD AUTO: 14.7 % (ref 0–7)
PO2 BLDA: 53 MM HG (ref 80–100)
POTASSIUM BLD-SCNC: 4.1 MMOL/L (ref 3.6–5.1)
POTASSIUM SERPL-SCNC: 3.4 MMOL/L (ref 3.5–5.1)
POTASSIUM SERPL-SCNC: 3.8 MMOL/L (ref 3.5–5.1)
POTASSIUM SERPL-SCNC: 4.2 MMOL/L (ref 3.5–5.1)
PROCALCITONIN SERPL-MCNC: 1.97 NG/ML (ref ?–0.05)
PROT SERPL-MCNC: 4.4 G/DL (ref 5.7–8.2)
Q-T INTERVAL: 324 MS
Q-T INTERVAL: 358 MS
QRS DURATION: 66 MS
QRS DURATION: 70 MS
QTC CALCULATION (BEZET): 356 MS
QTC CALCULATION (BEZET): 440 MS
R AXIS: 15 DEGREES
R AXIS: 47 DEGREES
RBC # BLD AUTO: 2.81 X10(6)UL
SODIUM BLD-SCNC: 130 MMOL/L (ref 135–145)
SODIUM SERPL-SCNC: 132 MMOL/L (ref 136–145)
SODIUM SERPL-SCNC: 132 MMOL/L (ref 136–145)
SODIUM SERPL-SCNC: 133 MMOL/L (ref 136–145)
SODIUM SERPL-SCNC: 135 MMOL/L (ref 136–145)
STREP PNEUMO ANTIGEN, URINE: NEGATIVE
T AXIS: 103 DEGREES
T AXIS: 210 DEGREES
TOTAL CELLS COUNTED BLD: 100
VENTRICULAR RATE: 73 BPM
VENTRICULAR RATE: 91 BPM
WBC # BLD AUTO: 18.2 X10(3) UL (ref 4–11)

## 2025-01-09 PROCEDURE — 71275 CT ANGIOGRAPHY CHEST: CPT | Performed by: NURSE PRACTITIONER

## 2025-01-09 PROCEDURE — 71045 X-RAY EXAM CHEST 1 VIEW: CPT | Performed by: NURSE PRACTITIONER

## 2025-01-09 PROCEDURE — 99233 SBSQ HOSP IP/OBS HIGH 50: CPT | Performed by: CLINICAL NURSE SPECIALIST

## 2025-01-09 PROCEDURE — 99233 SBSQ HOSP IP/OBS HIGH 50: CPT | Performed by: INTERNAL MEDICINE

## 2025-01-09 PROCEDURE — 76775 US EXAM ABDO BACK WALL LIM: CPT | Performed by: INTERNAL MEDICINE

## 2025-01-09 RX ORDER — MIDAZOLAM HYDROCHLORIDE 1 MG/ML
2 INJECTION INTRAMUSCULAR; INTRAVENOUS ONCE
Status: COMPLETED | OUTPATIENT
Start: 2025-01-09 | End: 2025-01-09

## 2025-01-09 RX ORDER — TAMOXIFEN CITRATE 10 MG/1
20 TABLET ORAL DAILY
Status: DISCONTINUED | OUTPATIENT
Start: 2025-01-09 | End: 2025-01-13

## 2025-01-09 RX ORDER — DEXTROSE MONOHYDRATE 25 G/50ML
INJECTION, SOLUTION INTRAVENOUS
Status: COMPLETED
Start: 2025-01-09 | End: 2025-01-09

## 2025-01-09 RX ORDER — SODIUM BICARBONATE 325 MG/1
650 TABLET ORAL 4 TIMES DAILY
Status: DISCONTINUED | OUTPATIENT
Start: 2025-01-09 | End: 2025-01-13

## 2025-01-09 RX ORDER — INSULIN DEGLUDEC 100 U/ML
6 INJECTION, SOLUTION SUBCUTANEOUS DAILY
Status: DISCONTINUED | OUTPATIENT
Start: 2025-01-09 | End: 2025-01-13

## 2025-01-09 RX ORDER — POTASSIUM CHLORIDE 14.9 MG/ML
20 INJECTION INTRAVENOUS ONCE
Status: COMPLETED | OUTPATIENT
Start: 2025-01-09 | End: 2025-01-09

## 2025-01-09 RX ORDER — ENOXAPARIN SODIUM 100 MG/ML
30 INJECTION SUBCUTANEOUS DAILY
Status: DISCONTINUED | OUTPATIENT
Start: 2025-01-09 | End: 2025-01-13

## 2025-01-09 RX ORDER — FUROSEMIDE 10 MG/ML
40 INJECTION INTRAMUSCULAR; INTRAVENOUS ONCE
Status: COMPLETED | OUTPATIENT
Start: 2025-01-09 | End: 2025-01-09

## 2025-01-09 RX ORDER — MAGNESIUM SULFATE HEPTAHYDRATE 40 MG/ML
2 INJECTION, SOLUTION INTRAVENOUS ONCE
Status: COMPLETED | OUTPATIENT
Start: 2025-01-09 | End: 2025-01-09

## 2025-01-09 NOTE — PHYSICAL THERAPY NOTE
Physical Therapy    Order for PT eval received.  Holding PT session until CTA of chest results have been obtained. Will re-attempt as schedule and pt's medical stability allow.

## 2025-01-09 NOTE — PROGRESS NOTES
ICU  Critical Care APRN Progress Note    NAME: Anna Montalvo - ROOM: / - MRN: [id number] - Age: 69 year old - :1955    Subjective:   On precedex gtt overnight. Restless and confused, re-directable.     Current Facility-Administered Medications   Medication Dose Route Frequency    potassium phosphate dibasic 15 mmol in sodium chloride 0.9% 250 mL IVPB  15 mmol Intravenous Once    Followed by    potassium chloride 20 mEq/100mL IVPB premix 20 mEq  20 mEq Intravenous Once    enoxaparin (Lovenox) 30 MG/0.3ML SUBQ injection 30 mg  30 mg Subcutaneous Daily    insulin degludec (Tresiba) 100 units/mL flextouch 6 Units  6 Units Subcutaneous Daily    insulin aspart (NovoLOG) 100 Units/mL FlexPen 1-68 Units  1-68 Units Subcutaneous TID CC    insulin aspart (NovoLOG) 100 Units/mL FlexPen 1-50 Units  1-50 Units Subcutaneous TID AC and HS    sodium bicarbonate tab 650 mg  650 mg Oral QID    metoprolol succinate ER (Toprol XL) 24 hr tab 25 mg  25 mg Oral Daily Beta Blocker    escitalopram (Lexapro) tab 20 mg  20 mg Oral Daily    dexmedeTOMIDine in sodium chloride 0.9% (Precedex) 400 mcg/100mL infusion premix  0.2-1.5 mcg/kg/hr (Dosing Weight) Intravenous Continuous    cefTRIAXone (Rocephin) 2 g in sodium chloride 0.9% 100 mL IVPB-ADDV  2 g Intravenous Daily    multivitamin (Tab-A-Edith/Beta Carotene) tab 1 tablet  1 tablet Oral Daily    glucose (Dex4) 15 GM/59ML oral liquid 15 g  15 g Oral Q15 Min PRN    Or    glucose (Glutose) 40% oral gel 15 g  15 g Oral Q15 Min PRN    Or    glucose-vitamin C (Dex-4) chewable tab 4 tablet  4 tablet Oral Q15 Min PRN    Or    dextrose 50% injection 50 mL  50 mL Intravenous Q15 Min PRN    Or    glucose (Dex4) 15 GM/59ML oral liquid 30 g  30 g Oral Q15 Min PRN    Or    glucose (Glutose) 40% oral gel 30 g  30 g Oral Q15 Min PRN    Or    glucose-vitamin C (Dex-4) chewable tab 8 tablet  8 tablet Oral Q15 Min PRN    sodium chloride 0.9% infusion   Intravenous Continuous    insulin regular  human (Novolin R, Humulin R) 100 Units in sodium chloride 0.9% 100 mL standard infusion (100 mL)  2-52 Units/hr Intravenous Continuous    dextrose 5%-sodium chloride 0.45% infusion  100 mL/hr Intravenous Continuous PRN    melatonin tab 3 mg  3 mg Oral Nightly PRN    polyethylene glycol (PEG 3350) (Miralax) 17 g oral packet 17 g  17 g Oral Daily PRN    sennosides (Senokot) tab 17.2 mg  17.2 mg Oral Nightly PRN    bisacodyl (Dulcolax) 10 MG rectal suppository 10 mg  10 mg Rectal Daily PRN    ondansetron (Zofran) 4 MG/2ML injection 4 mg  4 mg Intravenous Q6H PRN    acetaminophen (Tylenol Extra Strength) tab 500 mg  500 mg Oral Q4H PRN     OBJECTIVE  Vitals:  /58   Pulse 53   Temp 97 °F (36.1 °C)   Resp 25   Ht 165.1 cm (5' 5\")   Wt 97 lb 10.6 oz (44.3 kg)   LMP  (LMP Unknown)   SpO2 95%   BMI 16.25 kg/m²                 Physical Exam:    General Appearance: Oriented to self and knows she is at hospital, restless  Neck: No JVD  Lungs: Clear to auscultation bilaterally, respirations unlabored  Heart: Regular rate and rhythm, S1 and S2 normal, no murmur, rub or gallop  Abdomen: Soft, non-tender, bowel sounds active   Extremities: Atraumatic, no cyanosis or edema, capillary refill <3 sec.    Pulses: 2+ and symmetric all extremities  Skin: generalized LE mottling, texture, turgor normal for ethnicity, no rashes or lesions, warm and dry  Neurologic: normal strength    Data this admission:  XR CHEST AP PORTABLE  (CPT=71045)    Result Date: 1/7/2025  CONCLUSION:  1. Hyperexpansion of the lungs. 2. Atelectasis or scarring within the lung bases.    LOCATION:  [facility]      Dictated by (CST): Emelyn Abdullahi MD on 1/07/2025 at 1:59 PM     Finalized by (CST): Emelyn Abdullahi MD on 1/07/2025 at 2:02 PM       Labs:  Lab Results   Component Value Date    WBC 18.2 01/09/2025    HGB 9.7 01/09/2025    HCT 26.3 01/09/2025    PLT 82.0 01/09/2025    CREATSERUM 1.20 01/09/2025    BUN 25 01/09/2025     01/09/2025    K 3.8  01/09/2025     01/09/2025    CO2 18.0 01/09/2025     01/09/2025    CA 9.0 01/09/2025    ALB 2.7 01/09/2025    ALB 2.7 01/09/2025    ALKPHO 39 01/09/2025    BILT 0.2 01/09/2025    TP 4.4 01/09/2025     01/09/2025    ALT 44 01/09/2025    MG 2.5 01/09/2025    PHOS 2.8 01/09/2025       Assessment/Plan:      Acute hypoxic respiratory failure: Unclear etiology. Rule out PE given pO2 53 and hypoxic requiring O2  -Wean O2 as able-On 6 L NC  -Repeat CXR pending  -CTA pending to rule out PE  -continue antbx as below  -sputum cx pending collection    DKA--> resolving  History DM type 2  -A1c 11.1  -Anion gap now closed  -Insulin gtt per DKA protocol--will transition to subcutaneous insulin   -Accu-checks AC/HS  -BMP, Mg, Phos daily   -Start diabetic diet    NAGMA: May be due to borderline hyperchloremia. No diarrhea or GI issues.   - Bicarb 16, repeat 18  - Anion gap now closed  - Discontinue IVF- 0.9  - Start oral bicarb  - Daily BMP, repeat at 1500    Lactic Acidosis  Leukocytosis: Infectious work-up negative thus far, no signs of infection or current complaints, although LA and PCT elevated  -WBC 18.7 on admit with left shift  -LA 5.2-->3.2, trend until cleared  -Afebrile--monitor  -PCT 0.34-->2.17-->1.97 (Improving on antbx)  -Repeat CXR pending given requiring O2  -B. Cxs NGTD  -UA negative  -continue ceftriaxone (1/8-), s/p zosyn (1/7-)    Pseudo-hyponatremia  -Na+ corrected for glucose- 141 on admit (124 on serum)  -Monitor BMP     Acute Encephalopathy: Likely toxic metabolic due to DKA, possible infection/sepsis  -CT brain negative for acute process  -Ammonia negative  -Frequent and PRN re-orientation  -Try to avoid precedex gtt  -Clarify with family patient's baseline    CAD, non-obstructive  -EKG with NSR and ST depression  -Troponin negative  - BB--decrease given bradycardia this morning (Also on precedex gtt)  -Statin    DAVID on CKD stage IIIA: Likely due to dehydration in setting of DKA  -BL Cr-  ~ 0.9-1.1. Cr- on admit 2.28,improving with IVFs--1.3 today  -Avoid nephrotoxic agents  -Strict I/Os  -Monitor BMP  -Consult renal    Elevated AST  -  -CK level pending  -Daily CMP    HTN  -Holding, entresto, shayne  -monitor on tele    Chronic systolic HF, now with preserved EF 2/2 chemotherapy. Last TTE with EF 50-55%. History of EF 15-20% in 7/2023  -TTE with EF 55-60%, NWMAs  -Holding entresto and spironolactone until electrolytes stabilize  -Cards following    Breast CA Stage IIA (Dx 2021) s/p right mastectomy  -On tamoxifen  -Follows with Dr. Trinidad as outpt    History Brain CA s/p resection with residual left facial droop      F/E/N:  -Follow lytes and replete prn  -Diabetic diet    Proph:  -subcutaneous lovenox    Dispo:     Code Status: Full Code  -ICU monitoring    Plan of care discussed with intensivist, Dr. Bailey Kendall, [facility]  ICU  Phone  [phone]   Pager [phone]

## 2025-01-09 NOTE — PLAN OF CARE
Assumed care of patient following shift report. Pt alert, often restless/agitated. Oriented x1-2. Follows commands. Denies pain. Precedex gtt infusing. HFNC. NSR/SB on tele monitor. Harvey intact. Insulin gtt titrated per order. See MAR and flowsheets for additional information.

## 2025-01-09 NOTE — OCCUPATIONAL THERAPY NOTE
Order for OT eval received. Holding OT session until CTA of chest results have been obtained. Will re-attempt as schedule and pt's medical stability allow.

## 2025-01-09 NOTE — PROGRESS NOTES
[facility] Hospitalist Progress Note                                                                     [facility]   [facility]  9/14/1955    SUBJECTIVE: Pt confused. Pt denies chest pain, palpitations, shortness of breath, cough, nausea, vomiting. Pt with abdominal pain. Pt screaming but can't tell me why.     OBJECTIVE:  Temp:  [97.1 °F (36.2 °C)-99.7 °F (37.6 °C)] 97.1 °F (36.2 °C)  Pulse:  [52-91] 57  Resp:  [14-29] 23  BP: ()/() 105/53  SpO2:  [83 %-99 %] 92 %  Exam  Gen: No acute distress, alert and oriented to name only  Pulm: Lungs clear bilaterally, normal respiratory effort, no crackles, no wheezing  CV: Heart with regular rate and rhythm, no murmur.  Abd: Abdomen soft, nontender, nondistended, bowel sounds present  MSK: no significant pitting edema or tenderness of the LE  Skin: no new rashes or lesions    Labs:   Recent Labs   Lab 01/07/25  1317 01/07/25  1318 01/08/25  0359   WBC 18.7*  --  18.1*   HGB 12.2  --  11.6*   .5*  --  94.0   .0*  --  121.0*   INR  --  1.12  --        Recent Labs   Lab 01/08/25  0007 01/08/25  0359 01/08/25  0605 01/08/25  1249 01/08/25  1601    138 137 135* 136   K 3.6 3.3* 3.8 3.7 3.6    108 108 107 109   CO2 11.0* 15.0* 16.0* 16.0* 18.0*   BUN 25* 24* 24* 25* 24*   CREATSERUM 1.74* 1.60* 1.59* 1.49* 1.39*   CA 11.0* 10.5* 10.2 9.9 9.4   MG 1.6 2.4 2.1 1.9 1.9   PHOS 2.0* 1.6* 2.0* 1.4* 1.2*   * 111* 227* 164* 125*       Recent Labs   Lab 01/07/25  1318 01/07/25  1514 01/07/25  1602   ALT 19  --  16   AST  --  25 22   ALB 4.0  --  3.5       Recent Labs   Lab 01/08/25  1556 01/08/25  1657 01/08/25  1802 01/08/25  1851 01/08/25 2011   PGLU 152* 113* 86 97 81       Meds:   Scheduled:    metoprolol succinate ER  25 mg Oral Daily Beta Blocker    escitalopram  20 mg Oral Daily    cefTRIAXone  2 g Intravenous Daily    [START ON 1/9/2025] multivitamin  1  tablet Oral Daily    heparin  5,000 Units Subcutaneous 2 times per day     Continuous Infusions:    dexmedetomidine 0.6 mcg/kg/hr (01/08/25 1837)    sodium chloride Stopped (01/08/25 0003)    insulin regular Stopped (01/08/25 1802)    dextrose 5%-sodium chloride 0.45% 100 mL/hr (01/08/25 2007)     PRN:   glucose **OR** glucose **OR** glucose-vitamin C **OR** dextrose **OR** glucose **OR** glucose **OR** glucose-vitamin C    dextrose 5%-sodium chloride 0.45%    melatonin    polyethylene glycol (PEG 3350)    sennosides    bisacodyl    ondansetron    acetaminophen    ASSESSMENT / PLAN:   69 year old female with history 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     DKA  -Hold home meds  -ICU admission, Critical Care following  -Insulin drip  -electrolyte monitoring and replacement per protocol  -endo consulted     Leukocytosis  -etiology uncertain  -likely reactive to dka  -no obvious infection  -cx pending  -IV abx per Critical Care     DAVID on CKD  -creat baseline low to mid 1s  -secondary to dehydration from dka  -ivf  -I/o  -trend creat     Abnormal EKG  -pt denies chest pain  -trop wnl  -cardiology following --> prelim echo wnl, EKG abn secondary to electrolyte abnormalities and acidosis.     AMS  -baseline unknown  -CT brain pending --> no acute pathology      Quality:  DVT Prophylaxis: scd, heparin sq  CODE status: Full per chart  Harvey:      Plan of care discussed with patient and staff     Dispo: no discharge     Fabian Miranda MD  [facility]  [phone]

## 2025-01-09 NOTE — CONSULTS
[facility]   part of [facility]    Report of Consultation    Anna Montalvo Patient Status:  Inpatient    1955 MRN [id number]   Location [facility] 4SW-A Attending Fabian Miranda MD   Hosp Day # 1 PCP Justin Newberry MD     Date of consult: 2025    REASON FOR CONSULT:     Acidosis, CKD    HISTORY OF PRESENT ILLNESS:     Anna Montalvo is a a(n) 69 year old female w ho sCHF, HTN, PAD, DM, breast cancer, ho DAVID/CKD (follows w Dr Pittman, sp renal bx 2023 w ATN w myoglobin casts w diabetic and hypertensive changes), acoustic neuroma w L facial droop after surgery, DM type 1, diabetic retinopathy, who presented with hyperglycemia. Found to have DKA w elevated lactic acidosis and hyperkalemia.     Pt is a poor historian. History taken from chart review.   +decreased appetite for last few days   Was not taking her insulin last couple of days.   +increased urination and thirst  No n/v/d.     Home meds include = entresto, spironolactone.       REVIEW OF SYSTEMS:     Please see HPI for pertinent positives. 10 point review of systems otherwise reviewed and negative.     HISTORY:     Past Medical History:    Acoustic neuroma (HCC)    Left facial droop --after surgery    Anxiety    Cataract    Diabetic retinopathy (HCC)    DKA (diabetic ketoacidoses)    4/15/21    DM2    Hearing impairment    hearing loss in left ear    Hyperlipidemia    Hypertension    Osteoporosis    Pancreatitis (HCC)    PVD (peripheral vascular disease) (HCC)    Iliac stents-Bilateral--14    Recovering alcoholic (HCC)    Recurrent genital herpes    Right breast cancer    Uterine fibroid     Past Surgical History:   Procedure Laterality Date    Brain surgery      Cataract      Extern drain panc pseudocyst,open      Fracture surgery      L shoulder surgery    Hysterectomy  6-29-10    exp lap KODAK BSO,    Other  11/23/15    LEFT IMAGE GUIDED CRANIOTOMY FOR RESECTION OF ACOUSTIC NEUROMA    Other      left humerus repair  for FXR--plate in place    Other surgical history      ex-lap x 2 for pancreatic pseudocysts, both in mid 80's, had shunt placed with second sx    Pancreatectomy,distal+preserv duod      Repair incis hernia w mesh  9/20/2012    Procedure: INCISIONAL   HERNIA REPAIR;  Surgeon: Aaliyah Bennett MD;  Location: [facility], [facility]    Repair incisional hernia,reducible  9/20/2012    Procedure: INCISIONAL   HERNIA REPAIR;  Surgeon: Aaliyah Bennett MD;  Location: [facility], [facility]    Surgical stent Bilateral 9/11/14    Bilateral femoral stent placement    Tubal ligation       Family History   Problem Relation Age of Onset    Cancer Father     Colon Cancer Mother       reports that she has been smoking cigarettes. She has a 17.5 pack-year smoking history. She has never used smokeless tobacco. She reports that she does not drink alcohol and does not use drugs.    ALLERGIES:     Allergies[1]    MEDICATIONS:       Current Facility-Administered Medications:     metoprolol succinate ER (Toprol XL) 24 hr tab 25 mg, 25 mg, Oral, Daily Beta Blocker    escitalopram (Lexapro) tab 20 mg, 20 mg, Oral, Daily    dexmedeTOMIDine in sodium chloride 0.9% (Precedex) 400 mcg/100mL infusion premix, 0.2-1.5 mcg/kg/hr (Dosing Weight), Intravenous, Continuous    cefTRIAXone (Rocephin) 2 g in sodium chloride 0.9% 100 mL IVPB-ADDV, 2 g, Intravenous, Daily    [START ON 1/9/2025] multivitamin (Tab-A-Edith/Beta Carotene) tab 1 tablet, 1 tablet, Oral, Daily    glucose (Dex4) 15 GM/59ML oral liquid 15 g, 15 g, Oral, Q15 Min PRN **OR** glucose (Glutose) 40% oral gel 15 g, 15 g, Oral, Q15 Min PRN **OR** glucose-vitamin C (Dex-4) chewable tab 4 tablet, 4 tablet, Oral, Q15 Min PRN **OR** dextrose 50% injection 50 mL, 50 mL, Intravenous, Q15 Min PRN **OR** glucose (Dex4) 15 GM/59ML oral liquid 30 g, 30 g, Oral, Q15 Min PRN **OR** glucose (Glutose) 40% oral gel 30 g, 30 g, Oral, Q15 Min PRN **OR** glucose-vitamin C (Dex-4) chewable tab 8 tablet, 8  tablet, Oral, Q15 Min PRN    sodium chloride 0.9% infusion, , Intravenous, Continuous    insulin regular human (Novolin R, Humulin R) 100 Units in sodium chloride 0.9% 100 mL standard infusion (100 mL), 2-52 Units/hr, Intravenous, Continuous    dextrose 5%-sodium chloride 0.45% infusion, 100 mL/hr, Intravenous, Continuous PRN    heparin (Porcine) 5000 UNIT/ML injection 5,000 Units, 5,000 Units, Subcutaneous, 2 times per day    melatonin tab 3 mg, 3 mg, Oral, Nightly PRN    polyethylene glycol (PEG 3350) (Miralax) 17 g oral packet 17 g, 17 g, Oral, Daily PRN    sennosides (Senokot) tab 17.2 mg, 17.2 mg, Oral, Nightly PRN    bisacodyl (Dulcolax) 10 MG rectal suppository 10 mg, 10 mg, Rectal, Daily PRN    ondansetron (Zofran) 4 MG/2ML injection 4 mg, 4 mg, Intravenous, Q6H PRN    acetaminophen (Tylenol Extra Strength) tab 500 mg, 500 mg, Oral, Q4H PRN  No current outpatient medications on file.         PHYSICAL EXAM:     Vital Signs: /83   Pulse 66   Temp 97.5 °F (36.4 °C) (Temporal)   Resp (!) 29   Ht 5' 5\" (1.651 m)   Wt 97 lb 10.6 oz (44.3 kg)   LMP  (LMP Unknown)   SpO2 (!) 85%   BMI 16.25 kg/m²   Temp (24hrs), Av °F (36.7 °C), Min:97.2 °F (36.2 °C), Max:99.7 °F (37.6 °C)       Intake/Output Summary (Last 24 hours) at 2025 1926  Last data filed at 2025 1802  Gross per 24 hour   Intake 5363.4 ml   Output 1870 ml   Net 3493.4 ml     Wt Readings from Last 3 Encounters:   25 97 lb 10.6 oz (44.3 kg)   10/11/23 113 lb 5.1 oz (51.4 kg)   10/06/23 110 lb 3.2 oz (50 kg)       General: NAD  HEENT: NCAT, MMM, EOMI  Neck: Supple   Cardiac: Regular rate and rhythm   Lungs: CTAB   Abdomen: Soft, non-tender, non-distended   : No CVA tenderness  Extremities: no leg edema  Neurologic/Psych: mentating well, no asterixis  Skin: No rashes    LABORATORY DATA:       Lab Results   Component Value Date     (H) 2025    BUN 24 (H) 2025    BUNCREA 12.0 2022    CREATSERUM 1.39 (H)  01/08/2025    ANIONGAP 9 01/08/2025    GFR 67 11/26/2015    GFRNAA 81 04/17/2021    GFRAA 93 04/17/2021    CA 9.4 01/08/2025    OSMOCALC 288 01/08/2025    ALKPHO 70 01/07/2025    AST 22 01/07/2025    ALT 16 01/07/2025    BILT 0.3 01/07/2025    TP 5.8 01/07/2025    ALB 3.5 01/07/2025    GLOBULIN 2.3 01/07/2025    AGRATIO 1.7 06/11/2016     01/08/2025    K 3.6 01/08/2025     01/08/2025    CO2 18.0 (L) 01/08/2025     Lab Results   Component Value Date    WBC 18.1 (H) 01/08/2025    RBC 3.34 (L) 01/08/2025    HGB 11.6 (L) 01/08/2025    HCT 31.4 (L) 01/08/2025    .0 (L) 01/08/2025    MCV 94.0 01/08/2025    MCH 34.7 (H) 01/08/2025    MCHC 36.9 01/08/2025    RDW 12.0 01/08/2025    NEPRELIM 16.42 (H) 01/08/2025    NEUTABS 7.48 09/28/2023    LYMPHABS 0.44 (L) 09/28/2023    EOSABS 0.09 09/28/2023    BASABS 0.00 09/28/2023    NEUT 58 09/28/2023    LYMPH 5 09/28/2023    MON 5 09/28/2023    EOS 1 09/28/2023    BASO 0 09/28/2023    NEPERCENT 90.8 01/08/2025    LYPERCENT 2.9 01/08/2025    MOPERCENT 5.4 01/08/2025    EOPERCENT 0.0 01/08/2025    BAPERCENT 0.1 01/08/2025    NE 16.42 (H) 01/08/2025    LYMABS 0.53 (L) 01/08/2025    MOABSO 0.97 01/08/2025    EOABSO 0.00 01/08/2025    BAABSO 0.02 01/08/2025     Lab Results   Component Value Date    MALBP <1.2 05/20/2020    CREUR 54.80 07/27/2023     Lab Results   Component Value Date    COLORUR Light-Yellow 01/07/2025    CLARITY Clear 01/07/2025    SPECGRAVITY 1.020 01/07/2025    GLUUR >1000 (A) 01/07/2025    BILUR Negative 01/07/2025    KETUR >150 (A) 01/07/2025    BLOODURINE 3+ (A) 01/07/2025    PHURINE 5.0 01/07/2025    PROUR 50 (A) 01/07/2025    UROBILINOGEN Normal 01/07/2025    NITRITE Negative 01/07/2025    LEUUR Negative 01/07/2025    WBCUR 1-5 01/07/2025    RBCUR 3-5 (A) 01/07/2025    EPIUR None Seen 01/07/2025    BACUR None Seen 01/07/2025    HYLUR Present (A) 04/16/2021         IMAGING:     All imaging studies personally reviewed.    CT BRAIN OR HEAD  (CPT=70450)    Result Date: 1/8/2025  CONCLUSION:  Mild chronic small vessel ischemic disease and mild atrophy.  No acute intracranial hemorrhage, mass effect or midline shift is identified.  No significant interval change from previous exams.    LOCATION:  [facility]   Dictated by (CST): Obed Michel MD on 1/08/2025 at 1:11 PM     Finalized by (CST): Obed Michel MD on 1/08/2025 at 1:16 PM        --  sp renal bx 7/31/23. D/w renal pathologist (formal report still pending), primarily significant ATN w some myoglobin casts. Also w moderate diabetes changes class 2A and some hypertensive changes. 13 gloms but only 1 globally sclerotic  -- sp RHC 8/1/23 w wedge 3, RA 0. Pt volume contracted. sp 5% albumin and then NS     ASSESSMENT/PLAN:   Anna Montalvo is a a(n) 69 year old female w ho sCHF, HTN, PAD, DM, breast cancer, ho DAVID/CKD (follows w Dr Pittman, sp renal bx 7/2023 w ATN w myoglobin casts w diabetic and hypertensive changes), acoustic neuroma w L facial droop after surgery, DM type 1, diabetic retinopathy, who presented with hyperglycemia. Found to have DKA w elevated lactic acidosis and hyperkalemia. Nephrology consulted for acidosis and DAVID     Acidosis  -- due to DKA and lactic acidosis mostly.   -- On insulin ggt. IVFs.   -- Trend lactic. Once lactic normalized if acidosis persists can start bicarb ggt    DAVID on CKD3  -- Renal biopsy 7/31/2023 showed ATN with myoglobin casts with diabetic and hypertensive changes.   -- Cr 2.28mg/dL on admit  -- due to prerenal + urinary retention   -- UA w 3-5 RBCs, recheck. Check urine lytes   -- IVFs  -- having multiple straight caths, place grande, check renal US   -- strict UOP  -- hold entresto and shayne   -- avoid nephrotoxins and renally dose meds    Hyponatremia  -- pseudo due to hypergylcemia. Na 124 and Glu 795 on admit    Hyperphosphatemia, now Hypophosphatemia.  -- Replete and monitor per electrolyte protocol     Hypercalcemia  -- improved w  IVF    Anemia  -- transfusion prn     D/w DAMIAN Mariew RN    Thank you for allowing me to participate in the care of your patient. Please do not hesitate to contact me with concerns or questions.    Gaviota Oconnor MD  [facility]    1/8/2025  7:26 PM         [1]   Allergies  Allergen Reactions    Nucynta [Tapentadol] HALLUCINATION     Cannot tolerate    Lisinopril Coughing

## 2025-01-09 NOTE — PROGRESS NOTES
[facility]   part of [facility]    Nephrology Progress Note    Anna Montalvo Attending:  Fabian Miranda MD     Cc: DAVID, acidosis    SUBJECTIVE     Remains restless often, on insulin ggt, grande in place   UOP 1.595L/24hrs    PHYSICAL EXAM   Vital signs: /59 (BP Location: Left arm)   Pulse 57   Temp 97.8 °F (36.6 °C) (Temporal)   Resp 24   Ht 5' 5\" (1.651 m)   Wt 97 lb 10.6 oz (44.3 kg)   LMP  (LMP Unknown)   SpO2 100%   BMI 16.25 kg/m²   Temp (24hrs), Av.2 °F (36.2 °C), Min:96.8 °F (36 °C), Max:97.8 °F (36.6 °C)       Intake/Output Summary (Last 24 hours) at 2025 1445  Last data filed at 2025 1200  Gross per 24 hour   Intake 6785 ml   Output 1570 ml   Net 5215 ml     Wt Readings from Last 3 Encounters:   25 97 lb 10.6 oz (44.3 kg)   10/11/23 113 lb 5.1 oz (51.4 kg)   10/06/23 110 lb 3.2 oz (50 kg)     General: awake, confused  HEENT: NCAT, EOMI, MMM  Neck: Supple   Cardiac: Regular rate and rhythm   Lungs: CTAB  Abdomen: Soft, non-tender, nondistended   Extremities: No edema  Neurologic: No asterxis  Skin: Warm and dry, no rashes     MEDS     Current Facility-Administered Medications   Medication Dose Route Frequency    enoxaparin (Lovenox) 30 MG/0.3ML SUBQ injection 30 mg  30 mg Subcutaneous Daily    insulin degludec (Tresiba) 100 units/mL flextouch 6 Units  6 Units Subcutaneous Daily    insulin aspart (NovoLOG) 100 Units/mL FlexPen 1-68 Units  1-68 Units Subcutaneous TID CC    insulin aspart (NovoLOG) 100 Units/mL FlexPen 1-50 Units  1-50 Units Subcutaneous TID AC and HS    sodium bicarbonate tab 650 mg  650 mg Oral QID    metoprolol succinate (Toprol XL) partial tablet 12.5 mg  12.5 mg Oral Daily Beta Blocker    cefTRIAXone (Rocephin) 1 g in sodium chloride 0.9% 100 mL IVPB-ADDV  1 g Intravenous Daily    tamoxifen (Nolvadex) tab 20 mg  20 mg Oral Daily    furosemide (Lasix) 10 mg/mL injection 40 mg  40 mg Intravenous Once    escitalopram (Lexapro) tab 20 mg  20 mg Oral  Daily    dexmedeTOMIDine in sodium chloride 0.9% (Precedex) 400 mcg/100mL infusion premix  0.2-1.5 mcg/kg/hr (Dosing Weight) Intravenous Continuous    multivitamin (Tab-A-Edith/Beta Carotene) tab 1 tablet  1 tablet Oral Daily    glucose (Dex4) 15 GM/59ML oral liquid 15 g  15 g Oral Q15 Min PRN    Or    glucose (Glutose) 40% oral gel 15 g  15 g Oral Q15 Min PRN    Or    glucose-vitamin C (Dex-4) chewable tab 4 tablet  4 tablet Oral Q15 Min PRN    Or    dextrose 50% injection 50 mL  50 mL Intravenous Q15 Min PRN    Or    glucose (Dex4) 15 GM/59ML oral liquid 30 g  30 g Oral Q15 Min PRN    Or    glucose (Glutose) 40% oral gel 30 g  30 g Oral Q15 Min PRN    Or    glucose-vitamin C (Dex-4) chewable tab 8 tablet  8 tablet Oral Q15 Min PRN    insulin regular human (Novolin R, Humulin R) 100 Units in sodium chloride 0.9% 100 mL standard infusion (100 mL)  2-52 Units/hr Intravenous Continuous    dextrose 5%-sodium chloride 0.45% infusion  100 mL/hr Intravenous Continuous PRN    melatonin tab 3 mg  3 mg Oral Nightly PRN    polyethylene glycol (PEG 3350) (Miralax) 17 g oral packet 17 g  17 g Oral Daily PRN    sennosides (Senokot) tab 17.2 mg  17.2 mg Oral Nightly PRN    bisacodyl (Dulcolax) 10 MG rectal suppository 10 mg  10 mg Rectal Daily PRN    ondansetron (Zofran) 4 MG/2ML injection 4 mg  4 mg Intravenous Q6H PRN    acetaminophen (Tylenol Extra Strength) tab 500 mg  500 mg Oral Q4H PRN       LABS     Lab Results   Component Value Date    WBC 18.2 01/09/2025    HGB 9.7 01/09/2025    HCT 26.3 01/09/2025    PLT 82.0 01/09/2025    CREATSERUM 1.20 01/09/2025    BUN 25 01/09/2025     01/09/2025    K 3.8 01/09/2025     01/09/2025    CO2 18.0 01/09/2025     01/09/2025    CA 9.0 01/09/2025    ALB 2.7 01/09/2025    ALB 2.7 01/09/2025    ALKPHO 39 01/09/2025    BILT 0.2 01/09/2025    TP 4.4 01/09/2025     01/09/2025    ALT 44 01/09/2025    MG 2.5 01/09/2025    PHOS 2.8 01/09/2025    CK 1,136 01/09/2025     PGLU 94 01/09/2025       IMAGING   All imaging studies personally reviewed.    CTA CHEST (CPT=71275)   Final Result   PROCEDURE:  CT ANGIOGRAPHY, CHEST (CPT=71275)       COMPARISON:  [facility] , XR, XR CHEST AP PORTABLE  (CPT=71045), 1/09/2025,    10:56 AM.       INDICATIONS:  rule out PE, hypoxic       TECHNIQUE:  IV contrast-enhanced multislice CT angiography is performed    through the pulmonary arterial anatomy. 3D volume renderings are    generated.  Dose reduction techniques were used. Dose information is    transmitted to the ACR (American College of    Radiology) NRDR (National Radiology Data Registry) which includes the Dose    Index Registry.       PATIENT STATED HISTORY:(As transcribed by Technologist)  Patient is here    with SOB        CONTRAST USED:  80cc of Isovue 370       FINDINGS:     VASCULATURE:  No visible pulmonary arterial thrombus or attenuation.     THORACIC AORTA:  No aneurysm or visible dissection.     LUNGS:  Extensive ground-glass opacities are noted throughout the lungs    superimposed on emphysematous change.  Small bilateral pleural effusions    are present.   MEDIASTINUM:  No adenopathy or mass.     SHARYN:  No mass or adenopathy.     CARDIAC:  No enlargement, pericardial thickening, or significant coronary    artery calcification.   CHEST WALL:  No mass or axillary adenopathy.     LIMITED ABDOMEN:  Limited images of the upper abdomen are unremarkable.     BONES:  No bony lesion or fracture.     OTHER:  Negative.                           =====   CONCLUSION:     1. Ground-glass opacities throughout the lungs superimposed on    emphysematous change.   2. Small bilateral pleural effusions.   3. No evidence of pulmonary embolism.               LOCATION:  [facility]           Dictated by (CST): Richard Francis MD on 1/09/2025 at 1:44 PM        Finalized by (CST): Richard Francis MD on 1/09/2025 at 1:48 PM         US KIDNEYS (CPT=76775)   Final Result   PROCEDURE:  US KIDNEYS (CPT=76775)        COMPARISON:  [facility] , US, US KIDNEYS (CPT=76775), 9/29/2023, 10:46 AM.     [facility] , US, US ABDOMEN COMPLETE (CPT=76700), 9/27/2023, 4:38 PM.       INDICATIONS:  lindsay, urinary retention       TECHNIQUE:  Transabdominal gray scale ultrasound imaging of the bilateral    kidneys and bladder was performed.  Routine technique was utilized.         PATIENT STATED HISTORY: (As transcribed by Technologist)              FINDINGS:        RIGHT KIDNEY   MEASUREMENTS:  9.9 x 4.3 x 5.0 cm   ECHOGENICITY:  Normal.   HYDRONEPHROSIS:  None.   CYSTS/STONES/MASSES:  None.       LEFT KIDNEY    MEASUREMENTS:  7.0 x 4.8 x 4.1 cm   ECHOGENICITY:  Normal.   HYDRONEPHROSIS:  None.   CYSTS/STONES/MASSES:  None.       BLADDER:  Harvey catheter within the bladder.  Bladder is nondistended,    limiting evaluation.   OTHER:  Negative.                         =====   CONCLUSION:  No acute findings.           LOCATION:  [facility]                   Dictated by (CST): Emelyn Abdullahi MD on 1/09/2025 at 12:07 PM        Finalized by (CST): Emelyn Abdullahi MD on 1/09/2025 at 12:07 PM         XR CHEST AP PORTABLE  (CPT=71045)   Final Result   PROCEDURE:  XR CHEST AP PORTABLE  (CPT=71045)       TECHNIQUE:  AP chest radiograph was obtained.       COMPARISON:  [facility] , XR, XR CHEST AP PORTABLE  (CPT=71045), 1/07/2025,    1:50 PM.  [facility] , XR, XR CHEST AP PORTABLE  (CPT=71045), 9/27/2023, 10:21    AM.       INDICATIONS:  hypoxia       PATIENT STATED HISTORY: (As transcribed by Technologist)  Patient offered    no additional history at this time.             FINDINGS:  Cardiac size is stable.  Hyperexpansion of the lungs.  Stable    left-sided power port.  Interstitial opacities bilaterally.  Minimal    bilateral basilar opacities.  No pneumothorax.                         =====   CONCLUSION:     1. Bilateral pulmonary opacities suggestive of pneumonia with underlying    edema.   2. Hyperexpansion of the lungs.             LOCATION:  [facility]                        Dictated by (CST): Emelyn Abdullahi MD on 1/09/2025 at 11:28 AM        Finalized by (CST): Emelyn Abdullahi MD on 1/09/2025 at 11:29 AM         CT BRAIN OR HEAD (CPT=70450)   Final Result   PROCEDURE:  CT BRAIN OR HEAD (68402)       COMPARISON:  [facility] , CT, CT STROKE BRAIN (NO IV)(CPT=70450), 10/11/2023,    6:38 AM.  [facility] , CT, CT BRAIN OR HEAD (13377), 4/15/2021, 7:41 PM.       INDICATIONS:  AMS       TECHNIQUE:  Noncontrast CT scanning is performed through the brain. Dose    reduction techniques were used. Dose information is transmitted to the ACR    (American College of Radiology) NRDR (National Radiology Data Registry)    which includes the Dose Index    Registry.           FINDINGS:  Exam is limited by patient motion.  Multiple attempts were made    at obtaining diagnostic images.       VENTRICLES/SULCI:  Ventricles and sulci are mildly prominent and stable    consistent with mild stable atrophy.   INTRACRANIAL:  There are no abnormal extraaxial fluid collections.  There    is no midline shift.  Mild stable decreased attenuation in the    periventricular and subcortical deep white matter is nonspecific and    consistent with chronic small vessel ischemic    disease.  Intracranial atherosclerotic arterial vascular calcifications    are noted incidentally.  There is nothing specific for acute infarct.     There is no hemorrhage or mass lesion.     SINUSES:           No sign of acute sinusitis.     MASTOIDS:          No sign of acute inflammation.   SKULL:             No evidence for fracture or osseous abnormality.   OTHER:             Metallic artifact noted from the left globe.  Correlate    with history.                         =====   CONCLUSION:  Mild chronic small vessel ischemic disease and mild atrophy.     No acute intracranial hemorrhage, mass effect or midline shift is    identified.  No significant interval change from previous exams.               LOCATION:  [facility]           Dictated by  (CST): Obed Michel MD on 1/08/2025 at 1:11 PM        Finalized by (CST): Obed Michel MD on 1/08/2025 at 1:16 PM         XR CHEST AP PORTABLE  (CPT=71045)   Final Result   PROCEDURE:  XR CHEST AP PORTABLE  (CPT=71045)       TECHNIQUE:  AP chest radiograph was obtained.       COMPARISON:  [facility] , XR, XR CHEST AP PORTABLE  (CPT=71045), 9/27/2023,    10:21 AM.  [facility] , XR, XR CHEST AP PORTABLE  (CPT=71045), 9/25/2023,    11:38 PM.       INDICATIONS:  hyperglycemia       PATIENT STATED HISTORY: (As transcribed by Technologist)  The patient    offered no additional history at this point.             FINDINGS:  Cardiac size and pulmonary vasculature are within normal    limits. No pleural effusions. No pneumothorax.  Hyperexpansion of the    lungs.  Left-sided power port with tip overlying the expected location    SVC.  Atelectasis or scarring within the lung    bases.                             =====   CONCLUSION:     1. Hyperexpansion of the lungs.   2. Atelectasis or scarring within the lung bases.             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 Found to have DKA w elevated lactic acidosis and hyperkalemia. Nephrology consulted for acidosis and DAVID      Acidosis  -- due to DKA and lactic acidosis mostly.   -- On insulin ggt. IVFs.   -- Trend lactic. Once lactic normalized if acidosis persists can start bicarb ggt     DAVID on CKD3  -- Renal biopsy 7/31/2023 showed ATN with myoglobin casts with diabetic and hypertensive changes.   -- Cr 2.28mg/dL on  admit  -- due to prerenal + urinary retention   -- UA w 3-5 RBCs, 50 protein, recheck when grande removed. Urine na 99, but after IVFs.   -- sp CTA w contrast. Restart IVFs  -- having multiple straight caths, placed grande, renal US w no hydro  -- strict UOP  -- hold entresto and shayne   -- avoid nephrotoxins and renally dose meds     Hyponatremia  -- pseudo due to hypergylcemia. Na 124 and Glu 795 on admit     Hyperphosphatemia, now Hypophosphatemia.  -- Replete and monitor per electrolyte protocol      Hypercalcemia  -- improved w IVF     Anemia  -- transfusion prn      D/w RN    Thank you for allowing me to participate in the care of this patient. Please do not hesitate to call with questions or concerns.       Gaviota Oconnor MD  [facility]

## 2025-01-09 NOTE — PLAN OF CARE
Assumed care of patient after shift report. Patient alert, oriented x1-2. Restless/agitated throughout the day, but redirectable. Precedex gtt on/off. Placed on vapotherm. Transitioned off insulin gtt. CTA done.

## 2025-01-09 NOTE — PROGRESS NOTES
[facility]  Diabetes Consult Note    Anna Montalvo Patient Status:  Inpatient    1955 MRN [id number]   Location [facility] 4SW-A Attending Fabian Miranda MD   Hosp Day # 2 PCP Justin Newberry MD     Reason for Consult:   Management recommendations in setting of DKA in T1DM        Provider Requesting Consult:  Earline Kendall, ICU APN      Diagnosis:  Patient Active Problem List   Diagnosis    PURE HYPERCHOLESTEROLEM    Tobacco abuse    Vitamin D deficiency    Atherosclerosis of native arteries of extremity with intermittent claudication (HCC)    Osteoporosis    PVD (peripheral vascular disease) ([facility])    Anxiety    Senile nuclear sclerosis, bilateral    History of alcohol abuse    HTN (hypertension)    Facial droop    Leukocytosis    Diabetes type 2, controlled ([facility])    Mild nonproliferative diabetic retinopathy of both eyes without macular edema associated with type 2 diabetes mellitus (HCC)    Facial paralysis    Diabetic ketoacidosis without coma associated with type 2 diabetes mellitus (HCC)    Scalp laceration, initial encounter    Multiple contusions    DAVID (acute kidney injury) (HCC)    Fall    Postural dizziness with near syncope    Protein-calorie malnutrition, unspecified severity (HCC)    Malignant neoplasm of upper-outer quadrant of right breast in female, estrogen receptor positive (HCC)    Dehydration    Hypokalemia    Anemia, unspecified type    Leukocytosis, unspecified type    Acute UTI    Hyponatremia    Hyperglycemia without ketosis    Type 1 diabetes mellitus with ketoacidosis without coma (HCC)    Lactic acidosis    Azotemia    Hyperkalemia    Anemia    Acute renal failure (ARF) (HCC)    Acute kidney injury (HCC)    Sepsis without acute organ dysfunction (HCC)    Elevated LFTs    Hyperglycemic hyperosmolar nonketotic coma (HCC)    Palliative care encounter    Counseling regarding advance care planning and goals of care    Diabetic ketoacidosis without coma associated with type 1  diabetes mellitus (HCC)    Abnormal EKG    Thrombocytopenia (HCC)         Medical History:  Past Medical History:    Acoustic neuroma (HCC)    Left facial droop --after surgery    Anxiety    Cataract    Diabetic retinopathy (HCC)    DKA (diabetic ketoacidoses)    4/15/21    DM2    Hearing impairment    hearing loss in left ear    Hyperlipidemia    Hypertension    Osteoporosis    Pancreatitis (HCC)    PVD (peripheral vascular disease) (HCC)    Iliac stents-Bilateral--9/11/14    Recovering alcoholic (HCC)    Recurrent genital herpes    Right breast cancer    Uterine fibroid     Past Surgical History:   Procedure Laterality Date    Brain surgery      Cataract      Extern drain panc pseudocyst,open      Fracture surgery      L shoulder surgery    Hysterectomy  6-29-10    exp lap KODAK BSO,    Other  11/23/15    LEFT IMAGE GUIDED CRANIOTOMY FOR RESECTION OF ACOUSTIC NEUROMA    Other  12/16    left humerus repair for FXR--plate in place    Other surgical history      ex-lap x 2 for pancreatic pseudocysts, both in mid 80's, had shunt placed with second sx    Pancreatectomy,distal+preserv duod      Repair incis hernia w mesh  9/20/2012    Procedure: INCISIONAL   HERNIA REPAIR;  Surgeon: Aaliyah Bennett MD;  Location: [facility], [facility]    Repair incisional hernia,reducible  9/20/2012    Procedure: INCISIONAL   HERNIA REPAIR;  Surgeon: Aaliyah Bennett MD;  Location: [facility], [facility]    Surgical stent Bilateral 9/11/14    Bilateral femoral stent placement    Tubal ligation       Family History   Problem Relation Age of Onset    Cancer Father     Colon Cancer Mother          Diabetes history:  Type:  T1DM  Onset: unknown  Family history of DM: unknown      Allergies: Allergies[1]      Medications: Complete list reviewed. Active diabetes medications include continuous insulin infusion.      Labs:  Recent Labs   Lab 01/09/25  0101 01/09/25  0200 01/09/25  0301 01/09/25  0401 01/09/25  0501   PGLU 207* 173* 156* 152*  136*     Recent Labs     01/07/25  1317 01/07/25  1318 01/07/25  1318 01/07/25  1602 01/07/25  1618 01/08/25  1249 01/08/25  1316 01/08/25  1601 01/08/25  1657 01/08/25  2030 01/08/25  2103 01/09/25  0027 01/09/25  0101 01/09/25  0447 01/09/25  0501   NA  --  124*   < > 128*   < > 135*  --  136  --  132*  --  132*  --  133*  133*  133*  --    CL  --  87*   < > 95*   < > 107  --  109  --  106  --  105  --  106  106  106  --    CO2  --  <10.0*   < > <10.0*   < > 16.0*  --  18.0*  --  15.0*  --  14.0*  --  16.0*  16.0*  16.0*  --    BUN  --  26*   < > 25*   < > 25*  --  24*  --  25*  --  26*  --  25*  25*  25*  --    CREATSERUM  --  2.28*   < > 1.88*   < > 1.49*  --  1.39*  --  1.26*  --  1.38*  --  1.30*  1.30*  1.30*  --    A1C 11.1*  --   --   --   --   --   --   --   --   --   --   --   --   --   --    PGLU  --   --   --   --    < >  --    < >  --    < >  --    < >  --    < >  --  136*   CA  --  12.3*   < > 11.0*   < > 9.9  --  9.4  --  9.3  --  9.4  --  9.2  9.2  9.2  --    ALB  --  4.0  --  3.5  --   --   --   --   --   --   --   --   --  2.7*  2.7*  --     < > = values in this interval not displayed.                    History of Present Illness:  Anna Montalvo is a 69 year old female with a PMH of T1DM cb/ diabetic retinopathy, breast and brain CA, CHF, non-obstructive CAD, HTN, CKD3 and PVD s/p b/l iliac stents admitted 1/7/2025 with complaints of hyperglycemia on home meter (per ED notes, patient stopped insulin 3 days ago). ED evaluation revealed DKA (, CO2<10, pH 6.96, anion gap 27, lactic acid 5.2 and A1C 11.1%), as well as DAVID (Cr 2.88).         Assessment/Recommendations:  Upon interview today, patient confused and disoriented; states she has no pain. Upon chart review, patient follows with [facility] (next appointment Feb 2025); is on basal/bolus insulin regimen at home. With A1C of 11.1% on admission, either doses need to be up-titrated or patient non-complaint with  regimen. At this point, patient not able to hold beneficial conversation regarding prior to admission medications or plan of care. Will continue to follow.       Plan:  Inpatient recommendations -   Continue continuous insulin infusion per ICU team recommendations  Once labs are stable to transition to subcutaneous insulin, recommend:  Degludec = 6u every day  Aspart = 1:40>140 & 1:20gm CHO  Discharge recommendations -   Restart basal/bolus insulin:  Lispro = dose TBD pending clinical course  Glargine = dose TBD pending clinical course        Prescription Recommendations:  Medicare:  Insulin:   Novolog, Fiasp, Apidra, Admelog, Levemir, Lantus, Basaglar, Tresiba, Toujeo  (If no secondary insurance, may need prior auth)  Supplies:  BD pen needles (Gabriella)  Glucometer:  OneTouch        Provider F/U Recommendations:  PCP - Dr. Newberry ([place])  Endocrinology - Dr. Silver ([place])        A total of 60 minutes were spent with the patient, 100% was spent counseling and coordinating care for T1 diabetes self-management including nutrition, exercise, blood glucose monitoring, insulin administration, medications, treatment options, follow-up coordination and resources    Jennifer Coreas, APRN  1/9/2025  6:04 AM         [1]   Allergies  Allergen Reactions    Nucynta [Tapentadol] HALLUCINATION     Cannot tolerate    Lisinopril Coughing

## 2025-01-10 PROBLEM — J18.9 PNEUMONIA OF RIGHT LOWER LOBE DUE TO INFECTIOUS ORGANISM: Status: ACTIVE | Noted: 2025-01-10

## 2025-01-10 PROBLEM — J96.01 ACUTE HYPOXIC RESPIRATORY FAILURE (HCC): Status: ACTIVE | Noted: 2025-01-10

## 2025-01-10 LAB
ALBUMIN SERPL-MCNC: 2.4 G/DL (ref 3.2–4.8)
ALBUMIN SERPL-MCNC: 2.8 G/DL (ref 3.2–4.8)
ALP LIVER SERPL-CCNC: 41 U/L
ALT SERPL-CCNC: 41 U/L
ANION GAP SERPL CALC-SCNC: 8 MMOL/L (ref 0–18)
AST SERPL-CCNC: 107 U/L (ref ?–34)
BASOPHILS # BLD AUTO: 0.02 X10(3) UL (ref 0–0.2)
BASOPHILS NFR BLD AUTO: 0.1 %
BILIRUB DIRECT SERPL-MCNC: <0.1 MG/DL (ref ?–0.3)
BILIRUB SERPL-MCNC: 0.2 MG/DL (ref 0.2–1.1)
BUN BLD-MCNC: 21 MG/DL (ref 9–23)
CALCIUM BLD-MCNC: 8.9 MG/DL (ref 8.7–10.4)
CHLORIDE SERPL-SCNC: 107 MMOL/L (ref 98–112)
CK SERPL-CCNC: 591 U/L
CO2 SERPL-SCNC: 19 MMOL/L (ref 21–32)
CREAT BLD-MCNC: 1.3 MG/DL
EGFRCR SERPLBLD CKD-EPI 2021: 44 ML/MIN/1.73M2 (ref 60–?)
EOSINOPHIL # BLD AUTO: 0.01 X10(3) UL (ref 0–0.7)
EOSINOPHIL NFR BLD AUTO: 0.1 %
ERYTHROCYTE [DISTWIDTH] IN BLOOD BY AUTOMATED COUNT: 13.1 %
GLUCOSE BLD-MCNC: 141 MG/DL (ref 70–99)
GLUCOSE BLD-MCNC: 157 MG/DL (ref 70–99)
GLUCOSE BLD-MCNC: 205 MG/DL (ref 70–99)
GLUCOSE BLD-MCNC: 247 MG/DL (ref 70–99)
GLUCOSE BLD-MCNC: 279 MG/DL (ref 70–99)
GLUCOSE BLD-MCNC: 309 MG/DL (ref 70–99)
GLUCOSE BLD-MCNC: 360 MG/DL (ref 70–99)
GLUCOSE BLD-MCNC: 361 MG/DL (ref 70–99)
GLUCOSE BLD-MCNC: 365 MG/DL (ref 70–99)
GLUCOSE BLD-MCNC: 383 MG/DL (ref 70–99)
GLUCOSE BLD-MCNC: 81 MG/DL (ref 70–99)
HCT VFR BLD AUTO: 23.4 %
HGB BLD-MCNC: 9 G/DL
IMM GRANULOCYTES # BLD AUTO: 0.18 X10(3) UL (ref 0–1)
IMM GRANULOCYTES NFR BLD: 1.1 %
LYMPHOCYTES # BLD AUTO: 0.55 X10(3) UL (ref 1–4)
LYMPHOCYTES NFR BLD AUTO: 3.5 %
MAGNESIUM SERPL-MCNC: 1.9 MG/DL (ref 1.6–2.6)
MCH RBC QN AUTO: 34.6 PG (ref 26–34)
MCHC RBC AUTO-ENTMCNC: 38.5 G/DL (ref 31–37)
MCV RBC AUTO: 90 FL
MONOCYTES # BLD AUTO: 0.32 X10(3) UL (ref 0.1–1)
MONOCYTES NFR BLD AUTO: 2 %
NEUTROPHILS # BLD AUTO: 14.78 X10 (3) UL (ref 1.5–7.7)
NEUTROPHILS # BLD AUTO: 14.78 X10(3) UL (ref 1.5–7.7)
NEUTROPHILS NFR BLD AUTO: 93.2 %
OSMOLALITY SERPL CALC.SUM OF ELEC: 293 MOSM/KG (ref 275–295)
PHOSPHATE SERPL-MCNC: 2.1 MG/DL (ref 2.4–5.1)
PHOSPHATE SERPL-MCNC: 2.1 MG/DL (ref 2.4–5.1)
PLATELET # BLD AUTO: 77 10(3)UL (ref 150–450)
PLATELETS.RETICULATED NFR BLD AUTO: 8.8 % (ref 0–7)
POTASSIUM SERPL-SCNC: 3.2 MMOL/L (ref 3.5–5.1)
POTASSIUM SERPL-SCNC: 3.4 MMOL/L (ref 3.5–5.1)
POTASSIUM SERPL-SCNC: 3.4 MMOL/L (ref 3.5–5.1)
PROT SERPL-MCNC: 4.3 G/DL (ref 5.7–8.2)
RBC # BLD AUTO: 2.6 X10(6)UL
SODIUM SERPL-SCNC: 134 MMOL/L (ref 136–145)
WBC # BLD AUTO: 15.9 X10(3) UL (ref 4–11)

## 2025-01-10 PROCEDURE — 99233 SBSQ HOSP IP/OBS HIGH 50: CPT

## 2025-01-10 PROCEDURE — 99233 SBSQ HOSP IP/OBS HIGH 50: CPT | Performed by: INTERNAL MEDICINE

## 2025-01-10 RX ORDER — POTASSIUM CHLORIDE 14.9 MG/ML
20 INJECTION INTRAVENOUS ONCE
Status: COMPLETED | OUTPATIENT
Start: 2025-01-10 | End: 2025-01-10

## 2025-01-10 RX ORDER — METOPROLOL TARTRATE 1 MG/ML
2.5 INJECTION, SOLUTION INTRAVENOUS EVERY 6 HOURS PRN
Status: DISCONTINUED | OUTPATIENT
Start: 2025-01-10 | End: 2025-01-13

## 2025-01-10 RX ORDER — METOPROLOL TARTRATE 1 MG/ML
2.5 INJECTION, SOLUTION INTRAVENOUS EVERY 6 HOURS PRN
Status: DISCONTINUED | OUTPATIENT
Start: 2025-01-10 | End: 2025-01-10

## 2025-01-10 NOTE — PROGRESS NOTES
[facility]   part of [facility]    Nephrology Progress Note    Anna Montalvo Attending:  Fabian Miranda MD     Cc: DAVID, acidosis    SUBJECTIVE     No acute complaints.     PHYSICAL EXAM   Vital signs: BP (!) 140/105 (BP Location: Left arm)   Pulse 84   Temp 98.4 °F (36.9 °C) (Temporal)   Resp 24   Ht 5' 5\" (1.651 m)   Wt 106 lb 4.2 oz (48.2 kg)   LMP  (LMP Unknown)   SpO2 98%   BMI 17.68 kg/m²   Temp (24hrs), Av.6 °F (37 °C), Min:98.1 °F (36.7 °C), Max:99.1 °F (37.3 °C)       Intake/Output Summary (Last 24 hours) at 1/10/2025 1608  Last data filed at 1/10/2025 1045  Gross per 24 hour   Intake 331.8 ml   Output 2700 ml   Net -2368.2 ml     Wt Readings from Last 3 Encounters:   01/10/25 106 lb 4.2 oz (48.2 kg)   10/11/23 113 lb 5.1 oz (51.4 kg)   10/06/23 110 lb 3.2 oz (50 kg)     General: awake, confused  HEENT: NCAT  Cardiac: RRR  Lungs: no distress  Extremities: no edema  Neurologic: awake, alert  Skin: warm and dry     MEDS     Current Facility-Administered Medications   Medication Dose Route Frequency    insulin aspart (NovoLOG) 100 Units/mL FlexPen 1-50 Units  1-50 Units Subcutaneous TID CC and HS    metoprolol (Lopressor) 5 mg/5mL injection 2.5 mg  2.5 mg Intravenous Q6H PRN    enoxaparin (Lovenox) 30 MG/0.3ML SUBQ injection 30 mg  30 mg Subcutaneous Daily    insulin degludec (Tresiba) 100 units/mL flextouch 6 Units  6 Units Subcutaneous Daily    insulin aspart (NovoLOG) 100 Units/mL FlexPen 1-68 Units  1-68 Units Subcutaneous TID CC    sodium bicarbonate tab 650 mg  650 mg Oral QID    metoprolol succinate (Toprol XL) partial tablet 12.5 mg  12.5 mg Oral Daily Beta Blocker    cefTRIAXone (Rocephin) 1 g in sodium chloride 0.9% 100 mL IVPB-ADDV  1 g Intravenous Daily    tamoxifen (Nolvadex) tab 20 mg  20 mg Oral Daily    escitalopram (Lexapro) tab 20 mg  20 mg Oral Daily    multivitamin (Tab-A-Edith/Beta Carotene) tab 1 tablet  1 tablet Oral Daily    glucose (Dex4) 15 GM/59ML oral liquid  15 g  15 g Oral Q15 Min PRN    Or    glucose (Glutose) 40% oral gel 15 g  15 g Oral Q15 Min PRN    Or    glucose-vitamin C (Dex-4) chewable tab 4 tablet  4 tablet Oral Q15 Min PRN    Or    dextrose 50% injection 50 mL  50 mL Intravenous Q15 Min PRN    Or    glucose (Dex4) 15 GM/59ML oral liquid 30 g  30 g Oral Q15 Min PRN    Or    glucose (Glutose) 40% oral gel 30 g  30 g Oral Q15 Min PRN    Or    glucose-vitamin C (Dex-4) chewable tab 8 tablet  8 tablet Oral Q15 Min PRN    dextrose 5%-sodium chloride 0.45% infusion  100 mL/hr Intravenous Continuous PRN    melatonin tab 3 mg  3 mg Oral Nightly PRN    polyethylene glycol (PEG 3350) (Miralax) 17 g oral packet 17 g  17 g Oral Daily PRN    sennosides (Senokot) tab 17.2 mg  17.2 mg Oral Nightly PRN    bisacodyl (Dulcolax) 10 MG rectal suppository 10 mg  10 mg Rectal Daily PRN    ondansetron (Zofran) 4 MG/2ML injection 4 mg  4 mg Intravenous Q6H PRN    acetaminophen (Tylenol Extra Strength) tab 500 mg  500 mg Oral Q4H PRN       LABS     Lab Results   Component Value Date    WBC 15.9 01/10/2025    HGB 9.0 01/10/2025    HCT 23.4 01/10/2025    PLT 77.0 01/10/2025    CREATSERUM 1.30 01/10/2025    BUN 21 01/10/2025     01/10/2025    K 3.4 01/10/2025    K 3.4 01/10/2025     01/10/2025    CO2 19.0 01/10/2025     01/10/2025    CA 8.9 01/10/2025    ALB 2.8 01/10/2025    MG 1.9 01/10/2025    PHOS 2.1 01/10/2025    PHOS 2.1 01/10/2025     01/10/2025    PGLU 205 01/10/2025       IMAGING   All imaging studies personally reviewed.    CTA CHEST (CPT=71275)   Final Result   PROCEDURE:  CT ANGIOGRAPHY, CHEST (CPT=71275)       COMPARISON:  [facility] , XR, XR CHEST AP PORTABLE  (CPT=71045), 1/09/2025,    10:56 AM.       INDICATIONS:  rule out PE, hypoxic       TECHNIQUE:  IV contrast-enhanced multislice CT angiography is performed    through the pulmonary arterial anatomy. 3D volume renderings are    generated.  Dose reduction techniques were used. Dose information is     transmitted to the ACR (American College of    Radiology) NRDR (National Radiology Data Registry) which includes the Dose    Index Registry.       PATIENT STATED HISTORY:(As transcribed by Technologist)  Patient is here    with SOB        CONTRAST USED:  80cc of Isovue 370       FINDINGS:     VASCULATURE:  No visible pulmonary arterial thrombus or attenuation.     THORACIC AORTA:  No aneurysm or visible dissection.     LUNGS:  Extensive ground-glass opacities are noted throughout the lungs    superimposed on emphysematous change.  Small bilateral pleural effusions    are present.   MEDIASTINUM:  No adenopathy or mass.     SHARYN:  No mass or adenopathy.     CARDIAC:  No enlargement, pericardial thickening, or significant coronary    artery calcification.   CHEST WALL:  No mass or axillary adenopathy.     LIMITED ABDOMEN:  Limited images of the upper abdomen are unremarkable.     BONES:  No bony lesion or fracture.     OTHER:  Negative.                           =====   CONCLUSION:     1. Ground-glass opacities throughout the lungs superimposed on    emphysematous change.   2. Small bilateral pleural effusions.   3. No evidence of pulmonary embolism.               LOCATION:  [facility]           Dictated by (CST): Richard Francis MD on 1/09/2025 at 1:44 PM        Finalized by (CST): Richard Francis MD on 1/09/2025 at 1:48 PM         US KIDNEYS (CPT=76775)   Final Result   PROCEDURE:  US KIDNEYS (CPT=76775)       COMPARISON:  [facility] , US, US KIDNEYS (CPT=76775), 9/29/2023, 10:46 AM.     [facility] , US, US ABDOMEN COMPLETE (CPT=76700), 9/27/2023, 4:38 PM.       INDICATIONS:  lindsay, urinary retention       TECHNIQUE:  Transabdominal gray scale ultrasound imaging of the bilateral    kidneys and bladder was performed.  Routine technique was utilized.         PATIENT STATED HISTORY: (As transcribed by Technologist)              FINDINGS:        RIGHT KIDNEY   MEASUREMENTS:  9.9 x 4.3 x 5.0 cm   ECHOGENICITY:  Normal.   HYDRONEPHROSIS:   None.   CYSTS/STONES/MASSES:  None.       LEFT KIDNEY    MEASUREMENTS:  7.0 x 4.8 x 4.1 cm   ECHOGENICITY:  Normal.   HYDRONEPHROSIS:  None.   CYSTS/STONES/MASSES:  None.       BLADDER:  Harvey catheter within the bladder.  Bladder is nondistended,    limiting evaluation.   OTHER:  Negative.                         =====   CONCLUSION:  No acute findings.           LOCATION:  [facility]                   Dictated by (CST): Emelyn Abdullahi MD on 1/09/2025 at 12:07 PM        Finalized by (CST): Emelyn Abdullahi MD on 1/09/2025 at 12:07 PM         XR CHEST AP PORTABLE  (CPT=71045)   Final Result   PROCEDURE:  XR CHEST AP PORTABLE  (CPT=71045)       TECHNIQUE:  AP chest radiograph was obtained.       COMPARISON:  [facility] , XR, XR CHEST AP PORTABLE  (CPT=71045), 1/07/2025,    1:50 PM.  [facility] , XR, XR CHEST AP PORTABLE  (CPT=71045), 9/27/2023, 10:21    AM.       INDICATIONS:  hypoxia       PATIENT STATED HISTORY: (As transcribed by Technologist)  Patient offered    no additional history at this time.             FINDINGS:  Cardiac size is stable.  Hyperexpansion of the lungs.  Stable    left-sided power port.  Interstitial opacities bilaterally.  Minimal    bilateral basilar opacities.  No pneumothorax.                         =====   CONCLUSION:     1. Bilateral pulmonary opacities suggestive of pneumonia with underlying    edema.   2. Hyperexpansion of the lungs.             LOCATION:  [facility]                       Dictated by (CST): Emelyn Abdullahi MD on 1/09/2025 at 11:28 AM        Finalized by (CST): Emelyn Abdullahi MD on 1/09/2025 at 11:29 AM         CT BRAIN OR HEAD (CPT=70450)   Final Result   PROCEDURE:  CT BRAIN OR HEAD (10451)       COMPARISON:  [facility] , CT, CT STROKE BRAIN (NO IV)(CPT=70450), 10/11/2023,    6:38 AM.  [facility] , CT, CT BRAIN OR HEAD (62731), 4/15/2021, 7:41 PM.       INDICATIONS:  AMS       TECHNIQUE:  Noncontrast CT scanning is performed through the brain. Dose    reduction techniques were used.  Dose information is transmitted to the ACR    (American College of Radiology) NRDR (National Radiology Data Registry)    which includes the Dose Index    Registry.           FINDINGS:  Exam is limited by patient motion.  Multiple attempts were made    at obtaining diagnostic images.       VENTRICLES/SULCI:  Ventricles and sulci are mildly prominent and stable    consistent with mild stable atrophy.   INTRACRANIAL:  There are no abnormal extraaxial fluid collections.  There    is no midline shift.  Mild stable decreased attenuation in the    periventricular and subcortical deep white matter is nonspecific and    consistent with chronic small vessel ischemic    disease.  Intracranial atherosclerotic arterial vascular calcifications    are noted incidentally.  There is nothing specific for acute infarct.     There is no hemorrhage or mass lesion.     SINUSES:           No sign of acute sinusitis.     MASTOIDS:          No sign of acute inflammation.   SKULL:             No evidence for fracture or osseous abnormality.   OTHER:             Metallic artifact noted from the left globe.  Correlate    with history.                         =====   CONCLUSION:  Mild chronic small vessel ischemic disease and mild atrophy.     No acute intracranial hemorrhage, mass effect or midline shift is    identified.  No significant interval change from previous exams.               LOCATION:  [facility]           Dictated by (CST): Obed Michel MD on 1/08/2025 at 1:11 PM        Finalized by (CST): Obed Michel MD on 1/08/2025 at 1:16 PM         XR CHEST AP PORTABLE  (CPT=71045)   Final Result   PROCEDURE:  XR CHEST AP PORTABLE  (CPT=71045)       TECHNIQUE:  AP chest radiograph was obtained.       COMPARISON:  [facility] , XR, XR CHEST AP PORTABLE  (CPT=71045), 9/27/2023,    10:21 AM.  [facility] , XR, XR CHEST AP PORTABLE  (CPT=71045), 9/25/2023,    11:38 PM.       INDICATIONS:  hyperglycemia       PATIENT STATED HISTORY: (As transcribed  by Technologist)  The patient    offered no additional history at this point.             FINDINGS:  Cardiac size and pulmonary vasculature are within normal    limits. No pleural effusions. No pneumothorax.  Hyperexpansion of the    lungs.  Left-sided power port with tip overlying the expected location    SVC.  Atelectasis or scarring within the lung    bases.                             =====   CONCLUSION:     1. Hyperexpansion of the lungs.   2. Atelectasis or scarring within the lung bases.             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 Found to have DKA w elevated lactic acidosis and hyperkalemia. Nephrology consulted for acidosis and DAVID      Acidosis  -- due to DKA and lactic acidosis mostly.   -- improving with bicarbonate supplementations     DAVID on CKD3  -- Renal biopsy 7/31/2023 showed ATN with myoglobin casts with diabetic and hypertensive changes.   -- Cr 2.28mg/dL on admit  -- due to prerenal + urinary retention   -- UA w 3-5 RBCs, 50 protein, recheck when grande removed. Urine na 99, but after IVFs.   -- sp CTA w contrast.   -- hold entresto and shayne   -- avoid nephrotoxins and renally dose meds  -- diuretics as needed for volume overload     Hypophosphatemia  Hypokalemia  -- Replete and monitor per electrolyte protocol      Anemia  -- transfusion prn        Thank you for allowing me to participate in the care of this patient. Please do not hesitate to call with questions or concerns.     Lesia Pittman, DO  [facility] - Nephrology

## 2025-01-10 NOTE — PROGRESS NOTES
[facility]  Diabetes Consult Note    Anna Montalvo Patient Status:  Inpatient    1955 MRN [id number]   Location [facility] 4SW-A Attending Fabian Miranda MD   Hosp Day # 3 PCP Justin Newberry MD     Reason for Consult:   Management recommendations in setting of DKA in T1DM        Provider Requesting Consult:  Earline Kendall, ICU APN      Diagnosis:  Patient Active Problem List   Diagnosis    PURE HYPERCHOLESTEROLEM    Tobacco abuse    Vitamin D deficiency    Atherosclerosis of native arteries of extremity with intermittent claudication (HCC)    Osteoporosis    PVD (peripheral vascular disease) ([facility])    Anxiety    Senile nuclear sclerosis, bilateral    History of alcohol abuse    HTN (hypertension)    Facial droop    Leukocytosis    Diabetes type 2, controlled ([facility])    Mild nonproliferative diabetic retinopathy of both eyes without macular edema associated with type 2 diabetes mellitus (HCC)    Facial paralysis    Diabetic ketoacidosis without coma associated with type 2 diabetes mellitus (HCC)    Scalp laceration, initial encounter    Multiple contusions    DAVID (acute kidney injury) (HCC)    Fall    Postural dizziness with near syncope    Protein-calorie malnutrition, unspecified severity (HCC)    Malignant neoplasm of upper-outer quadrant of right breast in female, estrogen receptor positive (HCC)    Dehydration    Hypokalemia    Anemia, unspecified type    Leukocytosis, unspecified type    Acute UTI    Hyponatremia    Hyperglycemia without ketosis    Type 1 diabetes mellitus with ketoacidosis without coma (HCC)    Lactic acidosis    Azotemia    Hyperkalemia    Anemia    Acute renal failure (ARF) (HCC)    Acute kidney injury (HCC)    Sepsis without acute organ dysfunction (HCC)    Elevated LFTs    Hyperglycemic hyperosmolar nonketotic coma (HCC)    Palliative care encounter    Counseling regarding advance care planning and goals of care    Diabetic ketoacidosis without coma associated with type 1  diabetes mellitus (HCC)    Abnormal EKG    Thrombocytopenia (HCC)         Medical History:  Past Medical History:    Acoustic neuroma (HCC)    Left facial droop --after surgery    Anxiety    Cataract    Diabetic retinopathy (HCC)    DKA (diabetic ketoacidoses)    4/15/21    DM2    Hearing impairment    hearing loss in left ear    Hyperlipidemia    Hypertension    Osteoporosis    Pancreatitis (HCC)    PVD (peripheral vascular disease) (HCC)    Iliac stents-Bilateral--9/11/14    Recovering alcoholic (HCC)    Recurrent genital herpes    Right breast cancer    Uterine fibroid     Past Surgical History:   Procedure Laterality Date    Brain surgery      Cataract      Extern drain panc pseudocyst,open      Fracture surgery      L shoulder surgery    Hysterectomy  6-29-10    exp lap KODAK BSO,    Other  11/23/15    LEFT IMAGE GUIDED CRANIOTOMY FOR RESECTION OF ACOUSTIC NEUROMA    Other  12/16    left humerus repair for FXR--plate in place    Other surgical history      ex-lap x 2 for pancreatic pseudocysts, both in mid 80's, had shunt placed with second sx    Pancreatectomy,distal+preserv duod      Repair incis hernia w mesh  9/20/2012    Procedure: INCISIONAL   HERNIA REPAIR;  Surgeon: Aaliyah Bennett MD;  Location: [facility]    Repair incisional hernia,reducible  9/20/2012    Procedure: INCISIONAL   HERNIA REPAIR;  Surgeon: Aaliyah Bennett MD;  Location: [facility], [facility]    Surgical stent Bilateral 9/11/14    Bilateral femoral stent placement    Tubal ligation       Family History   Problem Relation Age of Onset    Cancer Father     Colon Cancer Mother        Diabetes history:  Type:  T1DM  Onset: age 29, after surgery for pancreatic cyst removal  Family history of DM: unknown      Allergies: Allergies[1]    Medications: Complete list reviewed. Active diabetes medications include tresiba 6u daily and ISF 1:40 >140, ICR 20.      Labs:  Recent Labs   Lab 01/10/25  0158 01/10/25  0401 01/10/25  0534  01/10/25  0800 01/10/25  1042   PGLU 279* 247* 361* 365* 383*     Recent Labs     01/07/25  1317 01/07/25  1318 01/07/25  1318 01/07/25  1602 01/07/25  1618 01/09/25  0027 01/09/25  0101 01/09/25  0447 01/09/25  0501 01/09/25  0905 01/09/25  0911 01/09/25  1511 01/09/25  1805 01/10/25  0408 01/10/25  0523 01/10/25  1042   NA  --  124*   < > 128*   < > 132*  --  133*  133*  133*  --  132*  --  135*  --  134*  --   --    CL  --  87*   < > 95*   < > 105  --  106  106  106  --  109  --  108  --  107  --   --    CO2  --  <10.0*   < > <10.0*   < > 14.0*  --  16.0*  16.0*  16.0*  --  18.0*  --  18.0*  --  19.0*  --   --    BUN  --  26*   < > 25*   < > 26*  --  25*  25*  25*  --  25*  --  23  --  21  --   --    CREATSERUM  --  2.28*   < > 1.88*   < > 1.38*  --  1.30*  1.30*  1.30*  --  1.20*  --  1.21*  --  1.30*  --   --    A1C 11.1*  --   --   --   --   --   --   --   --   --   --   --   --   --   --   --    PGLU  --   --   --   --    < >  --    < >  --    < >  --    < >  --    < >  --    < > 383*   CA  --  12.3*   < > 11.0*   < > 9.4  --  9.2  9.2  9.2  --  9.0  --  9.0  --  8.9  --   --    ALB  --  4.0  --  3.5  --   --   --  2.7*  2.7*  --   --   --  2.4*  --  2.8*  --   --     < > = values in this interval not displayed.                History of Present Illness: Anna Montalvo is a 69 year old female with PMH of Type 3c (Diagnosed in her age 30s after having pancreatic surgery for cysts), cpeptide this admission 0.6 - insulin dependent and ketosis prone,   c/b diabetic retinopathy, breast and brain CA, CHF, non-obstructive CAD, HTN, CKD3 and PVD s/p b/l iliac stents admitted 1/7/2025 with complaints of hyperglycemia on home meter (per ED notes, patient stopped insulin 3 days ago). ED evaluation revealed DKA (, CO2<10, pH 6.96, anion gap 27, lactic acid 5.2 and A1C 11.1%), as well as DAVID (Cr 2.88)       Assessment/Recommendations:  Transitioned off of insulin drip and onto Tresiba 6u yesterday AM.  Minimal PO intake yesterday, didn't receive correction doses as BG were stable. She then had a hypoglycemia episode to 48 overnight (patient states to me that she was aymptomatic). Treated with IV dextrose which resolved BG back to 81, then trended up to 279. BG this AM was 365 prior to breakfast. She attempted to eat some breakfast this AM, but RN staff noticed pocketing of food, so discontinued meal until speech could evaluate. Thus, carb coverage for the meal and the orange juice was held.     Upon interview this AM, patient is interactive, just completed physical therapy and is back in bed. Repeat BG after 6u of novolog was 383. Mostly likely elevated due to uncovered orange juice / breakfast food. Repeat BG before lunch trended down nicely to 202 on current plan. Will continue current regimen, nursing staff to ensure carb coverage given to keep trends stable.       Plan:  Inpatient recommendations -   Continue present plan for now, if she has another hypo episode overnight, drop tresiba to 5u. Would not exceed tresiba 6u as she has historic hypoglycemia at higher doses of long acting insulin; instead would utilize SSI  Degludec = 6u every day  Aspart = 1:40>140 & 1:20gm CHO  Continue Q6h accucheks for now in the context of poor PO intake/to monitor MN trends  Discharge recommendations -   Restart basal/bolus insulin:  Lispro = dose TBD pending clinical course  Glargine = dose TBD pending clinical course  Of note, this is her home regimen per her endo:  Lantus 5-6u at bedtime, 10u with breakfast, 5u with lunch and 5u with dinner (when PO intake is better)  Will need glucometer supplies re-sent on discharge     Prescription Recommendations:  Medicare:  Insulin:   Novolog, Fiasp, Apidra, Admelog, Levemir, Lantus, Basaglar, Tresiba, Toujeo  (If no secondary insurance, may need prior auth)  Supplies:  BD pen needles (Gabriella)  Glucometer:  OneTouch    Provider F/U Recommendations:  PCP - Dr. Newberry  (Vikki)  Endocrinology - Dr. Silver (Vikki) - needs to schedule appointment with her endo       A total of 60 minutes were spent with the patient, 100% was spent counseling and coordinating care for diabetes self-management including nutrition, exercise, blood glucose monitoring, insulin administration, medications, treatment options, follow-up coordination and resources.    POPEYE Bo  1/10/2025  11:22 AM          [1]   Allergies  Allergen Reactions    Nucynta [Tapentadol] HALLUCINATION     Cannot tolerate    Lisinopril Coughing

## 2025-01-10 NOTE — CM/SW NOTE
01/10/25 1700   CM/SW Referral Data   Referral Source    Reason for Referral Discharge planning   Informant EMR     Patient is a 69 year old female admitted 1/7/2025 for DKA.     HOME SITUATION  Type of Home: Townhouse  Home Layout: One level     Stairs to Bedroom: 0       Lives With: Significant other    Drives: No         Prior Level of [place]: Pt lives w/ her significant other in a one level townhouse.  Pt is typically independent w/ gait w/o device and adl's.  Pt does not drive, but her SO does.    PT=HH. HH referral sent via [facility]. Will need follow up for choice.     Elia Thao, SHERRIE RN, CM  [phone]

## 2025-01-10 NOTE — PHYSICAL THERAPY NOTE
PHYSICAL THERAPY EVALUATION - INPATIENT     Room Number: 457/457-A  Evaluation Date: 1/10/2025  Type of Evaluation: Initial  Physician Order: PT Eval and Treat    Presenting Problem: DKA, thrombocytopenia  Co-Morbidities : DM, breast/brain CA, falls, retinopathy, HTN, facial droop from acoustic neuroma resection, hx of etoh/tobacco abuse  Reason for Therapy: Mobility Dysfunction and Discharge Planning    PHYSICAL THERAPY ASSESSMENT   Patient is a 69 year old female admitted 1/7/2025 for DKA.  Prior to admission, patient's baseline is mod I for gait w/o device, independent w/ adl's.  Patient is currently functioning below baseline with transfers and gait.  Patient is requiring contact guard assist and minimal assist as a result of the following impairments: decreased functional strength, impaired standing balance, decreased muscular endurance, cognitive deficits (ongoing confusion, decreased insight into need for safety), and medical status.  Physical Therapy will continue to follow for duration of hospitalization.    Patient will benefit from continued skilled PT Services at discharge to promote prior level of function and safety with additional support and return home with home health PT.    PLAN DURING HOSPITALIZATION  Nursing Mobility Recommendation : 1 Assist  PT Device Recommendation:  (Undetermined)  PT Treatment Plan: Bed mobility;Patient education;Family education;Gait training;Strengthening;Transfer training;Balance training  Rehab Potential : Good  Frequency (Obs): 3-5x/week     CURRENT GOALS    Goal #1 Patient is able to demonstrate supine - sit EOB @ level: modified independent     Goal #2 Patient is able to demonstrate transfers EOB to/from BSC at assistance level: modified independent     Goal #3 Patient is able to ambulate 200 feet with assist device:  least restrictive  at assistance level: supervision     Goal #4    Goal #5    Goal #6    Goal Comments: Goals established on 1/10/2025      PHYSICAL  THERAPY MEDICAL/SOCIAL HISTORY  History related to current admission: Patient is a 69 year old female admitted on 2025 from home for ams, tachypnic and elevated glucose.  Pt diagnosed with DKA.    HOME SITUATION  Type of Home: Townhouse  Home Layout: One level           Stairs to Bedroom: 0         Lives With: Significant other    Drives: No          Prior Level of [place]: Pt lives w/ her significant other in a one level townhouse.  Pt is typically independent w/ gait w/o device and adl's.  Pt does not drive, but her SO does.    SUBJECTIVE  \"I'm not sure how to handle this.\"  Pt calling for help when approaching room, pt confused about her call light in relation to the TV, but could not articulate beyond that.  Pt difficult to understand at times due to her facial paralysis.    OBJECTIVE  Precautions: Bed/chair alarm  Fall Risk: High fall risk    WEIGHT BEARING RESTRICTION     PAIN ASSESSMENT  Ratin          COGNITION  Overall Cognitive Status:  Impaired  Arousal/Alertness:  appropriate responses to stimuli  Attention Span:  attends with cues to redirect, difficulty attending to directions, and difficulty dividing attention  Orientation Level:  oriented to place, oriented to person, disoriented to situation, and oriented to year, but could not state the month  Memory:  impaired working memory  Following Commands:  follows one step commands with increased time and follows one step commands with repetition  Safety Judgement:  decreased awareness of need for assistance and decreased awareness of need for safety  Awareness of Errors:  assistance required to identify errors made, assistance required to correct errors made, and decreased awareness of errors   Awareness of Deficits:  decreased awareness of deficits    RANGE OF MOTION AND STRENGTH ASSESSMENT  Upper extremity ROM and strength are within functional limits     Lower extremity ROM is within functional limits     Lower extremity strength - 4-/5  throughout    BALANCE  Static Sitting: Fair  Dynamic Sitting: Fair  Static Standing: Fair -  Dynamic Standing: Poor +    ADDITIONAL TESTS                                    ACTIVITY TOLERANCE  Pulse: (!) 139  Heart Rate Source: Monitor  Resp: 19                O2 WALK  Oxygen Therapy  SPO2% on Room Air at Rest: 84  SPO2% on Oxygen at Rest: 97  At rest oxygen flow (liters per minute): 10  SPO2% Ambulation on Oxygen: 92  Ambulation oxygen flow (liters per minute): 10    NEUROLOGICAL FINDINGS                        AM-PAC '6-Clicks' INPATIENT SHORT FORM - BASIC MOBILITY  How much difficulty does the patient currently have...  Patient Difficulty: Turning over in bed (including adjusting bedclothes, sheets and blankets)?: None   Patient Difficulty: Sitting down on and standing up from a chair with arms (e.g., wheelchair, bedside commode, etc.): None   Patient Difficulty: Moving from lying on back to sitting on the side of the bed?: None   How much help from another person does the patient currently need...   Help from Another: Moving to and from a bed to a chair (including a wheelchair)?: A Little   Help from Another: Need to walk in hospital room?: A Little   Help from Another: Climbing 3-5 steps with a railing?: A Little     AM-PAC Score:  Raw Score: 21   Approx Degree of Impairment: 28.97%   Standardized Score (AM-PAC Scale): 50.25   CMS Modifier (G-Code): CJ    FUNCTIONAL ABILITY STATUS  Gait Assessment   Functional Mobility/Gait Assessment  Gait Assistance: Minimum assistance  Distance (ft): 3', 5'  Assistive Device: Rolling walker  Pattern: Shuffle (Slight posterior lean)    Skilled Therapy Provided     Bed Mobility:  Rolling: Right and Left mod I, but constant cues to stay on task.  Assisted w/ brief change once returned to supine in order to assess bed mobility.  Supine to sit: NT   Sit to supine: Mod I w/ hob flat     Transfer Mobility:  Sit to stand: Cues for proper hand placement, both from bed and from  commode.  Completed 3x over course of session.  Once in standing, pt w/ slight posterior lean requiring min a of 1 to assist until she gains her balance.  Pt dependent for pericare and doffing brief after having a bm in commode.  Constant cues to remain still, follow therapist's commands.  Pt impulsive, combined w/ confusion - needs extra cues and hands on to prevent falls.  Was able to stand for ~ 2 min with CGA while cleaning pt.   Stand to sit: CGA w/ cues for safety technique.  Gait = Min a of 1 for gait 3' and 5'x1 during session w/ rw.  Pt needs significant directions due to decreased safety awareness and to remain on task.    Therapist's Comments: Pt's mobility will progress as sessions proceed, but bigger consideration for d/c is cognitive issues.  Pt will require significant oversight for safety if her cognition remains as limited as it was this session.    Exercise/Education Provided:  Bed mobility  Functional activity tolerated  Gait training  Transfer training    Patient End of Session: In bed;Needs met;Call light within reach;RN aware of session/findings;All patient questions and concerns addressed;Alarm set      Patient Evaluation Complexity Level:  History Moderate - 1 or 2 personal factors and/or co-morbidities   Examination of body systems Moderate - addressing a total of 3 or more elements   Clinical Presentation  Moderate - Evolving   Clinical Decision Making Moderate Complexity       PT Session Time: 40 minutes  Gait Trainin minutes  Therapeutic Activity: 24 minutes  Neuromuscular Re-education: 0 minutes  Therapeutic Exercise: 0 minutes

## 2025-01-10 NOTE — PROGRESS NOTES
ICU  Critical Care APRN Progress Note    NAME: Anna Montalvo - ROOM: / - MRN: [id number] - Age: 69 year old - :1955    Subjective:   Up in chair, oriented to self. Precedex gtt off.     Current Facility-Administered Medications   Medication Dose Route Frequency    potassium chloride 20 mEq/100mL IVPB premix 20 mEq  20 mEq Intravenous Once    insulin aspart (NovoLOG) 100 Units/mL FlexPen 1-50 Units  1-50 Units Subcutaneous TID CC and HS    metoprolol (Lopressor) 5 mg/5mL injection 2.5 mg  2.5 mg Intravenous Q6H PRN    enoxaparin (Lovenox) 30 MG/0.3ML SUBQ injection 30 mg  30 mg Subcutaneous Daily    insulin degludec (Tresiba) 100 units/mL flextouch 6 Units  6 Units Subcutaneous Daily    insulin aspart (NovoLOG) 100 Units/mL FlexPen 1-68 Units  1-68 Units Subcutaneous TID CC    sodium bicarbonate tab 650 mg  650 mg Oral QID    metoprolol succinate (Toprol XL) partial tablet 12.5 mg  12.5 mg Oral Daily Beta Blocker    cefTRIAXone (Rocephin) 1 g in sodium chloride 0.9% 100 mL IVPB-ADDV  1 g Intravenous Daily    tamoxifen (Nolvadex) tab 20 mg  20 mg Oral Daily    escitalopram (Lexapro) tab 20 mg  20 mg Oral Daily    multivitamin (Tab-A-Edith/Beta Carotene) tab 1 tablet  1 tablet Oral Daily    glucose (Dex4) 15 GM/59ML oral liquid 15 g  15 g Oral Q15 Min PRN    Or    glucose (Glutose) 40% oral gel 15 g  15 g Oral Q15 Min PRN    Or    glucose-vitamin C (Dex-4) chewable tab 4 tablet  4 tablet Oral Q15 Min PRN    Or    dextrose 50% injection 50 mL  50 mL Intravenous Q15 Min PRN    Or    glucose (Dex4) 15 GM/59ML oral liquid 30 g  30 g Oral Q15 Min PRN    Or    glucose (Glutose) 40% oral gel 30 g  30 g Oral Q15 Min PRN    Or    glucose-vitamin C (Dex-4) chewable tab 8 tablet  8 tablet Oral Q15 Min PRN    dextrose 5%-sodium chloride 0.45% infusion  100 mL/hr Intravenous Continuous PRN    melatonin tab 3 mg  3 mg Oral Nightly PRN    polyethylene glycol (PEG 3350) (Miralax) 17 g oral packet 17 g  17 g Oral Daily PRN     sennosides (Senokot) tab 17.2 mg  17.2 mg Oral Nightly PRN    bisacodyl (Dulcolax) 10 MG rectal suppository 10 mg  10 mg Rectal Daily PRN    ondansetron (Zofran) 4 MG/2ML injection 4 mg  4 mg Intravenous Q6H PRN    acetaminophen (Tylenol Extra Strength) tab 500 mg  500 mg Oral Q4H PRN     OBJECTIVE  Vitals:  /56   Pulse 78   Temp 98.5 °F (36.9 °C) (Temporal)   Resp (!) 29   Ht 165.1 cm (5' 5\")   Wt 106 lb 4.2 oz (48.2 kg)   LMP  (LMP Unknown)   SpO2 100%   BMI 17.68 kg/m²                 Physical Exam:    General Appearance: Oriented to self and knows she is at hospital, up in chair  Neck: No JVD  Lungs: Clear to auscultation bilaterally, respirations unlabored  Heart: Regular rate and rhythm, S1 and S2 normal, no murmur, rub or gallop  Abdomen: Soft, non-tender, bowel sounds active   Extremities: Atraumatic, no cyanosis or edema, capillary refill <3 sec.    Pulses: 2+ and symmetric all extremities  Skin: generalized LE mottling, texture, turgor normal for ethnicity, no rashes or lesions, warm and dry  Neurologic: normal strength    Data this admission:  XR CHEST AP PORTABLE  (CPT=71045)    Result Date: 1/7/2025  CONCLUSION:  1. Hyperexpansion of the lungs. 2. Atelectasis or scarring within the lung bases.    LOCATION:  [facility]      Dictated by (CST): Emelyn Abdullahi MD on 1/07/2025 at 1:59 PM     Finalized by (CST): Emelyn Abdullahi MD on 1/07/2025 at 2:02 PM       Labs:  Lab Results   Component Value Date    WBC 15.9 01/10/2025    HGB 9.0 01/10/2025    HCT 23.4 01/10/2025    PLT 77.0 01/10/2025    CREATSERUM 1.30 01/10/2025    BUN 21 01/10/2025     01/10/2025    K 3.4 01/10/2025    K 3.4 01/10/2025     01/10/2025    CO2 19.0 01/10/2025     01/10/2025    CA 8.9 01/10/2025    ALB 2.8 01/10/2025    ALKPHO 41 01/09/2025    BILT 0.2 01/09/2025    TP 4.3 01/09/2025     01/09/2025    ALT 41 01/09/2025    MG 1.9 01/10/2025    PHOS 2.1 01/10/2025    PHOS 2.1 01/10/2025        Assessment/Plan:      Acute hypoxic respiratory failure: Due to PNA vs pulmonary edema  -Wean O2 as able-On 5 L NC  -CTA negative for PE, multifocal PNA vs edema with pleural effusions  -continue antbx as below  -Urine strep and legionella negative  -Lasix x1 given 1/9  -sputum cx pending collection    DKA--> resolved  History DM type 2  -A1c 11.1  -Anion gap now closed  -Now on subcutaneous insulin; degludec, aspart--adjust PRN   -Accu-checks AC/HS  -BMP, Mg, Phos daily     NAGMA: May be due to borderline hyperchloremia. No diarrhea or GI issues.   - Bicarb improving  - Anion gap now closed  - Discontinued IVF- 0.9  - oral bicarb started--unable to take NPO  - Daily BMP    Lactic Acidosis  Leukocytosis: Likely 2/2 PNA  -WBC 18.7 on admit with left shift, down-trending   -LA elevated, now cleared. No longer trending  -Afebrile--monitor  -PCT 0.34-->2.17-->1.97 (Improving on antbx)  -B. Cxs NGTD  -UA negative  -continue ceftriaxone (1/8-), s/p zosyn (1/7-)    Pseudo-hyponatremia  -Na+ corrected for glucose- 141 on admit (124 on serum)  -Monitor BMP     Acute Encephalopathy: Likely toxic metabolic due to DKA, possible infection/sepsis. Baseline per family is oriented x4  -CT brain negative for acute process  -Ammonia negative  -Frequent and PRN re-orientation  -Try to avoid precedex gtt--order discontinued    CAD, non-obstructive  -EKG with NSR and ST depression  -Troponin negative  - BB  -Statin    DAVID on CKD stage IIIA: Likely due to dehydration in setting of DKA  -BL Cr- ~ 0.9-1.1. Cr- on admit 2.28,improving with IVFs  -Renal US negative  -Avoid nephrotoxic agents  -Strict I/Os  -Monitor BMP  -Consult renal    Acute on chronic thrombocytopenia  -Plts down-trending--77 today (133 on admit).  Plts have been 104-130 in 2023  -Will monitor for now, unlikely HIT given appears to be chronic    Elevated AST  -  -CK level 1136, repeat 591  -Daily CMP    HTN  -Holding, entresto, shayne  -monitor on  tele    Chronic systolic HF, now with preserved EF 2/2 chemotherapy. Last TTE with EF 50-55%. History of EF 15-20% in 7/2023  -TTE with EF 55-60%, NWMAs  -Holding entresto and spironolactone until electrolytes stabilize  -Cards following    Breast CA Stage IIA (Dx 2021) s/p right mastectomy  -On tamoxifen  -Follows with Dr. Trinidad as outpt    History Brain CA s/p resection with residual left facial droop      F/E/N:  -Follow lytes and replete prn  -Failed swallow eval, will try again today. If fails, will need to discuss dobhoff with TFs    Proph:  -subcutaneous lovenox    Dispo:     Code Status: Full Code  -ICU monitoring    Plan of care discussed with intensivist, Dr. Annita Kendall, [facility]  ICU  Phone  [phone]   Pager [phone]

## 2025-01-10 NOTE — PLAN OF CARE
Assumed care of patient following shift report. Pt alert, oriented to self. Intermittently restless/agitated, precedex gtt infusing. Vapotherm. NSR on tele monitor. Normotensive. Harvey intact. See MAR and flowsheets for additional information.

## 2025-01-10 NOTE — PLAN OF CARE
Assumed care of patient following shift report. Patient on minimal precedex, stopped. Niesha vest removed. Patient is alert and oriented to person and place only. Easily re-directable and follows commands. Difficulty with breakfast- NPO and SLP eval ordered, cleared for a minced/moist diet with thin liquids. Weaned from vapotherm to high flow nasal canula. NSR on tele. Large BM. Poor apatite. Harvey draining clear yellow urine. Denies pain. Up with one assist to commode/chair.

## 2025-01-10 NOTE — SLP NOTE
ADULT SWALLOWING EVALUATION    ASSESSMENT    ASSESSMENT/OVERALL IMPRESSION:  Order received for BSE to r/o aspiration. Chart reviewed. Patient currently in ICU with DKA, acute hypoxic respiratory failure due to PNA vs Pulm Edema with pleural effusions. Patient currently on 10L O2 via NC.  Medical history significant for DM, breast cancer, diabetic retinopathy, CHF, HTN, PAD, acoustic neuroma with L facial droop after resection surgery for brain cancer years ago.  Patient with no previous SLP consult in recent years. RN reported patient coughing/pocketing and appeared choking on breakfast meal this AM.     Patient received awake, alert, and very pleasant. No visitors present. Patient on 10L O2 via NC.  Patient edentulous.  Patient reported eating soft food at home and thin liquids.  Patient with severe left facial droop and reduced labial retraction on left.  Lingual ROM intact.  Patient assisted with PO trials however able to self feed.      Suspect chronic oral dysphagia characterized by reduced bolus formation, mastication, and A-P transit resulting in oral residue with advanced solid texture trials.  Patient tolerated puree and minced/softened textures with good oral clearance.  Patient able to complete lingual sweep to clear any residue as needed.  Patient tolerated straw sips thin liquid when placed on RIGHT SIDE OF MOUTH.    Monitor closely - may need to downgrade to puree texture altogether if continued oral pocketing is observed with minced & moist/ground texture.  SLP to follow.           RECOMMENDATIONS   Diet Recommendations - Solids: Mechanical soft ground/ Minced & Moist (add extra moisture/sauces/broth to reduce dryness of foods)    Diet Recommendations - Liquids: Thin Liquids (straw to RIGHT side of mouth)    Consider downgrade to puree texture if oral residue and pocketing is observed with minced & moist solid textures     Compensatory Strategies Recommended: To right;Extra sauce/gravy  Aspiration  Precautions: Upright position;Small bites;Small sips  Medication Administration Recommendations: One pill at a time (may consider crushing pills, especially larger ones)  Treatment Plan/Recommendations: Aspiration precautions;Dysphagia therapy    HISTORY   MEDICAL HISTORY  Reason for Referral: R/O aspiration    Problem List  Principal Problem:    Diabetic ketoacidosis without coma associated with type 1 diabetes mellitus (HCC)  Active Problems:    Diabetic ketoacidosis without coma associated with type 2 diabetes mellitus (HCC)    Lactic acidosis    Hyperkalemia    Abnormal EKG    Thrombocytopenia (HCC)      Past Medical History  Past Medical History:    Acoustic neuroma (HCC)    Left facial droop --after surgery    Anxiety    Cataract    Diabetic retinopathy (HCC)    DKA (diabetic ketoacidoses)    4/15/21    DM2    Hearing impairment    hearing loss in left ear    Hyperlipidemia    Hypertension    Osteoporosis    Pancreatitis (HCC)    PVD (peripheral vascular disease) ([facility])    Iliac stents-Bilateral--9/11/14    Recovering alcoholic ([facility])    Recurrent genital herpes    Right breast cancer    Uterine fibroid       Prior Living Situation: Home with spouse  Diet Prior to Admission: Unknown;Thin liquids       Patient/Family Goals: \"It's because of this (pointing to left side of face)\"    SWALLOWING HISTORY  Current Diet Consistency: NPO    Dysphagia History: as per above    Imaging Results: CT chest:  LUNGS:  Extensive ground-glass opacities are noted throughout the lungs superimposed on emphysematous change. Small bilateral pleural effusions are present.     OBJECTIVE   ORAL MOTOR EXAMINATION  Dentition: Edentulous  Symmetry: Reduced left facial  Strength: Reduced left facial  Tone: Reduced left facial  Range of Motion: Reduced left facial  Rate of Motion: Reduced    Voice Quality: Clear  Respiratory Status: Nasal cannula  Consistencies Trialed: Thin liquids;Puree;Soft solid  Method of Presentation: Staff/Clinician  assistance;Spoon;Straw  Patient Positioned: Upright;Midline    Oral Phase of Swallow: Impaired  Bolus Retrieval: Intact  Bilabial Seal: Intact  Bolus Formation: Impaired  Bolus Propulsion: Impaired  Mastication: Impaired  Retention: Impaired    Pharyngeal Phase of Swallow: Within Functional Limits           (Please note: Silent aspiration cannot be evaluated clinically. Videofluoroscopic Swallow Study is required to rule-out silent aspiration.)    Esophageal Phase of Swallow: No complaints consistent with possible esophageal involvement      GOALS  Goal #1 The patient will tolerate minced & moist consistency and thin liquids without overt signs or symptoms of aspiration with 80 % accuracy over 1-2 session(s).  In Progress   Goal #2 The patient/family/caregiver will demonstrate understanding and implementation of aspiration precautions and swallow strategies independently over 1-2 session(s).    In Progress   Goal #3 The patient will tolerate trial upgrade of soft/bite sized consistency and N/A liquids without overt signs or symptoms of aspiration with 90 % accuracy over 1-2 session(s).  In Progress     FOLLOW UP  Treatment Plan/Recommendations: Aspiration precautions;Dysphagia therapy     Follow Up Needed (Documentation Required): Yes  SLP Follow-up Date: 01/13/25    Thank you for your referral.   If you have any questions, please contact SHEYLA Musa MS CCC-SLP/L  Speech-Language Pathologist  [employer] [phone]

## 2025-01-11 LAB
ALBUMIN SERPL-MCNC: 3.2 G/DL (ref 3.2–4.8)
ANION GAP SERPL CALC-SCNC: 10 MMOL/L (ref 0–18)
BASOPHILS # BLD AUTO: 0.03 X10(3) UL (ref 0–0.2)
BASOPHILS NFR BLD AUTO: 0.2 %
BUN BLD-MCNC: 25 MG/DL (ref 9–23)
CALCIUM BLD-MCNC: 8.9 MG/DL (ref 8.7–10.4)
CHLORIDE SERPL-SCNC: 107 MMOL/L (ref 98–112)
CO2 SERPL-SCNC: 18 MMOL/L (ref 21–32)
CREAT BLD-MCNC: 1.38 MG/DL
EGFRCR SERPLBLD CKD-EPI 2021: 41 ML/MIN/1.73M2 (ref 60–?)
EOSINOPHIL # BLD AUTO: 0.01 X10(3) UL (ref 0–0.7)
EOSINOPHIL NFR BLD AUTO: 0.1 %
ERYTHROCYTE [DISTWIDTH] IN BLOOD BY AUTOMATED COUNT: 13.8 %
GLUCOSE BLD-MCNC: 139 MG/DL (ref 70–99)
GLUCOSE BLD-MCNC: 155 MG/DL (ref 70–99)
GLUCOSE BLD-MCNC: 193 MG/DL (ref 70–99)
GLUCOSE BLD-MCNC: 220 MG/DL (ref 70–99)
GLUCOSE BLD-MCNC: 228 MG/DL (ref 70–99)
GLUCOSE BLD-MCNC: 235 MG/DL (ref 70–99)
GLUCOSE BLD-MCNC: 280 MG/DL (ref 70–99)
HCT VFR BLD AUTO: 28.4 %
HGB BLD-MCNC: 10.4 G/DL
IMM GRANULOCYTES # BLD AUTO: 0.08 X10(3) UL (ref 0–1)
IMM GRANULOCYTES NFR BLD: 0.5 %
LYMPHOCYTES # BLD AUTO: 0.6 X10(3) UL (ref 1–4)
LYMPHOCYTES NFR BLD AUTO: 3.7 %
MCH RBC QN AUTO: 34.6 PG (ref 26–34)
MCHC RBC AUTO-ENTMCNC: 36.6 G/DL (ref 31–37)
MCV RBC AUTO: 94.4 FL
MONOCYTES # BLD AUTO: 0.51 X10(3) UL (ref 0.1–1)
MONOCYTES NFR BLD AUTO: 3.2 %
NEUTROPHILS # BLD AUTO: 14.87 X10 (3) UL (ref 1.5–7.7)
NEUTROPHILS # BLD AUTO: 14.87 X10(3) UL (ref 1.5–7.7)
NEUTROPHILS NFR BLD AUTO: 92.3 %
OSMOLALITY SERPL CALC.SUM OF ELEC: 292 MOSM/KG (ref 275–295)
PHOSPHATE SERPL-MCNC: 2.4 MG/DL (ref 2.4–5.1)
PHOSPHATE SERPL-MCNC: 2.5 MG/DL (ref 2.4–5.1)
PLATELET # BLD AUTO: 64 10(3)UL (ref 150–450)
PLATELETS.RETICULATED NFR BLD AUTO: 14.6 % (ref 0–7)
POTASSIUM SERPL-SCNC: 3.9 MMOL/L (ref 3.5–5.1)
POTASSIUM SERPL-SCNC: 4 MMOL/L (ref 3.5–5.1)
RBC # BLD AUTO: 3.01 X10(6)UL
SODIUM SERPL-SCNC: 135 MMOL/L (ref 136–145)
WBC # BLD AUTO: 16.1 X10(3) UL (ref 4–11)

## 2025-01-11 PROCEDURE — 99233 SBSQ HOSP IP/OBS HIGH 50: CPT | Performed by: INTERNAL MEDICINE

## 2025-01-11 RX ORDER — METOPROLOL SUCCINATE 25 MG/1
25 TABLET, EXTENDED RELEASE ORAL
Status: DISCONTINUED | OUTPATIENT
Start: 2025-01-12 | End: 2025-01-13

## 2025-01-11 RX ORDER — ACETAMINOPHEN 500 MG
500 TABLET ORAL EVERY 4 HOURS PRN
Status: DISCONTINUED | OUTPATIENT
Start: 2025-01-11 | End: 2025-01-13

## 2025-01-11 RX ORDER — HYDRALAZINE HYDROCHLORIDE 20 MG/ML
10 INJECTION INTRAMUSCULAR; INTRAVENOUS EVERY 4 HOURS PRN
Status: DISCONTINUED | OUTPATIENT
Start: 2025-01-11 | End: 2025-01-13

## 2025-01-11 NOTE — OCCUPATIONAL THERAPY NOTE
OCCUPATIONAL THERAPY EVALUATION - INPATIENT     Room Number: 457/457-A  Evaluation Date: 1/10/2025  Type of Evaluation: Initial  Presenting Problem: DKA    Physician Order: IP Consult to Occupational Therapy  Reason for Therapy: ADL/IADL Dysfunction and Discharge Planning    OCCUPATIONAL THERAPY ASSESSMENT   Patient is currently functioning below baseline with toileting, bathing, upper body dressing, lower body dressing, grooming, bed mobility, transfers, and static sitting balance. Prior to admission, patient's baseline is independent with mobility in the home without a device; assisted with bathing; assisted with many IADLs, reported that she is able to make simple meals/snacks and is able to dress and toilet herself. Patient does not drive.  Patient is requiring maximum assistance as a result of the following impairments: decreased functional reach, impaired sitting balance, cognitive deficits (decreased recall, attention, orientation), limited L shoulder active ROM, increased O2 needs from baseline, decreased insight to deficits, and decreased safety awareness. Occupational Therapy will continue to follow for duration of hospitalization.  Patient was confused and having difficulty with seated balance and motor planning , she was not safe to attempt standing this afternoon.     Patient will benefit from continued skilled OT Services to promote return to prior level of function and safety with continuous assistance and gradual rehabilitative therapy    History Related to Current Admission: Patient is a 69 year old female admitted on 1/7/2025 with Presenting Problem: DKA. Co-Morbidities : DM, h/o acoustic neuroma with L sided facial droop,h/o  breast cancer, anxiety, retinopathy, PVD, HF, osteoporosis, HTN    WEIGHT BEARING RESTRICTION       Recommendations for nursing staff:   Transfers: sit to stand lift   Toileting location: bed level versus commode depending on cognition    EVALUATION SESSION:  Patient Start of  Session: supine    FUNCTIONAL TRANSFER ASSESSMENT     BED MOBILITY  Supine to Sit : Maximum Assist  Sit to Supine (OT): Maximum Assist  Scooting: max assist    BALANCE ASSESSMENT  Static Sitting: Minimal Assist    FUNCTIONAL ADL ASSESSMENT     ACTIVITY TOLERANCE: denied dizziness                         O2 SATURATIONS       COGNITION  Arousal/Alertness:  appropriate responses to stimuli  Attention Span:  difficulty dividing attention  Orientation Level:  oriented to place, oriented to person, and stated it's August and thought it was 3:30 in the morning, not afternoon  Memory:  decreased short term memory and poor recall for time of day , could not state month or year  Following Commands:  follows one step commands with increased time and follows one step commands with repetition  Initiation: cues to initiate tasks and hand over hand to initiate tasks  Motor Planning: impaired  Awareness of Deficits:  decreased awareness of deficits  Problem Solving:  assistance required to identify errors made and assistance required to generate solutions    Upper Extremity   ROM: RUE WFL;  decreased L shoulder end range AROM for flexion  Strength: within functional limits RUE 5/5, LUE4+/5   Coordination  Gross motor: grossly intact  Fine motor: grossly intact  Sensation: denied new deficits, has UE neuropathy at baseline    EDUCATION PROVIDED  Patient Education : Fall Prevention; Role of Occupational Therapy; Plan of Care; Posture/Positioning  Patient's Response to Education: Verbalized Understanding; Requires Further Education    Equipment used: gait belt  Demonstrates functional use, Would benefit from additional trial yes     Therapist comments: OT explained role . Patient was confused, thought it was 3:30 AM. Re-orientation provided. Patient unable to choose correct tytpe of facility from two choices , unable to state year, thought it was August. OT provided re-orientation; patient was later able to recall that she is at  [facility]. Noted decreased initiation of LUE and decreased visual attention to L side; hand over hand cues needed to reach towards L side using RUE for supine to sit. MAX A needed. MAX A to scoot ; patient not able to support self well without MIN A . Patient leaning posteriorly and tpwards L , appearing to fatigue from sitting. Gait belt donned to attempt sit to stand transfer in prep for commode transfer however patient begann to have more difficulty with seated balance. OT assisted BLEs into bed; patient noted to have poor motor planning for trunk. MAX A needed to attain midline position in bed, max A to scoot up using Merigold sheet. Patient was left in bed with alarm on , needs met, call light in reach.     Patient End of Session: Call light within reach;All patient questions and concerns addressed;Hospital anti-slip socks;Needs met;In bed;Alarm set;SCDs in place    OCCUPATIONAL PROFILE    HOME SITUATION  Type of Home: [facility]  Home Layout: One level  Lives With: Significant other ([place])       Shower/Tub and Equipment: Tub-shower combo             Drives: No       Prior Level of Function: Lives with her SO , [place], in a 1 level townhouse . She is typically independent. He assists her with bathing and IADLs. Patient denied any recent falls and reported that she is normally able to go to the kitchen and get herself a snack.     SUBJECTIVE   \"Oh he helps me.\"    PAIN ASSESSMENT  Ratin          OBJECTIVE  Precautions: Bed/chair alarm (slurred speech at times due to L facial droop)  Fall Risk: High fall risk    ASSESSMENTS    AM-PAC ‘6-Clicks’ Inpatient Daily Activity Short Form  -   Putting on and taking off regular lower body clothing?: A Lot  -   Bathing (including washing, rinsing, drying)?: A Lot  -   Toileting, which includes using toilet, bedpan or urinal? : A Lot  -   Putting on and taking off regular upper body clothing?: A Lot  -   Taking care of personal grooming such as brushing teeth?: A  Little  -   Eating meals?: A Little    AM-PAC Score:  Score: 14  Approx Degree of Impairment: 59.67%  Standardized Score (AM-PAC Scale): 33.39    ADDITIONAL TESTS     NEUROLOGICAL FINDINGS      COGNITION ASSESSMENTS     PLAN  OT Device Recommendations: Transfer tub bench;Grab bars  OT Treatment Plan: Functional transfer training;UE strengthening/ROM;Endurance training;Cognitive reorientation;ADL training;Energy conservation/work simplification techniques;Patient/Family training;Patient/Family education;Equipment eval/education;Compensatory technique education  Rehab Potential : Good  Frequency: 3-5x/week  Number of Visits to Meet Established Goals: 5    ADL Goals   Patient will perform grooming: with supervision and while standing at sink  Patient will perform upper body dressing:  with setup and while in chair  Patient will perform lower body dressing:  with min assist  Patient will perform toileting: with min assist    Functional Transfer Goals  Patient will transfer from sit to supine:  with supervision  Patient will transfer from supine to sit:  with supervision  Patient will transfer to toilet:  with min assist    UE Exercise Program Goal  Patient will be supervision with bilateral AROM HEP (home exercise program).    Therapist Goals  Cognitive screening such as SLUMS or Short Blessed Test    Patient Evaluation Complexity Level:   Occupational Profile/Medical History MODERATE - Expanded review of history including review of medical or therapy record   Specific performance deficits impacting engagement in ADL/IADL MODERATE  3 - 5 performance deficits   Client Assessment/Performance Deficits MODERATE - Comorbidities and min to mod modifications of tasks    Clinical Decision Making MODERATE - Analysis of occupational profile, detailed assessments, several treatment options    Overall Complexity MODERATE     OT Session Time: 23 minutes    Therapeutic Activity: 18  minutes

## 2025-01-11 NOTE — PROGRESS NOTES
ICU  Critical Care APRN Progress Note    NAME: Anna Montalvo - ROOM: A7/A7 - MRN: [id number] - Age: 69 year old - :1955    Subjective:   No acute events overnight.  Patient alert to self and place.      Current Facility-Administered Medications   Medication Dose Route Frequency    acetaminophen (Tylenol Extra Strength) tab 500 mg  500 mg Oral Q4H PRN    insulin aspart (NovoLOG) 100 Units/mL FlexPen 1-50 Units  1-50 Units Subcutaneous TID CC and HS    metoprolol (Lopressor) 5 mg/5mL injection 2.5 mg  2.5 mg Intravenous Q6H PRN    enoxaparin (Lovenox) 30 MG/0.3ML SUBQ injection 30 mg  30 mg Subcutaneous Daily    insulin degludec (Tresiba) 100 units/mL flextouch 6 Units  6 Units Subcutaneous Daily    insulin aspart (NovoLOG) 100 Units/mL FlexPen 1-68 Units  1-68 Units Subcutaneous TID CC    sodium bicarbonate tab 650 mg  650 mg Oral QID    metoprolol succinate (Toprol XL) partial tablet 12.5 mg  12.5 mg Oral Daily Beta Blocker    cefTRIAXone (Rocephin) 1 g in sodium chloride 0.9% 100 mL IVPB-ADDV  1 g Intravenous Daily    tamoxifen (Nolvadex) tab 20 mg  20 mg Oral Daily    escitalopram (Lexapro) tab 20 mg  20 mg Oral Daily    multivitamin (Tab-A-Edith/Beta Carotene) tab 1 tablet  1 tablet Oral Daily    glucose (Dex4) 15 GM/59ML oral liquid 15 g  15 g Oral Q15 Min PRN    Or    glucose (Glutose) 40% oral gel 15 g  15 g Oral Q15 Min PRN    Or    glucose-vitamin C (Dex-4) chewable tab 4 tablet  4 tablet Oral Q15 Min PRN    Or    dextrose 50% injection 50 mL  50 mL Intravenous Q15 Min PRN    Or    glucose (Dex4) 15 GM/59ML oral liquid 30 g  30 g Oral Q15 Min PRN    Or    glucose (Glutose) 40% oral gel 30 g  30 g Oral Q15 Min PRN    Or    glucose-vitamin C (Dex-4) chewable tab 8 tablet  8 tablet Oral Q15 Min PRN    dextrose 5%-sodium chloride 0.45% infusion  100 mL/hr Intravenous Continuous PRN    melatonin tab 3 mg  3 mg Oral Nightly PRN    polyethylene glycol (PEG 3350) (Miralax) 17 g oral packet 17 g  17 g Oral  Daily PRN    sennosides (Senokot) tab 17.2 mg  17.2 mg Oral Nightly PRN    bisacodyl (Dulcolax) 10 MG rectal suppository 10 mg  10 mg Rectal Daily PRN    ondansetron (Zofran) 4 MG/2ML injection 4 mg  4 mg Intravenous Q6H PRN     OBJECTIVE  Vitals:  /85   Pulse 86   Temp 98.6 °F (37 °C) (Temporal)   Resp 23   Ht 165.1 cm (5' 5\")   Wt 96 lb 9 oz (43.8 kg)   LMP  (LMP Unknown)   SpO2 94%   BMI 16.07 kg/m²                 Physical Exam:    General Appearance: Oriented to self and knows she is at hospital, up in chair  Neck: No JVD  Lungs: Clear to auscultation bilaterally, respirations unlabored  Heart: Regular rate and rhythm, S1 and S2 normal, no murmur, rub or gallop  Abdomen: Soft, non-tender, bowel sounds active   Extremities: Atraumatic, no cyanosis or edema, capillary refill <3 sec.    Pulses: 2+ and symmetric all extremities  Skin: generalized LE mottling, texture, turgor normal for ethnicity, no rashes or lesions, warm and dry  Neurologic: normal strength    Data this admission:  XR CHEST AP PORTABLE  (CPT=71045)    Result Date: 1/7/2025  CONCLUSION:  1. Hyperexpansion of the lungs. 2. Atelectasis or scarring within the lung bases.    LOCATION:  [facility]      Dictated by (CST): Emelyn Abdullahi MD on 1/07/2025 at 1:59 PM     Finalized by (CST): Emelyn Abdullahi MD on 1/07/2025 at 2:02 PM       Labs:  Lab Results   Component Value Date    K 4.0 01/11/2025    PHOS 2.5 01/11/2025       Assessment/Plan:      Acute hypoxic respiratory failure: Due to PNA vs pulmonary edema  -Wean O2 as able-On 4 L NC  -CTA negative for PE, multifocal PNA vs edema with pleural effusions  -continue antbx as below  -Urine strep and legionella negative  -Lasix x1 given 1/9    DKA--> resolved  History DM type 2  -A1c 11.1  -Anion gap now closed  -Now on subcutaneous insulin; degludec, aspart--adjust PRN   -Accu-checks AC/HS  -BMP, Mg, Phos daily     NAGMA: May be due to borderline hyperchloremia. No diarrhea or GI issues.   -  Bicarb improving  - Anion gap now closed  - Discontinued IVF- 0.9  - oral bicarb  - Daily BMP    Lactic Acidosis  Leukocytosis: Likely 2/2 PNA  -WBC down-trending   -LA elevated, now cleared. No longer trending  -Afebrile--monitor  -PCT 0.34-->2.17-->1.97 (Improving on antbx)  -B. Cxs NGTD  -UA negative  -continue ceftriaxone (1/8-), s/p zosyn (1/7-)    Pseudo-hyponatremia  -Na+ corrected for glucose- 141 on admit (124 on serum)  -Monitor BMP     Acute Encephalopathy: Likely toxic metabolic due to DKA, possible infection/sepsis. Baseline per family is oriented x4  -CT brain negative for acute process  -Ammonia negative  -Frequent and PRN re-orientation    CAD, non-obstructive  -EKG with NSR and ST depression  -Troponin negative  - BB  -Statin    DAVID on CKD stage IIIA: Likely due to dehydration in setting of DKA  -BL Cr- ~ 0.9-1.1. Cr- on admit 2.28,improving with IVFs  -Renal US negative  -Avoid nephrotoxic agents  -Strict I/Os  -Monitor BMP  -Consult renal    Acute on chronic thrombocytopenia  -Plts down-trending--77 today (133 on admit).  Plts have been 104-130 in 2023  -Will monitor for now, unlikely HIT given appears to be chronic    Elevated AST  -  -CK level 1136, repeat 591  -Daily CMP    HTN  -Holding, entresto, shayne  -monitor on tele    Chronic systolic HF, now with preserved EF 2/2 chemotherapy. Last TTE with EF 50-55%. History of EF 15-20% in 7/2023  -TTE with EF 55-60%, NWMAs  -Holding entresto and spironolactone until electrolytes stabilize  -Cards following    Breast CA Stage IIA (Dx 2021) s/p right mastectomy  -On tamoxifen  -Follows with Dr. Trinidad as outpt    History Brain CA s/p resection with residual left facial droop      F/E/N:  -Follow lytes and replete prn  -ADA diet    Proph:  -subcutaneous lovenox    Dispo:     Code Status: Full Code  -may transfer to floor    Plan of care discussed with intensivist, Dr. Annita Avila, [facility]  Critical Care NP  Phone [phone]

## 2025-01-11 NOTE — PLAN OF CARE
Assumed care of patient following shift change. Alert and oriented to person and place, disoriented to time and situation. Up to chair with one assist/gait belt. Poor PO intake. NSR on tele. Afebrile. Transfer to medical floor with tele. Report to RN called.

## 2025-01-11 NOTE — PLAN OF CARE
Pt received following report. Alert to self and place, easily redirectable. On 4L. NSR, afebrile. Hypertensive, MD notified, PRN metoprolol (see MAR). Harvey intact. Complaints of nausea (see MAR). No complaints of pain. Patient updated on plan of care. See flowsheets for more.

## 2025-01-11 NOTE — PLAN OF CARE
PT presented onto unit via bed from ICU     A&Ox2- oriented to person and place - baseline: x4. VSS. 4L oxygen via nasal canula. . Denies chest pain and SOB.   Telemetry: NSR.   GI: Abdomen soft, nondistended. Passing gas. Last BM - yesterday    Denies nausea.   : Voids - grande catheter in place draining clear yellow urine.   Pain controlled with PRN pain medications.   Up with standby assist/ gait-belt   Drains: Grande   Incisions: None  Diet: 1800 carb count - automatic trays, moist/minced diet/ thin liquids   IVF running per order. All appropriate safety measures in place. All questions and concerns addressed.    Two person skin check completed with RAO Salter   R breast mastectomy  Scattered upper ext bruising - no recent falls per pt  Bandage on L forearm  Mepilex dressing applied to backside for redness.

## 2025-01-12 LAB
ALBUMIN SERPL-MCNC: 3 G/DL (ref 3.2–4.8)
ANION GAP SERPL CALC-SCNC: 10 MMOL/L (ref 0–18)
BASOPHILS # BLD AUTO: 0.02 X10(3) UL (ref 0–0.2)
BASOPHILS NFR BLD AUTO: 0.2 %
BUN BLD-MCNC: 23 MG/DL (ref 9–23)
CALCIUM BLD-MCNC: 8.4 MG/DL (ref 8.7–10.4)
CHLORIDE SERPL-SCNC: 108 MMOL/L (ref 98–112)
CO2 SERPL-SCNC: 19 MMOL/L (ref 21–32)
CREAT BLD-MCNC: 1.27 MG/DL
EGFRCR SERPLBLD CKD-EPI 2021: 45 ML/MIN/1.73M2 (ref 60–?)
EOSINOPHIL # BLD AUTO: 0.04 X10(3) UL (ref 0–0.7)
EOSINOPHIL NFR BLD AUTO: 0.4 %
ERYTHROCYTE [DISTWIDTH] IN BLOOD BY AUTOMATED COUNT: 14.2 %
GLUCOSE BLD-MCNC: 141 MG/DL (ref 70–99)
GLUCOSE BLD-MCNC: 165 MG/DL (ref 70–99)
GLUCOSE BLD-MCNC: 254 MG/DL (ref 70–99)
GLUCOSE BLD-MCNC: 255 MG/DL (ref 70–99)
GLUCOSE BLD-MCNC: 304 MG/DL (ref 70–99)
HCT VFR BLD AUTO: 28.5 %
HGB BLD-MCNC: 10.2 G/DL
IMM GRANULOCYTES # BLD AUTO: 0.1 X10(3) UL (ref 0–1)
IMM GRANULOCYTES NFR BLD: 0.9 %
LYMPHOCYTES # BLD AUTO: 0.87 X10(3) UL (ref 1–4)
LYMPHOCYTES NFR BLD AUTO: 8.3 %
MCH RBC QN AUTO: 33.9 PG (ref 26–34)
MCHC RBC AUTO-ENTMCNC: 35.8 G/DL (ref 31–37)
MCV RBC AUTO: 94.7 FL
MONOCYTES # BLD AUTO: 0.62 X10(3) UL (ref 0.1–1)
MONOCYTES NFR BLD AUTO: 5.9 %
NEUTROPHILS # BLD AUTO: 8.88 X10 (3) UL (ref 1.5–7.7)
NEUTROPHILS # BLD AUTO: 8.88 X10(3) UL (ref 1.5–7.7)
NEUTROPHILS NFR BLD AUTO: 84.3 %
OSMOLALITY SERPL CALC.SUM OF ELEC: 290 MOSM/KG (ref 275–295)
PHOSPHATE SERPL-MCNC: 3.1 MG/DL (ref 2.4–5.1)
PHOSPHATE SERPL-MCNC: 3.1 MG/DL (ref 2.4–5.1)
PLATELET # BLD AUTO: 62 10(3)UL (ref 150–450)
POTASSIUM SERPL-SCNC: 3.6 MMOL/L (ref 3.5–5.1)
RBC # BLD AUTO: 3.01 X10(6)UL
SODIUM SERPL-SCNC: 137 MMOL/L (ref 136–145)
WBC # BLD AUTO: 10.5 X10(3) UL (ref 4–11)

## 2025-01-12 RX ORDER — ACETAMINOPHEN 325 MG/1
650 TABLET ORAL EVERY 6 HOURS PRN
Status: DISCONTINUED | OUTPATIENT
Start: 2025-01-12 | End: 2025-01-13

## 2025-01-12 RX ORDER — ATORVASTATIN CALCIUM 10 MG/1
10 TABLET, FILM COATED ORAL NIGHTLY
Status: DISCONTINUED | OUTPATIENT
Start: 2025-01-12 | End: 2025-01-13

## 2025-01-12 NOTE — PROGRESS NOTES
[facility]   part of [facility]    Nephrology Progress Note    Anna Montalvo Attending:  Fabian Miranda MD     Cc: DAVID, acidosis    SUBJECTIVE     No acute complaints.   Denies sob    PHYSICAL EXAM   Vital signs: /59 (BP Location: Left arm)   Pulse 85   Temp 97.9 °F (36.6 °C) (Oral)   Resp 20   Ht 5' 5\" (1.651 m)   Wt 96 lb 9 oz (43.8 kg)   LMP  (LMP Unknown)   SpO2 98%   BMI 16.07 kg/m²   Temp (24hrs), Av.3 °F (36.8 °C), Min:97.9 °F (36.6 °C), Max:99.3 °F (37.4 °C)       Intake/Output Summary (Last 24 hours) at 2025  Last data filed at 2025  Gross per 24 hour   Intake 482 ml   Output 825 ml   Net -343 ml     Wt Readings from Last 3 Encounters:   25 96 lb 9 oz (43.8 kg)   10/11/23 113 lb 5.1 oz (51.4 kg)   10/06/23 110 lb 3.2 oz (50 kg)     General: awake, confused  HEENT: NCAT  Cardiac: RRR  Lungs: no distress  Extremities: no edema  Neurologic: awake, alert  Skin: warm and dry     MEDS     Current Facility-Administered Medications   Medication Dose Route Frequency    acetaminophen (Tylenol Extra Strength) tab 500 mg  500 mg Oral Q4H PRN    [START ON 2025] metoprolol succinate ER (Toprol XL) 24 hr tab 25 mg  25 mg Oral Daily Beta Blocker    hydrALAzine (Apresoline) 20 mg/mL injection 10 mg  10 mg Intravenous Q4H PRN    insulin aspart (NovoLOG) 100 Units/mL FlexPen 1-50 Units  1-50 Units Subcutaneous TID CC and HS    metoprolol (Lopressor) 5 mg/5mL injection 2.5 mg  2.5 mg Intravenous Q6H PRN    enoxaparin (Lovenox) 30 MG/0.3ML SUBQ injection 30 mg  30 mg Subcutaneous Daily    insulin degludec (Tresiba) 100 units/mL flextouch 6 Units  6 Units Subcutaneous Daily    insulin aspart (NovoLOG) 100 Units/mL FlexPen 1-68 Units  1-68 Units Subcutaneous TID CC    sodium bicarbonate tab 650 mg  650 mg Oral QID    cefTRIAXone (Rocephin) 1 g in sodium chloride 0.9% 100 mL IVPB-ADDV  1 g Intravenous Daily    tamoxifen (Nolvadex) tab 20 mg  20 mg Oral Daily     escitalopram (Lexapro) tab 20 mg  20 mg Oral Daily    multivitamin (Tab-A-Edith/Beta Carotene) tab 1 tablet  1 tablet Oral Daily    glucose (Dex4) 15 GM/59ML oral liquid 15 g  15 g Oral Q15 Min PRN    Or    glucose (Glutose) 40% oral gel 15 g  15 g Oral Q15 Min PRN    Or    glucose-vitamin C (Dex-4) chewable tab 4 tablet  4 tablet Oral Q15 Min PRN    Or    dextrose 50% injection 50 mL  50 mL Intravenous Q15 Min PRN    Or    glucose (Dex4) 15 GM/59ML oral liquid 30 g  30 g Oral Q15 Min PRN    Or    glucose (Glutose) 40% oral gel 30 g  30 g Oral Q15 Min PRN    Or    glucose-vitamin C (Dex-4) chewable tab 8 tablet  8 tablet Oral Q15 Min PRN    melatonin tab 3 mg  3 mg Oral Nightly PRN    polyethylene glycol (PEG 3350) (Miralax) 17 g oral packet 17 g  17 g Oral Daily PRN    sennosides (Senokot) tab 17.2 mg  17.2 mg Oral Nightly PRN    bisacodyl (Dulcolax) 10 MG rectal suppository 10 mg  10 mg Rectal Daily PRN    ondansetron (Zofran) 4 MG/2ML injection 4 mg  4 mg Intravenous Q6H PRN       LABS     Lab Results   Component Value Date    WBC 16.1 01/11/2025    HGB 10.4 01/11/2025    HCT 28.4 01/11/2025    PLT 64.0 01/11/2025    CREATSERUM 1.38 01/11/2025    BUN 25 01/11/2025     01/11/2025    K 3.9 01/11/2025     01/11/2025    CO2 18.0 01/11/2025     01/11/2025    CA 8.9 01/11/2025    ALB 3.2 01/11/2025    PHOS 2.4 01/11/2025    PGLU 139 01/11/2025       IMAGING   All imaging studies personally reviewed.    CTA CHEST (CPT=71275)   Final Result   PROCEDURE:  CT ANGIOGRAPHY, CHEST (CPT=71275)       COMPARISON:  [facility] , XR, XR CHEST AP PORTABLE  (CPT=71045), 1/09/2025,    10:56 AM.       INDICATIONS:  rule out PE, hypoxic       TECHNIQUE:  IV contrast-enhanced multislice CT angiography is performed    through the pulmonary arterial anatomy. 3D volume renderings are    generated.  Dose reduction techniques were used. Dose information is    transmitted to the ACR (American College of    Radiology) NRDR  (National Radiology Data Registry) which includes the Dose    Index Registry.       PATIENT STATED HISTORY:(As transcribed by Technologist)  Patient is here    with SOB        CONTRAST USED:  80cc of Isovue 370       FINDINGS:     VASCULATURE:  No visible pulmonary arterial thrombus or attenuation.     THORACIC AORTA:  No aneurysm or visible dissection.     LUNGS:  Extensive ground-glass opacities are noted throughout the lungs    superimposed on emphysematous change.  Small bilateral pleural effusions    are present.   MEDIASTINUM:  No adenopathy or mass.     SHARYN:  No mass or adenopathy.     CARDIAC:  No enlargement, pericardial thickening, or significant coronary    artery calcification.   CHEST WALL:  No mass or axillary adenopathy.     LIMITED ABDOMEN:  Limited images of the upper abdomen are unremarkable.     BONES:  No bony lesion or fracture.     OTHER:  Negative.                           =====   CONCLUSION:     1. Ground-glass opacities throughout the lungs superimposed on    emphysematous change.   2. Small bilateral pleural effusions.   3. No evidence of pulmonary embolism.               LOCATION:  [facility]           Dictated by (CST): Richard Francis MD on 1/09/2025 at 1:44 PM        Finalized by (CST): Richard Francis MD on 1/09/2025 at 1:48 PM         US KIDNEYS (CPT=76775)   Final Result   PROCEDURE:  US KIDNEYS (CPT=76775)       COMPARISON:  [facility] , US, US KIDNEYS (CPT=76775), 9/29/2023, 10:46 AM.     [facility] , US, US ABDOMEN COMPLETE (CPT=76700), 9/27/2023, 4:38 PM.       INDICATIONS:  lindsay, urinary retention       TECHNIQUE:  Transabdominal gray scale ultrasound imaging of the bilateral    kidneys and bladder was performed.  Routine technique was utilized.         PATIENT STATED HISTORY: (As transcribed by Technologist)              FINDINGS:        RIGHT KIDNEY   MEASUREMENTS:  9.9 x 4.3 x 5.0 cm   ECHOGENICITY:  Normal.   HYDRONEPHROSIS:  None.   CYSTS/STONES/MASSES:  None.       LEFT KIDNEY     MEASUREMENTS:  7.0 x 4.8 x 4.1 cm   ECHOGENICITY:  Normal.   HYDRONEPHROSIS:  None.   CYSTS/STONES/MASSES:  None.       BLADDER:  Harvey catheter within the bladder.  Bladder is nondistended,    limiting evaluation.   OTHER:  Negative.                         =====   CONCLUSION:  No acute findings.           LOCATION:  [facility]                   Dictated by (CST): Emelyn Abdullahi MD on 1/09/2025 at 12:07 PM        Finalized by (CST): Emelyn Abdullahi MD on 1/09/2025 at 12:07 PM         XR CHEST AP PORTABLE  (CPT=71045)   Final Result   PROCEDURE:  XR CHEST AP PORTABLE  (CPT=71045)       TECHNIQUE:  AP chest radiograph was obtained.       COMPARISON:  [facility] , XR, XR CHEST AP PORTABLE  (CPT=71045), 1/07/2025,    1:50 PM.  [facility] , XR, XR CHEST AP PORTABLE  (CPT=71045), 9/27/2023, 10:21    AM.       INDICATIONS:  hypoxia       PATIENT STATED HISTORY: (As transcribed by Technologist)  Patient offered    no additional history at this time.             FINDINGS:  Cardiac size is stable.  Hyperexpansion of the lungs.  Stable    left-sided power port.  Interstitial opacities bilaterally.  Minimal    bilateral basilar opacities.  No pneumothorax.                         =====   CONCLUSION:     1. Bilateral pulmonary opacities suggestive of pneumonia with underlying    edema.   2. Hyperexpansion of the lungs.             LOCATION:  [facility]                       Dictated by (CST): Emelyn Abdullahi MD on 1/09/2025 at 11:28 AM        Finalized by (CST): Emelyn Abdullahi MD on 1/09/2025 at 11:29 AM         CT BRAIN OR HEAD (CPT=70450)   Final Result   PROCEDURE:  CT BRAIN OR HEAD (09580)       COMPARISON:  [facility] , CT, CT STROKE BRAIN (NO IV)(CPT=70450), 10/11/2023,    6:38 AM.  [facility] , CT, CT BRAIN OR HEAD (50283), 4/15/2021, 7:41 PM.       INDICATIONS:  AMS       TECHNIQUE:  Noncontrast CT scanning is performed through the brain. Dose    reduction techniques were used. Dose information is transmitted to the ACR    (American  College of Radiology) NRDR (National Radiology Data Registry)    which includes the Dose Index    Registry.           FINDINGS:  Exam is limited by patient motion.  Multiple attempts were made    at obtaining diagnostic images.       VENTRICLES/SULCI:  Ventricles and sulci are mildly prominent and stable    consistent with mild stable atrophy.   INTRACRANIAL:  There are no abnormal extraaxial fluid collections.  There    is no midline shift.  Mild stable decreased attenuation in the    periventricular and subcortical deep white matter is nonspecific and    consistent with chronic small vessel ischemic    disease.  Intracranial atherosclerotic arterial vascular calcifications    are noted incidentally.  There is nothing specific for acute infarct.     There is no hemorrhage or mass lesion.     SINUSES:           No sign of acute sinusitis.     MASTOIDS:          No sign of acute inflammation.   SKULL:             No evidence for fracture or osseous abnormality.   OTHER:             Metallic artifact noted from the left globe.  Correlate    with history.                         =====   CONCLUSION:  Mild chronic small vessel ischemic disease and mild atrophy.     No acute intracranial hemorrhage, mass effect or midline shift is    identified.  No significant interval change from previous exams.               LOCATION:  [facility]           Dictated by (CST): Obed Michel MD on 1/08/2025 at 1:11 PM        Finalized by (CST): Obed Michel MD on 1/08/2025 at 1:16 PM         XR CHEST AP PORTABLE  (CPT=71045)   Final Result   PROCEDURE:  XR CHEST AP PORTABLE  (CPT=71045)       TECHNIQUE:  AP chest radiograph was obtained.       COMPARISON:  [facility] , XR, XR CHEST AP PORTABLE  (CPT=71045), 9/27/2023,    10:21 AM.  [facility] , XR, XR CHEST AP PORTABLE  (CPT=71045), 9/25/2023,    11:38 PM.       INDICATIONS:  hyperglycemia       PATIENT STATED HISTORY: (As transcribed by Technologist)  The patient    offered no additional  history at this point.             FINDINGS:  Cardiac size and pulmonary vasculature are within normal    limits. No pleural effusions. No pneumothorax.  Hyperexpansion of the    lungs.  Left-sided power port with tip overlying the expected location    SVC.  Atelectasis or scarring within the lung    bases.                             =====   CONCLUSION:     1. Hyperexpansion of the lungs.   2. Atelectasis or scarring within the lung bases.             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 Found to have DKA w elevated lactic acidosis and hyperkalemia. Nephrology consulted for acidosis and DAVID      Acidosis  -- due to DKA and lactic acidosis mostly.   -- improving with bicarbonate supplementations     DAVID on CKD3  -- Renal biopsy 7/31/2023 showed ATN with myoglobin casts with diabetic and hypertensive changes.   -- Cr 2.28mg/dL on admit  -- due to prerenal + urinary retention   -- UA w 3-5 RBCs, 50 protein, recheck when grande removed. Urine na 99, but after IVFs.   -- sp CTA w contrast.   -- hold entresto and shayne   -- avoid nephrotoxins and renally dose meds  -- diuretics as needed for volume overload     Hypophosphatemia  Hypokalemia  -- Replete and monitor per electrolyte protocol      Anemia  -- transfusion prn        Thank you for allowing me to participate in the care of this patient. Please do not hesitate to call with questions or concerns.     Justin Billings MD  [facility]  Nephrology

## 2025-01-12 NOTE — PROGRESS NOTES
Walk test conducted with the patient per MD order.   Oxygen saturation level at rest on room air was 87 to 91 percent. Oxygen saturation level with ambulation on room air was 83 to 85 percent. Oxygen saturation level at rest on 3 liters of oxygen per nasal cannula was 95 percent. Oxygen saturation level on 3 liters of oxygen per nasal cannula with ambulation was 92 percent.

## 2025-01-12 NOTE — PLAN OF CARE
Received pt at 1930. Pt is A&O x 4; follows commands, transfer from ICU once stabilized after coming to ER in DKA crisis. Pt is on RA, VSS, 1800 ADA minced/moist diet with thin liquids. Pt has grande for strict I & O d/r reported low output. Pt denies pain and ambulates with 1/2 assist and GB. IV access is patent currently SL. Shift assessment performed, HS sodium bicarb and reorientation given. NOC safety plan in place; bed in low position, bed alarm on, call light and personal items within reach, pt encouraged to call staff for assistance.

## 2025-01-12 NOTE — PROGRESS NOTES
[facility]   part of [facility] Hospitalist Progress Note     Anna Montalvo Patient Status:  Inpatient    1955 MRN [id number]   Location [facility] 3NW-A Attending Blanco Milian, DO   Hosp Day # 5 PCP Justin Newberry MD     Assessment & Plan:    69 year old female with history 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 Found to be in DKA.    DKA--> resolved  T2DM  Plan:  - Continue 6u Tresiba plus sliding scale     Leukocytosis - resolved  Bcx: NGTD  Plan:  - Finishing IV abx today, received 1 day of Zosyn, 4 days of CTX, stop today, no need for further abx     DAVID on CKD3  Baseline in the low to mid 1s  Plan:  - Near baseline, encourage po hydration      AMS  Likely 2/2 to Delirium  Near baseline after speaking w/   -CT brain pending --> no acute pathology   Plan:  - Delirium precautions   - Melatonin nightly    Acute respiratory failure with hypoxia  -CTA chest--> no PE, effusion present, ground glass opacities  -iv diuretics given   -iv abx completing today  -home o2 evaluation today     Hx Breast Ca  -con tamoxifen     Thrombocytopenia  - holding DVT ppx today    DVT Ppx: none today    Subjective:     Patient seen and examined this morning at bedside. Mildy confused, otherwise BG in range, no other acute events overnight. Spoke w/  who requested discharge tomorrow so he could get the house ready. Complaining of taste of food.    Vital signs:  Temp:  [97.9 °F (36.6 °C)-99.3 °F (37.4 °C)] 98.9 °F (37.2 °C)  Pulse:  [78-98] 98  Resp:  [18-24] 18  BP: (129-146)/(59-85) 146/85  SpO2:  [96 %-98 %] 96 %    Physical Exam:    General: No acute distress.   Respiratory: Clear to  auscultation bilaterally. No wheezes.  Cardiovascular: S1, S2. Regular rate and rhythm  Abdomen: Soft, nontender, nondistended.  Positive bowel sounds. No rebound or guarding.  Extremities: No edema.  Neuro:  Grossly non focal, no motor deficits.  AAOx2-3    Diagnostic Data:    Pertinent Labs and Imaging independently reviewed  Comments:  Cr improving  Bicarb 19  Leukocytosis resolved  Plts dropping    Blanco Milian D.O.  [facility] Centerist

## 2025-01-13 VITALS
DIASTOLIC BLOOD PRESSURE: 82 MMHG | SYSTOLIC BLOOD PRESSURE: 110 MMHG | HEIGHT: 65 IN | HEART RATE: 87 BPM | BODY MASS INDEX: 16.09 KG/M2 | WEIGHT: 96.56 LBS | OXYGEN SATURATION: 99 % | TEMPERATURE: 98 F | RESPIRATION RATE: 18 BRPM

## 2025-01-13 PROBLEM — D69.6 THROMBOCYTOPENIA: Status: RESOLVED | Noted: 2025-01-07 | Resolved: 2025-01-13

## 2025-01-13 PROBLEM — R94.31 ABNORMAL EKG: Status: RESOLVED | Noted: 2025-01-07 | Resolved: 2025-01-13

## 2025-01-13 PROBLEM — J96.01 ACUTE HYPOXIC RESPIRATORY FAILURE (HCC): Status: RESOLVED | Noted: 2025-01-10 | Resolved: 2025-01-13

## 2025-01-13 PROBLEM — J18.9 PNEUMONIA OF RIGHT LOWER LOBE DUE TO INFECTIOUS ORGANISM: Status: RESOLVED | Noted: 2025-01-10 | Resolved: 2025-01-13

## 2025-01-13 PROBLEM — E11.10 DIABETIC KETOACIDOSIS WITHOUT COMA ASSOCIATED WITH TYPE 2 DIABETES MELLITUS (HCC): Status: RESOLVED | Noted: 2021-04-15 | Resolved: 2025-01-13

## 2025-01-13 PROBLEM — E87.5 HYPERKALEMIA: Status: RESOLVED | Noted: 2023-07-24 | Resolved: 2025-01-13

## 2025-01-13 PROBLEM — D69.6 THROMBOCYTOPENIA (HCC): Status: RESOLVED | Noted: 2025-01-07 | Resolved: 2025-01-13

## 2025-01-13 PROBLEM — E10.10 DIABETIC KETOACIDOSIS WITHOUT COMA ASSOCIATED WITH TYPE 1 DIABETES MELLITUS (HCC): Status: RESOLVED | Noted: 2025-01-07 | Resolved: 2025-01-13

## 2025-01-13 PROBLEM — E87.20 LACTIC ACIDOSIS: Status: RESOLVED | Noted: 2023-07-24 | Resolved: 2025-01-13

## 2025-01-13 LAB
ALBUMIN SERPL-MCNC: 2.9 G/DL (ref 3.2–4.8)
ANION GAP SERPL CALC-SCNC: 8 MMOL/L (ref 0–18)
BASOPHILS # BLD AUTO: 0.02 X10(3) UL (ref 0–0.2)
BASOPHILS NFR BLD AUTO: 0.4 %
BUN BLD-MCNC: 22 MG/DL (ref 9–23)
CALCIUM BLD-MCNC: 8.7 MG/DL (ref 8.7–10.4)
CHLORIDE SERPL-SCNC: 105 MMOL/L (ref 98–112)
CO2 SERPL-SCNC: 22 MMOL/L (ref 21–32)
CREAT BLD-MCNC: 1.33 MG/DL
EGFRCR SERPLBLD CKD-EPI 2021: 43 ML/MIN/1.73M2 (ref 60–?)
EOSINOPHIL # BLD AUTO: 0.1 X10(3) UL (ref 0–0.7)
EOSINOPHIL NFR BLD AUTO: 1.8 %
ERYTHROCYTE [DISTWIDTH] IN BLOOD BY AUTOMATED COUNT: 14.2 %
GLUCOSE BLD-MCNC: 199 MG/DL (ref 70–99)
GLUCOSE BLD-MCNC: 200 MG/DL (ref 70–99)
GLUCOSE BLD-MCNC: 215 MG/DL (ref 70–99)
GLUCOSE BLD-MCNC: 264 MG/DL (ref 70–99)
GLUCOSE BLD-MCNC: 303 MG/DL (ref 70–99)
HCT VFR BLD AUTO: 27.8 %
HGB BLD-MCNC: 10.2 G/DL
IMM GRANULOCYTES # BLD AUTO: 0.21 X10(3) UL (ref 0–1)
IMM GRANULOCYTES NFR BLD: 3.7 %
LYMPHOCYTES # BLD AUTO: 1.29 X10(3) UL (ref 1–4)
LYMPHOCYTES NFR BLD AUTO: 22.6 %
MCH RBC QN AUTO: 34.7 PG (ref 26–34)
MCHC RBC AUTO-ENTMCNC: 36.7 G/DL (ref 31–37)
MCV RBC AUTO: 94.6 FL
MONOCYTES # BLD AUTO: 0.88 X10(3) UL (ref 0.1–1)
MONOCYTES NFR BLD AUTO: 15.4 %
NEUTROPHILS # BLD AUTO: 3.21 X10 (3) UL (ref 1.5–7.7)
NEUTROPHILS # BLD AUTO: 3.21 X10(3) UL (ref 1.5–7.7)
NEUTROPHILS NFR BLD AUTO: 56.1 %
OSMOLALITY SERPL CALC.SUM OF ELEC: 289 MOSM/KG (ref 275–295)
PHOSPHATE SERPL-MCNC: 2.2 MG/DL (ref 2.4–5.1)
PLATELET # BLD AUTO: 80 10(3)UL (ref 150–450)
PLATELETS.RETICULATED NFR BLD AUTO: 17 % (ref 0–7)
POTASSIUM SERPL-SCNC: 3.9 MMOL/L (ref 3.5–5.1)
RBC # BLD AUTO: 2.94 X10(6)UL
SODIUM SERPL-SCNC: 135 MMOL/L (ref 136–145)
WBC # BLD AUTO: 5.7 X10(3) UL (ref 4–11)

## 2025-01-13 RX ORDER — INSULIN DEGLUDEC 100 U/ML
7 INJECTION, SOLUTION SUBCUTANEOUS DAILY
Status: DISCONTINUED | OUTPATIENT
Start: 2025-01-14 | End: 2025-01-13

## 2025-01-13 RX ORDER — SODIUM BICARBONATE 325 MG/1
650 TABLET ORAL 2 TIMES DAILY
Status: DISCONTINUED | OUTPATIENT
Start: 2025-01-13 | End: 2025-01-13

## 2025-01-13 RX ORDER — INSULIN GLARGINE 100 [IU]/ML
7 INJECTION, SOLUTION SUBCUTANEOUS NIGHTLY
Qty: 2.1 ML | Refills: 0 | Status: SHIPPED | OUTPATIENT
Start: 2025-01-13 | End: 2025-02-12

## 2025-01-13 NOTE — PROGRESS NOTES
[facility]   part of [facility]    Nephrology Progress Note    Anna Montalvo Attending:  Fabian Miranda MD     Cc: DAVID, acidosis    SUBJECTIVE     No acute complaints.   Denies sob    PHYSICAL EXAM   Vital signs: /59 (BP Location: Left arm)   Pulse 93   Temp 98 °F (36.7 °C) (Oral)   Resp 18   Ht 5' 5\" (1.651 m)   Wt 96 lb 9 oz (43.8 kg)   LMP  (LMP Unknown)   SpO2 92%   BMI 16.07 kg/m²   Temp (24hrs), Av.5 °F (36.9 °C), Min:97.9 °F (36.6 °C), Max:99.2 °F (37.3 °C)       Intake/Output Summary (Last 24 hours) at 2025 1825  Last data filed at 2025 1140  Gross per 24 hour   Intake 190 ml   Output 575 ml   Net -385 ml     Wt Readings from Last 3 Encounters:   25 96 lb 9 oz (43.8 kg)   10/11/23 113 lb 5.1 oz (51.4 kg)   10/06/23 110 lb 3.2 oz (50 kg)     General: awake, confused  HEENT: NCAT  Cardiac: RRR  Lungs: no distress  Extremities: no edema  Neurologic: awake, alert  Skin: warm and dry     MEDS     Current Facility-Administered Medications   Medication Dose Route Frequency    acetaminophen (Tylenol) tab 650 mg  650 mg Oral Q6H PRN    atorvastatin (Lipitor) tab 10 mg  10 mg Oral Nightly    melatonin tab 3 mg  3 mg Oral Nightly    acetaminophen (Tylenol Extra Strength) tab 500 mg  500 mg Oral Q4H PRN    metoprolol succinate ER (Toprol XL) 24 hr tab 25 mg  25 mg Oral Daily Beta Blocker    hydrALAzine (Apresoline) 20 mg/mL injection 10 mg  10 mg Intravenous Q4H PRN    insulin aspart (NovoLOG) 100 Units/mL FlexPen 1-50 Units  1-50 Units Subcutaneous TID CC and HS    metoprolol (Lopressor) 5 mg/5mL injection 2.5 mg  2.5 mg Intravenous Q6H PRN    [Held by provider] enoxaparin (Lovenox) 30 MG/0.3ML SUBQ injection 30 mg  30 mg Subcutaneous Daily    insulin degludec (Tresiba) 100 units/mL flextouch 6 Units  6 Units Subcutaneous Daily    insulin aspart (NovoLOG) 100 Units/mL FlexPen 1-68 Units  1-68 Units Subcutaneous TID CC    sodium bicarbonate tab 650 mg  650 mg Oral QID     tamoxifen (Nolvadex) tab 20 mg  20 mg Oral Daily    escitalopram (Lexapro) tab 20 mg  20 mg Oral Daily    multivitamin (Tab-A-Edith/Beta Carotene) tab 1 tablet  1 tablet Oral Daily    glucose (Dex4) 15 GM/59ML oral liquid 15 g  15 g Oral Q15 Min PRN    Or    glucose (Glutose) 40% oral gel 15 g  15 g Oral Q15 Min PRN    Or    glucose-vitamin C (Dex-4) chewable tab 4 tablet  4 tablet Oral Q15 Min PRN    Or    dextrose 50% injection 50 mL  50 mL Intravenous Q15 Min PRN    Or    glucose (Dex4) 15 GM/59ML oral liquid 30 g  30 g Oral Q15 Min PRN    Or    glucose (Glutose) 40% oral gel 30 g  30 g Oral Q15 Min PRN    Or    glucose-vitamin C (Dex-4) chewable tab 8 tablet  8 tablet Oral Q15 Min PRN    melatonin tab 3 mg  3 mg Oral Nightly PRN    polyethylene glycol (PEG 3350) (Miralax) 17 g oral packet 17 g  17 g Oral Daily PRN    sennosides (Senokot) tab 17.2 mg  17.2 mg Oral Nightly PRN    bisacodyl (Dulcolax) 10 MG rectal suppository 10 mg  10 mg Rectal Daily PRN    ondansetron (Zofran) 4 MG/2ML injection 4 mg  4 mg Intravenous Q6H PRN       LABS     Lab Results   Component Value Date    WBC 10.5 01/12/2025    HGB 10.2 01/12/2025    HCT 28.5 01/12/2025    PLT 62.0 01/12/2025    CREATSERUM 1.27 01/12/2025    BUN 23 01/12/2025     01/12/2025    K 3.6 01/12/2025     01/12/2025    CO2 19.0 01/12/2025     01/12/2025    CA 8.4 01/12/2025    ALB 3.0 01/12/2025    PHOS 3.1 01/12/2025    PHOS 3.1 01/12/2025    PGLU 254 01/12/2025       IMAGING   All imaging studies personally reviewed.    CTA CHEST (CPT=71275)   Final Result   PROCEDURE:  CT ANGIOGRAPHY, CHEST (CPT=71275)       COMPARISON:  [facility] , XR, XR CHEST AP PORTABLE  (CPT=71045), 1/09/2025,    10:56 AM.       INDICATIONS:  rule out PE, hypoxic       TECHNIQUE:  IV contrast-enhanced multislice CT angiography is performed    through the pulmonary arterial anatomy. 3D volume renderings are    generated.  Dose reduction techniques were used. Dose information  is    transmitted to the ACR (American College of    Radiology) NRDR (National Radiology Data Registry) which includes the Dose    Index Registry.       PATIENT STATED HISTORY:(As transcribed by Technologist)  Patient is here    with SOB        CONTRAST USED:  80cc of Isovue 370       FINDINGS:     VASCULATURE:  No visible pulmonary arterial thrombus or attenuation.     THORACIC AORTA:  No aneurysm or visible dissection.     LUNGS:  Extensive ground-glass opacities are noted throughout the lungs    superimposed on emphysematous change.  Small bilateral pleural effusions    are present.   MEDIASTINUM:  No adenopathy or mass.     SHARYN:  No mass or adenopathy.     CARDIAC:  No enlargement, pericardial thickening, or significant coronary    artery calcification.   CHEST WALL:  No mass or axillary adenopathy.     LIMITED ABDOMEN:  Limited images of the upper abdomen are unremarkable.     BONES:  No bony lesion or fracture.     OTHER:  Negative.                           =====   CONCLUSION:     1. Ground-glass opacities throughout the lungs superimposed on    emphysematous change.   2. Small bilateral pleural effusions.   3. No evidence of pulmonary embolism.               LOCATION:  [facility]           Dictated by (CST): Richard Francis MD on 1/09/2025 at 1:44 PM        Finalized by (CST): Richard Francis MD on 1/09/2025 at 1:48 PM         US KIDNEYS (CPT=76775)   Final Result   PROCEDURE:  US KIDNEYS (CPT=76775)       COMPARISON:  [facility] , US, US KIDNEYS (CPT=76775), 9/29/2023, 10:46 AM.     [facility] , US, US ABDOMEN COMPLETE (CPT=76700), 9/27/2023, 4:38 PM.       INDICATIONS:  lindsay, urinary retention       TECHNIQUE:  Transabdominal gray scale ultrasound imaging of the bilateral    kidneys and bladder was performed.  Routine technique was utilized.         PATIENT STATED HISTORY: (As transcribed by Technologist)              FINDINGS:        RIGHT KIDNEY   MEASUREMENTS:  9.9 x 4.3 x 5.0 cm   ECHOGENICITY:  Normal.    HYDRONEPHROSIS:  None.   CYSTS/STONES/MASSES:  None.       LEFT KIDNEY    MEASUREMENTS:  7.0 x 4.8 x 4.1 cm   ECHOGENICITY:  Normal.   HYDRONEPHROSIS:  None.   CYSTS/STONES/MASSES:  None.       BLADDER:  Harvey catheter within the bladder.  Bladder is nondistended,    limiting evaluation.   OTHER:  Negative.                         =====   CONCLUSION:  No acute findings.           LOCATION:  [facility]                   Dictated by (CST): Emelyn Abdullahi MD on 1/09/2025 at 12:07 PM        Finalized by (CST): Emelyn Abdullahi MD on 1/09/2025 at 12:07 PM         XR CHEST AP PORTABLE  (CPT=71045)   Final Result   PROCEDURE:  XR CHEST AP PORTABLE  (CPT=71045)       TECHNIQUE:  AP chest radiograph was obtained.       COMPARISON:  [facility] , XR, XR CHEST AP PORTABLE  (CPT=71045), 1/07/2025,    1:50 PM.  [facility] , XR, XR CHEST AP PORTABLE  (CPT=71045), 9/27/2023, 10:21    AM.       INDICATIONS:  hypoxia       PATIENT STATED HISTORY: (As transcribed by Technologist)  Patient offered    no additional history at this time.             FINDINGS:  Cardiac size is stable.  Hyperexpansion of the lungs.  Stable    left-sided power port.  Interstitial opacities bilaterally.  Minimal    bilateral basilar opacities.  No pneumothorax.                         =====   CONCLUSION:     1. Bilateral pulmonary opacities suggestive of pneumonia with underlying    edema.   2. Hyperexpansion of the lungs.             LOCATION:  [facility]                       Dictated by (CST): Emelyn Abdullahi MD on 1/09/2025 at 11:28 AM        Finalized by (CST): Emelyn Abdullahi MD on 1/09/2025 at 11:29 AM         CT BRAIN OR HEAD (CPT=70450)   Final Result   PROCEDURE:  CT BRAIN OR HEAD (58333)       COMPARISON:  [facility] , CT, CT STROKE BRAIN (NO IV)(CPT=70450), 10/11/2023,    6:38 AM.  [facility] , CT, CT BRAIN OR HEAD (39326), 4/15/2021, 7:41 PM.       INDICATIONS:  AMS       TECHNIQUE:  Noncontrast CT scanning is performed through the brain. Dose    reduction  techniques were used. Dose information is transmitted to the ACR    (American College of Radiology) NRDR (National Radiology Data Registry)    which includes the Dose Index    Registry.           FINDINGS:  Exam is limited by patient motion.  Multiple attempts were made    at obtaining diagnostic images.       VENTRICLES/SULCI:  Ventricles and sulci are mildly prominent and stable    consistent with mild stable atrophy.   INTRACRANIAL:  There are no abnormal extraaxial fluid collections.  There    is no midline shift.  Mild stable decreased attenuation in the    periventricular and subcortical deep white matter is nonspecific and    consistent with chronic small vessel ischemic    disease.  Intracranial atherosclerotic arterial vascular calcifications    are noted incidentally.  There is nothing specific for acute infarct.     There is no hemorrhage or mass lesion.     SINUSES:           No sign of acute sinusitis.     MASTOIDS:          No sign of acute inflammation.   SKULL:             No evidence for fracture or osseous abnormality.   OTHER:             Metallic artifact noted from the left globe.  Correlate    with history.                         =====   CONCLUSION:  Mild chronic small vessel ischemic disease and mild atrophy.     No acute intracranial hemorrhage, mass effect or midline shift is    identified.  No significant interval change from previous exams.               LOCATION:  [facility]           Dictated by (CST): Obed Michel MD on 1/08/2025 at 1:11 PM        Finalized by (CST): Obed Michel MD on 1/08/2025 at 1:16 PM         XR CHEST AP PORTABLE  (CPT=71045)   Final Result   PROCEDURE:  XR CHEST AP PORTABLE  (CPT=71045)       TECHNIQUE:  AP chest radiograph was obtained.       COMPARISON:  [facility] , XR, XR CHEST AP PORTABLE  (CPT=71045), 9/27/2023,    10:21 AM.  [facility] , XR, XR CHEST AP PORTABLE  (CPT=71045), 9/25/2023,    11:38 PM.       INDICATIONS:  hyperglycemia       PATIENT STATED  HISTORY: (As transcribed by Technologist)  The patient    offered no additional history at this point.             FINDINGS:  Cardiac size and pulmonary vasculature are within normal    limits. No pleural effusions. No pneumothorax.  Hyperexpansion of the    lungs.  Left-sided power port with tip overlying the expected location    SVC.  Atelectasis or scarring within the lung    bases.                             =====   CONCLUSION:     1. Hyperexpansion of the lungs.   2. Atelectasis or scarring within the lung bases.             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 Found to have DKA w elevated lactic acidosis and hyperkalemia. Nephrology consulted for acidosis and DAVID      Acidosis  -- due to DKA and lactic acidosis mostly.   -- improving with bicarbonate supplementations     DAVID on CKD3  -- Renal biopsy 7/31/2023 showed ATN with myoglobin casts with diabetic and hypertensive changes.   -- Cr 2.28mg/dL on admit -sCr improving  -- due to prerenal + urinary retention   -- UA w 3-5 RBCs, 50 protein, recheck when grande removed. Urine na 99, but after IVFs.   -- sp CTA w contrast.   -- hold entresto and shayne   -- avoid nephrotoxins and renally dose meds  -- diuretics as needed for volume overload     Hypophosphatemia  Hypokalemia  -- Replete and monitor per electrolyte protocol      Anemia  -- transfusion prn        Thank you for allowing me to participate in the care of this patient. Please do not hesitate to call with questions or concerns.     Justin  MD Awa  [facility]  Nephrology

## 2025-01-13 NOTE — PHYSICAL THERAPY NOTE
PHYSICAL THERAPY TREATMENT NOTE - INPATIENT    Room Number: 330/330-A     Session: 1/5     Number of Visits to Meet Established Goals: 5    Presenting Problem: DKA, thrombocytopenia  Co-Morbidities : DM, h/o acoustic neuroma with L sided facial droop,h/o  breast cancer, anxiety, retinopathy, PVD, HF, osteoporosis, HTN    PHYSICAL THERAPY MEDICAL/SOCIAL HISTORY  History related to current admission: Patient is a 69 year old female admitted on 1/7/2025 from home for ams, tachypnic and elevated glucose.  Pt diagnosed with DKA.     HOME SITUATION  Type of Home: Townhouse  Home Layout: One level           Stairs to Bedroom: 0         Lives With: Significant other    Drives: No           Prior Level of [place]: Pt lives w/ her significant other in a one level townhouse.  Pt is typically independent w/ gait w/o device and adl's.  Pt does not drive, but her SO does.    PHYSICAL THERAPY ASSESSMENT   Patient demonstrates fair progress this session, goals  remain in progress.      Patient is requiring minimal assist as a result of the following impairments: decreased functional strength, decreased endurance/aerobic capacity, pain, impaired static/dynamic standing balance, and decreased muscular endurance.     Patient continues to function below baseline with transfers, gait, and standing prolonged periods.  Next session anticipate patient to progress gait and standing prolonged periods.  Physical Therapy will continue to follow patient for duration of hospitalization.    Patient continues to benefit from continued skilled PT services: to promote return to prior level of function and safety with continuous assistance and gradual rehabilitative therapy .    PLAN DURING HOSPITALIZATION  Nursing Mobility Recommendation : 1 Assist  PT Device Recommendation:  (Undetermined)  PT Treatment Plan: Bed mobility;Patient education;Family education;Gait training;Strengthening;Transfer training;Balance training  Frequency (Obs):  3-5x/week     CURRENT GOALS     Goal #1 Patient is able to demonstrate supine - sit EOB @ level: modified independent      Goal #2 Patient is able to demonstrate transfers EOB to/from BSC at assistance level: modified independent      Goal #3 Patient is able to ambulate 200 feet with assist device:  least restrictive  at assistance level: supervision      Goal #4     Goal #5     Goal #6     Goal Comments: Goals established on 1/10/2025-all in progress 1/13/25    SUBJECTIVE  \"I need to keep moving\"    OBJECTIVE  Precautions: Bed/chair alarm (slurred speech at times due to L facial droop)    WEIGHT BEARING RESTRICTION     PAIN ASSESSMENT   Rating: Unable to rate  Location: L eye/face  Management Techniques: Activity promotion;Body mechanics;Repositioning;Relaxation;Other (Comment) (ice/heat as tolerated)    BALANCE                                                                                                                       Static Sitting: Fair  Dynamic Sitting: Fair           Static Standing: Poor +  Dynamic Standing: Poor +    ACTIVITY TOLERANCE                         O2 WALK  Oxygen Therapy  SPO2% on Room Air at Rest: 96  SPO2% on Oxygen at Rest: 98  At rest oxygen flow (liters per minute): 1  SPO2% Ambulation on Room Air:  (83-93)    AM-PAC '6-Clicks' INPATIENT SHORT FORM - BASIC MOBILITY  How much difficulty does the patient currently have...  Patient Difficulty: Turning over in bed (including adjusting bedclothes, sheets and blankets)?: A Little   Patient Difficulty: Sitting down on and standing up from a chair with arms (e.g., wheelchair, bedside commode, etc.): A Little   Patient Difficulty: Moving from lying on back to sitting on the side of the bed?: A Little   How much help from another person does the patient currently need...   Help from Another: Moving to and from a bed to a chair (including a wheelchair)?: A Little   Help from Another: Need to walk in hospital room?: A Little   Help from  Another: Climbing 3-5 steps with a railing?: A Little     AM-PAC Score:  Raw Score: 18   Approx Degree of Impairment: 46.58%   Standardized Score (AM-PAC Scale): 43.63   CMS Modifier (G-Code): CK    FUNCTIONAL ABILITY STATUS  Gait Assessment   Functional Mobility/Gait Assessment  Gait Assistance: Minimum assistance  Distance (ft): 45;10  Assistive Device: Rolling walker  Pattern: Shuffle;L Foot flat;R Foot flat;L Flexed knee;R Flexed knee;Comment (WBOS, mild post lean; cues for safe positioning using RW)    Skilled Therapy Provided  Pt presents to PT sitting up in BSC. Pt is agreeable to participate, however reporting she is tired.  Sit/stand c min A and RW. Pt able to amb 45' c RW and min A, however demo's the above gait deviations c continual cues to maintain safe use of RW and navigation using RW.  Pt initially amb without O2 per her request and reporting no SOB. Following amb, she returned to BS and sat c min A. Rested for several min and O2 was 83-93% while on RA. Sit/stand again c min A and RW, pt able to amb 10' c min A, however increased assist required to safely navigate RW within her room. Sats were WFL while on RA and amb, however as soon as she returned to sitting they dropped into the 80's. Pt requested to t/f to her bed. Sat c min A and returned to supine c min A. Alarm intact and RN aware of session.    Bed Mobility:  Rolling: NT   Supine<>Sit: NT   Sit<>Supine: min A     Transfer Mobility:  Sit<>Stand: min A   Stand<>Sit: min A   Gait: min Ax45'; 10' c RW    Therapist's Comments: monitor O2 sats and cues for safe use of RW      THERAPEUTIC EXERCISES  Lower Extremity Alternating marching  Knee extension     Upper Extremity See note     Position Sitting     Repetitions   5-10   Sets   1     Patient End of Session: In bed;Needs met;Call light within reach;RN aware of session/findings;All patient questions and concerns addressed;Hospital anti-slip socks;Alarm set    PT Session Time: 30 minutes  Gait  Training: 15 minutes  Therapeutic Activity: 10 minutes  Therapeutic Exercise: 3 minutes

## 2025-01-13 NOTE — OCCUPATIONAL THERAPY NOTE
OCCUPATIONAL THERAPY TREATMENT NOTE - INPATIENT     Room Number: 330/330-A  Session: 1   Number of Visits to Meet Established Goals: 5    Presenting Problem: DKA    HOME SITUATION  Type of Home: Townhouse  Home Layout: One level  Lives With: Significant other (Brendan)     Shower/Tub and Equipment: Tub-shower combo     Drives: No     Prior Level of Function: Lives with her SO , Brendan, in a 1 level townhouse . She is typically independent. He assists her with bathing and IADLs. Patient denied any recent falls and reported that she is normally able to go to the kitchen and get herself a snack.   ASSESSMENT   Patient demonstrates good  progress this session, goals remain in progress.    Patient continues to function below baseline with toileting, lower body dressing, bed mobility, transfers, static sitting balance, dynamic sitting balance, static standing balance, dynamic standing balance, maintaining seated position, functional standing tolerance, energy conservation strategies, and aerobic capacity.   Contributing factors to remaining limitations include decreased functional strength, decreased functional reach, decreased endurance, and impaired standing balance.  Next session anticipate patient to progress toileting.  Occupational Therapy will continue to follow patient for duration of hospitalization.    Patient continues to benefit from continued skilled OT services: to promote return to prior level of function and safety with continuous assistance and gradual rehabilitative therapy.     History: Patient is a 69 year old female admitted on 1/7/2025 with Presenting Problem: DKA. Co-Morbidities : DM, h/o acoustic neuroma with L sided facial droop,h/o  breast cancer, anxiety, retinopathy, PVD, HF, osteoporosis, HTN     WEIGHT BEARING RESTRICTION       Recommendations for nursing staff:   Transfers: up x 1 assist, Yoko, RW  Toileting location: Toilet    TREATMENT SESSION:  Patient Start of Session: seated upright in  chair    FUNCTIONAL TRANSFER ASSESSMENT  Sit to Stand: Chair  Chair: Minimal Assist    BED MOBILITY  Supine to Sit : Not tested  Sit to Supine (OT): Supervision  Scooting: max assist    BALANCE ASSESSMENT  Static Sitting: Supervision  Static Standing: Minimal Assist    FUNCTIONAL ADL ASSESSMENT  LB Dressing Seated: Maximum Assist    ACTIVITY TOLERANCE: Pt participated in dynamic standing activity to increase tolerance for standing ADLs, pt tolerated < 5 min dynamic standing activity with use of RW and Yoko, required tactile cues for guidance of RW during activity.    Pulse: 104        BP: 121/60  BP Location: Left arm  BP Method: Automatic  Patient Position: Sitting    O2 SATURATIONS  Oxygen Therapy  SPO2% on Oxygen at Rest: 94  SPO2% Ambulation on Room Air: 83    EDUCATION PROVIDED  Patient Education : Role of Occupational Therapy; Plan of Care; Functional Transfer Techniques; Fall Prevention; Posture/Positioning; Energy Conservation; Proper Body Mechanics  Patient's Response to Education: Verbalized Understanding; Requires Further Education    Equipment used: RW  Would benefit from additional trial yes     Exercises:    Exercises Repetitions Comments   Scapular elevation     Scapular retraction     Shoulder rolls     Shoulder flexion     Shoulder abduction     Shoulder internal/external rotation     Forward punch     Elbow flexion 10    Elbow extension     Forearm pronation/supination     Wrist flexion/extension     Gross grasp/fist pumps 10    Ankle pumps     Knee extension     Marching       UPPER EXTREMITY  ROM: RUE WFL;  decreased L shoulder end range AROM for flexion  Strength: within functional limits RUE 5/5, LUE4+/5   Coordination  Gross motor: grossly intact  Fine motor: grossly intact  Sensation: denied new deficits, has UE neuropathy at baseline    Therapist comments: Pt received sitting upright in chair, pt oriented to self and place only, unable to state correct month or year. Pt completed sit <>  stand from chair with Yoko, tolerated < 5 min dynamic standing activity, requesting to go sit back in chair. Pt demonstrated ability to simulate toilet transfer during sit <> stand from chair with Yoko. Pt declining use of bathroom this session. Pt returned to semi supine in bed at end of session.     Patient End of Session: In bed;Needs met;Call light within reach;RN aware of session/findings;All patient questions and concerns addressed;Hospital anti-slip socks;Alarm set    SUBJECTIVE  \"I like looking at your eyes\"    PAIN ASSESSMENT  Ratin  Location: L side of face  Management Techniques: Repositioning;Relaxation;Breathing techniques     OBJECTIVE  Precautions: Bed/chair alarm (slurred speech at times due to L facial droop)    AM-PAC ‘6-Clicks’ Inpatient Daily Activity Short Form  -   Putting on and taking off regular lower body clothing?: A Lot  -   Bathing (including washing, rinsing, drying)?: A Lot  -   Toileting, which includes using toilet, bedpan or urinal? : A Lot  -   Putting on and taking off regular upper body clothing?: A Little  -   Taking care of personal grooming such as brushing teeth?: A Little  -   Eating meals?: A Little    AM-PAC Score:  Score: 15  Approx Degree of Impairment: 56.46%  Standardized Score (AM-PAC Scale): 34.69    PLAN  OT Device Recommendations: Transfer tub bench;Grab bars  OT Treatment Plan: Functional transfer training;UE strengthening/ROM;Endurance training;Cognitive reorientation;ADL training;Energy conservation/work simplification techniques;Patient/Family training;Patient/Family education;Equipment eval/education;Compensatory technique education  Rehab Potential : Good  Frequency: 3-5x/week    OT Goals: ADL Goals all goals ongoing as of 25  Patient will perform grooming: with supervision and while standing at sink  Patient will perform upper body dressing:  with setup and while in chair  Patient will perform lower body dressing:  with min assist  Patient will  perform toileting: with min assist     Functional Transfer Goals  Patient will transfer from sit to supine:  with supervision  Patient will transfer from supine to sit:  with supervision  Patient will transfer to toilet:  with min assist     UE Exercise Program Goal  Patient will be supervision with bilateral AROM HEP (home exercise program).     Therapist Goals  Cognitive screening such as SLUMS or Short Blessed Test       OT Session Time: 20 minutes  Self-Care Home Management: 20 minutes

## 2025-01-13 NOTE — SLP NOTE
SPEECH DAILY NOTE - INPATIENT    ASSESSMENT & PLAN   ASSESSMENT  Pt seen for dysphagia tx to assess tolerance with recommended diet, ensure proper utilization of aspiration precautions and provide pt/family education.  Patient alert and up in bedside chair. She reports she ate an unrestricted diet consistency at home prior to admission. She notes no significant dysphagia since surgery for acoustic neuroma 5 years ago with associated left facial weakness. She denied any significant pocketing. Patient very motivated to upgrade her diet as she feels she has improved from when she was initially evaluated by SLP. Patient able to self present thin liquids by cup and straw and solid trials. She demonstrated intact oral retrieval and containment with all trials. Oral prep and transit adequate. Mastication of solids prolonged due to weakness and lack of dentition but she managed well. She adjusted her rate of self presentation to match timing of oral clearance prior to self presenting subsequent po trials.     Recommend advance to baseline diet of regular consistency solids allowing patient to chose food options and optimize po acceptance. Recommend up to chair for all meals. Continue aspiration precautions. Patient in agreement. Updated RN.     Diet Recommendations - Solids: Regular (patient to choose softer foods she is able to tolerate)  Diet Recommendations - Liquids: Thin Liquids (straw to RIGHT side of mouth)    Compensatory Strategies Recommended: To right;Extra sauce/gravy  Aspiration Precautions: Upright position;Small bites;Small sips  Medication Administration Recommendations: One pill at a time;Whole in puree (or with liquids as tolerated)    Patient Experiencing Pain: No                Treatment Plan  Treatment Plan/Recommendations: Aspiration precautions;Dysphagia therapy    Interdisciplinary Communication: Discussed with RN            GOALS  Goal #1 The patient will tolerate regular consistency and thin  liquids without overt signs or symptoms of aspiration with 80 % accuracy over 1-2 session(s).  In Progress   Goal #2 The patient/family/caregiver will demonstrate understanding and implementation of aspiration precautions and swallow strategies independently over 1-2 session(s).     In Progress   Goal #3 The patient will tolerate trial upgrade of soft/bite sized consistency and N/A liquids without overt signs or symptoms of aspiration with 90 % accuracy over 1-2 session(s).  Met     FOLLOW UP  Follow Up Needed (Documentation Required): Yes  SLP Follow-up Date: 01/14/25  Duration: 1 week    Session: 1    If you have any questions, please contact Poli Garcia MS [facility]-SLP  Spectra [phone]

## 2025-01-13 NOTE — PLAN OF CARE
Problem: Delirium  Goal: Minimize duration of delirium  Description: Interventions:  - Encourage use of hearing aids, eye glasses  - Promote highest level of mobility daily  - Provide frequent reorientation  - Promote wakefulness i.e. lights on, blinds open  - Promote sleep, encourage patient's normal rest cycle i.e. lights off, TV off, minimize noise and interruptions  - Encourage family to assist in orientation and promotion of home routines  Outcome: Progressing     Problem: CARDIOVASCULAR - ADULT  Goal: Maintains optimal cardiac output and hemodynamic stability  Description: INTERVENTIONS:  - Monitor vital signs, rhythm, and trends  - Monitor for bleeding, hypotension and signs of decreased cardiac output  - Evaluate effectiveness of vasoactive medications to optimize hemodynamic stability  - Monitor arterial and/or venous puncture sites for bleeding and/or hematoma  - Assess quality of pulses, skin color and temperature  - Assess for signs of decreased coronary artery perfusion - ex. Angina  - Evaluate fluid balance, assess for edema, trend weights  Outcome: Progressing  Goal: Absence of cardiac arrhythmias or at baseline  Description: INTERVENTIONS:  - Continuous cardiac monitoring, monitor vital signs, obtain 12 lead EKG if indicated  - Evaluate effectiveness of antiarrhythmic and heart rate control medications as ordered  - Initiate emergency measures for life threatening arrhythmias  - Monitor electrolytes and administer replacement therapy as ordered  Outcome: Progressing     Problem: RESPIRATORY - ADULT  Goal: Achieves optimal ventilation and oxygenation  Description: INTERVENTIONS:  - Assess for changes in respiratory status  - Assess for changes in mentation and behavior  - Position to facilitate oxygenation and minimize respiratory effort  - Oxygen supplementation based on oxygen saturation or ABGs  - Provide Smoking Cessation handout, if applicable  - Encourage broncho-pulmonary hygiene including  cough, deep breathe, Incentive Spirometry  - Assess the need for suctioning and perform as needed  - Assess and instruct to report SOB or any respiratory difficulty  - Respiratory Therapy support as indicated  - Manage/alleviate anxiety  - Monitor for signs/symptoms of CO2 retention  Outcome: Progressing     Problem: METABOLIC/FLUID AND ELECTROLYTES - ADULT  Goal: Glucose maintained within prescribed range  Description: INTERVENTIONS:  - Monitor Blood Glucose as ordered  - Assess for signs and symptoms of hyperglycemia and hypoglycemia  - Administer ordered medications to maintain glucose within target range  - Assess barriers to adequate nutritional intake and initiate nutrition consult as needed  - Instruct patient on self management of diabetes  Outcome: Progressing  Goal: Electrolytes maintained within normal limits  Description: INTERVENTIONS:  - Monitor labs and rhythm and assess patient for signs and symptoms of electrolyte imbalances  - Administer electrolyte replacement as ordered  - Monitor response to electrolyte replacements, including rhythm and repeat lab results as appropriate  - Fluid restriction as ordered  - Instruct patient on fluid and nutrition restrictions as appropriate  Outcome: Progressing  Goal: Hemodynamic stability and optimal renal function maintained  Description: INTERVENTIONS:  - Monitor labs and assess for signs and symptoms of volume excess or deficit  - Monitor intake, output and patient weight  - Monitor urine specific gravity, serum osmolarity and serum sodium as indicated or ordered  - Monitor response to interventions for patient's volume status, including labs, urine output, blood pressure (other measures as available)  - Encourage oral intake as appropriate  - Instruct patient on fluid and nutrition restrictions as appropriate  Outcome: Progressing     Problem: SKIN/TISSUE INTEGRITY - ADULT  Goal: Skin integrity remains intact  Description: INTERVENTIONS  - Assess and  document risk factors for pressure ulcer development  - Assess and document skin integrity  - Monitor for areas of redness and/or skin breakdown  - Initiate interventions, skin care algorithm/standards of care as needed  Outcome: Progressing     Problem: NEUROLOGICAL - ADULT  Goal: Achieves stable or improved neurological status  Description: INTERVENTIONS  - Assess for and report changes in neurological status  - Initiate measures to prevent increased intracranial pressure  - Maintain blood pressure and fluid volume within ordered parameters to optimize cerebral perfusion and minimize risk of hemorrhage  - Monitor temperature, glucose, and sodium. Initiate appropriate interventions as ordered  Outcome: Progressing     Problem: Impaired Swallowing  Goal: Minimize aspiration risk  Description: Interventions:  - Patient should be alert and upright for all feedings (90 degrees preferred)  - Offer food and liquids at a slow rate  - No straws  - Encourage small bites of food and small sips of liquid  - Offer pills one at a time, crush or deliver with applesauce as needed  - Discontinue feeding and notify MD (or speech pathologist) if coughing or persistent throat clearing or wet/gurgly vocal quality is noted  Outcome: Progressing   The patient is alert and oriented x 3, and her vital signs are stable. Dr. Milian notified that the patient has not been to void since her Harvey catheter was removed at 1140 today, orders received from Dr. Milian.

## 2025-01-13 NOTE — CM/SW NOTE
CM met w/ pt to provide HHC choices and discuss home oxygen. Pt anxious to DC home. She would prefer to DC without oxygen. Discussed that according to testing 1/12, she would qualify/ need oxygen at home. D/w RN that pt would prefer to not have home oxygen. RN/PT planning on repeating o2 walk today.     Reviewed HHC choices w/ pt. PT requests to have her sps review options to assist her with choosing agency.     D/w pt that CM will follow up w/ pt/sps prior to DC.    Addendum 1355: Home o2 eval completed. Pt was 93% on room air with ambulation. No need for home oxygen identified. However, following return to bed, pt desaturating to 87% on room air.     During PT session this PM, anticipated therapy need changed to gradual rehab. D/w pt. Pt is agreeable to dc to [facility] if felt appropriate. [facility] referrals sent via [facility], PASRR screen completed and attached to referral. Pt previously had stay at [facility] in [place]. Pt indicated she would be happy to return there as \"they know her.\"  Pt's life-partner Bud called to discuss. Guston will call CM back.     1500: Call received from pt's life-partner Bud. Options for [facility] reviewed with Brendan. He is agreeable to any location available but voiced it is Anna's choice. 1615: Anna provided list of available [facility] facilities. [facility] chosen. Pt informed she would be in a semi-private room, which she is okay with. Brendan called and updated on dc planning. He will be here before DC. CM will provide address of location for Guston to visit.     [facility] transport scheduled for 6:45 . PCS form completed and available for RN.     RN to call report to receiving RN at [facility] Phone [phone]     LONNIE PhilipN, CMSRN    [id number]

## 2025-01-13 NOTE — DIETARY MALNUTRITION NOTE
[facility]   part of [facility]  NUTRITION ASSESSMENT    Pt meets moderate malnutrition criteria at this time.    CRITERIA FOR MALNUTRITION DIAGNOSIS:  Criteria for non-severe malnutrition diagnosis: chronic illness related to body fat mild depletion and muscle mass mild depletion    NUTRITION INTERVENTION:    RD nutrition Care Plan- See RD nutrition assessment for additional recommendations  Meal and Snacks - monitor patient po intake. Encourage adequate po of appropriate diet.  Medical Food Supplements - RD added Glucerna BID once diet advanced. Rationale/use for oral supplements discussed.  Vitamin and Mineral Supplements - Recommend adding Multivitamin with minerals  Coordination of Nutrition Care - Recommend Diabetes nurse specialist consult for further DM management    PATIENT STATUS: Up in chair, dislikes minced and moist consistency, SLP recommends general thin as of this afternoon. Added Glucerna BID. RD to follow.  1/8--69 year old female admitted on 1/7 presents with DKA. Pt screened d/t low BMI and elevated A1c 11.1%. Visited pt at bedside but pt was lethargic and unable to participate in interview. Per H&P, pt reportedly with decreased appetite/PO intake for a couple days PTA and had not been taking her insulin. Pt remains NPO per DKA protocol. No GI symptoms noted. No chewing or swallowing difficulties and NKFA. Will order ONS once diet advanced and offer education if appropriate.    PMH: acoustic neuroma with left facial droop after surgery, anxiety, diabetic retinopathy, history of type 1 diabetes, hyperlipidemia, hypertension, osteoporosis, history of pancreatitis, peripheral vascular disease, right breast cancer, heart failure preserved EF, nonobstructive coronary disease     ANTHROPOMETRICS:  Ht: 165.1 cm (5' 5\")  Wt: 43.8 kg (96 lb 9 oz).   BMI: Body mass index is 16.07 kg/m².  IBW: 56.8 kg    WEIGHT HISTORY: Per chart review, pt with ~5 lb wt loss x 2 months (5%, not significant per  standards).  Patient Weight(s) for the past 336 hrs:   Weight   01/11/25 0000 43.8 kg (96 lb 9 oz)   01/10/25 0000 48.2 kg (106 lb 4.2 oz)   01/09/25 0012 46.1 kg (101 lb 10.1 oz)   01/08/25 0400 44.3 kg (97 lb 10.6 oz)   01/07/25 1325 47.2 kg (104 lb)       Wt Readings from Last 5 Encounters:   01/11/25 43.8 kg (96 lb 9 oz)   10/11/23 51.4 kg (113 lb 5.1 oz)   10/06/23 50 kg (110 lb 3.2 oz)   08/12/23 57 kg (125 lb 10.6 oz)   10/06/22 46.4 kg (102 lb 6.4 oz)   Per Care Everywhere:  11/19/24 109 lb 12.8 oz (49.8 kg)  10/15/24 106 lb 14.4 oz (48.5 kg)  09/09/24 107 lb (48.5 kg)  06/25/24 107 lb (48.5 kg)  06/20/24 109 lb (49.4 kg)  05/21/24 107 lb 6.4 oz (48.7 kg)     NUTRITION:  Diet:       Procedures    Carbohydrate controlled diet 1800 kcal/60 grams; Fluid Consistency: Thin Liquids ; Texture Consistency: Ground / Minced & Moist; Is Patient on Accuchecks? Yes      Food Allergies: No  Cultural/Ethnic/Yarsani Preferences Addressed: Yes    Percent Meals Eaten (last 3 days)       Date/Time Percent Meals Eaten (%)    01/11/25 1719 0 %    01/13/25 0911 50 %            GI SYSTEM REVIEW: WNL  Skin/Wounds: WNL    NUTRITION RELATED PHYSICAL FINDINGS:     1. Body Fat/Muscle Mass: moderate depletion body fat Buccal fat pad and Triceps and moderate muscle depletion Temple region and Clavicle region     2. Fluid Accumulation: none per RN documentation    NUTRITION PRESCRIPTION:  47.2 kg Admit Wt  Calories: 7318-2860 calories/day (30-35 kcal/kg)  Protein: 57-71 grams protein/day (1.2-1.5 gm/kg)  Fluid: ~1 ml/kcal or per MD discretion    NUTRITION DIAGNOSIS/PROBLEM:  Malnutrition related to physiological causes as evidenced by loss of fat mass, loss of muscle mass, and low BMI    MONITOR AND EVALUATE/NUTRITION GOALS:  Weight stable within 1 to 2 lbs during admission - New  Return to PO intake or advance diet in 24-48 hrs - New      MEDICATIONS:  Novolog, tresiba, MVI, Na bicarb    LABS:  , Na 135, Phos 1.4, A1c =  11.1%    Pt is at Moderate nutrition risk      Lizz Valdes RD, LDN  Clinical Nutrition  [id number]

## 2025-01-13 NOTE — PROGRESS NOTES
[facility]   part of [facility]    Nephrology Progress Note    Anna Montalvo Attending:  Fabian Miranda MD     Cc: DAVID, acidosis    SUBJECTIVE     No acute complaints.     PHYSICAL EXAM   Vital signs: /64 (BP Location: Left arm)   Pulse 83   Temp 98.4 °F (36.9 °C) (Oral)   Resp 18   Ht 5' 5\" (1.651 m)   Wt 96 lb 9 oz (43.8 kg)   LMP  (LMP Unknown)   SpO2 95%   BMI 16.07 kg/m²   Temp (24hrs), Av.7 °F (37.1 °C), Min:98 °F (36.7 °C), Max:99.6 °F (37.6 °C)       Intake/Output Summary (Last 24 hours) at 2025 1350  Last data filed at 2025 0911  Gross per 24 hour   Intake 300 ml   Output 0 ml   Net 300 ml     Wt Readings from Last 3 Encounters:   25 96 lb 9 oz (43.8 kg)   10/11/23 113 lb 5.1 oz (51.4 kg)   10/06/23 110 lb 3.2 oz (50 kg)     General: no acute distress  HEENT: NCAT  Cardiac: RRR  Lungs: no distress  Extremities: no edema  Neurologic: awake, alert  Skin: warm and dry     MEDS     Current Facility-Administered Medications   Medication Dose Route Frequency    [START ON 2025] insulin degludec (Tresiba) 100 units/mL flextouch 7 Units  7 Units Subcutaneous Daily    sodium bicarbonate tab 650 mg  650 mg Oral BID    acetaminophen (Tylenol) tab 650 mg  650 mg Oral Q6H PRN    atorvastatin (Lipitor) tab 10 mg  10 mg Oral Nightly    melatonin tab 3 mg  3 mg Oral Nightly    acetaminophen (Tylenol Extra Strength) tab 500 mg  500 mg Oral Q4H PRN    metoprolol succinate ER (Toprol XL) 24 hr tab 25 mg  25 mg Oral Daily Beta Blocker    hydrALAzine (Apresoline) 20 mg/mL injection 10 mg  10 mg Intravenous Q4H PRN    insulin aspart (NovoLOG) 100 Units/mL FlexPen 1-50 Units  1-50 Units Subcutaneous TID CC and HS    metoprolol (Lopressor) 5 mg/5mL injection 2.5 mg  2.5 mg Intravenous Q6H PRN    [Held by provider] enoxaparin (Lovenox) 30 MG/0.3ML SUBQ injection 30 mg  30 mg Subcutaneous Daily    insulin aspart (NovoLOG) 100 Units/mL FlexPen 1-68 Units  1-68 Units Subcutaneous TID  CC    tamoxifen (Nolvadex) tab 20 mg  20 mg Oral Daily    escitalopram (Lexapro) tab 20 mg  20 mg Oral Daily    multivitamin (Tab-A-Edith/Beta Carotene) tab 1 tablet  1 tablet Oral Daily    glucose (Dex4) 15 GM/59ML oral liquid 15 g  15 g Oral Q15 Min PRN    Or    glucose (Glutose) 40% oral gel 15 g  15 g Oral Q15 Min PRN    Or    glucose-vitamin C (Dex-4) chewable tab 4 tablet  4 tablet Oral Q15 Min PRN    Or    dextrose 50% injection 50 mL  50 mL Intravenous Q15 Min PRN    Or    glucose (Dex4) 15 GM/59ML oral liquid 30 g  30 g Oral Q15 Min PRN    Or    glucose (Glutose) 40% oral gel 30 g  30 g Oral Q15 Min PRN    Or    glucose-vitamin C (Dex-4) chewable tab 8 tablet  8 tablet Oral Q15 Min PRN    melatonin tab 3 mg  3 mg Oral Nightly PRN    polyethylene glycol (PEG 3350) (Miralax) 17 g oral packet 17 g  17 g Oral Daily PRN    sennosides (Senokot) tab 17.2 mg  17.2 mg Oral Nightly PRN    bisacodyl (Dulcolax) 10 MG rectal suppository 10 mg  10 mg Rectal Daily PRN    ondansetron (Zofran) 4 MG/2ML injection 4 mg  4 mg Intravenous Q6H PRN       LABS     Lab Results   Component Value Date    WBC 5.7 01/13/2025    HGB 10.2 01/13/2025    HCT 27.8 01/13/2025    PLT 80.0 01/13/2025    CREATSERUM 1.33 01/13/2025    BUN 22 01/13/2025     01/13/2025    K 3.9 01/13/2025     01/13/2025    CO2 22.0 01/13/2025     01/13/2025    CA 8.7 01/13/2025    ALB 2.9 01/13/2025    PHOS 2.2 01/13/2025    PGLU 215 01/13/2025       IMAGING   All imaging studies personally reviewed.    CTA CHEST (CPT=71275)   Final Result   PROCEDURE:  CT ANGIOGRAPHY, CHEST (CPT=71275)       COMPARISON:  [facility] , XR, XR CHEST AP PORTABLE  (CPT=71045), 1/09/2025,    10:56 AM.       INDICATIONS:  rule out PE, hypoxic       TECHNIQUE:  IV contrast-enhanced multislice CT angiography is performed    through the pulmonary arterial anatomy. 3D volume renderings are    generated.  Dose reduction techniques were used. Dose information is    transmitted  to the ACR (American College of    Radiology) NRDR (National Radiology Data Registry) which includes the Dose    Index Registry.       PATIENT STATED HISTORY:(As transcribed by Technologist)  Patient is here    with SOB        CONTRAST USED:  80cc of Isovue 370       FINDINGS:     VASCULATURE:  No visible pulmonary arterial thrombus or attenuation.     THORACIC AORTA:  No aneurysm or visible dissection.     LUNGS:  Extensive ground-glass opacities are noted throughout the lungs    superimposed on emphysematous change.  Small bilateral pleural effusions    are present.   MEDIASTINUM:  No adenopathy or mass.     SHARYN:  No mass or adenopathy.     CARDIAC:  No enlargement, pericardial thickening, or significant coronary    artery calcification.   CHEST WALL:  No mass or axillary adenopathy.     LIMITED ABDOMEN:  Limited images of the upper abdomen are unremarkable.     BONES:  No bony lesion or fracture.     OTHER:  Negative.                           =====   CONCLUSION:     1. Ground-glass opacities throughout the lungs superimposed on    emphysematous change.   2. Small bilateral pleural effusions.   3. No evidence of pulmonary embolism.               LOCATION:  [facility]           Dictated by (CST): Richard Francis MD on 1/09/2025 at 1:44 PM        Finalized by (CST): Richard Francis MD on 1/09/2025 at 1:48 PM         US KIDNEYS (CPT=76775)   Final Result   PROCEDURE:  US KIDNEYS (CPT=76775)       COMPARISON:  [facility] , US, US KIDNEYS (CPT=76775), 9/29/2023, 10:46 AM.     [facility] , US, US ABDOMEN COMPLETE (CPT=76700), 9/27/2023, 4:38 PM.       INDICATIONS:  lindsay, urinary retention       TECHNIQUE:  Transabdominal gray scale ultrasound imaging of the bilateral    kidneys and bladder was performed.  Routine technique was utilized.         PATIENT STATED HISTORY: (As transcribed by Technologist)              FINDINGS:        RIGHT KIDNEY   MEASUREMENTS:  9.9 x 4.3 x 5.0 cm   ECHOGENICITY:  Normal.   HYDRONEPHROSIS:  None.    CYSTS/STONES/MASSES:  None.       LEFT KIDNEY    MEASUREMENTS:  7.0 x 4.8 x 4.1 cm   ECHOGENICITY:  Normal.   HYDRONEPHROSIS:  None.   CYSTS/STONES/MASSES:  None.       BLADDER:  Harvey catheter within the bladder.  Bladder is nondistended,    limiting evaluation.   OTHER:  Negative.                         =====   CONCLUSION:  No acute findings.           LOCATION:  [facility]                   Dictated by (CST): Emelyn Abdullahi MD on 1/09/2025 at 12:07 PM        Finalized by (CST): Emelyn Abdullahi MD on 1/09/2025 at 12:07 PM         XR CHEST AP PORTABLE  (CPT=71045)   Final Result   PROCEDURE:  XR CHEST AP PORTABLE  (CPT=71045)       TECHNIQUE:  AP chest radiograph was obtained.       COMPARISON:  [facility] , XR, XR CHEST AP PORTABLE  (CPT=71045), 1/07/2025,    1:50 PM.  [facility] , XR, XR CHEST AP PORTABLE  (CPT=71045), 9/27/2023, 10:21    AM.       INDICATIONS:  hypoxia       PATIENT STATED HISTORY: (As transcribed by Technologist)  Patient offered    no additional history at this time.             FINDINGS:  Cardiac size is stable.  Hyperexpansion of the lungs.  Stable    left-sided power port.  Interstitial opacities bilaterally.  Minimal    bilateral basilar opacities.  No pneumothorax.                         =====   CONCLUSION:     1. Bilateral pulmonary opacities suggestive of pneumonia with underlying    edema.   2. Hyperexpansion of the lungs.             LOCATION:  [facility]                       Dictated by (CST): Emelyn Abdullahi MD on 1/09/2025 at 11:28 AM        Finalized by (CST): Emelyn Abdullahi MD on 1/09/2025 at 11:29 AM         CT BRAIN OR HEAD (CPT=70450)   Final Result   PROCEDURE:  CT BRAIN OR HEAD (79779)       COMPARISON:  [facility] , CT, CT STROKE BRAIN (NO IV)(CPT=70450), 10/11/2023,    6:38 AM.  [facility] , CT, CT BRAIN OR HEAD (66748), 4/15/2021, 7:41 PM.       INDICATIONS:  AMS       TECHNIQUE:  Noncontrast CT scanning is performed through the brain. Dose    reduction techniques were used. Dose  information is transmitted to the ACR    (American College of Radiology) NRDR (National Radiology Data Registry)    which includes the Dose Index    Registry.           FINDINGS:  Exam is limited by patient motion.  Multiple attempts were made    at obtaining diagnostic images.       VENTRICLES/SULCI:  Ventricles and sulci are mildly prominent and stable    consistent with mild stable atrophy.   INTRACRANIAL:  There are no abnormal extraaxial fluid collections.  There    is no midline shift.  Mild stable decreased attenuation in the    periventricular and subcortical deep white matter is nonspecific and    consistent with chronic small vessel ischemic    disease.  Intracranial atherosclerotic arterial vascular calcifications    are noted incidentally.  There is nothing specific for acute infarct.     There is no hemorrhage or mass lesion.     SINUSES:           No sign of acute sinusitis.     MASTOIDS:          No sign of acute inflammation.   SKULL:             No evidence for fracture or osseous abnormality.   OTHER:             Metallic artifact noted from the left globe.  Correlate    with history.                         =====   CONCLUSION:  Mild chronic small vessel ischemic disease and mild atrophy.     No acute intracranial hemorrhage, mass effect or midline shift is    identified.  No significant interval change from previous exams.               LOCATION:  [facility]           Dictated by (CST): Obed Michel MD on 1/08/2025 at 1:11 PM        Finalized by (CST): Obed Michel MD on 1/08/2025 at 1:16 PM         XR CHEST AP PORTABLE  (CPT=71045)   Final Result   PROCEDURE:  XR CHEST AP PORTABLE  (CPT=71045)       TECHNIQUE:  AP chest radiograph was obtained.       COMPARISON:  [facility] , XR, XR CHEST AP PORTABLE  (CPT=71045), 9/27/2023,    10:21 AM.  [facility] , XR, XR CHEST AP PORTABLE  (CPT=71045), 9/25/2023,    11:38 PM.       INDICATIONS:  hyperglycemia       PATIENT STATED HISTORY: (As transcribed by  Technologist)  The patient    offered no additional history at this point.             FINDINGS:  Cardiac size and pulmonary vasculature are within normal    limits. No pleural effusions. No pneumothorax.  Hyperexpansion of the    lungs.  Left-sided power port with tip overlying the expected location    SVC.  Atelectasis or scarring within the lung    bases.                             =====   CONCLUSION:     1. Hyperexpansion of the lungs.   2. Atelectasis or scarring within the lung bases.             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 Found to have DKA w elevated lactic acidosis and hyperkalemia. Nephrology consulted for acidosis and DAVID      Acidosis  -- due to DKA and lactic acidosis mostly.   -- improving with bicarbonate supplementation     DAVID on CKD3  -- Renal biopsy 7/31/2023 showed ATN with myoglobin casts with diabetic and hypertensive changes.   -- due to prerenal + urinary retention   -- s/p CTA w contrast.   -- hold entresto and shayne   -- avoid nephrotoxins and renally dose meds  -- diuretics as needed  -- encourage PO intake     Hypophosphatemia  Hypokalemia  -- Replete and monitor per electrolyte protocol      Anemia  -- transfusion prn        Thank you for allowing me to participate in the care of this patient. Please do not hesitate to call with questions or concerns.     Lesia Pittman, DO  [facility] - Nephrology

## 2025-01-13 NOTE — DISCHARGE INSTRUCTIONS
Ms. Montalvo,    Thank you for allowing us to take care of your health during your admission at [facility]. You are stable for discharge at this time. Your diagnoses and specific instructions for your medications are listed below. Please ensure you follow up with your PCP in 1-2 weeks on discharge and all other follow up appointments listed below.    Diagnoses: DKA, DAVID, Delirium, Acute Hypoxic Respiratory Failure, Possible Pneumonia, Thrombocytopenia    Changes to Medications:  - None    Important Results:  - You will need to follow up with an Endocrinologist in 1-2 weeks    Follow Ups:  - PCP in 1-2 weeks  - Endo in 1-2 weeks    Best Wishes and Good Blanco Garza D.O.  [facility]

## 2025-01-13 NOTE — PLAN OF CARE
Neuro: A&Ox2-3 .VSS. 2L oxygen via nasal canula. . Denies chest pain and SOB. Pt has Left facial droop with slurred speech.  Telemetry: NSR (90s).  GI: Abdomen flat, nondistended. Passing gas and belching    Pt denies N/V.  : Voids freely (incontinent) with clear yellow urine. Q.4 bladder scans done.  Pain controlled with PRN pain medications.   Amb: Up with 2 assist/ walker  Diet: 1800 carb count - automatic trays, moist/minced diet/ thin liquids   IVF:  Fluids SL per order.     All appropriate safety measures in place. Pt updated on POC, call light within reach.

## 2025-01-14 NOTE — DISCHARGE SUMMARY
[facility] Internal Medicine Hospitalist Discharge Summary     Patient ID:  Anna Montalvo  69 year old  9/14/1955    Admit date: 1/7/2025    Discharge date: 1/13/25    Attending Physician: No att. providers found     Primary Care Physician: Justin Newberry MD     Discharge Diagnoses: DKA, DAVID, Delirium, Acute Hypoxic Respiratory Failure    Discharge Condition: stable    Disposition:  Home    Important Follow Ups:  - PCP: 1-2 weeks  - Consults: PCP Follow Up, Endo in 1-2 weeks    Hospital Course:      69 year old female with history 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 Found to be in DKA.     DKA--> resolved  T2DM  Plan:  - Continue 7u Tresiba plus sliding scale on discharge     Leukocytosis - resolved  Bcx: NGTD  Plan:  - s/p abx, received 1 day of Zosyn, 4 days of CTX, stop today, no need for further abx     DAVID on CKD3  Baseline in the low to mid 1s  Plan:  - Near baseline, encourage po hydration      AMS  Likely 2/2 to Delirium  Near baseline after speaking w/   -CT brain pending --> no acute pathology   Plan:  - Delirium precautions      Acute respiratory failure with hypoxia  -CTA chest--> no PE, effusion present, ground glass opacities  -iv diuretics given   -iv abx completed  -home o2 evaluation completed today     Hx Breast Ca  -con tamoxifen      Thrombocytopenia  - PCP follow up    Consults: IP CONSULT TO CARDIOLOGY  IP CONSULT TO HOSPITALIST  IP CONSULT TO CRITICAL CARE INTENSIVIST  IP CONSULT TO DIABETES APRN/PA  IP CONSULT TO NEPHROLOGY  IP CONSULT TO SOCIAL WORK    Operative Procedures:        Patient instructions:      I as the attending physician  reconciled the current and discharge medications on day of discharge.     Discharge Medication List as of 1/13/2025  4:52 PM        CONTINUE these medications which have CHANGED    Details   insulin glargine 100 UNIT/ML Subcutaneous Solution Inject 7 Units into the skin nightly., Normal, Disp-2.1 mL, R-0           CONTINUE these medications which have NOT CHANGED    Details   insulin lispro (HUMALOG) 100 UNIT/ML Injection Solution Inject 2-15 Units into the skin 3 (three) times daily before meals. Please give 2 units fixed dose with meals and correction dose of 1 unit for every 80 pts above 140., Historical      sacubitril-valsartan 24-26 MG Oral Tab Take 0.5 tablets by mouth once daily., Historical      metoprolol succinate ER (TOPROL XL) 25 MG Oral Tablet 24 Hr Take 1 tablet (25 mg total) by mouth daily., Historical      spironolactone 25 MG Oral Tab Take 1 tablet (25 mg total) by mouth daily., Print Script, Disp-30 tablet, R-0      tamoxifen 20 MG Oral Tab Take 1 tablet (20 mg total) by mouth daily., Historical      SIMVASTATIN 20 MG Oral Tab TAKE 1 TABLET(20 MG) BY MOUTH DAILY, Normal, Disp-90 tablet, R-0      citalopram 40 MG Oral Tab Take 1 tablet (40 mg total) by mouth daily., Normal, Disp-90 tablet, R-1      Cholecalciferol (VITAMIN D) 1000 UNITS Oral Tab Take 3,000 units by mouth once daily, OTC, Disp-30 tablet, R-0      Multiple Vitamin (ONE-DAILY MULTI VITAMINS) Oral Tab Take 1 tablet by mouth daily., Historical      Insulin Pen Needle 32G X 4 MM Does not apply Misc May substitute if not on insurance formulary, Normal, Disp-100 each, R-5      FreeStyle System Does not apply Kit 1 each by Does not apply route as needed for Other., Print Script, Disp-1 kit, R-0             Activity: activity as tolerated  Diet: diabetic diet  Wound Care: as directed  Code Status: Full Code      Exam on day of discharge:     Vitals:    01/13/25 1643   BP: 110/82   Pulse: 87   Resp: 18   Temp: 98 °F (36.7 °C)       Physical  Exam:   General: no acute distress  Heart: RRR  Lungs: CTAB  Abd: Soft, NT, ND  Neuro: no focal deficits      Total time coordinating care for discharge: Greater than 30 minutes    Blanco Milian D.O.  [facility] Hospitalist

## 2025-01-14 NOTE — PROGRESS NOTES
Pt discharged by [facility] at 1845 accompanied by , Brendan.  All paperwork and personal items sent with pt. Report given to BURTON Roy at [facility].

## 2025-01-21 LAB
BASE EXCESS BLDV CALC-SCNC: -26.3 MMOL/L
HCO3 BLDV-SCNC: 4 MEQ/L (ref 22–26)
OXYHGB MFR BLDV: 82.1 % (ref 72–78)
PCO2 BLDV: 19 MM HG (ref 38–50)
PH BLDV: 6.96 [PH] (ref 7.33–7.43)
PO2 BLDV: 57 MM HG (ref 30–50)

## (undated) DEVICE — DRESSING TRANS 4.75X4IN ADH HPOAL WTPRF TEGADERM PU STD STRL

## (undated) DEVICE — CANNULA NSL 7FT STD CRV ANGULATE FLXB LIP PLATE CRSH RES LUM

## (undated) DEVICE — CABLE TRNDCR TRANSPAC PVC MALE TO FEMALE CNCT HPRS

## (undated) DEVICE — BAG WST 48IN SPK FLTR RLLR CLAMP FEMALE LL TUBE VENT MERIT

## (undated) DEVICE — HEMOSTAT CMPR VASC REG 24CM

## (undated) DEVICE — CATHETER PRSS MNTR 110CM 7FR PULM WDG

## (undated) DEVICE — KIT SYRINGE 3ML ABG LL TIP LN DRAW FLTRPRO DRY LIHEP DISP

## (undated) DEVICE — CATHETER 6FR JR4 CRV 100CM LG INNER LUM RADOPQ SLCT INFNT

## (undated) DEVICE — SYRINGE 5ML GRAD N-PYRG DEHP-FR PVC FREE STRL MED LF DISP LL

## (undated) DEVICE — Device

## (undated) DEVICE — CATHETER 6FR PGTL CRV 110CM WIRE BRAID ROBUST SHAFT EXPO

## (undated) DEVICE — SYRINGE ANGIO LF MARK 7 ARTERION

## (undated) DEVICE — ADAPTER TBG 2 MALE LL DEHP-FR STRL LF MEDEX 1IN DISP .1ML IV

## (undated) DEVICE — STOPCOCK IV DARK BLUE .082IN MARQUIS 1050 PSI PLYCRB 3W HPRS

## (undated) DEVICE — SHEATH 7FR 10CM 2.5CM .035IN INTRO SNAP ON DIL LOCK KINK RST

## (undated) DEVICE — SHEET TRNSF 1960X760MM 480MM MAXITUBE FLITES PRM TUBE LF

## (undated) DEVICE — KIT MICROINTRODUCER 4FR .018IN 40CM 7CM .018IN SFTP MNDRL

## (undated) DEVICE — DECANTER FLUID DSPNSR BAG BAJ DISP STRL LF ASEPTIC TRNSF

## (undated) DEVICE — DRAPE L4 APRTR BRR PRTC 42X29IN 4.5X3.5IN SURG ACTI-GARD

## (undated) DEVICE — NEEDLE PROC 21GA 2.5CM THNWL WO BSPLT STRL DISP PERC VASC

## (undated) DEVICE — GLOVE SURG 7 PROTEXIS LF CRM PF BEAD CUFF STRL PLISPRN 12IN

## (undated) DEVICE — CATHETER 6FR PGTL CRV 110CM FULL LGTH WIRE BRAID ROBUST

## (undated) DEVICE — APPLICATOR 70% ISO ALC 2% CHG 10.5ML BD CHLRPRP SCRUB TEAL

## (undated) DEVICE — GUIDEWIRE 150CM 3MM RDS J CRV .035IN VASC PTFE FX CORE LF

## (undated) DEVICE — KIT INTRO 6FR 21GA 10CM 45CM .021IN 35MM FLEX STRGT SHTH DIL

## (undated) NOTE — ED AVS SNAPSHOT
[facility]   MRN: [id number]    Department:  [facility] Emergency Department   Date of Visit:  7/24/2019           Disclosure     Insurance plans vary and the physician(s) referred by the ER may not be covered by your plan.  Please contact yo tell this physician (or your personal doctor if your instructions are to return to your personal doctor) about any new or lasting problems. The primary care or specialist physician will see patients referred from the [facility] Emergency Department.  Arthor Bernheim

## (undated) NOTE — LETTER
[facility]  [address]  Consent for Procedure/Sedation  Date: 08/01/2023         Time: 5    I hereby authorize Dr. Monica Gr, my physician and his/her assistants (if applicable), which may include medical students, residents, and/or fellows, to perform the following surgical operation/ procedure and administer such anesthesia as may be determined necessary by my physician:  Operation/Procedure name (s)  Cardiac Catheterization, Left Ventricular Cineangiography, Bilateral Selective Coronary Angiography and/or Right Heart Catheterization; possible Percutaneous Transluminal Coronary Angioplasty, Coronary Atherectomy, Coronary Stent, Intracoronary Thrombolytic therapy, Antiplatelet therapy and/or Intravascular Ultrasound on Anna Montalvo   2. I recognize that during the surgical operation/procedure, unforeseen conditions may necessitate additional or different procedures than those listed above. I, therefore, further authorize and request that the above-named surgeon, assistants, or designees perform such procedures as are, in their judgment, necessary and desirable. 3.   My surgeon/physician has discussed prior to my surgery the potential benefits, risks and side effects of this procedure; the likelihood of achieving goals; and potential problems that might occur during recuperation. They also discussed reasonable alternatives to the procedure, including risks, benefits, and side effects related to the alternatives and risks related to not receiving this procedure. I have had all my questions answered and I acknowledge that no guarantee has been made as to the result that may be obtained. 4.   Should the need arise during my operation/procedure, which includes change of level of care prior to discharge, I also consent to the administration of blood and/or blood products.   Further, I understand that despite careful testing and screening of blood or blood products by collecting agencies, I may still be subject to ill effects as a result of receiving a blood transfusion and/or blood products. The following are some, but not all, of the potential risks that can occur: fever and allergic reactions, hemolytic reactions, transmission of diseases such as Hepatitis, AIDS and Cytomegalovirus (CMV) and fluid overload. In the event that I wish to have an autologous transfusion of my own blood, or a directed donor transfusion, I will discuss this with my physician. Check only if Refusing Blood or Blood Products  I understand refusal of blood or blood products as deemed necessary by my physician may have serious consequences to my condition to include possible death. I hereby assume responsibility for my refusal and release the hospital, its personnel, and my physicians from any responsibility for the consequences of my refusal.          o  Refuse      5. I authorize the use of any specimen, organs, tissues, body parts or foreign objects that may be removed from my body during the operation/procedure for diagnosis, research or teaching purposes and their subsequent disposal by hospital authorities. I also authorize the release of specimen test results and/or written reports to my treating physician on the hospital medical staff or other referring or consulting physicians involved in my care, at the discretion of the Pathologist or my treating physician. 6.   I consent to the photographing or videotaping of the operations or procedures to be performed, including appropriate portions of my body for medical, scientific, or educational purposes, provided my identity is not revealed by the pictures or by descriptive texts accompanying them. If the procedure has been photographed/videotaped, the surgeon will obtain the original picture, image, videotape or CD.   The hospital will not be responsible for storage, release or maintenance of the picture, image, tape or CD.    7.   I consent to the presence of a  or observers in the operating room as deemed necessary by my physician or their designees. 8.   I recognize that in the event my procedure results in extended X-Ray/fluoroscopy time, I may develop a skin reaction. 9. If I have a Do Not Attempt Resuscitation (DNAR) order in place, that status will be suspended while in the operating room, procedural suite, and during the recovery period unless otherwise explicitly stated by me (or a person authorized to consent on my behalf). The surgeon or my attending physician will determine when the applicable recovery period ends for purposes of reinstating the DNAR order. 10. Patients having a sterilization procedure: I understand that if the procedure is successful the results will be permanent and it will therefore be impossible for me to inseminate, conceive, or bear children. I also understand that the procedure is intended to result in sterility, although the result has not been guaranteed. 11. I acknowledge that my physician has explained sedation/analgesia administration to me including the risk and benefits I consent to the administration of sedation/analgesia as may be necessary or desirable in the judgment of my physician.     I CERTIFY THAT I HAVE READ AND FULLY UNDERSTAND THE ABOVE CONSENT TO OPERATION and/or OTHER PROCEDURE.        ____________________________________       _________________________________      ______________________________  Signature of Patient         Signature of Responsible Person        Printed Name of Responsible Person    ____________________________________      _________________________________      ______________________________       Signature of Witness          Relationship to Patient                       Date                                       Time  Patient Name: Darron Sheffield     : 1955                 Printed: 2023      Medical Record #: [id number] Page 1 of 2

## (undated) NOTE — LETTER
[facility] [facility] FOR BLOOD OR BLOOD COMPONENTS      In the course of your treatment, it may become necessary to administer a transfusion of blood or blood components. This form provides basic information concerning this procedure and, if signed by you, authorizes its performance by qualified medical personnel. DESCRIPTION OF PROCEDURE:  Blood is introduced into one of your veins, commonly in the arm, using a sterilized disposable needle. The amount of blood transfused, and whether the transfusion will be of blood or blood components is a judgment the physician will make based on your particular needs. RISKS:  The transfusion is a common procedure of low risk. MINOR AND TEMPORARY REACTIONS ARE NOT UNCOMMON, including a slight bruise, swelling or local reaction in the area where the needle pierces your skin, or a non-serious reaction to the transfused material itself, including headache, fever or a mild skin reaction, such as rash. Serious reactions are possible, though very unlikely and include severe allergic reaction (shock)  and destruction (hemolysis) of transfused blood cells. Infectious diseases which are known to be transmitted by blood transfusion include CERTAIN TYPES OF VIRAL HEPATITIS, a viral infection of the liver, HUMAN IMMUNODEFICIENCY VIRUS (HIV-1,2) infection, a viral infection known to cause ACQUIRED IMMUNODEFICIENCY SYNDROME (AIDS) AS WELL AS CERTAIN OTHER BACTERIAL, VIRAL AND PARASITIC DISEASES. While a minimal risk of acquiring an infectious disease from transfused blood exists, in accordance with Federal and State law all due care has been taken in donor selection and testing to avoid transmission of disease. ALTERNATIVES:  If loss of blood poses serious threats in the course of your treatment, THERE IS NO EFFECTIVE ALTERNATIVE TO BLOOD TRANSFUSION.  However, if you have any further questions on this matter, your physician will fully explain the alternatives to you if it has not already been done. I,Anna Montalvo I, have read/had read to me the above. I understand the matters bearing on the decision whether or not to authorize a transfusion of blood or blood components. I have no questions which have not been answered to my full satisfaction.  I hereby consent to such transfusion as  my physician may deem necessary or advisable in the course of my treatment.        _______________   __________________________________________________  Date     Signature of Patient, Parent or Legal Guardian      (Vivian One)      __________________________________________  Witness to Signature (title or relationship to patient)    Patient Name: Julieta Dallas     : 1955                 Printed: 2023     Medical Record #: [id number]                    Page 1 of 1

## (undated) NOTE — LETTER
Date & Time: 7/24/2019, 12:00 PM  Patient: Reina Novel  Encounter Provider(s):    Ana Bush MD       To Whom It May Concern:    Ricky Guzman was seen and treated in our department on 7/24/2019. She can return to work.     If you have any questio

## (undated) NOTE — LETTER
[address]     I agree to have a Peripherally Inserted Central Catheter (PICC) placed in my arm.    1. The PICC insertion procedure, care, maintenance, risks, benefits, and complications have been explained to me by my physic including risks, benefits, and side effects related to the alternatives and risks related to not receiving this procedure. 8.  I have expressed any questions about this procedure to my physician or the PICC Proceduralist and he/she has answered them.   I

## (undated) NOTE — LETTER
[address]  [address] [place] [address], [address] [place] [address]  Samia [address]:  [phone]  FAX:  [phone]    17    Patient: Chansagrario Sveta  : [id number]     Dear  Julia Agent for her repeat EMG was provided today and we will discuss the next step after the results are back. Thank you for involving me in this patients care and please do not hesitate to contact me with any questions.               Sincerely,        Betina REAL